# Patient Record
Sex: FEMALE | Race: WHITE | NOT HISPANIC OR LATINO | Employment: OTHER | URBAN - METROPOLITAN AREA
[De-identification: names, ages, dates, MRNs, and addresses within clinical notes are randomized per-mention and may not be internally consistent; named-entity substitution may affect disease eponyms.]

---

## 2017-02-13 ENCOUNTER — APPOINTMENT (EMERGENCY)
Dept: RADIOLOGY | Facility: HOSPITAL | Age: 57
End: 2017-02-13
Payer: COMMERCIAL

## 2017-02-13 ENCOUNTER — HOSPITAL ENCOUNTER (EMERGENCY)
Facility: HOSPITAL | Age: 57
Discharge: HOME | End: 2017-02-13
Payer: COMMERCIAL

## 2017-02-13 VITALS
OXYGEN SATURATION: 98 % | RESPIRATION RATE: 17 BRPM | SYSTOLIC BLOOD PRESSURE: 133 MMHG | DIASTOLIC BLOOD PRESSURE: 95 MMHG | HEART RATE: 65 BPM | WEIGHT: 146 LBS | TEMPERATURE: 97.7 F

## 2017-02-13 DIAGNOSIS — R53.1 WEAKNESS: Primary | ICD-10-CM

## 2017-02-13 DIAGNOSIS — E87.6 HYPOKALEMIA: ICD-10-CM

## 2017-02-13 LAB
ALBUMIN SERPL BCP-MCNC: 3.4 G/DL (ref 3.5–5)
ALP SERPL-CCNC: 83 U/L (ref 46–116)
ALT SERPL W P-5'-P-CCNC: 40 U/L (ref 12–78)
ANION GAP SERPL CALCULATED.3IONS-SCNC: 9 MMOL/L (ref 4–13)
AST SERPL W P-5'-P-CCNC: 15 U/L (ref 5–45)
BASOPHILS # BLD AUTO: 0.03 THOUSANDS/ΜL (ref 0–0.1)
BASOPHILS NFR BLD AUTO: 1 % (ref 0–1)
BILIRUB SERPL-MCNC: 0.3 MG/DL (ref 0.2–1)
BUN SERPL-MCNC: 20 MG/DL (ref 5–25)
CALCIUM SERPL-MCNC: 9 MG/DL (ref 8.3–10.1)
CHLORIDE SERPL-SCNC: 101 MMOL/L (ref 100–108)
CO2 SERPL-SCNC: 32 MMOL/L (ref 21–32)
CREAT SERPL-MCNC: 0.83 MG/DL (ref 0.6–1.3)
EOSINOPHIL # BLD AUTO: 0.2 THOUSAND/ΜL (ref 0–0.61)
EOSINOPHIL NFR BLD AUTO: 4 % (ref 0–6)
ERYTHROCYTE [DISTWIDTH] IN BLOOD BY AUTOMATED COUNT: 12.6 % (ref 11.6–15.1)
GFR SERPL CREATININE-BSD FRML MDRD: >60 ML/MIN/1.73SQ M
GLUCOSE SERPL-MCNC: 95 MG/DL (ref 65–140)
HCT VFR BLD AUTO: 44.6 % (ref 34.8–46.1)
HGB BLD-MCNC: 15.6 G/DL (ref 11.5–15.4)
LYMPHOCYTES # BLD AUTO: 2.15 THOUSANDS/ΜL (ref 0.6–4.47)
LYMPHOCYTES NFR BLD AUTO: 41 % (ref 14–44)
MCH RBC QN AUTO: 31.1 PG (ref 26.8–34.3)
MCHC RBC AUTO-ENTMCNC: 35 G/DL (ref 31.4–37.4)
MCV RBC AUTO: 89 FL (ref 82–98)
MONOCYTES # BLD AUTO: 0.51 THOUSAND/ΜL (ref 0.17–1.22)
MONOCYTES NFR BLD AUTO: 10 % (ref 4–12)
NEUTROPHILS # BLD AUTO: 2.33 THOUSANDS/ΜL (ref 1.85–7.62)
NEUTS SEG NFR BLD AUTO: 44 % (ref 43–75)
PLATELET # BLD AUTO: 254 THOUSANDS/UL (ref 149–390)
PMV BLD AUTO: 10.7 FL (ref 8.9–12.7)
POTASSIUM SERPL-SCNC: 2.9 MMOL/L (ref 3.5–5.3)
PROT SERPL-MCNC: 6.8 G/DL (ref 6.4–8.2)
RBC # BLD AUTO: 5.01 MILLION/UL (ref 3.81–5.12)
SODIUM SERPL-SCNC: 142 MMOL/L (ref 136–145)
TROPONIN I SERPL-MCNC: <0.02 NG/ML
WBC # BLD AUTO: 5.22 THOUSAND/UL (ref 4.31–10.16)

## 2017-02-13 PROCEDURE — 85025 COMPLETE CBC W/AUTO DIFF WBC: CPT | Performed by: PHYSICIAN ASSISTANT

## 2017-02-13 PROCEDURE — 80053 COMPREHEN METABOLIC PANEL: CPT | Performed by: PHYSICIAN ASSISTANT

## 2017-02-13 PROCEDURE — 99285 EMERGENCY DEPT VISIT HI MDM: CPT

## 2017-02-13 PROCEDURE — 84484 ASSAY OF TROPONIN QUANT: CPT | Performed by: PHYSICIAN ASSISTANT

## 2017-02-13 PROCEDURE — 36415 COLL VENOUS BLD VENIPUNCTURE: CPT | Performed by: PHYSICIAN ASSISTANT

## 2017-02-13 PROCEDURE — 71020 HB CHEST X-RAY 2VW FRONTAL&LATL: CPT

## 2017-02-13 PROCEDURE — 93005 ELECTROCARDIOGRAM TRACING: CPT | Performed by: PHYSICIAN ASSISTANT

## 2017-02-13 RX ORDER — POTASSIUM CHLORIDE 20 MEQ/1
40 TABLET, EXTENDED RELEASE ORAL ONCE
Status: COMPLETED | OUTPATIENT
Start: 2017-02-13 | End: 2017-02-13

## 2017-02-13 RX ADMIN — POTASSIUM CHLORIDE 40 MEQ: 1500 TABLET, EXTENDED RELEASE ORAL at 22:17

## 2017-02-14 LAB
ATRIAL RATE: 69 BPM
P AXIS: 54 DEGREES
PR INTERVAL: 154 MS
QRS AXIS: -17 DEGREES
QRSD INTERVAL: 98 MS
QT INTERVAL: 406 MS
QTC INTERVAL: 435 MS
T WAVE AXIS: 56 DEGREES
VENTRICULAR RATE: 69 BPM

## 2017-03-02 ENCOUNTER — HOSPITAL ENCOUNTER (EMERGENCY)
Facility: HOSPITAL | Age: 57
Discharge: HOME | End: 2017-03-02
Admitting: EMERGENCY MEDICINE
Payer: COMMERCIAL

## 2017-03-02 VITALS
WEIGHT: 147 LBS | RESPIRATION RATE: 18 BRPM | DIASTOLIC BLOOD PRESSURE: 92 MMHG | TEMPERATURE: 97.6 F | HEART RATE: 64 BPM | HEIGHT: 66 IN | BODY MASS INDEX: 23.63 KG/M2 | SYSTOLIC BLOOD PRESSURE: 132 MMHG | OXYGEN SATURATION: 97 %

## 2017-03-02 DIAGNOSIS — T50.905A DRUG REACTION, INITIAL ENCOUNTER: Primary | ICD-10-CM

## 2017-03-02 LAB
ANION GAP SERPL CALCULATED.3IONS-SCNC: 9 MMOL/L (ref 4–13)
BASOPHILS # BLD MANUAL: 0.11 THOUSAND/UL (ref 0–0.1)
BASOPHILS NFR MAR MANUAL: 2 % (ref 0–1)
BUN SERPL-MCNC: 22 MG/DL (ref 5–25)
CALCIUM SERPL-MCNC: 9 MG/DL (ref 8.3–10.1)
CHLORIDE SERPL-SCNC: 102 MMOL/L (ref 100–108)
CLARITY, POC: CLEAR
CO2 SERPL-SCNC: 32 MMOL/L (ref 21–32)
COLOR, POC: YELLOW
CREAT SERPL-MCNC: 0.86 MG/DL (ref 0.6–1.3)
EOSINOPHIL # BLD MANUAL: 0.28 THOUSAND/UL (ref 0–0.4)
EOSINOPHIL NFR BLD MANUAL: 5 % (ref 0–6)
ERYTHROCYTE [DISTWIDTH] IN BLOOD BY AUTOMATED COUNT: 13 % (ref 11.6–15.1)
EXT BILIRUBIN, UA: NORMAL
EXT BLOOD URINE: NORMAL
EXT GLUCOSE, UA: NORMAL
EXT KETONES: NORMAL
EXT NITRITE, UA: NORMAL
EXT PH, UA: 7
EXT PROTEIN, UA: NORMAL
EXT SPECIFIC GRAVITY, UA: 1.01
EXT UROBILINOGEN: 0.2
GFR SERPL CREATININE-BSD FRML MDRD: >60 ML/MIN/1.73SQ M
GLUCOSE SERPL-MCNC: 103 MG/DL (ref 65–140)
HCT VFR BLD AUTO: 46.1 % (ref 34.8–46.1)
HGB BLD-MCNC: 15.6 G/DL (ref 11.5–15.4)
LYMPHOCYTES # BLD AUTO: 2.19 THOUSAND/UL (ref 0.6–4.47)
LYMPHOCYTES # BLD AUTO: 39 % (ref 14–44)
MCH RBC QN AUTO: 31.4 PG (ref 26.8–34.3)
MCHC RBC AUTO-ENTMCNC: 33.8 G/DL (ref 31.4–37.4)
MCV RBC AUTO: 93 FL (ref 82–98)
MONOCYTES # BLD AUTO: 0.39 THOUSAND/UL (ref 0–1.22)
MONOCYTES NFR BLD: 7 % (ref 4–12)
NEUTROPHILS # BLD MANUAL: 2.3 THOUSAND/UL (ref 1.85–7.62)
NEUTS SEG NFR BLD AUTO: 41 % (ref 43–75)
PLATELET # BLD AUTO: 277 THOUSANDS/UL (ref 149–390)
PLATELET BLD QL SMEAR: ADEQUATE
PMV BLD AUTO: 10.1 FL (ref 8.9–12.7)
POTASSIUM SERPL-SCNC: 3.8 MMOL/L (ref 3.5–5.3)
RBC # BLD AUTO: 4.97 MILLION/UL (ref 3.81–5.12)
RBC MORPH BLD: NORMAL
SODIUM SERPL-SCNC: 143 MMOL/L (ref 136–145)
TOTAL CELLS COUNTED SPEC: 100
VARIANT LYMPHS # BLD AUTO: 6 %
WBC # BLD AUTO: 5.62 THOUSAND/UL (ref 4.31–10.16)
WBC # BLD EST: NORMAL 10*3/UL

## 2017-03-02 PROCEDURE — 80048 BASIC METABOLIC PNL TOTAL CA: CPT | Performed by: PHYSICIAN ASSISTANT

## 2017-03-02 PROCEDURE — 85027 COMPLETE CBC AUTOMATED: CPT | Performed by: PHYSICIAN ASSISTANT

## 2017-03-02 PROCEDURE — 85007 BL SMEAR W/DIFF WBC COUNT: CPT | Performed by: PHYSICIAN ASSISTANT

## 2017-03-02 PROCEDURE — 36415 COLL VENOUS BLD VENIPUNCTURE: CPT | Performed by: PHYSICIAN ASSISTANT

## 2017-03-02 PROCEDURE — 99284 EMERGENCY DEPT VISIT MOD MDM: CPT

## 2017-03-02 PROCEDURE — 81002 URINALYSIS NONAUTO W/O SCOPE: CPT | Performed by: PHYSICIAN ASSISTANT

## 2017-03-02 RX ORDER — MULTIVITAMIN WITH IRON
TABLET ORAL
COMMUNITY
End: 2018-02-27 | Stop reason: SDUPTHER

## 2017-03-02 RX ORDER — POTASSIUM CHLORIDE 1500 MG/1
10 TABLET, FILM COATED, EXTENDED RELEASE ORAL
COMMUNITY
End: 2018-02-27 | Stop reason: SDUPTHER

## 2017-03-02 RX ORDER — CHOLECALCIFEROL (VITAMIN D3) 125 MCG
500 CAPSULE ORAL DAILY
COMMUNITY
End: 2018-02-27 | Stop reason: SDUPTHER

## 2017-04-17 ENCOUNTER — HOSPITAL ENCOUNTER (OUTPATIENT)
Dept: BONE DENSITY | Facility: IMAGING CENTER | Age: 57
Discharge: HOME | End: 2017-04-17
Payer: COMMERCIAL

## 2017-04-17 DIAGNOSIS — Z00.00 ENCOUNTER FOR GENERAL ADULT MEDICAL EXAMINATION WITHOUT ABNORMAL FINDINGS: ICD-10-CM

## 2017-04-17 PROCEDURE — G0202 SCR MAMMO BI INCL CAD: HCPCS

## 2017-05-11 ENCOUNTER — HOSPITAL ENCOUNTER (EMERGENCY)
Facility: HOSPITAL | Age: 57
Discharge: HOME | End: 2017-05-11
Admitting: EMERGENCY MEDICINE
Payer: COMMERCIAL

## 2017-05-11 ENCOUNTER — APPOINTMENT (EMERGENCY)
Dept: RADIOLOGY | Facility: HOSPITAL | Age: 57
End: 2017-05-11
Payer: COMMERCIAL

## 2017-05-11 VITALS
OXYGEN SATURATION: 98 % | HEART RATE: 79 BPM | SYSTOLIC BLOOD PRESSURE: 129 MMHG | BODY MASS INDEX: 23.73 KG/M2 | RESPIRATION RATE: 20 BRPM | TEMPERATURE: 97.2 F | WEIGHT: 147 LBS | DIASTOLIC BLOOD PRESSURE: 67 MMHG

## 2017-05-11 DIAGNOSIS — S66.911A STRAIN OF RIGHT WRIST, INITIAL ENCOUNTER: Primary | ICD-10-CM

## 2017-05-11 PROCEDURE — 73110 X-RAY EXAM OF WRIST: CPT

## 2017-05-11 PROCEDURE — 99283 EMERGENCY DEPT VISIT LOW MDM: CPT

## 2017-05-11 RX ORDER — UBIDECARENONE/VIT E ACET 100MG-5
1 CAPSULE ORAL DAILY
COMMUNITY
End: 2018-02-27 | Stop reason: SDUPTHER

## 2017-05-11 RX ORDER — IBUPROFEN 600 MG/1
600 TABLET ORAL ONCE
Status: COMPLETED | OUTPATIENT
Start: 2017-05-11 | End: 2017-05-11

## 2017-05-11 RX ADMIN — IBUPROFEN 600 MG: 600 TABLET, FILM COATED ORAL at 19:18

## 2017-06-22 ENCOUNTER — ALLSCRIPTS OFFICE VISIT (OUTPATIENT)
Dept: OTHER | Facility: OTHER | Age: 57
End: 2017-06-22

## 2017-06-22 DIAGNOSIS — Z01.419 ENCOUNTER FOR GYNECOLOGICAL EXAMINATION WITHOUT ABNORMAL FINDING: ICD-10-CM

## 2017-06-27 LAB
ADEQUACY: (HISTORICAL): NORMAL
CLINICIAN PROVIDIED ICD 9 OR 10 (HISTORICAL): NORMAL
COMMENT (HISTORICAL): NORMAL
DIAGNOSIS (HISTORICAL): NORMAL
HPV HIGH RISK (HISTORICAL): NEGATIVE
NOTE: (HISTORICAL): NORMAL
PERFORMED BY (HISTORICAL): NORMAL
TEST INFORMATION (HISTORICAL): NORMAL

## 2018-01-12 VITALS
WEIGHT: 150 LBS | HEIGHT: 66 IN | DIASTOLIC BLOOD PRESSURE: 80 MMHG | SYSTOLIC BLOOD PRESSURE: 126 MMHG | BODY MASS INDEX: 24.11 KG/M2

## 2018-02-27 PROBLEM — Z00.00 HEALTH CARE MAINTENANCE: Status: ACTIVE | Noted: 2018-02-27

## 2018-02-27 RX ORDER — LOSARTAN POTASSIUM 50 MG/1
TABLET ORAL
COMMUNITY
End: 2018-03-19 | Stop reason: SDUPTHER

## 2018-02-27 RX ORDER — HYDROCHLOROTHIAZIDE 12.5 MG/1
CAPSULE, GELATIN COATED ORAL
COMMUNITY
Start: 2017-02-16 | End: 2018-03-19 | Stop reason: SDUPTHER

## 2018-02-27 NOTE — PROGRESS NOTES
Assessment/Plan:    Health care maintenance  Here to establish care and preventive health    Patient Active Problem List   Diagnosis    Health care maintenance     No orders of the defined types were placed in this encounter  Diagnoses and all orders for this visit:    Health care maintenance    Other orders  -     aspirin 81 MG tablet; Take by mouth  -     losartan (COZAAR) 50 mg tablet; Take by mouth  -     hydrochlorothiazide (MICROZIDE) 12 5 mg capsule; Take by mouth          Subjective:      Patient ID: Naomi Chow is a 65 y o  female  Here to establish care  The following portions of the patient's history were reviewed and updated as appropriate: allergies, current medications, past family history, past medical history, past social history, past surgical history and problem list     Review of Systems   Constitutional: Negative for fatigue and fever  HENT: Negative for hearing loss  Eyes: Negative for visual disturbance  Respiratory: Negative for cough, chest tightness, shortness of breath and wheezing  Cardiovascular: Negative for chest pain, palpitations and leg swelling  Gastrointestinal: Negative for abdominal pain, diarrhea and nausea  Genitourinary: Negative for dysuria and hematuria  Musculoskeletal: Negative for arthralgias  Neurological: Negative for dizziness, numbness and headaches  Psychiatric/Behavioral: Negative for confusion and dysphoric mood  All other systems reviewed and are negative  Objective:      Current Outpatient Prescriptions:     hydrochlorothiazide (MICROZIDE) 12 5 mg capsule, Take by mouth, Disp: , Rfl:     aspirin 81 MG tablet, Take by mouth, Disp: , Rfl:     losartan (COZAAR) 50 mg tablet, Take by mouth, Disp: , Rfl:      Physical Exam   Constitutional: She is oriented to person, place, and time  She appears well-developed and well-nourished  Eyes: Pupils are equal, round, and reactive to light     Neck: Normal range of motion  Neck supple  No thyromegaly present  Cardiovascular: Normal rate, regular rhythm, normal heart sounds and intact distal pulses  No murmur heard  Pulmonary/Chest: Effort normal and breath sounds normal  She has no wheezes  She has no rales  Abdominal: Soft  Bowel sounds are normal  There is no tenderness  Musculoskeletal: Normal range of motion  She exhibits no edema, tenderness or deformity  Lymphadenopathy:     She has no cervical adenopathy  Neurological: She is alert and oriented to person, place, and time  She has normal reflexes  Skin: Skin is warm and dry  Psychiatric: She has a normal mood and affect  Vitals reviewed

## 2018-02-28 ENCOUNTER — OFFICE VISIT (OUTPATIENT)
Dept: FAMILY MEDICINE CLINIC | Facility: HOSPITAL | Age: 58
End: 2018-02-28
Payer: COMMERCIAL

## 2018-02-28 VITALS
OXYGEN SATURATION: 96 % | WEIGHT: 148 LBS | HEIGHT: 66 IN | SYSTOLIC BLOOD PRESSURE: 144 MMHG | DIASTOLIC BLOOD PRESSURE: 70 MMHG | HEART RATE: 85 BPM | BODY MASS INDEX: 23.78 KG/M2 | RESPIRATION RATE: 16 BRPM

## 2018-02-28 DIAGNOSIS — I10 ESSENTIAL HYPERTENSION: ICD-10-CM

## 2018-02-28 DIAGNOSIS — G47.00 INSOMNIA, UNSPECIFIED TYPE: Primary | ICD-10-CM

## 2018-02-28 DIAGNOSIS — Z00.00 HEALTH CARE MAINTENANCE: ICD-10-CM

## 2018-02-28 DIAGNOSIS — R73.9 HYPERGLYCEMIA: ICD-10-CM

## 2018-02-28 PROBLEM — F84.0 AUTISM DISORDER: Chronic | Status: ACTIVE | Noted: 2018-02-28

## 2018-02-28 PROCEDURE — 99203 OFFICE O/P NEW LOW 30 MIN: CPT | Performed by: INTERNAL MEDICINE

## 2018-02-28 RX ORDER — TEMAZEPAM 7.5 MG/1
7.5 CAPSULE ORAL
COMMUNITY
End: 2018-02-28 | Stop reason: SDUPTHER

## 2018-02-28 RX ORDER — LORAZEPAM 0.5 MG/1
0.5 TABLET ORAL DAILY PRN
COMMUNITY
End: 2018-04-04 | Stop reason: SDUPTHER

## 2018-02-28 RX ORDER — TEMAZEPAM 7.5 MG/1
7.5 CAPSULE ORAL
Qty: 30 CAPSULE | Refills: 0 | Status: SHIPPED | OUTPATIENT
Start: 2018-02-28 | End: 2018-04-04 | Stop reason: SDUPTHER

## 2018-02-28 NOTE — PATIENT INSTRUCTIONS
First office visit today  Immediate concerns were about sleep  Current medications are effective and will be refilled as needed    Might need referral to sleep specialist      Blood pressure elevated a bit but likely due to stress    Screening labs ordered, no need to fast

## 2018-02-28 NOTE — ASSESSMENT & PLAN NOTE
Not a new problem, long standing, has used ativan in past  Patient is seeing a counselor and feels this is impacting her sleep  Can use prn ativan up to 1 5mg at night if desired

## 2018-03-19 DIAGNOSIS — I10 HYPERTENSION, ESSENTIAL: Primary | ICD-10-CM

## 2018-03-19 RX ORDER — LOSARTAN POTASSIUM 50 MG/1
50 TABLET ORAL DAILY
Qty: 90 TABLET | Refills: 3 | Status: SHIPPED | OUTPATIENT
Start: 2018-03-19 | End: 2019-03-27 | Stop reason: SDUPTHER

## 2018-03-19 RX ORDER — HYDROCHLOROTHIAZIDE 12.5 MG/1
12.5 CAPSULE, GELATIN COATED ORAL DAILY
Qty: 90 CAPSULE | Refills: 3 | Status: SHIPPED | OUTPATIENT
Start: 2018-03-19 | End: 2019-03-27 | Stop reason: SDUPTHER

## 2018-03-23 ENCOUNTER — HOSPITAL ENCOUNTER (EMERGENCY)
Facility: HOSPITAL | Age: 58
Discharge: HOME | End: 2018-03-23
Payer: COMMERCIAL

## 2018-03-23 VITALS
WEIGHT: 145 LBS | HEIGHT: 66 IN | TEMPERATURE: 98.5 F | SYSTOLIC BLOOD PRESSURE: 128 MMHG | OXYGEN SATURATION: 99 % | RESPIRATION RATE: 18 BRPM | BODY MASS INDEX: 23.3 KG/M2 | HEART RATE: 60 BPM | DIASTOLIC BLOOD PRESSURE: 80 MMHG

## 2018-03-23 DIAGNOSIS — E87.6 HYPOKALEMIA: ICD-10-CM

## 2018-03-23 DIAGNOSIS — R42 DIZZINESS: Primary | ICD-10-CM

## 2018-03-23 LAB
ANION GAP SERPL CALCULATED.3IONS-SCNC: 6 MMOL/L (ref 4–13)
BASOPHILS # BLD AUTO: 0.03 THOUSANDS/ΜL (ref 0–0.1)
BASOPHILS NFR BLD AUTO: 1 % (ref 0–1)
BILIRUB UR QL STRIP: NEGATIVE
BUN SERPL-MCNC: 23 MG/DL (ref 5–25)
CALCIUM SERPL-MCNC: 8.6 MG/DL (ref 8.3–10.1)
CHLORIDE SERPL-SCNC: 102 MMOL/L (ref 100–108)
CLARITY UR: CLEAR
CO2 SERPL-SCNC: 33 MMOL/L (ref 21–32)
COLOR UR: YELLOW
CREAT SERPL-MCNC: 0.86 MG/DL (ref 0.6–1.3)
EOSINOPHIL # BLD AUTO: 0.35 THOUSAND/ΜL (ref 0–0.61)
EOSINOPHIL NFR BLD AUTO: 7 % (ref 0–6)
ERYTHROCYTE [DISTWIDTH] IN BLOOD BY AUTOMATED COUNT: 13.2 % (ref 11.6–15.1)
GFR SERPL CREATININE-BSD FRML MDRD: 76 ML/MIN/1.73SQ M
GLUCOSE SERPL-MCNC: 102 MG/DL (ref 65–140)
GLUCOSE UR STRIP-MCNC: NEGATIVE MG/DL
HCT VFR BLD AUTO: 45.3 % (ref 34.8–46.1)
HGB BLD-MCNC: 15.5 G/DL (ref 11.5–15.4)
HGB UR QL STRIP.AUTO: NEGATIVE
KETONES UR STRIP-MCNC: NEGATIVE MG/DL
LEUKOCYTE ESTERASE UR QL STRIP: NEGATIVE
LYMPHOCYTES # BLD AUTO: 1.6 THOUSANDS/ΜL (ref 0.6–4.47)
LYMPHOCYTES NFR BLD AUTO: 30 % (ref 14–44)
MCH RBC QN AUTO: 31.9 PG (ref 26.8–34.3)
MCHC RBC AUTO-ENTMCNC: 34.2 G/DL (ref 31.4–37.4)
MCV RBC AUTO: 93 FL (ref 82–98)
MONOCYTES # BLD AUTO: 0.46 THOUSAND/ΜL (ref 0.17–1.22)
MONOCYTES NFR BLD AUTO: 9 % (ref 4–12)
NEUTROPHILS # BLD AUTO: 2.84 THOUSANDS/ΜL (ref 1.85–7.62)
NEUTS SEG NFR BLD AUTO: 53 % (ref 43–75)
NITRITE UR QL STRIP: NEGATIVE
PH UR STRIP.AUTO: 6.5 [PH] (ref 4.5–8)
PLATELET # BLD AUTO: 247 THOUSANDS/UL (ref 149–390)
PMV BLD AUTO: 10.1 FL (ref 8.9–12.7)
POTASSIUM SERPL-SCNC: 3.2 MMOL/L (ref 3.5–5.3)
PROT UR STRIP-MCNC: NEGATIVE MG/DL
RBC # BLD AUTO: 4.86 MILLION/UL (ref 3.81–5.12)
SODIUM SERPL-SCNC: 141 MMOL/L (ref 136–145)
SP GR UR STRIP.AUTO: 1.01 (ref 1–1.03)
UROBILINOGEN UR QL STRIP.AUTO: 0.2 E.U./DL
WBC # BLD AUTO: 5.28 THOUSAND/UL (ref 4.31–10.16)

## 2018-03-23 PROCEDURE — 80048 BASIC METABOLIC PNL TOTAL CA: CPT | Performed by: PHYSICIAN ASSISTANT

## 2018-03-23 PROCEDURE — 81003 URINALYSIS AUTO W/O SCOPE: CPT | Performed by: PHYSICIAN ASSISTANT

## 2018-03-23 PROCEDURE — 36415 COLL VENOUS BLD VENIPUNCTURE: CPT | Performed by: PHYSICIAN ASSISTANT

## 2018-03-23 PROCEDURE — 96360 HYDRATION IV INFUSION INIT: CPT

## 2018-03-23 PROCEDURE — 99284 EMERGENCY DEPT VISIT MOD MDM: CPT

## 2018-03-23 PROCEDURE — 85025 COMPLETE CBC W/AUTO DIFF WBC: CPT | Performed by: PHYSICIAN ASSISTANT

## 2018-03-23 RX ADMIN — SODIUM CHLORIDE 1000 ML: 0.9 INJECTION, SOLUTION INTRAVENOUS at 19:22

## 2018-03-23 NOTE — ED NOTES
Pt reports "I do not feel dizzy now"  Pt reports around 1630 today pt felt "dizzy" and "off balance"  Pt reports drinking "lots of fluid and still feeling thirsty"  Pt states "I came in because I felt off  I just didn't feel like myself   But after sitting around here, I do feel better"     Samson Monge RN  03/23/18 1950

## 2018-03-23 NOTE — ED NOTES
IV fluids running  Pt states "I feel fine  I don't feel dizzy  I am just hungry"  Pt is awake, alert, oriented and speaking in full sentences       Chastity Marcial RN  03/23/18 5767

## 2018-03-23 NOTE — ED PROVIDER NOTES
History  Chief Complaint   Patient presents with    Dizziness     Patient states she went to lunch today and developed dizziness  Patient states her BS and her BP was lower  Pt eating a protein bar, during triage  Patient denies any CP or SOB  Pt states she feels weak     64 yo female presents to the ER with complaint of dizziness that started at lunch today and then she developed dizziness  Patient states her BS and her BP was lower  Pt states that she went home and laid down for a while and ate a protein bar while waiting to be brought back and she feels better now  Patient denies any CP or SOB  Pt states she felt weak and doesn't know if she was trying to get sick  Prior to Admission Medications   Prescriptions Last Dose Informant Patient Reported? Taking? LORazepam (ATIVAN) 0 5 mg tablet   Yes Yes   Sig: Take 0 5 mg by mouth daily as needed for anxiety   aspirin 81 MG tablet   Yes Yes   Sig: Take by mouth   hydrochlorothiazide (MICROZIDE) 12 5 mg capsule   No Yes   Sig: Take 1 capsule (12 5 mg total) by mouth daily   losartan (COZAAR) 50 mg tablet   No Yes   Sig: Take 1 tablet (50 mg total) by mouth daily   temazepam (RESTORIL) 7 5 mg capsule   No Yes   Sig: Take 1 capsule (7 5 mg total) by mouth daily at bedtime as needed for sleep      Facility-Administered Medications: None       Past Medical History:   Diagnosis Date    Hypertension     Pre-diabetes        Past Surgical History:   Procedure Laterality Date    APPENDECTOMY         Family History   Problem Relation Age of Onset    Hypertension Mother     Substance Abuse Neg Hx     Mental illness Neg Hx      I have reviewed and agree with the history as documented  Social History   Substance Use Topics    Smoking status: Never Smoker    Smokeless tobacco: Never Used    Alcohol use No        Review of Systems   Constitutional: Positive for chills  Negative for fever     HENT: Negative for congestion, ear pain, rhinorrhea, sinus pain and sinus pressure  Respiratory: Negative for cough, chest tightness, shortness of breath and wheezing  Gastrointestinal: Negative for abdominal pain, constipation, diarrhea, nausea and vomiting  Neurological: Positive for dizziness and weakness  Negative for light-headedness, numbness and headaches  All other systems reviewed and are negative  Physical Exam  ED Triage Vitals   Temperature Pulse Respirations Blood Pressure SpO2   03/23/18 1807 03/23/18 1807 03/23/18 1807 03/23/18 1807 03/23/18 1807   98 5 °F (36 9 °C) 84 20 110/66 97 %      Temp src Heart Rate Source Patient Position - Orthostatic VS BP Location FiO2 (%)   -- 03/23/18 1945 -- -- --    Monitor         Pain Score       03/23/18 1807       No Pain           Orthostatic Vital Signs  Vitals:    03/23/18 1945 03/23/18 2000 03/23/18 2015 03/23/18 2054   BP: 132/83 128/82 132/84 128/80   Pulse: 66 62 61 60       Physical Exam   Constitutional: She is oriented to person, place, and time  Vital signs are normal  She appears well-developed and well-nourished  HENT:   Head: Normocephalic and atraumatic  Eyes: Conjunctivae and EOM are normal  Pupils are equal, round, and reactive to light  Neck: Normal range of motion  Neck supple  Cardiovascular: Normal rate, regular rhythm and normal heart sounds  Pulmonary/Chest: Effort normal and breath sounds normal    Abdominal: Soft  Bowel sounds are normal    Musculoskeletal: Normal range of motion  Neurological: She is alert and oriented to person, place, and time  Skin: Skin is warm and dry  Psychiatric: She has a normal mood and affect  Her speech is normal and behavior is normal  Judgment and thought content normal    Nursing note and vitals reviewed        ED Medications  Medications   sodium chloride 0 9 % bolus 1,000 mL (0 mL Intravenous Stopped 3/23/18 2022)       Diagnostic Studies  Results Reviewed     Procedure Component Value Units Date/Time    Basic metabolic panel [[id number]]  (Abnormal) Collected:  03/23/18 1921    Lab Status:  Final result Specimen:  Blood from Arm, Right Updated:  03/23/18 1947     Sodium 141 mmol/L      Potassium 3 2 (L) mmol/L      Chloride 102 mmol/L      CO2 33 (H) mmol/L      Anion Gap 6 mmol/L      BUN 23 mg/dL      Creatinine 0 86 mg/dL      Glucose 102 mg/dL      Calcium 8 6 mg/dL      eGFR 76 ml/min/1 73sq m     Narrative:         National Kidney Disease Education Program recommendations are as follows:  GFR calculation is accurate only with a steady state creatinine  Chronic Kidney disease less than 60 ml/min/1 73 sq  meters  Kidney failure less than 15 ml/min/1 73 sq  meters      UA w Reflex to Microscopic w Reflex to Culture [[id number]]  (Normal) Collected:  03/23/18 1927    Lab Status:  Final result Specimen:  Urine from Urine, Clean Catch Updated:  03/23/18 1943     Color, UA Yellow     Clarity, UA Clear     Specific Gravity, UA 1 010     pH, UA 6 5     Leukocytes, UA Negative     Nitrite, UA Negative     Protein, UA Negative mg/dl      Glucose, UA Negative mg/dl      Ketones, UA Negative mg/dl      Urobilinogen, UA 0 2 E U /dl      Bilirubin, UA Negative     Blood, UA Negative    CBC and differential [[id number]]  (Abnormal) Collected:  03/23/18 1921    Lab Status:  Final result Specimen:  Blood from Arm, Right Updated:  03/23/18 1937     WBC 5 28 Thousand/uL      RBC 4 86 Million/uL      Hemoglobin 15 5 (H) g/dL      Hematocrit 45 3 %      MCV 93 fL      MCH 31 9 pg      MCHC 34 2 g/dL      RDW 13 2 %      MPV 10 1 fL      Platelets 449 Thousands/uL      Neutrophils Relative 53 %      Lymphocytes Relative 30 %      Monocytes Relative 9 %      Eosinophils Relative 7 (H) %      Basophils Relative 1 %      Neutrophils Absolute 2 84 Thousands/µL      Lymphocytes Absolute 1 60 Thousands/µL      Monocytes Absolute 0 46 Thousand/µL      Eosinophils Absolute 0 35 Thousand/µL      Basophils Absolute 0 03 Thousands/µL                  No orders to display Procedures  Procedures       Phone Contacts  ED Phone Contact    ED Course  ED Course as of Mar 28 1232   Fri Mar 23, 2018   2056 Pt states that she has potassium pills at home that she can take to increase her potassium levels                                MDM  Number of Diagnoses or Management Options  Dizziness: new and requires workup  Hypokalemia: new and does not require workup     Amount and/or Complexity of Data Reviewed  Clinical lab tests: ordered and reviewed    Patient Progress  Patient progress: stable    CritCare Time    Disposition  Final diagnoses:   Dizziness   Hypokalemia     Time reflects when diagnosis was documented in both MDM as applicable and the Disposition within this note     Time User Action Codes Description Comment    3/23/2018  8:53 PM Ivette Sang Add [R42] Dizziness     3/23/2018  8:54 PM Ivette Sang Add [E87 6] Hypokalemia       ED Disposition     ED Disposition Condition Comment    Discharge  Luis A Ferrell discharge to home/self care      Condition at discharge: Stable        Follow-up Information     Follow up With Specialties Details Why Contact Info    Arelis Phillip MD Internal Medicine Call For Recheck, If symptoms worsen [facility]  [address] [facility]          Discharge Medication List as of 3/23/2018  8:55 PM      CONTINUE these medications which have NOT CHANGED    Details   aspirin 81 MG tablet Take by mouth, Historical Med      hydrochlorothiazide (MICROZIDE) 12 5 mg capsule Take 1 capsule (12 5 mg total) by mouth daily, Starting Mon 3/19/2018, Normal      LORazepam (ATIVAN) 0 5 mg tablet Take 0 5 mg by mouth daily as needed for anxiety, Historical Med      losartan (COZAAR) 50 mg tablet Take 1 tablet (50 mg total) by mouth daily, Starting Mon 3/19/2018, Normal      temazepam (RESTORIL) 7 5 mg capsule Take 1 capsule (7 5 mg total) by mouth daily at bedtime as needed for sleep, Starting Wed 2/28/2018, Print No discharge procedures on file      ED Provider  Electronically Signed by           Aldo García PA-C  03/28/18 8330

## 2018-03-24 NOTE — ED NOTES
Pt ambulated back to bed  No dizziness reported  Pt eating crackers and sitting upright       Jaime Maria RN  03/23/18 2046

## 2018-03-24 NOTE — DISCHARGE INSTRUCTIONS
Can take over the counter potassium  Drink plenty of fluids  Follow up with your family doctor if symptoms worsen or new symptoms develop  Dizziness   WHAT YOU NEED TO KNOW:   Dizziness is a feeling of being off balance or unsteady  Common causes of dizziness are an inner ear fluid imbalance or a lack of oxygen in your blood  Dizziness may be acute (lasts 3 days or less) or chronic (lasts longer than 3 days)  You may have dizzy spells that last from seconds to a few hours  DISCHARGE INSTRUCTIONS:   Return to the emergency department if:   · You have a headache and a stiff neck  · You have shaking chills and a fever  · You vomit over and over with no relief  · Your vomit or bowel movements are red or black  · You have pain in your chest, back, or abdomen  · You have numbness, especially in your face, arms, or legs  · You have trouble moving your arms or legs  · You are confused  Contact your healthcare provider if:   · You have a fever  · Your symptoms do not get better with treatment  · You have questions or concerns about your condition or care  Manage your symptoms:   · Do not drive  or operate heavy machinery when you are dizzy  · Get up slowly  from sitting or lying down  · Drink plenty of liquids  Liquids help prevent dehydration  Ask how much liquid to drink each day and which liquids are best for you  Follow up with your healthcare provider as directed:  Write down your questions so you remember to ask them during your visits  © 2017 [employer] Information is for End User's use only and may not be sold, redistributed or otherwise used for commercial purposes  All illustrations and images included in CareNotes® are the copyrighted property of [employer]  or [employer]  The above information is an  only  It is not intended as medical advice for individual conditions or treatments   Talk to your doctor, nurse or pharmacist before following any medical regimen to see if it is safe and effective for you  Hypokalemia   WHAT YOU NEED TO KNOW:   Hypokalemia is a low level of potassium in your blood  Potassium helps control how your muscles, heart, and digestive system work  Hypokalemia occurs when your body loses too much potassium or does not absorb enough from food  DISCHARGE INSTRUCTIONS:   Return to the emergency department if:   · You cannot move your arm or leg  · You have a fast or irregular heartbeat  · You are too tired or weak to stand up  Contact your healthcare provider if:   · You are vomiting, or you have diarrhea  · You have numbness or tingling in your arms or legs  · Your symptoms do not go away or they get worse  · You have questions or concerns about your condition or care  Medicines:   · Potassium  will be given to bring your potassium levels back to normal     · Take your medicine as directed  Contact your healthcare provider if you think your medicine is not helping or if you have side effects  Tell him of her if you are allergic to any medicine  Keep a list of the medicines, vitamins, and herbs you take  Include the amounts, and when and why you take them  Bring the list or the pill bottles to follow-up visits  Carry your medicine list with you in case of an emergency  Eat foods that are high in potassium:  Foods that are high in potassium include bananas, oranges, tomatoes, potatoes, and avocado  Mortensen beans, turkey, salmon, lean beef, yogurt, and milk are also high in potassium  Ask your healthcare provider or dietitian for more information about foods that are high in potassium  Follow up with your healthcare provider as directed:  Write down your questions so you remember to ask them during your visits  © 2017 [employer] Information is for End User's use only and may not be sold, redistributed or otherwise used for commercial purposes   All illustrations and images included in CareNotes® are the copyrighted property of [employer]  or [employer]  The above information is an  only  It is not intended as medical advice for individual conditions or treatments  Talk to your doctor, nurse or pharmacist before following any medical regimen to see if it is safe and effective for you

## 2018-03-24 NOTE — ED NOTES
No complaints of dizziness or N/V  Pt maintains balance and moves without any difficulties  VS stable       Sony Ward RN  03/23/18 3464

## 2018-03-24 NOTE — ED NOTES
Pt resting in bed  Pt sitting upright  No dizziness or slurred speech  Mother at the bedside  IV fluids remain infusing       Marshall Guallpa RN  03/23/18 2009

## 2018-03-26 ENCOUNTER — TELEPHONE (OUTPATIENT)
Dept: FAMILY MEDICINE CLINIC | Facility: HOSPITAL | Age: 58
End: 2018-03-26

## 2018-03-27 ENCOUNTER — VBI (OUTPATIENT)
Dept: ADMINISTRATIVE | Facility: OTHER | Age: 58
End: 2018-03-27

## 2018-03-27 ENCOUNTER — TELEPHONE (OUTPATIENT)
Dept: FAMILY MEDICINE CLINIC | Facility: HOSPITAL | Age: 58
End: 2018-03-27

## 2018-03-27 DIAGNOSIS — E87.6 HYPOKALEMIA: Primary | ICD-10-CM

## 2018-03-27 NOTE — TELEPHONE ENCOUNTER
Wang Zavala    ED Visit Information     Ed visit date: 03/23/2018  Diagnosis Description: Dizziness; Hypokalemia  In Network? Yes [address]  Discharge status: Home  Discharged with meds ? No  Number of ED visits to date: 1  ED Severity:3     Outreach Information    Outreach successful: Yes 2  Date letter mailed:N/a   Date Finalized:03/28/2018    Care Coordination    Follow up appointment with pcp: yes 04/04/2018  Transportation issues ? No    [employer] type:  7 Day Outreach  Emergent necessity warranted by diagnosis:  No  [facility] PCP:  Yes  Transportation:  Friend/Family Transport  Called PCP first?:  No  Feels able to call PCP for urgent problems ?:  Yes  Understands what emergencies can be handled by PCP ?:  Yes  Ever any problems getting appointment with PCP for minor emergency/urgency problems?:  No  Practice Contacted Patient ?:  No  Pt had ED follow up with pcp/staff ?:  No    Reason Patient went to ED instead of Urgent Care or PCP?:  Perceived Severity of Illness    03/27/2018 10:39 AM Phone (Katya Spangler) Mine Sellers (Self)   Left Message - Requesting a call back    03/28/2018 8:24 AM Phone (Katya Spangler) Mine Sellers (Self)   Call Complete - Personal communication with patient:    Patient stated that she is doing okay "under the circumstances " She stated that she was having a late lunch on 03/23/2008 and started to not feel well  She grew concerned when symptoms had not passed a few hours later  She was told at ED that she was dehydrated and had low potassium  She has been trying to stay hydrated, has made dietary changes to try and is taking MVI and supplements to  improve her levels  She does have an ED follow up appt scheduled with her PCP

## 2018-03-29 LAB
EST. AVERAGE GLUCOSE BLD GHB EST-MCNC: 117 MG/DL
HBA1C MFR BLD: 5.7 % (ref 4.8–5.6)
TSH SERPL DL<=0.005 MIU/L-ACNC: 0.52 UIU/ML (ref 0.45–4.5)

## 2018-03-30 LAB
BUN SERPL-MCNC: 17 MG/DL (ref 6–24)
BUN/CREAT SERPL: 22 (ref 9–23)
CALCIUM SERPL-MCNC: 9.5 MG/DL (ref 8.7–10.2)
CHLORIDE SERPL-SCNC: 103 MMOL/L (ref 96–106)
CO2 SERPL-SCNC: 27 MMOL/L (ref 18–29)
CREAT SERPL-MCNC: 0.79 MG/DL (ref 0.57–1)
GLUCOSE SERPL-MCNC: 80 MG/DL (ref 65–99)
POTASSIUM SERPL-SCNC: 4.3 MMOL/L (ref 3.5–5.2)
SL AMB EGFR AFRICAN AMERICAN: 97 ML/MIN/1.73
SL AMB EGFR NON AFRICAN AMERICAN: 84 ML/MIN/1.73
SODIUM SERPL-SCNC: 141 MMOL/L (ref 134–144)

## 2018-04-04 ENCOUNTER — OFFICE VISIT (OUTPATIENT)
Dept: FAMILY MEDICINE CLINIC | Facility: HOSPITAL | Age: 58
End: 2018-04-04
Payer: COMMERCIAL

## 2018-04-04 VITALS
WEIGHT: 149 LBS | HEIGHT: 66 IN | RESPIRATION RATE: 16 BRPM | BODY MASS INDEX: 23.95 KG/M2 | HEART RATE: 90 BPM | DIASTOLIC BLOOD PRESSURE: 82 MMHG | SYSTOLIC BLOOD PRESSURE: 138 MMHG

## 2018-04-04 DIAGNOSIS — G47.00 INSOMNIA, UNSPECIFIED TYPE: ICD-10-CM

## 2018-04-04 DIAGNOSIS — E87.6 HYPOKALEMIA: ICD-10-CM

## 2018-04-04 DIAGNOSIS — I10 ESSENTIAL HYPERTENSION: ICD-10-CM

## 2018-04-04 DIAGNOSIS — G47.09 OTHER INSOMNIA: Primary | ICD-10-CM

## 2018-04-04 PROCEDURE — 99213 OFFICE O/P EST LOW 20 MIN: CPT | Performed by: INTERNAL MEDICINE

## 2018-04-04 RX ORDER — ASCORBIC ACID
500 CRYSTALS ORAL DAILY
COMMUNITY
End: 2021-03-31 | Stop reason: HOSPADM

## 2018-04-04 RX ORDER — UBIDECARENONE/VIT E ACET 100MG-5
1 CAPSULE ORAL DAILY
COMMUNITY
End: 2021-03-31 | Stop reason: HOSPADM

## 2018-04-04 RX ORDER — YOHIMBE BARK 500 MG
1 CAPSULE ORAL DAILY
COMMUNITY
End: 2021-03-31 | Stop reason: HOSPADM

## 2018-04-04 RX ORDER — CALCIUM CARBONATE/VITAMIN D3 600 MG-10
1 TABLET ORAL DAILY
COMMUNITY
End: 2019-10-15 | Stop reason: ALTCHOICE

## 2018-04-04 RX ORDER — MULTIVIT-MIN/IRON FUM/FOLIC AC 7.5 MG-4
1 TABLET ORAL DAILY
COMMUNITY
End: 2019-03-18

## 2018-04-04 RX ORDER — ASCORBIC ACID 500 MG
500 TABLET ORAL 2 TIMES DAILY
COMMUNITY
End: 2021-03-31 | Stop reason: HOSPADM

## 2018-04-04 RX ORDER — LORAZEPAM 0.5 MG/1
0.5 TABLET ORAL DAILY PRN
Qty: 30 TABLET | Refills: 0 | Status: SHIPPED | OUTPATIENT
Start: 2018-04-04 | End: 2018-07-26 | Stop reason: SDUPTHER

## 2018-04-04 RX ORDER — MULTIVITAMIN WITH IRON
TABLET ORAL
COMMUNITY
End: 2021-03-31 | Stop reason: HOSPADM

## 2018-04-04 RX ORDER — TEMAZEPAM 7.5 MG/1
7.5 CAPSULE ORAL
Qty: 30 CAPSULE | Refills: 2 | Status: SHIPPED | OUTPATIENT
Start: 2018-04-04 | End: 2018-06-20 | Stop reason: SDUPTHER

## 2018-04-04 NOTE — PROGRESS NOTES
Assessment/Plan:    Insomnia  Re evaluation,  Using ativan    Essential hypertension  Stable  Hypokalemia  Found on labs, incidental   Replete and recheck  Patient Active Problem List   Diagnosis    Health care maintenance    Insomnia    Hyperglycemia    Essential hypertension    Autism disorder    Hypokalemia     No orders of the defined types were placed in this encounter  Diagnoses and all orders for this visit:    Other insomnia    Essential hypertension    Hypokalemia    Insomnia, unspecified type  -     LORazepam (ATIVAN) 0 5 mg tablet; Take 1 tablet (0 5 mg total) by mouth daily as needed for anxiety  -     temazepam (RESTORIL) 7 5 mg capsule; Take 1 capsule (7 5 mg total) by mouth daily at bedtime as needed for sleep    Other orders  -     Multiple Vitamins-Minerals (MULTIVITAMIN WITH MINERALS) tablet; Take 1 tablet by mouth daily  -     Calcium Carbonate-Vitamin D3 (CALCIUM 600-D) 600-400 MG-UNIT TABS; Take 1 capsule by mouth daily  -     Magnesium 250 MG TABS; Take 1 tablet by mouth daily  -     Lactobacillus (ACIDOPHILUS) 100 MG CAPS; Take 3 capsules by mouth daily  -     CHROMIUM POLYNICOTINATE PO; Take 1-2 capsules by mouth daily This is 200 mcg daily  -     Resveratrol 100 MG CAPS; Take 1 capsule by mouth daily  -     ascorbic acid (VITAMIN C) 500 mg tablet; Take 500 mg by mouth 3 (three) times a week  -     Selenium 200 MCG CAPS; Take by mouth 2-3 times a week takes 1 tab  -     Cyanocobalamin (VITAMIN B 12) 250 MCG LOZG; Take 500 mcg by mouth daily  -     VANADYL SULFATE PO; Take by mouth 10 mg tabs  Takes 1-2 daily          Subjective:      Patient ID: Nikolas Maravilla is a 65 y o  female  Recheck and review of labs     Sleep is an issue and using multiple sleep meds  Has long standing issues and sees psych as needed           The following portions of the patient's history were reviewed and updated as appropriate: allergies, current medications, past family history, past medical history, past social history, past surgical history and problem list     Review of Systems   Constitutional: Negative for fatigue and fever  HENT: Negative for hearing loss  Eyes: Negative for visual disturbance  Respiratory: Negative for cough, chest tightness, shortness of breath and wheezing  Cardiovascular: Negative for chest pain, palpitations and leg swelling  Gastrointestinal: Negative for abdominal pain, diarrhea and nausea  Genitourinary: Negative for dysuria and hematuria  Musculoskeletal: Negative for arthralgias  Neurological: Negative for dizziness, numbness and headaches  Psychiatric/Behavioral: Negative for confusion and dysphoric mood  All other systems reviewed and are negative  Objective:      Current Outpatient Prescriptions:     aspirin 81 MG tablet, Take by mouth, Disp: , Rfl:     hydrochlorothiazide (MICROZIDE) 12 5 mg capsule, Take 1 capsule (12 5 mg total) by mouth daily, Disp: 90 capsule, Rfl: 3    LORazepam (ATIVAN) 0 5 mg tablet, Take 0 5 mg by mouth daily as needed for anxiety, Disp: , Rfl:     losartan (COZAAR) 50 mg tablet, Take 1 tablet (50 mg total) by mouth daily, Disp: 90 tablet, Rfl: 3    temazepam (RESTORIL) 7 5 mg capsule, Take 1 capsule (7 5 mg total) by mouth daily at bedtime as needed for sleep, Disp: 30 capsule, Rfl: 0     Physical Exam   Constitutional: She is oriented to person, place, and time  She appears well-developed and well-nourished  Eyes: Pupils are equal, round, and reactive to light  Neck: Normal range of motion  Neck supple  No thyromegaly present  Cardiovascular: Normal rate, regular rhythm, normal heart sounds and intact distal pulses  No murmur heard  Pulmonary/Chest: Effort normal and breath sounds normal  She has no wheezes  She has no rales  Abdominal: Soft  Bowel sounds are normal  There is no tenderness  Musculoskeletal: Normal range of motion  She exhibits no edema, tenderness or deformity  Lymphadenopathy:     She has no cervical adenopathy  Neurological: She is alert and oriented to person, place, and time  She has normal reflexes  Skin: Skin is warm and dry  Psychiatric: She has a normal mood and affect  Vitals reviewed

## 2018-04-04 NOTE — PATIENT INSTRUCTIONS
You were seen for a follow up of chronic medical problems today  Be sure to make any changes to your medication as discussed by your doctor  If blood tests or other testing was ordered, be sure to obtain this at the time requested by the doctor  Our office will call you with the results of your tests once they arrive in our office

## 2018-04-05 ENCOUNTER — TELEPHONE (OUTPATIENT)
Dept: FAMILY MEDICINE CLINIC | Facility: HOSPITAL | Age: 58
End: 2018-04-05

## 2018-04-05 DIAGNOSIS — L68.9 EXCESS BODY AND FACIAL HAIR: Primary | ICD-10-CM

## 2018-04-05 NOTE — TELEPHONE ENCOUNTER
SHE SAID SHE HAS A PATCH OF DARK HAIR ON HER BACK - HER PUBIC HAIR GOES UP FARTHER THAN USUAL, SHE HAS CHIN HAIR - SHE ALSO FEELS HER HEAD MIGHT BE OFF BALANCE - BECAUSE OF HORMONES - EVEN HER MOTHER NOTICED

## 2018-04-09 DIAGNOSIS — L68.9 EXCESSIVE HAIR GROWTH: Primary | ICD-10-CM

## 2018-04-25 ENCOUNTER — TRANSCRIBE ORDERS (OUTPATIENT)
Dept: ADMINISTRATIVE | Facility: HOSPITAL | Age: 58
End: 2018-04-25

## 2018-04-25 DIAGNOSIS — Z12.39 SCREENING BREAST EXAMINATION: Primary | ICD-10-CM

## 2018-05-09 ENCOUNTER — HOSPITAL ENCOUNTER (OUTPATIENT)
Dept: BONE DENSITY | Facility: IMAGING CENTER | Age: 58
Discharge: HOME | End: 2018-05-09
Payer: COMMERCIAL

## 2018-05-09 DIAGNOSIS — Z12.39 SCREENING BREAST EXAMINATION: ICD-10-CM

## 2018-05-09 PROCEDURE — 77067 SCR MAMMO BI INCL CAD: CPT

## 2018-06-14 LAB
ALBUMIN SERPL-MCNC: 4.4 G/DL (ref 3.5–5.5)
ALBUMIN/GLOB SERPL: 1.8 {RATIO} (ref 1.2–2.2)
ALP SERPL-CCNC: 70 IU/L (ref 39–117)
ALT SERPL-CCNC: 23 IU/L (ref 0–32)
AST SERPL-CCNC: 18 IU/L (ref 0–40)
BILIRUB SERPL-MCNC: 0.5 MG/DL (ref 0–1.2)
BUN SERPL-MCNC: 14 MG/DL (ref 6–24)
BUN/CREAT SERPL: 17 (ref 9–23)
CALCIUM SERPL-MCNC: 9.8 MG/DL (ref 8.7–10.2)
CHLORIDE SERPL-SCNC: 100 MMOL/L (ref 96–106)
CO2 SERPL-SCNC: 27 MMOL/L (ref 20–29)
CREAT SERPL-MCNC: 0.83 MG/DL (ref 0.57–1)
GLOBULIN SER-MCNC: 2.4 G/DL (ref 1.5–4.5)
GLUCOSE SERPL-MCNC: 94 MG/DL (ref 65–99)
POTASSIUM SERPL-SCNC: 3.8 MMOL/L (ref 3.5–5.2)
PROT SERPL-MCNC: 6.8 G/DL (ref 6–8.5)
SL AMB EGFR AFRICAN AMERICAN: 91 ML/MIN/1.73
SL AMB EGFR NON AFRICAN AMERICAN: 79 ML/MIN/1.73
SODIUM SERPL-SCNC: 144 MMOL/L (ref 134–144)

## 2018-06-20 ENCOUNTER — OFFICE VISIT (OUTPATIENT)
Dept: FAMILY MEDICINE CLINIC | Facility: HOSPITAL | Age: 58
End: 2018-06-20
Payer: COMMERCIAL

## 2018-06-20 VITALS
HEART RATE: 63 BPM | BODY MASS INDEX: 25.16 KG/M2 | HEIGHT: 65 IN | TEMPERATURE: 98.6 F | SYSTOLIC BLOOD PRESSURE: 110 MMHG | WEIGHT: 151 LBS | OXYGEN SATURATION: 99 % | DIASTOLIC BLOOD PRESSURE: 80 MMHG

## 2018-06-20 DIAGNOSIS — G47.09 OTHER INSOMNIA: Primary | ICD-10-CM

## 2018-06-20 DIAGNOSIS — E87.6 HYPOKALEMIA: ICD-10-CM

## 2018-06-20 DIAGNOSIS — G47.00 INSOMNIA, UNSPECIFIED TYPE: ICD-10-CM

## 2018-06-20 DIAGNOSIS — I10 ESSENTIAL HYPERTENSION: ICD-10-CM

## 2018-06-20 PROBLEM — R73.9 HYPERGLYCEMIA: Status: RESOLVED | Noted: 2018-02-28 | Resolved: 2018-06-20

## 2018-06-20 PROCEDURE — 99214 OFFICE O/P EST MOD 30 MIN: CPT | Performed by: INTERNAL MEDICINE

## 2018-06-20 PROCEDURE — 3074F SYST BP LT 130 MM HG: CPT | Performed by: INTERNAL MEDICINE

## 2018-06-20 PROCEDURE — 3079F DIAST BP 80-89 MM HG: CPT | Performed by: INTERNAL MEDICINE

## 2018-06-20 RX ORDER — TEMAZEPAM 7.5 MG/1
7.5 CAPSULE ORAL
Qty: 30 CAPSULE | Refills: 3 | Status: SHIPPED | OUTPATIENT
Start: 2018-06-20 | End: 2018-10-19 | Stop reason: SDUPTHER

## 2018-06-20 NOTE — PROGRESS NOTES
Assessment/Plan:    Insomnia  Chronic insomnia issues, have been discussed at length and at many visits  Current regimen works     Essential hypertension  Stable current regimen    Hypokalemia  Now resolved    Patient Active Problem List   Diagnosis    Health care maintenance    Insomnia    Hyperglycemia    Essential hypertension    Autism disorder    Hypokalemia     No orders of the defined types were placed in this encounter  Diagnoses and all orders for this visit:    Other insomnia    Essential hypertension    Hypokalemia    Insomnia, unspecified type  -     temazepam (RESTORIL) 7 5 mg capsule; Take 1 capsule (7 5 mg total) by mouth daily at bedtime as needed for sleep          Subjective:      Patient ID: Mary Davis is a 63 y o  female  Here to review potassium level  Labs look improved  Using dietary  The following portions of the patient's history were reviewed and updated as appropriate: allergies, current medications, past family history, past medical history, past social history, past surgical history and problem list     Review of Systems   Constitutional: Negative for fatigue and fever  HENT: Negative for hearing loss  Eyes: Negative for visual disturbance  Respiratory: Negative for cough, chest tightness, shortness of breath and wheezing  Cardiovascular: Negative for chest pain, palpitations and leg swelling  Gastrointestinal: Negative for abdominal pain, diarrhea and nausea  Genitourinary: Negative for dysuria and hematuria  Musculoskeletal: Negative for arthralgias  Neurological: Negative for dizziness, numbness and headaches  Psychiatric/Behavioral: Negative for confusion and dysphoric mood  All other systems reviewed and are negative          Objective:      Current Outpatient Prescriptions:     ascorbic acid (VITAMIN C) 500 mg tablet, Take 500 mg by mouth 3 (three) times a week, Disp: , Rfl:     aspirin 81 MG tablet, Take by mouth, Disp: , Rfl:   Calcium Carbonate-Vitamin D3 (CALCIUM 600-D) 600-400 MG-UNIT TABS, Take 1 capsule by mouth daily, Disp: , Rfl:     CHROMIUM POLYNICOTINATE PO, Take 1-2 capsules by mouth daily This is 200 mcg daily, Disp: , Rfl:     Cyanocobalamin (VITAMIN B 12) 250 MCG LOZG, Take 500 mcg by mouth daily, Disp: , Rfl:     hydrochlorothiazide (MICROZIDE) 12 5 mg capsule, Take 1 capsule (12 5 mg total) by mouth daily, Disp: 90 capsule, Rfl: 3    Lactobacillus (ACIDOPHILUS) 100 MG CAPS, Take 3 capsules by mouth daily, Disp: , Rfl:     LORazepam (ATIVAN) 0 5 mg tablet, Take 1 tablet (0 5 mg total) by mouth daily as needed for anxiety, Disp: 30 tablet, Rfl: 0    losartan (COZAAR) 50 mg tablet, Take 1 tablet (50 mg total) by mouth daily, Disp: 90 tablet, Rfl: 3    Magnesium 250 MG TABS, Take 1 tablet by mouth daily, Disp: , Rfl:     Multiple Vitamins-Minerals (MULTIVITAMIN WITH MINERALS) tablet, Take 1 tablet by mouth daily, Disp: , Rfl:     Resveratrol 100 MG CAPS, Take 1 capsule by mouth daily, Disp: , Rfl:     Selenium 200 MCG CAPS, Take by mouth 2-3 times a week takes 1 tab, Disp: , Rfl:     temazepam (RESTORIL) 7 5 mg capsule, Take 1 capsule (7 5 mg total) by mouth daily at bedtime as needed for sleep, Disp: 30 capsule, Rfl: 3    VANADYL SULFATE PO, Take by mouth 10 mg tabs  Takes 1-2 daily, Disp: , Rfl:      Physical Exam   Constitutional: She is oriented to person, place, and time  She appears well-developed and well-nourished  Eyes: Pupils are equal, round, and reactive to light  Neck: Normal range of motion  Neck supple  No thyromegaly present  Cardiovascular: Normal rate, regular rhythm, normal heart sounds and intact distal pulses  No murmur heard  Pulmonary/Chest: Effort normal and breath sounds normal  She has no wheezes  She has no rales  Abdominal: Soft  Bowel sounds are normal  There is no tenderness  Musculoskeletal: Normal range of motion  She exhibits no edema, tenderness or deformity  Lymphadenopathy:     She has no cervical adenopathy  Neurological: She is alert and oriented to person, place, and time  She has normal reflexes  Skin: Skin is warm and dry  Psychiatric: She has a normal mood and affect  Nursing note and vitals reviewed

## 2018-06-22 ENCOUNTER — ANNUAL EXAM (OUTPATIENT)
Dept: OBGYN CLINIC | Facility: CLINIC | Age: 58
End: 2018-06-22
Payer: COMMERCIAL

## 2018-06-22 VITALS
BODY MASS INDEX: 24.78 KG/M2 | HEIGHT: 66 IN | DIASTOLIC BLOOD PRESSURE: 76 MMHG | SYSTOLIC BLOOD PRESSURE: 122 MMHG | WEIGHT: 154.2 LBS

## 2018-06-22 DIAGNOSIS — Z12.31 ENCOUNTER FOR SCREENING MAMMOGRAM FOR MALIGNANT NEOPLASM OF BREAST: ICD-10-CM

## 2018-06-22 DIAGNOSIS — Z01.419 ENCOUNTER FOR GYNECOLOGICAL EXAMINATION WITHOUT ABNORMAL FINDING: Primary | ICD-10-CM

## 2018-06-22 PROCEDURE — 99396 PREV VISIT EST AGE 40-64: CPT | Performed by: OBSTETRICS & GYNECOLOGY

## 2018-06-22 NOTE — PROGRESS NOTES
CC:  Annual exam    HPI: Daniel Cueto presents for  Routine annual visit  She expresses no complaints or concerns at this time  Past Medical History:  Past Medical History:   Diagnosis Date    Hypertension     Pre-diabetes        Past Surgical History:  Past Surgical History:   Procedure Laterality Date    APPENDECTOMY         Past OB/Gyn History:    Patient is menopausal   Denies any history of sexually transmitted infection  No history of abnormal pap smears  Her last pap smear was  2017  ALLERGIES:   Allergies   Allergen Reactions    Sulfa Antibiotics Rash and Itching       MEDS:   Current Outpatient Prescriptions:     ascorbic acid (VITAMIN C) 500 mg tablet    aspirin 81 MG tablet    Calcium Carbonate-Vitamin D3 (CALCIUM 600-D) 600-400 MG-UNIT TABS    CHROMIUM POLYNICOTINATE PO    Cyanocobalamin (VITAMIN B 12) 250 MCG LOZG    hydrochlorothiazide (MICROZIDE) 12 5 mg capsule    Lactobacillus (ACIDOPHILUS) 100 MG CAPS    LORazepam (ATIVAN) 0 5 mg tablet    losartan (COZAAR) 50 mg tablet    Magnesium 250 MG TABS    Multiple Vitamins-Minerals (MULTIVITAMIN WITH MINERALS) tablet    Resveratrol 100 MG CAPS    Selenium 200 MCG CAPS    temazepam (RESTORIL) 7 5 mg capsule    VANADYL SULFATE PO    Family History:  Family History   Problem Relation Age of Onset    Hypertension Mother     Substance Abuse Neg Hx     Mental illness Neg Hx        Social History:  Social History     Social History    Marital status: Single     Spouse name: N/A    Number of children: N/A    Years of education: N/A     Occupational History    Not on file  Social History Main Topics    Smoking status: Never Smoker    Smokeless tobacco: Never Used    Alcohol use No    Drug use: No    Sexual activity: Not on file     Other Topics Concern    Not on file     Social History Narrative    Always uses seat belt    Caffeine use    Lives with mother  Feels safe  No living will  Sees dentist reg  Review of Systems:  Skin: No rashes or discolorations of any concern  RESP: Denies SOB, no cough  CV: Denies chest pain or palpitations  Breasts: Denies masses, pain, skin changes and nipple discharge  GI: Denies abdominal pain, heartburn, nausea, vomiting, changes in bowel habits  : Denies dysuria, frequency, CVA tenderness, incontinence and hematuria  Genitalia: Denies abnormal vaginal discharge, external lesions, rashes, pelvic pain, pressure, abnormal bleeding  Rectal:  Denies pain, bleeding, hemorrhoids,    Physical Exam:  /76 (BP Location: Right arm, Patient Position: Sitting, Cuff Size: Adult)   Ht 5' 6" (1 676 m)   Wt 69 9 kg (154 lb 3 2 oz)   LMP  (LMP Unknown)   BMI 24 89 kg/m²    Gen: The patient was alert and oriented x3, pleasant well-appearing female in no acute distress  Neck:  Unremarkable, no anterior or posterior lymphadenopathy, no thyromegaly  Breasts: Symmetric  No dominant, discrete, fixed  or suspicious masses are noted  No skin or nipple changes  No palpable axillary nodes  Abd:  Soft, nontender, nondistended, no masses or organomegaly  Back:  No CVA tenderness, no tenderness to palpation along spine  Pelvic  Normal appearing external female genitalia, no visible lesions, no rashes  Vagina is free of discharge, normal vaginal epithelium, no abnormal  lesions, no evidence of prolapse anteriorly or posteriorly  Caliber is somewhat narrow  Normal appearing cervix, mobile and nontender  A thin prep pap smear was  Not obtained  Uterus is atrophic in size, mobile and, nontender  No palpable adnexal masses or tenderness  No anoperineal lesions  Rectal:  No masses, tenderness, hemorrhoids, or obvious blood  Skin:  No concerning lesions  Extremeties: No edema      Assessment & Plan:   1  Routine annual exam      RTO one year orPRN  2  Encounter for screening mammogram, referral for mammogram given to patient

## 2018-07-05 ENCOUNTER — TELEPHONE (OUTPATIENT)
Dept: FAMILY MEDICINE CLINIC | Facility: HOSPITAL | Age: 58
End: 2018-07-05

## 2018-07-05 NOTE — TELEPHONE ENCOUNTER
----- Message from Felipe Tinajero MD sent at 7/5/2018  2:28 PM EDT -----  Regarding: appt next week  I saw Leopold Mura in office last week  I told her that I will see her every 6 months for routine visits  She is on schedule next week  Please call her and cancel this visit unless it is for some new acute issue  Assure her that when she needs refills of her sleep meds, she just needs to call us and give 48 hr notice and we will refill for her

## 2018-07-26 DIAGNOSIS — G47.00 INSOMNIA, UNSPECIFIED TYPE: ICD-10-CM

## 2018-07-26 RX ORDER — LORAZEPAM 0.5 MG/1
TABLET ORAL
Qty: 30 TABLET | Refills: 0 | Status: SHIPPED | OUTPATIENT
Start: 2018-07-26 | End: 2019-03-04 | Stop reason: SDUPTHER

## 2018-08-01 ENCOUNTER — OFFICE VISIT (OUTPATIENT)
Dept: FAMILY MEDICINE CLINIC | Facility: HOSPITAL | Age: 58
End: 2018-08-01
Payer: COMMERCIAL

## 2018-08-01 VITALS
BODY MASS INDEX: 25.33 KG/M2 | WEIGHT: 152 LBS | HEART RATE: 77 BPM | OXYGEN SATURATION: 98 % | HEIGHT: 65 IN | SYSTOLIC BLOOD PRESSURE: 122 MMHG | DIASTOLIC BLOOD PRESSURE: 82 MMHG

## 2018-08-01 DIAGNOSIS — F32.89 OTHER DEPRESSION: Primary | ICD-10-CM

## 2018-08-01 PROBLEM — F32.A DEPRESSION: Status: ACTIVE | Noted: 2018-08-01

## 2018-08-01 PROBLEM — E87.6 HYPOKALEMIA: Status: RESOLVED | Noted: 2018-04-04 | Resolved: 2018-08-01

## 2018-08-01 PROCEDURE — 3008F BODY MASS INDEX DOCD: CPT | Performed by: INTERNAL MEDICINE

## 2018-08-01 PROCEDURE — 3725F SCREEN DEPRESSION PERFORMED: CPT | Performed by: INTERNAL MEDICINE

## 2018-08-01 PROCEDURE — 99213 OFFICE O/P EST LOW 20 MIN: CPT | Performed by: INTERNAL MEDICINE

## 2018-08-01 NOTE — ASSESSMENT & PLAN NOTE
Sees a counsellor,  Working through [facility] she is having some PTSD and some emotional upheaval

## 2018-08-01 NOTE — PROGRESS NOTES
Assessment/Plan:    Depression  Sees a counsellor,  Working through [facility] she is having some PTSD and some emotional upheaval      Patient Active Problem List   Diagnosis    Health care maintenance    Insomnia    Essential hypertension    Autism disorder    Depression     No orders of the defined types were placed in this encounter  Diagnoses and all orders for this visit:    Other depression          Subjective:      Patient ID: Hallie Finch is a 63 y o  female  Wants to consider medication for depression  Not a new issue and does see a counsellor  Having trouble getting out of the house  Trying to make some efforts and did join [employer] for [employer]  Had a good time  The following portions of the patient's history were reviewed and updated as appropriate: allergies, current medications, past family history, past medical history, past social history, past surgical history and problem list     Review of Systems   Constitutional: Negative for fatigue and fever  HENT: Negative for hearing loss  Eyes: Negative for visual disturbance  Respiratory: Negative for cough, chest tightness, shortness of breath and wheezing  Cardiovascular: Negative for chest pain, palpitations and leg swelling  Gastrointestinal: Negative for abdominal pain, diarrhea and nausea  Genitourinary: Negative for dysuria and hematuria  Musculoskeletal: Negative for arthralgias  Neurological: Negative for dizziness, numbness and headaches  Psychiatric/Behavioral: Positive for dysphoric mood  Negative for confusion  All other systems reviewed and are negative          Objective:      Current Outpatient Prescriptions:     CHROMIUM POLYNICOTINATE PO, Take 1-2 capsules by mouth daily This is 200 mcg daily, Disp: , Rfl:     hydrochlorothiazide (MICROZIDE) 12 5 mg capsule, Take 1 capsule (12 5 mg total) by mouth daily, Disp: 90 capsule, Rfl: 3    LORazepam (ATIVAN) 0 5 mg tablet, take 1 tablet by mouth daily if needed for anxiety, Disp: 30 tablet, Rfl: 0    losartan (COZAAR) 50 mg tablet, Take 1 tablet (50 mg total) by mouth daily, Disp: 90 tablet, Rfl: 3    Magnesium 250 MG TABS, Take 1 tablet by mouth daily, Disp: , Rfl:     Multiple Vitamins-Minerals (MULTIVITAMIN WITH MINERALS) tablet, Take 1 tablet by mouth daily, Disp: , Rfl:     temazepam (RESTORIL) 7 5 mg capsule, Take 1 capsule (7 5 mg total) by mouth daily at bedtime as needed for sleep, Disp: 30 capsule, Rfl: 3    VANADYL SULFATE PO, Take by mouth 10 mg tabs  Takes 1-2 daily, Disp: , Rfl:     ascorbic acid (VITAMIN C) 500 mg tablet, Take 500 mg by mouth 3 (three) times a week, Disp: , Rfl:     aspirin 81 MG tablet, Take by mouth, Disp: , Rfl:     Calcium Carbonate-Vitamin D3 (CALCIUM 600-D) 600-400 MG-UNIT TABS, Take 1 capsule by mouth daily, Disp: , Rfl:     Cyanocobalamin (VITAMIN B 12) 250 MCG LOZG, Take 500 mcg by mouth daily, Disp: , Rfl:     Lactobacillus (ACIDOPHILUS) 100 MG CAPS, Take 3 capsules by mouth daily, Disp: , Rfl:     Resveratrol 100 MG CAPS, Take 1 capsule by mouth daily, Disp: , Rfl:     Selenium 200 MCG CAPS, Take by mouth 2-3 times a week takes 1 tab, Disp: , Rfl:      Physical Exam   Constitutional: She is oriented to person, place, and time  She appears well-developed and well-nourished  Eyes: Pupils are equal, round, and reactive to light  Neck: Normal range of motion  Neck supple  No thyromegaly present  Cardiovascular: Normal rate, regular rhythm, normal heart sounds and intact distal pulses  No murmur heard  Pulmonary/Chest: Effort normal and breath sounds normal  She has no wheezes  She has no rales  Abdominal: Soft  Bowel sounds are normal  There is no tenderness  Musculoskeletal: Normal range of motion  She exhibits no edema, tenderness or deformity  Lymphadenopathy:     She has no cervical adenopathy  Neurological: She is alert and oriented to person, place, and time   She has normal reflexes  Skin: Skin is warm and dry  Psychiatric: She has a normal mood and affect  Nursing note and vitals reviewed

## 2018-08-01 NOTE — PATIENT INSTRUCTIONS
You were seen today for an acute episode of illness and feeling unwell  Be sure to rest, drink fluids and take any medications ordered  If the doctor ordered testing, please get this done promptly and be sure to follow up with the doctor's office  You will get a call if any of the tests show abnormal results  Try holistic methods first, then consider referral to [facility]

## 2018-10-19 DIAGNOSIS — G47.00 INSOMNIA, UNSPECIFIED TYPE: ICD-10-CM

## 2018-10-22 RX ORDER — TEMAZEPAM 7.5 MG/1
CAPSULE ORAL
Qty: 30 CAPSULE | Refills: 3 | Status: SHIPPED | OUTPATIENT
Start: 2018-10-22 | End: 2019-02-02 | Stop reason: SDUPTHER

## 2018-11-29 ENCOUNTER — OFFICE VISIT (OUTPATIENT)
Dept: FAMILY MEDICINE CLINIC | Facility: HOSPITAL | Age: 58
End: 2018-11-29
Payer: COMMERCIAL

## 2018-11-29 VITALS
HEIGHT: 65 IN | HEART RATE: 69 BPM | SYSTOLIC BLOOD PRESSURE: 124 MMHG | WEIGHT: 154 LBS | DIASTOLIC BLOOD PRESSURE: 82 MMHG | BODY MASS INDEX: 25.66 KG/M2

## 2018-11-29 DIAGNOSIS — G47.09 OTHER INSOMNIA: Primary | ICD-10-CM

## 2018-11-29 DIAGNOSIS — K59.09 OTHER CONSTIPATION: ICD-10-CM

## 2018-11-29 DIAGNOSIS — I10 ESSENTIAL HYPERTENSION: ICD-10-CM

## 2018-11-29 PROCEDURE — 3074F SYST BP LT 130 MM HG: CPT | Performed by: INTERNAL MEDICINE

## 2018-11-29 PROCEDURE — 99214 OFFICE O/P EST MOD 30 MIN: CPT | Performed by: INTERNAL MEDICINE

## 2018-11-29 PROCEDURE — 1036F TOBACCO NON-USER: CPT | Performed by: INTERNAL MEDICINE

## 2018-11-29 PROCEDURE — 3079F DIAST BP 80-89 MM HG: CPT | Performed by: INTERNAL MEDICINE

## 2018-11-29 PROCEDURE — 3008F BODY MASS INDEX DOCD: CPT | Performed by: INTERNAL MEDICINE

## 2018-11-29 NOTE — PATIENT INSTRUCTIONS
You were seen for a follow up of chronic medical problems today  Be sure to make any changes to your medication as discussed by your doctor  If blood tests or other testing was ordered, be sure to obtain this at the time requested by the doctor  Our office will call you with the results of your tests once they arrive in our office  One blood test to check potassium when able  No changes to regimen

## 2018-11-29 NOTE — PROGRESS NOTES
Subjective:   Chief Complaint   Patient presents with    Follow-up     pt declines flu vaccine        Patient ID: Jose Luis Alcantara is a 63 y o  female  Follow up of chronic conditions    Lots of issues with constipation        The following portions of the patient's history were reviewed and updated as appropriate: allergies, current medications, past family history, past medical history, past social history, past surgical history and problem list     Review of Systems   Constitutional: Negative for fatigue and fever  HENT: Negative for hearing loss  Eyes: Negative for visual disturbance  Respiratory: Negative for cough, chest tightness, shortness of breath and wheezing  Cardiovascular: Negative for chest pain, palpitations and leg swelling  Gastrointestinal: Negative for abdominal pain, diarrhea and nausea  Genitourinary: Negative for dysuria and hematuria  Musculoskeletal: Negative for arthralgias  Neurological: Negative for dizziness, numbness and headaches  Psychiatric/Behavioral: Negative for confusion and dysphoric mood  All other systems reviewed and are negative          Current Outpatient Prescriptions on File Prior to Visit   Medication Sig Dispense Refill    ascorbic acid (VITAMIN C) 500 mg tablet Take 500 mg by mouth 3 (three) times a week      aspirin 81 MG tablet Take by mouth      Calcium Carbonate-Vitamin D3 (CALCIUM 600-D) 600-400 MG-UNIT TABS Take 1 capsule by mouth daily      CHROMIUM POLYNICOTINATE PO Take 1-2 capsules by mouth daily This is 200 mcg daily      Cyanocobalamin (VITAMIN B 12) 250 MCG LOZG Take 500 mcg by mouth daily      hydrochlorothiazide (MICROZIDE) 12 5 mg capsule Take 1 capsule (12 5 mg total) by mouth daily 90 capsule 3    Lactobacillus (ACIDOPHILUS) 100 MG CAPS Take 3 capsules by mouth daily      LORazepam (ATIVAN) 0 5 mg tablet take 1 tablet by mouth daily if needed for anxiety 30 tablet 0    losartan (COZAAR) 50 mg tablet Take 1 tablet (50 mg total) by mouth daily 90 tablet 3    Magnesium 250 MG TABS Take 1 tablet by mouth daily      Multiple Vitamins-Minerals (MULTIVITAMIN WITH MINERALS) tablet Take 1 tablet by mouth daily      Resveratrol 100 MG CAPS Take 1 capsule by mouth daily      Selenium 200 MCG CAPS Take by mouth 2-3 times a week takes 1 tab      temazepam (RESTORIL) 7 5 mg capsule take 1 capsule by mouth at bedtime if needed for sleep 30 capsule 3    VANADYL SULFATE PO Take by mouth 10 mg tabs  Takes 1-2 daily       No current facility-administered medications on file prior to visit  Objective:  Vitals:    11/29/18 1334   BP: 124/82   Pulse: 69   Weight: 69 9 kg (154 lb)   Height: 5' 5" (1 651 m)      Physical Exam   Constitutional: She is oriented to person, place, and time  She appears well-developed and well-nourished  Eyes: Pupils are equal, round, and reactive to light  Neck: Normal range of motion  Neck supple  No thyromegaly present  Cardiovascular: Normal rate, regular rhythm, normal heart sounds and intact distal pulses  No murmur heard  Pulmonary/Chest: Effort normal and breath sounds normal  She has no wheezes  She has no rales  Abdominal: Soft  Bowel sounds are normal  There is no tenderness  Musculoskeletal: Normal range of motion  She exhibits no edema, tenderness or deformity  Lymphadenopathy:     She has no cervical adenopathy  Neurological: She is alert and oriented to person, place, and time  She has normal reflexes  Skin: Skin is warm and dry  Psychiatric: She has a normal mood and affect  Nursing note and vitals reviewed  Assessment/Plan:    No problem-specific Assessment & Plan notes found for this encounter  Diagnoses and all orders for this visit:    Other insomnia    Other constipation  -     Cologuard    Essential hypertension  -     Basic metabolic panel;  Future  -     Basic metabolic panel

## 2018-11-30 ENCOUNTER — TELEPHONE (OUTPATIENT)
Dept: FAMILY MEDICINE CLINIC | Facility: HOSPITAL | Age: 58
End: 2018-11-30

## 2018-11-30 LAB
BUN SERPL-MCNC: 19 MG/DL (ref 6–24)
BUN/CREAT SERPL: 22 (ref 9–23)
CALCIUM SERPL-MCNC: 10.1 MG/DL (ref 8.7–10.2)
CHLORIDE SERPL-SCNC: 101 MMOL/L (ref 96–106)
CO2 SERPL-SCNC: 25 MMOL/L (ref 20–29)
CREAT SERPL-MCNC: 0.88 MG/DL (ref 0.57–1)
GLUCOSE SERPL-MCNC: 91 MG/DL (ref 65–99)
POTASSIUM SERPL-SCNC: 4.1 MMOL/L (ref 3.5–5.2)
SL AMB EGFR AFRICAN AMERICAN: 84 ML/MIN/1.73
SL AMB EGFR NON AFRICAN AMERICAN: 73 ML/MIN/1.73
SODIUM SERPL-SCNC: 142 MMOL/L (ref 134–144)

## 2019-02-02 DIAGNOSIS — G47.00 INSOMNIA, UNSPECIFIED TYPE: ICD-10-CM

## 2019-02-04 RX ORDER — TEMAZEPAM 7.5 MG/1
CAPSULE ORAL
Qty: 30 CAPSULE | Refills: 3 | Status: SHIPPED | OUTPATIENT
Start: 2019-02-04 | End: 2019-05-29 | Stop reason: SDUPTHER

## 2019-02-06 ENCOUNTER — APPOINTMENT (OUTPATIENT)
Dept: LAB | Facility: HOSPITAL | Age: 59
End: 2019-02-06
Attending: INTERNAL MEDICINE
Payer: COMMERCIAL

## 2019-03-04 DIAGNOSIS — G47.00 INSOMNIA, UNSPECIFIED TYPE: ICD-10-CM

## 2019-03-04 RX ORDER — LORAZEPAM 0.5 MG/1
0.5 TABLET ORAL DAILY PRN
Qty: 30 TABLET | Refills: 0 | Status: SHIPPED | OUTPATIENT
Start: 2019-03-04 | End: 2019-05-03 | Stop reason: SDUPTHER

## 2019-03-05 ENCOUNTER — OFFICE VISIT (OUTPATIENT)
Dept: FAMILY MEDICINE CLINIC | Facility: HOSPITAL | Age: 59
End: 2019-03-05
Payer: COMMERCIAL

## 2019-03-05 VITALS
SYSTOLIC BLOOD PRESSURE: 126 MMHG | HEART RATE: 96 BPM | WEIGHT: 154 LBS | BODY MASS INDEX: 25.66 KG/M2 | TEMPERATURE: 99.4 F | DIASTOLIC BLOOD PRESSURE: 76 MMHG | HEIGHT: 65 IN

## 2019-03-05 DIAGNOSIS — J06.9 VIRAL UPPER RESPIRATORY TRACT INFECTION: Primary | ICD-10-CM

## 2019-03-05 DIAGNOSIS — I10 ESSENTIAL HYPERTENSION: ICD-10-CM

## 2019-03-05 DIAGNOSIS — L20.89 OTHER ATOPIC DERMATITIS: ICD-10-CM

## 2019-03-05 PROBLEM — Z00.00 HEALTH CARE MAINTENANCE: Status: RESOLVED | Noted: 2018-02-27 | Resolved: 2019-03-05

## 2019-03-05 PROCEDURE — 99214 OFFICE O/P EST MOD 30 MIN: CPT | Performed by: INTERNAL MEDICINE

## 2019-03-05 PROCEDURE — 3008F BODY MASS INDEX DOCD: CPT | Performed by: INTERNAL MEDICINE

## 2019-03-05 PROCEDURE — 3078F DIAST BP <80 MM HG: CPT | Performed by: INTERNAL MEDICINE

## 2019-03-05 PROCEDURE — 3074F SYST BP LT 130 MM HG: CPT | Performed by: INTERNAL MEDICINE

## 2019-03-05 RX ORDER — FLUTICASONE PROPIONATE 50 MCG
1 SPRAY, SUSPENSION (ML) NASAL DAILY
Qty: 16 G | Refills: 0 | Status: SHIPPED | OUTPATIENT
Start: 2019-03-05 | End: 2019-10-15 | Stop reason: ALTCHOICE

## 2019-03-05 RX ORDER — AMOXICILLIN AND CLAVULANATE POTASSIUM 875; 125 MG/1; MG/1
1 TABLET, FILM COATED ORAL EVERY 12 HOURS SCHEDULED
Qty: 20 TABLET | Refills: 0 | Status: SHIPPED | OUTPATIENT
Start: 2019-03-05 | End: 2019-03-15

## 2019-03-05 NOTE — PROGRESS NOTES
Subjective:   No chief complaint on file  Patient ID: Kendra Luong is a 63 y o  female  Presents c/o symptoms of upper respiratory inflammation  Nasal and sinus pressure, some PND,  Some ear pressure,  Occ  Scratchy throat  +/- fever  Sx for few days, not improving with OTC treatments  The following portions of the patient's history were reviewed and updated as appropriate: allergies, current medications, past family history, past medical history, past social history, past surgical history and problem list     Review of Systems   Constitutional: Positive for fatigue  Negative for fever  HENT: Positive for congestion, ear pain, postnasal drip and sinus pressure  Eyes: Negative for discharge  Respiratory: Positive for cough  Negative for shortness of breath and wheezing  Cardiovascular: Negative for chest pain  Neurological: Positive for headaches           Current Outpatient Medications on File Prior to Visit   Medication Sig Dispense Refill    ascorbic acid (VITAMIN C) 500 mg tablet Take 500 mg by mouth 3 (three) times a week      aspirin 81 MG tablet Take by mouth      Calcium Carbonate-Vitamin D3 (CALCIUM 600-D) 600-400 MG-UNIT TABS Take 1 capsule by mouth daily      CHROMIUM POLYNICOTINATE PO Take 1-2 capsules by mouth daily This is 200 mcg daily      Cyanocobalamin (VITAMIN B 12) 250 MCG LOZG Take 500 mcg by mouth daily      hydrochlorothiazide (MICROZIDE) 12 5 mg capsule Take 1 capsule (12 5 mg total) by mouth daily 90 capsule 3    Lactobacillus (ACIDOPHILUS) 100 MG CAPS Take 3 capsules by mouth daily      LORazepam (ATIVAN) 0 5 mg tablet Take 1 tablet (0 5 mg total) by mouth daily as needed for anxiety 30 tablet 0    losartan (COZAAR) 50 mg tablet Take 1 tablet (50 mg total) by mouth daily 90 tablet 3    Magnesium 250 MG TABS Take 1 tablet by mouth daily      Multiple Vitamins-Minerals (MULTIVITAMIN WITH MINERALS) tablet Take 1 tablet by mouth daily  Resveratrol 100 MG CAPS Take 1 capsule by mouth daily      Selenium 200 MCG CAPS Take by mouth 2-3 times a week takes 1 tab      temazepam (RESTORIL) 7 5 mg capsule take 1 capsule by mouth at bedtime if needed for sleep 30 capsule 3    VANADYL SULFATE PO Take by mouth 10 mg tabs  Takes 1-2 daily      VENTOLIN  (90 Base) MCG/ACT inhaler        No current facility-administered medications on file prior to visit  Objective: There were no vitals filed for this visit  Physical Exam   Constitutional: She is oriented to person, place, and time  She appears well-developed and well-nourished  She appears distressed  HENT:   Right Ear: Tympanic membrane is injected and retracted  Decreased hearing is noted  Left Ear: Tympanic membrane is injected and retracted  Decreased hearing is noted  Nose: Mucosal edema and rhinorrhea present  Mouth/Throat: Mucous membranes are normal  Posterior oropharyngeal erythema present  No tonsillar abscesses  Eyes: Pupils are equal, round, and reactive to light  Neck: Normal range of motion  Neck supple  No thyromegaly present  Cardiovascular: Normal rate, regular rhythm, normal heart sounds and intact distal pulses  No murmur heard  Pulmonary/Chest: Effort normal and breath sounds normal  She has no wheezes  She has no rales  Abdominal: Soft  Bowel sounds are normal  There is no tenderness  Musculoskeletal: Normal range of motion  She exhibits no edema, tenderness or deformity  Lymphadenopathy:     She has no cervical adenopathy  Neurological: She is alert and oriented to person, place, and time  She has normal reflexes  Skin: Skin is warm and dry  Psychiatric: She has a normal mood and affect  Assessment/Plan:    No problem-specific Assessment & Plan notes found for this encounter         Diagnoses and all orders for this visit:    Viral upper respiratory tract infection    Essential hypertension    Other orders  -     VENTOLIN HFA 108 (90 Base) MCG/ACT inhaler

## 2019-03-18 ENCOUNTER — HOSPITAL ENCOUNTER (EMERGENCY)
Facility: HOSPITAL | Age: 59
Discharge: HOME | End: 2019-03-18
Attending: EMERGENCY MEDICINE
Payer: COMMERCIAL

## 2019-03-18 ENCOUNTER — TELEPHONE (OUTPATIENT)
Dept: FAMILY MEDICINE CLINIC | Facility: HOSPITAL | Age: 59
End: 2019-03-18

## 2019-03-18 VITALS
HEIGHT: 65 IN | BODY MASS INDEX: 24.99 KG/M2 | OXYGEN SATURATION: 96 % | SYSTOLIC BLOOD PRESSURE: 124 MMHG | TEMPERATURE: 97.4 F | RESPIRATION RATE: 20 BRPM | DIASTOLIC BLOOD PRESSURE: 88 MMHG | HEART RATE: 95 BPM | WEIGHT: 150 LBS

## 2019-03-18 DIAGNOSIS — E87.6 HYPOKALEMIA: Primary | ICD-10-CM

## 2019-03-18 DIAGNOSIS — R09.81 NASAL CONGESTION: ICD-10-CM

## 2019-03-18 DIAGNOSIS — R53.83 FATIGUE: ICD-10-CM

## 2019-03-18 DIAGNOSIS — R05.9 COUGH: ICD-10-CM

## 2019-03-18 DIAGNOSIS — L20.89 OTHER ATOPIC DERMATITIS: Primary | ICD-10-CM

## 2019-03-18 LAB
ALBUMIN SERPL BCP-MCNC: 3.6 G/DL (ref 3.5–5)
ALP SERPL-CCNC: 106 U/L (ref 46–116)
ALT SERPL W P-5'-P-CCNC: 104 U/L (ref 12–78)
ANION GAP SERPL CALCULATED.3IONS-SCNC: 7 MMOL/L (ref 4–13)
AST SERPL W P-5'-P-CCNC: 19 U/L (ref 5–45)
BASOPHILS # BLD AUTO: 0.05 THOUSANDS/ΜL (ref 0–0.1)
BASOPHILS NFR BLD AUTO: 1 % (ref 0–1)
BILIRUB SERPL-MCNC: 0.3 MG/DL (ref 0.2–1)
BUN SERPL-MCNC: 21 MG/DL (ref 5–25)
CALCIUM SERPL-MCNC: 9.5 MG/DL (ref 8.3–10.1)
CHLORIDE SERPL-SCNC: 101 MMOL/L (ref 100–108)
CO2 SERPL-SCNC: 29 MMOL/L (ref 21–32)
CREAT SERPL-MCNC: 0.92 MG/DL (ref 0.6–1.3)
EOSINOPHIL # BLD AUTO: 0.17 THOUSAND/ΜL (ref 0–0.61)
EOSINOPHIL NFR BLD AUTO: 3 % (ref 0–6)
ERYTHROCYTE [DISTWIDTH] IN BLOOD BY AUTOMATED COUNT: 13.2 % (ref 11.6–15.1)
GFR SERPL CREATININE-BSD FRML MDRD: 69 ML/MIN/1.73SQ M
GLUCOSE SERPL-MCNC: 101 MG/DL (ref 65–140)
HCT VFR BLD AUTO: 46.4 % (ref 34.8–46.1)
HGB BLD-MCNC: 15.8 G/DL (ref 11.5–15.4)
IMM GRANULOCYTES # BLD AUTO: 0.01 THOUSAND/UL (ref 0–0.2)
IMM GRANULOCYTES NFR BLD AUTO: 0 % (ref 0–2)
LYMPHOCYTES # BLD AUTO: 2.39 THOUSANDS/ΜL (ref 0.6–4.47)
LYMPHOCYTES NFR BLD AUTO: 43 % (ref 14–44)
MCH RBC QN AUTO: 32 PG (ref 26.8–34.3)
MCHC RBC AUTO-ENTMCNC: 34.1 G/DL (ref 31.4–37.4)
MCV RBC AUTO: 94 FL (ref 82–98)
MONOCYTES # BLD AUTO: 0.38 THOUSAND/ΜL (ref 0.17–1.22)
MONOCYTES NFR BLD AUTO: 7 % (ref 4–12)
NEUTROPHILS # BLD AUTO: 2.57 THOUSANDS/ΜL (ref 1.85–7.62)
NEUTS SEG NFR BLD AUTO: 46 % (ref 43–75)
NRBC BLD AUTO-RTO: 0 /100 WBCS
PLATELET # BLD AUTO: 290 THOUSANDS/UL (ref 149–390)
PMV BLD AUTO: 10.3 FL (ref 8.9–12.7)
POTASSIUM SERPL-SCNC: 3.2 MMOL/L (ref 3.5–5.3)
PROT SERPL-MCNC: 7.3 G/DL (ref 6.4–8.2)
RBC # BLD AUTO: 4.93 MILLION/UL (ref 3.81–5.12)
SODIUM SERPL-SCNC: 137 MMOL/L (ref 136–145)
WBC # BLD AUTO: 5.57 THOUSAND/UL (ref 4.31–10.16)

## 2019-03-18 PROCEDURE — 80053 COMPREHEN METABOLIC PANEL: CPT | Performed by: PHYSICIAN ASSISTANT

## 2019-03-18 PROCEDURE — 99283 EMERGENCY DEPT VISIT LOW MDM: CPT

## 2019-03-18 PROCEDURE — 85025 COMPLETE CBC W/AUTO DIFF WBC: CPT | Performed by: PHYSICIAN ASSISTANT

## 2019-03-18 PROCEDURE — 36415 COLL VENOUS BLD VENIPUNCTURE: CPT | Performed by: PHYSICIAN ASSISTANT

## 2019-03-18 RX ORDER — POTASSIUM CHLORIDE 20 MEQ/1
40 TABLET, EXTENDED RELEASE ORAL ONCE
Status: COMPLETED | OUTPATIENT
Start: 2019-03-18 | End: 2019-03-18

## 2019-03-18 RX ADMIN — POTASSIUM CHLORIDE 40 MEQ: 1500 TABLET, EXTENDED RELEASE ORAL at 22:12

## 2019-03-19 NOTE — DISCHARGE INSTRUCTIONS
Rest, increase fluids  Follow up with family doctor for recheck of potassium  Maintain a high potassium diet     Keep appointment with allergist

## 2019-03-19 NOTE — ED PROVIDER NOTES
History  Chief Complaint   Patient presents with   Sofy Biju Like Symptoms     Nevan states she has had cold symptoms for about a year, she thinks she has black mold in her house  Patient is a 64 y/o F with a h/o HTN that presents to the ED with multiple complaints that she feels is due to black mold toxicity  She states she has had nasal congestion for 3 months  She was seen by urgent care and diagnosed with URI and told to treat her symptoms with OTC medications  SHe states she then saw her PCP who started her on antibiotics for sinus infection  Patient states she was taking allergy medicine, but it wasn't helping her symptoms  She has had a dry cough for a couple months  She denies fevers/chills  She states she has had "severe fatigue" for 3 months  She was "brainstorming" with her friend and they both feel she has black mold toxicity  Her mother lives in the same house she does, but does not have any symptoms  She states the mold is in her room on the ceiling  She has an appointment with an allergist in 3 days for allergy testing  Patient did have labs done by her PCP 1 month ago which included BMP and TSH which were all normal        History provided by:  Patient  Fatigue   Severity:  Moderate  Onset quality:  Gradual  Duration: 3 months  Timing:  Constant  Progression:  Worsening  Chronicity:  New  Context: recent infection    Relieved by:  Nothing  Worsened by:  Nothing  Ineffective treatments:  None tried  Associated symptoms: cough    Associated symptoms: no abdominal pain, no anorexia, no chest pain, no diarrhea, no dizziness, no dysuria, no falls, no fever, no foul-smelling urine, no headaches, no nausea, no seizures, no shortness of breath, no urgency and no vomiting        Prior to Admission Medications   Prescriptions Last Dose Informant Patient Reported? Taking?    CHROMIUM POLYNICOTINATE PO  Self Yes Yes   Sig: Take 1-2 capsules by mouth daily This is 200 mcg daily   Calcium Carbonate-Vitamin D3 (CALCIUM 600-D) 600-400 MG-UNIT TABS   Yes Yes   Sig: Take 1 capsule by mouth daily   Cyanocobalamin (VITAMIN B 12) 250 MCG LOZG   Yes Yes   Sig: Take 500 mcg by mouth daily   LORazepam (ATIVAN) 0 5 mg tablet   No Yes   Sig: Take 1 tablet (0 5 mg total) by mouth daily as needed for anxiety   Lactobacillus (ACIDOPHILUS) 100 MG CAPS   Yes Yes   Sig: Take 3 capsules by mouth daily   Magnesium 250 MG TABS   Yes Yes   Sig: Take 1 tablet by mouth daily   Resveratrol 100 MG CAPS   Yes Yes   Sig: Take 1 capsule by mouth daily   Selenium 200 MCG CAPS  Self Yes Yes   Sig: Take by mouth 2-3 times a week takes 1 tab   VANADYL SULFATE PO  Self Yes Yes   Sig: Take by mouth 10 mg tabs  Takes 1-2 daily   ascorbic acid (VITAMIN C) 500 mg tablet   Yes Yes   Sig: Take 500 mg by mouth 3 (three) times a week   aspirin 81 MG tablet   Yes Yes   Sig: Take by mouth   fluocinonide (LIDEX) 0 05 % cream Not Taking at Unknown time  No No   Sig: Apply topically 2 (two) times a day   Patient not taking: Reported on 3/18/2019   fluticasone (FLONASE) 50 mcg/act nasal spray   No Yes   Si spray into each nostril daily   hydrochlorothiazide (MICROZIDE) 12 5 mg capsule   No Yes   Sig: Take 1 capsule (12 5 mg total) by mouth daily   losartan (COZAAR) 50 mg tablet   No Yes   Sig: Take 1 tablet (50 mg total) by mouth daily   methylPREDNISolone 4 MG tablet therapy pack   Yes Yes   Sig: use as directed for 6 days   temazepam (RESTORIL) 7 5 mg capsule   No Yes   Sig: take 1 capsule by mouth at bedtime if needed for sleep      Facility-Administered Medications: None       Past Medical History:   Diagnosis Date    Hypertension     Pre-diabetes        Past Surgical History:   Procedure Laterality Date    APPENDECTOMY         Family History   Problem Relation Age of Onset    Hypertension Mother     Substance Abuse Neg Hx     Mental illness Neg Hx      I have reviewed and agree with the history as documented      Social History Tobacco Use    Smoking status: Never Smoker    Smokeless tobacco: Never Used   Substance Use Topics    Alcohol use: No    Drug use: No        Review of Systems   Constitutional: Positive for fatigue  Negative for chills and fever  HENT: Negative  Respiratory: Positive for cough  Negative for shortness of breath  Cardiovascular: Negative for chest pain, palpitations and leg swelling  Gastrointestinal: Negative for abdominal pain, anorexia, diarrhea, nausea and vomiting  Genitourinary: Negative for dysuria and urgency  Musculoskeletal: Negative for back pain, falls and neck pain  Skin: Negative for color change, pallor and rash  Neurological: Negative for dizziness, seizures, weakness, light-headedness, numbness and headaches  Psychiatric/Behavioral: Positive for decreased concentration  Negative for confusion  All other systems reviewed and are negative  Physical Exam  Physical Exam   Constitutional: She is oriented to person, place, and time  She appears well-developed and well-nourished  She is cooperative  She does not appear ill  No distress  HENT:   Head: Normocephalic and atraumatic  Right Ear: Hearing normal    Left Ear: Hearing normal    Nose: Nose normal    Mouth/Throat: Oropharynx is clear and moist and mucous membranes are normal    Eyes: Conjunctivae are normal    Neck: Normal range of motion  Cardiovascular: Normal rate, regular rhythm and normal heart sounds  No murmur heard  Pulmonary/Chest: Effort normal and breath sounds normal  She has no wheezes  She has no rhonchi  She has no rales  Musculoskeletal: Normal range of motion  She exhibits no edema or deformity  Neurological: She is alert and oriented to person, place, and time  She has normal strength  No sensory deficit  Gait normal    Skin: Skin is warm and dry  No rash noted  She is not diaphoretic  No pallor  Psychiatric: Her mood appears anxious  Her speech is rapid and/or pressured   Cognition and memory are normal    Nursing note and vitals reviewed  Vital Signs  ED Triage Vitals [03/18/19 1829]   Temperature Pulse Respirations Blood Pressure SpO2   (!) 97 4 °F (36 3 °C) 95 20 124/88 96 %      Temp src Heart Rate Source Patient Position - Orthostatic VS BP Location FiO2 (%)   -- -- -- -- --      Pain Score       No Pain           Vitals:    03/18/19 1829   BP: 124/88   Pulse: 95       qSOFA     Row Name 03/18/19 1829                Altered mental status GCS < 15  --        Respiratory Rate > / =96  0        Systolic BP < / =884  0        Q Sofa Score  0              Visual Acuity      ED Medications  Medications   potassium chloride (K-DUR,KLOR-CON) CR tablet 40 mEq (40 mEq Oral Given 3/18/19 2212)       Diagnostic Studies  Results Reviewed     Procedure Component Value Units Date/Time    Comprehensive metabolic panel [[id number]]  (Abnormal) Collected:  03/18/19 2025    Lab Status:  Final result Specimen:  Blood from Arm, Right Updated:  03/18/19 2136     Sodium 137 mmol/L      Potassium 3 2 mmol/L      Chloride 101 mmol/L      CO2 29 mmol/L      ANION GAP 7 mmol/L      BUN 21 mg/dL      Creatinine 0 92 mg/dL      Glucose 101 mg/dL      Calcium 9 5 mg/dL      AST 19 U/L       U/L      Alkaline Phosphatase 106 U/L      Total Protein 7 3 g/dL      Albumin 3 6 g/dL      Total Bilirubin 0 30 mg/dL      eGFR 69 ml/min/1 73sq m     Narrative:       National Kidney Disease Education Program recommendations are as follows:  GFR calculation is accurate only with a steady state creatinine  Chronic Kidney disease less than 60 ml/min/1 73 sq  meters  Kidney failure less than 15 ml/min/1 73 sq  meters      CBC and differential [[id number]]  (Abnormal) Collected:  03/18/19 2025    Lab Status:  Final result Specimen:  Blood from Arm, Right Updated:  03/18/19 2116     WBC 5 57 Thousand/uL      RBC 4 93 Million/uL      Hemoglobin 15 8 g/dL      Hematocrit 46 4 %      MCV 94 fL      MCH 32 0 pg      MCHC 34 1 g/dL      RDW 13 2 %      MPV 10 3 fL      Platelets 355 Thousands/uL      nRBC 0 /100 WBCs      Neutrophils Relative 46 %      Immat GRANS % 0 %      Lymphocytes Relative 43 %      Monocytes Relative 7 %      Eosinophils Relative 3 %      Basophils Relative 1 %      Neutrophils Absolute 2 57 Thousands/µL      Immature Grans Absolute 0 01 Thousand/uL      Lymphocytes Absolute 2 39 Thousands/µL      Monocytes Absolute 0 38 Thousand/µL      Eosinophils Absolute 0 17 Thousand/µL      Basophils Absolute 0 05 Thousands/µL                  No orders to display              Procedures  Procedures       Phone Contacts  ED Phone Contact    ED Course                               MDM  Number of Diagnoses or Management Options  Fatigue: established and worsening  Hypokalemia: new and does not require workup  Nasal congestion: established and worsening  Diagnosis management comments: Patient with multiple complaints, will check labs to r/o anemia or electrolyte abnormality  Will hold off on cxr since lungs cta  Patient with mild hypokalemia in the ER will give a supplement and discharge  Patient advised to f/u with PCP for recheck of potassium and LFTs and to keep the appointment with her allergist         Amount and/or Complexity of Data Reviewed  Clinical lab tests: ordered and reviewed    Patient Progress  Patient progress: stable      Disposition  Final diagnoses:   Hypokalemia   Fatigue   Nasal congestion     Time reflects when diagnosis was documented in both MDM as applicable and the Disposition within this note     Time User Action Codes Description Comment    3/18/2019  9:59 PM Dominic Michael Add [E87 6] Hypokalemia     3/18/2019 10:00 PM Dominic Michael Add [R53 83] Fatigue     3/18/2019 10:00 PM Dominic Michael Add [R09 81] Nasal congestion       ED Disposition     ED Disposition Condition Date/Time Comment    Discharge Stable Mon Mar 18, 2019  9:59 PM Mariluz Parra discharge to home/self care  Follow-up Information     Follow up With Specialties Details Why Contact Info    Lio Melgar MD Internal Medicine In 3 days for recheck of potassium and liver function tests  Tian  [address] [facility]            Discharge Medication List as of 3/18/2019 10:01 PM      CONTINUE these medications which have NOT CHANGED    Details   ascorbic acid (VITAMIN C) 500 mg tablet Take 500 mg by mouth 3 (three) times a week, Historical Med      aspirin 81 MG tablet Take by mouth, Historical Med      Calcium Carbonate-Vitamin D3 (CALCIUM 600-D) 600-400 MG-UNIT TABS Take 1 capsule by mouth daily, Historical Med      CHROMIUM POLYNICOTINATE PO Take 1-2 capsules by mouth daily This is 200 mcg daily, Historical Med      Cyanocobalamin (VITAMIN B 12) 250 MCG LOZG Take 500 mcg by mouth daily, Historical Med      fluticasone (FLONASE) 50 mcg/act nasal spray 1 spray into each nostril daily, Starting Tue 3/5/2019, Normal      hydrochlorothiazide (MICROZIDE) 12 5 mg capsule Take 1 capsule (12 5 mg total) by mouth daily, Starting Mon 3/19/2018, Normal      Lactobacillus (ACIDOPHILUS) 100 MG CAPS Take 3 capsules by mouth daily, Historical Med      LORazepam (ATIVAN) 0 5 mg tablet Take 1 tablet (0 5 mg total) by mouth daily as needed for anxiety, Starting Mon 3/4/2019, Normal      losartan (COZAAR) 50 mg tablet Take 1 tablet (50 mg total) by mouth daily, Starting Mon 3/19/2018, Normal      Magnesium 250 MG TABS Take 1 tablet by mouth daily, Historical Med      methylPREDNISolone 4 MG tablet therapy pack use as directed for 6 days, Historical Med      Resveratrol 100 MG CAPS Take 1 capsule by mouth daily, Historical Med      Selenium 200 MCG CAPS Take by mouth 2-3 times a week takes 1 tab, Historical Med      temazepam (RESTORIL) 7 5 mg capsule take 1 capsule by mouth at bedtime if needed for sleep, Normal      VANADYL SULFATE PO Take by mouth 10 mg tabs    Takes 1-2 daily, Historical Med fluocinonide (LIDEX) 0 05 % cream Apply topically 2 (two) times a day, Starting Tue 3/5/2019, Normal           No discharge procedures on file      ED Provider  Electronically Signed by           Daniel Sevilla PA-C  03/18/19 7556

## 2019-03-21 ENCOUNTER — TELEPHONE (OUTPATIENT)
Dept: FAMILY MEDICINE CLINIC | Facility: HOSPITAL | Age: 59
End: 2019-03-21

## 2019-03-22 ENCOUNTER — OFFICE VISIT (OUTPATIENT)
Dept: FAMILY MEDICINE CLINIC | Facility: HOSPITAL | Age: 59
End: 2019-03-22
Payer: COMMERCIAL

## 2019-03-22 VITALS
RESPIRATION RATE: 16 BRPM | DIASTOLIC BLOOD PRESSURE: 82 MMHG | HEART RATE: 90 BPM | SYSTOLIC BLOOD PRESSURE: 120 MMHG | HEIGHT: 65 IN | BODY MASS INDEX: 25.83 KG/M2 | WEIGHT: 155 LBS

## 2019-03-22 DIAGNOSIS — I10 ESSENTIAL HYPERTENSION: ICD-10-CM

## 2019-03-22 DIAGNOSIS — E87.6 HYPOKALEMIA: ICD-10-CM

## 2019-03-22 DIAGNOSIS — G47.09 OTHER INSOMNIA: ICD-10-CM

## 2019-03-22 DIAGNOSIS — R53.83 FATIGUE, UNSPECIFIED TYPE: Primary | ICD-10-CM

## 2019-03-22 PROBLEM — L20.89 OTHER ATOPIC DERMATITIS: Status: RESOLVED | Noted: 2019-03-05 | Resolved: 2019-03-22

## 2019-03-22 PROBLEM — J06.9 VIRAL UPPER RESPIRATORY TRACT INFECTION: Status: RESOLVED | Noted: 2019-03-05 | Resolved: 2019-03-22

## 2019-03-22 PROCEDURE — 1036F TOBACCO NON-USER: CPT | Performed by: INTERNAL MEDICINE

## 2019-03-22 PROCEDURE — 99214 OFFICE O/P EST MOD 30 MIN: CPT | Performed by: INTERNAL MEDICINE

## 2019-03-22 PROCEDURE — 3008F BODY MASS INDEX DOCD: CPT | Performed by: INTERNAL MEDICINE

## 2019-03-22 PROCEDURE — 3079F DIAST BP 80-89 MM HG: CPT | Performed by: INTERNAL MEDICINE

## 2019-03-22 PROCEDURE — 3074F SYST BP LT 130 MM HG: CPT | Performed by: INTERNAL MEDICINE

## 2019-03-22 RX ORDER — POTASSIUM CHLORIDE 750 MG/1
10 TABLET, EXTENDED RELEASE ORAL DAILY
Qty: 30 TABLET | Refills: 0 | Status: SHIPPED | OUTPATIENT
Start: 2019-03-22 | End: 2019-05-29 | Stop reason: ALTCHOICE

## 2019-03-22 NOTE — PROGRESS NOTES
Subjective:   Chief Complaint   Patient presents with    Follow-up     ER f/u        Patient ID: Naomi Chow is a 63 y o  female  Has been to ENT and allergist   Was seen in Er with essentially negative work up  Here with multiple complaints and issues she feels are related to mold in her home and lots of fatigue and etc   Some perseveration about all of these and not seeming to be convinced that all tests done so far are WNL  The following portions of the patient's history were reviewed and updated as appropriate: allergies, current medications, past family history, past medical history, past social history, past surgical history and problem list     Review of Systems   Constitutional: Positive for fatigue  Negative for fever  HENT: Negative for hearing loss  Eyes: Negative for visual disturbance  Respiratory: Positive for shortness of breath  Negative for cough, chest tightness and wheezing  Cardiovascular: Negative for chest pain, palpitations and leg swelling  Gastrointestinal: Negative for abdominal pain, diarrhea and nausea  Genitourinary: Negative for dysuria and hematuria  Musculoskeletal: Positive for arthralgias  Neurological: Positive for dizziness and headaches  Negative for numbness  Psychiatric/Behavioral: Negative for confusion and dysphoric mood  All other systems reviewed and are negative          Current Outpatient Medications on File Prior to Visit   Medication Sig Dispense Refill    ascorbic acid (VITAMIN C) 500 mg tablet Take 500 mg by mouth 3 (three) times a week      aspirin 81 MG tablet Take by mouth      Calcium Carbonate-Vitamin D3 (CALCIUM 600-D) 600-400 MG-UNIT TABS Take 1 capsule by mouth daily      CHROMIUM POLYNICOTINATE PO Take 1-2 capsules by mouth daily This is 200 mcg daily      Cyanocobalamin (VITAMIN B 12) 250 MCG LOZG Take 500 mcg by mouth daily      fluocinonide (LIDEX) 0 05 % cream Apply topically 2 (two) times a day 30 g 1    fluticasone (FLONASE) 50 mcg/act nasal spray 1 spray into each nostril daily 16 g 0    hydrochlorothiazide (MICROZIDE) 12 5 mg capsule Take 1 capsule (12 5 mg total) by mouth daily 90 capsule 3    Lactobacillus (ACIDOPHILUS) 100 MG CAPS Take 3 capsules by mouth daily      LORazepam (ATIVAN) 0 5 mg tablet Take 1 tablet (0 5 mg total) by mouth daily as needed for anxiety 30 tablet 0    losartan (COZAAR) 50 mg tablet Take 1 tablet (50 mg total) by mouth daily 90 tablet 3    Magnesium 250 MG TABS Take 1 tablet by mouth daily      Resveratrol 100 MG CAPS Take 1 capsule by mouth daily      Selenium 200 MCG CAPS Take by mouth 2-3 times a week takes 1 tab      temazepam (RESTORIL) 7 5 mg capsule take 1 capsule by mouth at bedtime if needed for sleep 30 capsule 3    VANADYL SULFATE PO Take by mouth 10 mg tabs  Takes 1-2 daily      [DISCONTINUED] methylPREDNISolone 4 MG tablet therapy pack use as directed for 6 days  0     No current facility-administered medications on file prior to visit  Objective:  Vitals:    03/22/19 1029   BP: 120/82   Pulse: 90   Resp: 16   Weight: 70 3 kg (155 lb)   Height: 5' 5" (1 651 m)      Physical Exam   Constitutional: She is oriented to person, place, and time  She appears well-developed and well-nourished  Eyes: Pupils are equal, round, and reactive to light  Neck: Normal range of motion  Neck supple  No thyromegaly present  Cardiovascular: Normal rate, regular rhythm, normal heart sounds and intact distal pulses  No murmur heard  Pulmonary/Chest: Effort normal and breath sounds normal  She has no wheezes  She has no rales  Abdominal: Soft  Bowel sounds are normal  There is no tenderness  Musculoskeletal: Normal range of motion  She exhibits no edema, tenderness or deformity  Lymphadenopathy:     She has no cervical adenopathy  Neurological: She is alert and oriented to person, place, and time  She has normal reflexes  Skin: Skin is warm and dry  Psychiatric: She has a normal mood and affect  Nursing note and vitals reviewed  Assessment/Plan:    No problem-specific Assessment & Plan notes found for this encounter         Diagnoses and all orders for this visit:    Fatigue, unspecified type    Other insomnia    Essential hypertension

## 2019-03-27 ENCOUNTER — TELEPHONE (OUTPATIENT)
Dept: FAMILY MEDICINE CLINIC | Facility: HOSPITAL | Age: 59
End: 2019-03-27

## 2019-03-27 DIAGNOSIS — I10 HYPERTENSION, ESSENTIAL: ICD-10-CM

## 2019-03-27 RX ORDER — HYDROCHLOROTHIAZIDE 12.5 MG/1
CAPSULE, GELATIN COATED ORAL
Qty: 90 CAPSULE | Refills: 3 | Status: SHIPPED | OUTPATIENT
Start: 2019-03-27 | End: 2019-11-29 | Stop reason: SDUPTHER

## 2019-03-27 RX ORDER — LOSARTAN POTASSIUM 50 MG/1
TABLET ORAL
Qty: 90 TABLET | Refills: 3 | Status: SHIPPED | OUTPATIENT
Start: 2019-03-27 | End: 2020-04-14 | Stop reason: SDUPTHER

## 2019-03-28 ENCOUNTER — TELEPHONE (OUTPATIENT)
Dept: FAMILY MEDICINE CLINIC | Facility: HOSPITAL | Age: 59
End: 2019-03-28

## 2019-03-28 DIAGNOSIS — Z77.120 CONTACT WITH AND (SUSPECTED) EXPOSURE TO MOLD (TOXIC): Primary | ICD-10-CM

## 2019-03-28 NOTE — TELEPHONE ENCOUNTER
Pt called back this morning I have a lengthy called with her  She said the  came back to her house this morning and she is bringing in a copy of the mold report in to show you  Sheldon Carrizales stated she called [facility] and needs codes for lab work to test for mold allergy  Pt will be bringing in a copy of the mold inspection report for you to review and to be put in her chart  She also asked me to fax her last labs and ov note from tanya to [facility] rheumatology because tanya most recently referred her to rheumatology and pt is awaiting appt from them      Sheldon Carrizales asking if you can please look at the mold report when it comes in and order a lab judith lab slip for mold testing and she will need the codes to call [facility] to see if its covered  [phone]

## 2019-03-28 NOTE — TELEPHONE ENCOUNTER
Spoke with edwin I called [facility] got the cpt code for the allergan mold panel that suni ordered [place] 80054 times 12  Spoke with Nicole Choi at [facility] she advised no prior auth needed that its a covered test for CPT code 80258 times 12  Also per Eddy request she is requesting the mold testing done on her house be put in her chart  I send up to scanning and it is now in her chart   Also gave a copy of the mold testing to suni and per eddy request she wanted a copy of the mold testing on her house send to her allergy dr Nia Macias testing to [facility] to Dr Joe Brito fax [phone]    Dx codes suni gave me for mold panel   Are chronic headadche R51 and allergies and fatigue R53 83

## 2019-03-28 NOTE — TELEPHONE ENCOUNTER
Pt is aware I gave a copy of lab order to Elvis Olvera to give to pt mother as per pt request Elvis Olvera is aware DD

## 2019-04-02 ENCOUNTER — TELEPHONE (OUTPATIENT)
Dept: FAMILY MEDICINE CLINIC | Facility: HOSPITAL | Age: 59
End: 2019-04-02

## 2019-04-03 ENCOUNTER — TELEPHONE (OUTPATIENT)
Dept: FAMILY MEDICINE CLINIC | Facility: HOSPITAL | Age: 59
End: 2019-04-03

## 2019-04-03 DIAGNOSIS — R06.03 RESPIRATORY DISTRESS: ICD-10-CM

## 2019-04-03 DIAGNOSIS — Z77.120 MOLD EXPOSURE: Primary | ICD-10-CM

## 2019-04-04 ENCOUNTER — HOSPITAL ENCOUNTER (OUTPATIENT)
Dept: RADIOLOGY | Facility: HOSPITAL | Age: 59
Discharge: HOME | End: 2019-04-04
Attending: INTERNAL MEDICINE
Payer: COMMERCIAL

## 2019-04-04 DIAGNOSIS — R06.03 RESPIRATORY DISTRESS: ICD-10-CM

## 2019-04-04 DIAGNOSIS — Z77.120 MOLD EXPOSURE: ICD-10-CM

## 2019-04-04 PROCEDURE — 71046 X-RAY EXAM CHEST 2 VIEWS: CPT

## 2019-04-05 ENCOUNTER — TELEPHONE (OUTPATIENT)
Dept: FAMILY MEDICINE CLINIC | Facility: HOSPITAL | Age: 59
End: 2019-04-05

## 2019-04-07 LAB
A ALTERNATA IGE QN: <0.1 KU/L
A FUMIGATUS IGE QN: <0.1 KU/L
A PULLULANS IGE QN: <0.1 KU/L
C ALBICANS IGE QN: <0.1 KU/L
C HERBARUM IGE QN: <0.1 KU/L
E PURPURASCENS IGE QN: <0.1 KU/L
FUSARIUM PROLIFERATUM: <0.1 KU/L
Lab: NORMAL
M RACEMOSUS IGE QN: <0.1 KU/L
PENICILLIUM CHRYSOGENUM: <0.1 KU/L
PHOMA BETAE: <0.1 KU/L
SETOMELANOMMA ROSTRATA: <0.1 KU/L
STEMPHYLIUM HERBARUM: <0.1 KU/L

## 2019-04-22 LAB
ALBUMIN SERPL-MCNC: 4.4 G/DL (ref 3.5–5.5)
ALBUMIN/GLOB SERPL: 1.8 {RATIO} (ref 1.2–2.2)
ALP SERPL-CCNC: 64 IU/L (ref 39–117)
ALT SERPL-CCNC: 26 IU/L (ref 0–32)
AST SERPL-CCNC: 25 IU/L (ref 0–40)
BILIRUB SERPL-MCNC: 0.4 MG/DL (ref 0–1.2)
BUN SERPL-MCNC: 18 MG/DL (ref 6–24)
BUN/CREAT SERPL: 22 (ref 9–23)
CALCIUM SERPL-MCNC: 10.1 MG/DL (ref 8.7–10.2)
CHLORIDE SERPL-SCNC: 99 MMOL/L (ref 96–106)
CO2 SERPL-SCNC: 28 MMOL/L (ref 20–29)
CREAT SERPL-MCNC: 0.82 MG/DL (ref 0.57–1)
GLOBULIN SER-MCNC: 2.4 G/DL (ref 1.5–4.5)
GLUCOSE SERPL-MCNC: 89 MG/DL (ref 65–99)
POTASSIUM SERPL-SCNC: 4.2 MMOL/L (ref 3.5–5.2)
PROT SERPL-MCNC: 6.8 G/DL (ref 6–8.5)
SL AMB EGFR AFRICAN AMERICAN: 92 ML/MIN/1.73
SL AMB EGFR NON AFRICAN AMERICAN: 80 ML/MIN/1.73
SODIUM SERPL-SCNC: 140 MMOL/L (ref 134–144)

## 2019-05-03 DIAGNOSIS — G47.00 INSOMNIA, UNSPECIFIED TYPE: ICD-10-CM

## 2019-05-06 RX ORDER — LORAZEPAM 0.5 MG/1
TABLET ORAL
Qty: 30 TABLET | Refills: 0 | Status: SHIPPED | OUTPATIENT
Start: 2019-05-06 | End: 2020-01-20

## 2019-05-29 ENCOUNTER — OFFICE VISIT (OUTPATIENT)
Dept: FAMILY MEDICINE CLINIC | Facility: HOSPITAL | Age: 59
End: 2019-05-29
Payer: COMMERCIAL

## 2019-05-29 VITALS
SYSTOLIC BLOOD PRESSURE: 122 MMHG | BODY MASS INDEX: 25.66 KG/M2 | RESPIRATION RATE: 16 BRPM | HEART RATE: 84 BPM | WEIGHT: 154 LBS | HEIGHT: 65 IN | DIASTOLIC BLOOD PRESSURE: 68 MMHG

## 2019-05-29 DIAGNOSIS — I10 ESSENTIAL HYPERTENSION: Primary | ICD-10-CM

## 2019-05-29 DIAGNOSIS — F32.89 OTHER DEPRESSION: ICD-10-CM

## 2019-05-29 DIAGNOSIS — G47.00 INSOMNIA, UNSPECIFIED TYPE: ICD-10-CM

## 2019-05-29 PROCEDURE — 1036F TOBACCO NON-USER: CPT | Performed by: INTERNAL MEDICINE

## 2019-05-29 PROCEDURE — 99214 OFFICE O/P EST MOD 30 MIN: CPT | Performed by: INTERNAL MEDICINE

## 2019-05-29 PROCEDURE — 3078F DIAST BP <80 MM HG: CPT | Performed by: INTERNAL MEDICINE

## 2019-05-29 PROCEDURE — 3074F SYST BP LT 130 MM HG: CPT | Performed by: INTERNAL MEDICINE

## 2019-05-29 PROCEDURE — 3008F BODY MASS INDEX DOCD: CPT | Performed by: INTERNAL MEDICINE

## 2019-05-29 RX ORDER — TEMAZEPAM 7.5 MG/1
7.5 CAPSULE ORAL
Qty: 30 CAPSULE | Refills: 3 | Status: SHIPPED | OUTPATIENT
Start: 2019-05-29 | End: 2019-12-17 | Stop reason: SDUPTHER

## 2019-07-25 ENCOUNTER — TELEPHONE (OUTPATIENT)
Dept: FAMILY MEDICINE CLINIC | Facility: HOSPITAL | Age: 59
End: 2019-07-25

## 2019-07-29 DIAGNOSIS — R53.83 FATIGUE, UNSPECIFIED TYPE: Primary | ICD-10-CM

## 2019-07-29 DIAGNOSIS — G47.00 INSOMNIA, UNSPECIFIED TYPE: ICD-10-CM

## 2019-07-30 RX ORDER — TEMAZEPAM 7.5 MG/1
CAPSULE ORAL
Qty: 30 CAPSULE | Refills: 3 | Status: ON HOLD | OUTPATIENT
Start: 2019-07-30 | End: 2019-09-25 | Stop reason: CLARIF

## 2019-09-20 ENCOUNTER — TELEPHONE (OUTPATIENT)
Dept: FAMILY MEDICINE CLINIC | Facility: HOSPITAL | Age: 59
End: 2019-09-20

## 2019-09-20 NOTE — TELEPHONE ENCOUNTER
Pt called wants something to be run by Dr Patricia Phillip  Pt said she saw something on facebook if you have a black nelly or line on her finger nail that it could be melanoma  Pt said on her Left Big Toe she has a black nelly  Pt wants to know if Dr Patricia Phillip thinks it could be melanoma or she supposed to do a specalist for this? PT said she did not slam her toe    [phone]

## 2019-09-22 ENCOUNTER — APPOINTMENT (EMERGENCY)
Dept: RADIOLOGY | Facility: HOSPITAL | Age: 59
DRG: 301 | End: 2019-09-22
Payer: COMMERCIAL

## 2019-09-22 ENCOUNTER — APPOINTMENT (EMERGENCY)
Dept: CT IMAGING | Facility: HOSPITAL | Age: 59
DRG: 301 | End: 2019-09-22
Payer: COMMERCIAL

## 2019-09-22 ENCOUNTER — HOSPITAL ENCOUNTER (INPATIENT)
Facility: HOSPITAL | Age: 59
LOS: 3 days | Discharge: NURSING, REHAB OR LONG-TERM CARE | DRG: 301 | End: 2019-09-25
Attending: EMERGENCY MEDICINE | Admitting: INTERNAL MEDICINE
Payer: COMMERCIAL

## 2019-09-22 DIAGNOSIS — S72.001A DISPLACED FRACTURE OF RIGHT FEMORAL NECK (HCC): ICD-10-CM
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
## 2019-09-22 DIAGNOSIS — S72.001A FRACTURE, HIP, RIGHT, CLOSED, INITIAL ENCOUNTER (HCC): Primary | ICD-10-CM

## 2019-09-22 LAB
ALBUMIN SERPL BCP-MCNC: 3.6 G/DL (ref 3.5–5)
ALP SERPL-CCNC: 63 U/L (ref 46–116)
ALT SERPL W P-5'-P-CCNC: 28 U/L (ref 12–78)
ANION GAP SERPL CALCULATED.3IONS-SCNC: 9 MMOL/L (ref 4–13)
APTT PPP: 28 SECONDS (ref 23–37)
AST SERPL W P-5'-P-CCNC: 21 U/L (ref 5–45)
BASOPHILS # BLD AUTO: 0.04 THOUSANDS/ΜL (ref 0–0.1)
BASOPHILS NFR BLD AUTO: 1 % (ref 0–1)
BILIRUB SERPL-MCNC: 0.4 MG/DL (ref 0.2–1)
BUN SERPL-MCNC: 16 MG/DL (ref 5–25)
CALCIUM SERPL-MCNC: 8.8 MG/DL (ref 8.3–10.1)
CHLORIDE SERPL-SCNC: 104 MMOL/L (ref 100–108)
CO2 SERPL-SCNC: 27 MMOL/L (ref 21–32)
CREAT SERPL-MCNC: 0.75 MG/DL (ref 0.6–1.3)
EOSINOPHIL # BLD AUTO: 0.05 THOUSAND/ΜL (ref 0–0.61)
EOSINOPHIL NFR BLD AUTO: 1 % (ref 0–6)
ERYTHROCYTE [DISTWIDTH] IN BLOOD BY AUTOMATED COUNT: 13 % (ref 11.6–15.1)
GFR SERPL CREATININE-BSD FRML MDRD: 88 ML/MIN/1.73SQ M
GLUCOSE SERPL-MCNC: 107 MG/DL (ref 65–140)
GLUCOSE SERPL-MCNC: 107 MG/DL (ref 65–140)
GLUCOSE SERPL-MCNC: 148 MG/DL (ref 65–140)
HCT VFR BLD AUTO: 45.9 % (ref 34.8–46.1)
HGB BLD-MCNC: 15.4 G/DL (ref 11.5–15.4)
IMM GRANULOCYTES # BLD AUTO: 0.03 THOUSAND/UL (ref 0–0.2)
IMM GRANULOCYTES NFR BLD AUTO: 0 % (ref 0–2)
INR PPP: 1.02 (ref 0.84–1.19)
LYMPHOCYTES # BLD AUTO: 1.01 THOUSANDS/ΜL (ref 0.6–4.47)
LYMPHOCYTES NFR BLD AUTO: 12 % (ref 14–44)
MCH RBC QN AUTO: 31.4 PG (ref 26.8–34.3)
MCHC RBC AUTO-ENTMCNC: 33.6 G/DL (ref 31.4–37.4)
MCV RBC AUTO: 94 FL (ref 82–98)
MONOCYTES # BLD AUTO: 0.5 THOUSAND/ΜL (ref 0.17–1.22)
MONOCYTES NFR BLD AUTO: 6 % (ref 4–12)
NEUTROPHILS # BLD AUTO: 6.77 THOUSANDS/ΜL (ref 1.85–7.62)
NEUTS SEG NFR BLD AUTO: 80 % (ref 43–75)
NRBC BLD AUTO-RTO: 0 /100 WBCS
PLATELET # BLD AUTO: 219 THOUSANDS/UL (ref 149–390)
PMV BLD AUTO: 10.7 FL (ref 8.9–12.7)
POTASSIUM SERPL-SCNC: 3.5 MMOL/L (ref 3.5–5.3)
PROT SERPL-MCNC: 6.8 G/DL (ref 6.4–8.2)
PROTHROMBIN TIME: 13.1 SECONDS (ref 11.6–14.5)
RBC # BLD AUTO: 4.9 MILLION/UL (ref 3.81–5.12)
SODIUM SERPL-SCNC: 140 MMOL/L (ref 136–145)
TSH SERPL DL<=0.05 MIU/L-ACNC: 0.53 UIU/ML (ref 0.36–3.74)
WBC # BLD AUTO: 8.4 THOUSAND/UL (ref 4.31–10.16)

## 2019-09-22 PROCEDURE — 93005 ELECTROCARDIOGRAM TRACING: CPT

## 2019-09-22 PROCEDURE — 96374 THER/PROPH/DIAG INJ IV PUSH: CPT

## 2019-09-22 PROCEDURE — 73700 CT LOWER EXTREMITY W/O DYE: CPT

## 2019-09-22 PROCEDURE — 99285 EMERGENCY DEPT VISIT HI MDM: CPT | Performed by: PHYSICIAN ASSISTANT

## 2019-09-22 PROCEDURE — 96361 HYDRATE IV INFUSION ADD-ON: CPT

## 2019-09-22 PROCEDURE — 82948 REAGENT STRIP/BLOOD GLUCOSE: CPT

## 2019-09-22 PROCEDURE — 99285 EMERGENCY DEPT VISIT HI MDM: CPT

## 2019-09-22 PROCEDURE — 36415 COLL VENOUS BLD VENIPUNCTURE: CPT | Performed by: PHYSICIAN ASSISTANT

## 2019-09-22 PROCEDURE — 80053 COMPREHEN METABOLIC PANEL: CPT | Performed by: PHYSICIAN ASSISTANT

## 2019-09-22 PROCEDURE — 94664 DEMO&/EVAL PT USE INHALER: CPT

## 2019-09-22 PROCEDURE — 71045 X-RAY EXAM CHEST 1 VIEW: CPT

## 2019-09-22 PROCEDURE — 85610 PROTHROMBIN TIME: CPT | Performed by: PHYSICIAN ASSISTANT

## 2019-09-22 PROCEDURE — 85025 COMPLETE CBC W/AUTO DIFF WBC: CPT | Performed by: PHYSICIAN ASSISTANT

## 2019-09-22 PROCEDURE — 85730 THROMBOPLASTIN TIME PARTIAL: CPT | Performed by: PHYSICIAN ASSISTANT

## 2019-09-22 PROCEDURE — 94760 N-INVAS EAR/PLS OXIMETRY 1: CPT

## 2019-09-22 PROCEDURE — 73502 X-RAY EXAM HIP UNI 2-3 VIEWS: CPT

## 2019-09-22 PROCEDURE — 84443 ASSAY THYROID STIM HORMONE: CPT | Performed by: INTERNAL MEDICINE

## 2019-09-22 PROCEDURE — 99223 1ST HOSP IP/OBS HIGH 75: CPT | Performed by: INTERNAL MEDICINE

## 2019-09-22 RX ORDER — ONDANSETRON 2 MG/ML
4 INJECTION INTRAMUSCULAR; INTRAVENOUS EVERY 6 HOURS PRN
Status: DISCONTINUED | OUTPATIENT
Start: 2019-09-22 | End: 2019-09-25 | Stop reason: HOSPADM

## 2019-09-22 RX ORDER — HYDROMORPHONE HCL/PF 1 MG/ML
0.5 SYRINGE (ML) INJECTION ONCE
Status: COMPLETED | OUTPATIENT
Start: 2019-09-22 | End: 2019-09-22

## 2019-09-22 RX ORDER — POTASSIUM CHLORIDE 20 MEQ/1
40 TABLET, EXTENDED RELEASE ORAL ONCE
Status: COMPLETED | OUTPATIENT
Start: 2019-09-22 | End: 2019-09-22

## 2019-09-22 RX ORDER — ACETAMINOPHEN 325 MG/1
650 TABLET ORAL EVERY 6 HOURS PRN
Status: DISCONTINUED | OUTPATIENT
Start: 2019-09-22 | End: 2019-09-25 | Stop reason: HOSPADM

## 2019-09-22 RX ORDER — OXYCODONE HYDROCHLORIDE AND ACETAMINOPHEN 5; 325 MG/1; MG/1
1 TABLET ORAL EVERY 4 HOURS PRN
Status: DISCONTINUED | OUTPATIENT
Start: 2019-09-22 | End: 2019-09-25 | Stop reason: HOSPADM

## 2019-09-22 RX ORDER — FLUTICASONE PROPIONATE 50 MCG
1 SPRAY, SUSPENSION (ML) NASAL DAILY
Status: DISCONTINUED | OUTPATIENT
Start: 2019-09-22 | End: 2019-09-22

## 2019-09-22 RX ORDER — HYDROCHLOROTHIAZIDE 12.5 MG/1
12.5 TABLET ORAL DAILY
Status: DISCONTINUED | OUTPATIENT
Start: 2019-09-23 | End: 2019-09-25 | Stop reason: HOSPADM

## 2019-09-22 RX ORDER — TEMAZEPAM 7.5 MG/1
7.5 CAPSULE ORAL
Status: DISCONTINUED | OUTPATIENT
Start: 2019-09-22 | End: 2019-09-25 | Stop reason: HOSPADM

## 2019-09-22 RX ORDER — MORPHINE SULFATE 10 MG/ML
6 INJECTION, SOLUTION INTRAMUSCULAR; INTRAVENOUS ONCE
Status: COMPLETED | OUTPATIENT
Start: 2019-09-22 | End: 2019-09-22

## 2019-09-22 RX ORDER — SODIUM CHLORIDE 9 MG/ML
125 INJECTION, SOLUTION INTRAVENOUS CONTINUOUS
Status: DISCONTINUED | OUTPATIENT
Start: 2019-09-22 | End: 2019-09-25 | Stop reason: HOSPADM

## 2019-09-22 RX ORDER — FENTANYL CITRATE 50 UG/ML
1 INJECTION, SOLUTION INTRAMUSCULAR; INTRAVENOUS ONCE
Status: COMPLETED | OUTPATIENT
Start: 2019-09-22 | End: 2019-09-22

## 2019-09-22 RX ORDER — LORAZEPAM 0.5 MG/1
0.5 TABLET ORAL DAILY PRN
Status: DISCONTINUED | OUTPATIENT
Start: 2019-09-22 | End: 2019-09-25 | Stop reason: HOSPADM

## 2019-09-22 RX ORDER — LOSARTAN POTASSIUM 50 MG/1
50 TABLET ORAL DAILY
Status: DISCONTINUED | OUTPATIENT
Start: 2019-09-22 | End: 2019-09-25 | Stop reason: HOSPADM

## 2019-09-22 RX ADMIN — SODIUM CHLORIDE 125 ML/HR: 0.9 INJECTION, SOLUTION INTRAVENOUS at 23:36

## 2019-09-22 RX ADMIN — SODIUM CHLORIDE 125 ML/HR: 0.9 INJECTION, SOLUTION INTRAVENOUS at 12:30

## 2019-09-22 RX ADMIN — POTASSIUM CHLORIDE 40 MEQ: 20 TABLET, EXTENDED RELEASE ORAL at 14:46

## 2019-09-22 RX ADMIN — LOSARTAN POTASSIUM 50 MG: 50 TABLET, FILM COATED ORAL at 22:39

## 2019-09-22 RX ADMIN — MORPHINE SULFATE 6 MG: 10 INJECTION INTRAVENOUS at 11:36

## 2019-09-22 RX ADMIN — MORPHINE SULFATE 2 MG: 2 INJECTION, SOLUTION INTRAMUSCULAR; INTRAVENOUS at 20:42

## 2019-09-22 RX ADMIN — MORPHINE SULFATE 2 MG: 2 INJECTION, SOLUTION INTRAMUSCULAR; INTRAVENOUS at 15:42

## 2019-09-22 RX ADMIN — HYDROMORPHONE HYDROCHLORIDE 0.5 MG: 1 INJECTION, SOLUTION INTRAMUSCULAR; INTRAVENOUS; SUBCUTANEOUS at 21:25

## 2019-09-22 RX ADMIN — TEMAZEPAM 7.5 MG: 7.5 CAPSULE ORAL at 22:39

## 2019-09-22 RX ADMIN — ENOXAPARIN SODIUM 40 MG: 40 INJECTION, SOLUTION INTRAVENOUS; SUBCUTANEOUS at 14:46

## 2019-09-22 NOTE — H&P
H&P- Arjun Woodward 1960, 63 y o  female MRN: [id number]    Unit/Bed#: [address] [place] [address] Encounter: [id number]    Primary Care Provider: Israel Oreilly MD   Date and time admitted to hospital: 9/22/2019 10:52 AM        * Closed fracture of right hip [facility])  Assessment & Plan  Right hip fracture status post fall  Orthopedic consult  Pain management and stool softener  Encourage incentive spirometer use  Monitor H&H and transfuse if needed  Gentle IV fluids  Will order UA for completeness  Repeat labs including PT INR in a m  EKG in a m   Keep patient NPO after midnight for surgery in a m  Fall  Assessment & Plan  Mechanical fall  Denies any loss of consciousness  P T /OT    Depression  Assessment & Plan  Denies any suicidal or homicidal ideation    Essential hypertension  Assessment & Plan  On hydrochlorothiazide and Cozaar  Monitor vitals as per protocol   Anticipated length of stay greater than 2 midnights  Chief Complaint   Patient presents with    Hip Pain     Pt presents to ER after a fall this morning, landing on her right hip        HPI:  Arjun Woodward is a 63 y o  female with history of hypertension, depression presented to the emergency department with right hip pain after a fall  Patient states that she was walking from the kitchen to the living room when her right leg gave out on her and she fell  She landed on her right hip and complained of severe right hip pain which was 10/10 intensity, sharp, constant and worse with movement  She denies hitting her head or any other injuries or loss of consciousness  Denies any tingling or numbness  Patient was found to have right hip fracture and Orthopedics recommended patient to be admitted to medical service  Patient denies any fever, chills, chest pain, shortness of breath, nausea, vomiting, abdominal pain, headache, weakness of extremities      Historical Information   Past Medical History:   Diagnosis Date    Anxiety     Hypertension     Pre-diabetes      Past Surgical History:   Procedure Laterality Date    APPENDECTOMY       Social History   Social History     Substance and Sexual Activity   Alcohol Use Never    Frequency: Never     Social History     Substance and Sexual Activity   Drug Use Never     Social History     Tobacco Use   Smoking Status Never Smoker   Smokeless Tobacco Never Used     Family History   Problem Relation Age of Onset    Hypertension Mother     Substance Abuse Neg Hx     Mental illness Neg Hx        Meds/Allergies   Allergies   Allergen Reactions    Sulfa Antibiotics Rash and Itching       Meds:    Current Facility-Administered Medications:     acetaminophen (TYLENOL) tablet 650 mg, 650 mg, Oral, Q6H PRN, Derrick Og MD    enoxaparin (LOVENOX) subcutaneous injection 40 mg, 40 mg, Subcutaneous, Daily, Derrick Og MD    fluticasone (FLONASE) 50 mcg/act nasal spray 1 spray, 1 spray, Nasal, Daily, Derrick Og MD    [START ON 9/23/2019] hydrochlorothiazide (HYDRODIURIL) tablet 12 5 mg, 12 5 mg, Oral, Daily, Derrick Og MD    LORazepam (ATIVAN) tablet 0 5 mg, 0 5 mg, Oral, Daily PRN, Derrick Og MD    losartan (COZAAR) tablet 50 mg, 50 mg, Oral, Daily, Derrick Og MD    morphine injection 2 mg, 2 mg, Intravenous, Q4H PRN, Derrick Og MD    ondansetron (ZOFRAN) injection 4 mg, 4 mg, Intravenous, Q6H PRN, Derrick Og MD    oxyCODONE-acetaminophen (PERCOCET) 5-325 mg per tablet 1 tablet, 1 tablet, Oral, Q4H PRN, Derrick Og MD    potassium chloride (K-DUR,KLOR-CON) CR tablet 40 mEq, 40 mEq, Oral, Once, Derrick Og MD    sodium chloride 0 9 % infusion, 125 mL/hr, Intravenous, Continuous, Derrick Og MD, Last Rate: 125 mL/hr at 09/22/19 1230, 125 mL/hr at 09/22/19 1230    temazepam (RESTORIL) capsule 7 5 mg, 7 5 mg, Oral, HS PRN, Derrick Og MD    Medications Prior to Admission   Medication    ascorbic acid (VITAMIN C) 500 mg tablet    aspirin 81 MG tablet    Calcium Carbonate-Vitamin D3 (CALCIUM 600-D) 600-400 MG-UNIT TABS    CHROMIUM POLYNICOTINATE PO    Cyanocobalamin (VITAMIN B 12) 250 MCG LOZG    fluocinonide (LIDEX) 0 05 % cream    fluticasone (FLONASE) 50 mcg/act nasal spray    hydrochlorothiazide (MICROZIDE) 12 5 mg capsule    Lactobacillus (ACIDOPHILUS) 100 MG CAPS    LORazepam (ATIVAN) 0 5 mg tablet    losartan (COZAAR) 50 mg tablet    Magnesium 250 MG TABS    Resveratrol 100 MG CAPS    Selenium 200 MCG CAPS    temazepam (RESTORIL) 7 5 mg capsule    temazepam (RESTORIL) 7 5 mg capsule    VANADYL SULFATE PO         Review of Systems   Constitutional: Positive for activity change and fatigue  HENT: Negative  Eyes: Negative  Respiratory: Negative  Cardiovascular: Negative  Gastrointestinal: Negative  Endocrine: Negative  Genitourinary: Negative  Musculoskeletal: Positive for arthralgias  Skin: Negative  Allergic/Immunologic: Negative  Neurological: Negative  Hematological: Negative  Psychiatric/Behavioral: Negative  Current Vitals:   Blood Pressure: 135/79 (09/22/19 1417)  Pulse: 77 (09/22/19 1417)  Temperature: 98 °F (36 7 °C) (09/22/19 1417)  Temp Source: Oral (09/22/19 1417)  Respirations: 18 (09/22/19 1417)  Height: 5' 7 2" (170 7 cm) (09/22/19 1417)  Weight - Scale: 70 kg (154 lb 5 2 oz) (09/22/19 1417)  SpO2: 97 % (09/22/19 1417)  SPO2 RA Rest      ED to Hosp-Admission (Current) from 9/22/2019 in [facility]   SpO2  97 %   SpO2 Activity  At Rest   O2 Device  None (Room air)   O2 Flow Rate          No intake or output data in the 24 hours ending 09/22/19 1420  Body mass index is 24 03 kg/m²  Physical Exam   Constitutional: She is oriented to person, place, and time  She appears well-developed and well-nourished  No distress  HENT:   Head: Normocephalic and atraumatic     Nose: Nose normal    Mouth/Throat: Oropharynx is clear and moist    Eyes: Pupils are equal, round, and reactive to light  Conjunctivae and EOM are normal  No scleral icterus  Neck: Normal range of motion  Neck supple  No JVD present  No tracheal deviation present  No thyromegaly present  Cardiovascular: Normal rate, regular rhythm, normal heart sounds and intact distal pulses  No murmur heard  Pulmonary/Chest: Effort normal and breath sounds normal  No respiratory distress  She has no wheezes  She has no rales  She exhibits no tenderness  Abdominal: Soft  Bowel sounds are normal  She exhibits no mass  There is no tenderness  There is no rebound and no guarding  Musculoskeletal: Normal range of motion  She exhibits no edema, tenderness or deformity  Right hip-shortened, decreased range of motion, tenderness to palpation   Lymphadenopathy:     She has no cervical adenopathy  Neurological: She is alert and oriented to person, place, and time  No cranial nerve deficit  No focal deficits   Skin: Skin is warm  No rash noted  No erythema  No pallor  Psychiatric: She has a normal mood and affect  Her behavior is normal  Judgment normal    Nursing note and vitals reviewed        Lab Results:   CBC:   Lab Results   Component Value Date    WBC 8 40 09/22/2019    HGB 15 4 09/22/2019    HCT 45 9 09/22/2019    MCV 94 09/22/2019     09/22/2019    MCH 31 4 09/22/2019    MCHC 33 6 09/22/2019    RDW 13 0 09/22/2019    MPV 10 7 09/22/2019    NRBC 0 09/22/2019     CMP:  Lab Results   Component Value Date     12/22/2014     09/22/2019    CL 99 04/22/2019    CO2 27 09/22/2019    CO2 28 04/22/2019    ANIONGAP 5 12/22/2014    BUN 16 09/22/2019    BUN 18 04/22/2019    CREATININE 0 75 09/22/2019    CREATININE 0 65 12/22/2014    GLUCOSE 107 12/22/2014    CALCIUM 8 8 09/22/2019    CALCIUM 8 9 12/22/2014    AST 21 09/22/2019    AST 25 04/22/2019    ALT 28 09/22/2019    ALT 26 04/22/2019    ALKPHOS 63 09/22/2019    EGFR 88 09/22/2019     Lab Results   Component Value Date TROPONINI <0 02 02/13/2017     Coagulation:   Lab Results   Component Value Date    INR 1 02 09/22/2019    Urinalysis:  Lab Results   Component Value Date    COLORU Yellow 03/23/2018    CLARITYU Clear 03/23/2018    SPECGRAV 1 010 03/23/2018    PHUR 6 5 03/23/2018    LEUKOCYTESUR Negative 03/23/2018    NITRITE Negative 03/23/2018    GLUCOSEU Negative 03/23/2018    KETONESU Negative 03/23/2018    BILIRUBINUR Negative 03/23/2018    BLOODU Negative 03/23/2018      Amylase: No results found for: AMYLASE  Lipase: No results found for: LIPASE     Imaging: Xr Hip/pelv 2-3 Vws Right    Result Date: 9/22/2019  Narrative: RIGHT HIP INDICATION:   fall  COMPARISON:  None VIEWS:  XR HIP/PELV 2-3 VWS RIGHT W PELVIS IF PERFORMED FINDINGS: There is an acute impacted and minimally displaced right femoral neck fracture  Mild bilateral hip osteoarthrosis  No lytic or blastic osseous lesions  Soft tissues are unremarkable  Mild degenerative changes in the lower lumbar spine  Impression: Acute right femoral neck fracture  Workstation performed: [id number]     Ct Hip Right Without Contrast    Result Date: 9/22/2019  Narrative: CT right hip without IV contrast INDICATION: Right hip fracture seen on the prior radiograph  Follow-up    COMPARISON: X-ray pelvis/right hip dated September 22, 2019  TECHNIQUE: CT examination of the right hip was performed  This examination, like all CT scans performed in the [facility], was performed utilizing techniques to minimize radiation dose exposure, including the use of iterative reconstruction and automated exposure control software  Sagittal and coronal two dimensional reconstructed images were also submitted for interpretation  Rad dose  339 03 mGy-cm FINDINGS: OSSEOUS STRUCTURES:  There is an impacted and mildly displaced right basicervical femoral neck fracture, similar to the prior radiograph  Mild degenerative arthropathy of the right hip    Mild degenerative changes in the lower lumbar spine  VISUALIZED MUSCULATURE:  Unremarkable  SOFT TISSUES:  Unremarkable  Impression: RIGHT side femoral neck fracture, similar to the prior radiograph  Workstation performed: [id number]     EKG, Pathology, and Other Studies: I have personally reviewed the results    VTE Pharmacologic Prophylaxis: Enoxaparin (Lovenox)  VTE Mechanical Prophylaxis: sequential compression device    Code Status: Level 1 - Full Code    "This note has been constructed using a voice recognition system"      Kia Nath MD  9/22/2019, 2:20 PM

## 2019-09-22 NOTE — ASSESSMENT & PLAN NOTE
Right hip fracture status post fall  Orthopedic consult  Pain management and stool softener  Encourage incentive spirometer use  Monitor H&H and transfuse if needed  Gentle IV fluids  Will order UA for completeness  Repeat labs including PT INR in a m  EKG in a m   Keep patient NPO after midnight for surgery in a m

## 2019-09-22 NOTE — RESPIRATORY THERAPY NOTE
RT Protocol Note  Kayla Ford 63 y o  female MRN: [id number]  Unit/Bed#: [address] [place] [address] 201-01 Encounter: [id number]    Assessment    Principal Problem:    Closed fracture of right hip [facility])  Active Problems:    Essential hypertension    Depression    Fall      Home Pulmonary Medications:  no       Past Medical History:   Diagnosis Date    Anxiety     Hypertension     Pre-diabetes      Social History     Socioeconomic History    Marital status: Single     Spouse name: None    Number of children: None    Years of education: None    Highest education level: None   Occupational History    None   Social Needs    Financial resource strain: None    Food insecurity:     Worry: None     Inability: None    Transportation needs:     Medical: No     Non-medical: No   Tobacco Use    Smoking status: Never Smoker    Smokeless tobacco: Never Used   Substance and Sexual Activity    Alcohol use: Never     Frequency: Never    Drug use: Never    Sexual activity: Never   Lifestyle    Physical activity:     Days per week: None     Minutes per session: None    Stress: None   Relationships    Social connections:     Talks on phone: None     Gets together: None     Attends Shinto service: None     Active member of club or organization: None     Attends meetings of clubs or organizations: None     Relationship status: None    Intimate partner violence:     Fear of current or ex partner: None     Emotionally abused: None     Physically abused: None     Forced sexual activity: None   Other Topics Concern    None   Social History Narrative    Always uses seat belt    Caffeine use    Lives with mother  Feels safe  No living will  Sees dentist reg         Subjective         Objective    Physical Exam:   Assessment Type: Assess only  General Appearance: Alert, Awake  Respiratory Pattern: Normal  Chest Assessment: Chest expansion symmetrical  Bilateral Breath Sounds: Clear, Diminished    Vitals:  Blood pressure 135/79, pulse 77, temperature 98 °F (36 7 °C), temperature source Oral, resp  rate 18, height 5' 7 2" (1 707 m), weight 70 kg (154 lb 5 2 oz), SpO2 97 %, not currently breastfeeding  Imaging and other studies: I have personally reviewed pertinent reports              Plan    Respiratory Plan: No distress/Pulmonary history

## 2019-09-22 NOTE — ED PROVIDER NOTES
History  Chief Complaint   Patient presents with    Hip Pain     Pt presents to ER after a fall this morning, landing on her right hip     Patient is a 64 y/o F with h/o HTN and depression that presents to the ED with severe right hip pain that started this morning after a fall  She states she was walking from the kitchen to the living room with a bowl of cereal and her right leg gave out on her and she fell onto her right hip  She denies hitting head or LOC  No other injuries  No numbness or tingling  Patient keeps requesting to us to touch pressure points on her feet to take the pain away  She was given 100mcg of Fentanyl in the ambulance on the way to the ER, but states it didn't help  History provided by:  Patient  Hip Pain   Location:  Right hip pain  Severity:  Moderate  Onset quality:  Sudden  Duration:  3 hours  Timing:  Constant  Progression:  Unchanged  Chronicity:  New  Context:  Patient's right leg gave out and she fell onto right hip this morning  Relieved by:  Nothing  Worsened by: Movement  Ineffective treatments:  Fentanyl  Associated symptoms: no abdominal pain, no chest pain, no cough, no fever, no nausea, no rash, no shortness of breath and no vomiting        Prior to Admission Medications   Prescriptions Last Dose Informant Patient Reported? Taking?    CHROMIUM POLYNICOTINATE PO  Self Yes No   Sig: Take 1-2 capsules by mouth daily This is 200 mcg daily   Calcium Carbonate-Vitamin D3 (CALCIUM 600-D) 600-400 MG-UNIT TABS Not Taking at Unknown time  Yes No   Sig: Take 1 capsule by mouth daily    Cyanocobalamin (VITAMIN B 12) 250 MCG LOZG   Yes No   Sig: Take 500 mcg by mouth daily   LORazepam (ATIVAN) 0 5 mg tablet   No No   Sig: take 1 tablet by mouth once daily if needed for anxiety   Lactobacillus (ACIDOPHILUS) 100 MG CAPS   Yes No   Sig: Take 3 capsules by mouth daily   Magnesium 250 MG TABS   Yes No   Sig: Take 1 tablet by mouth daily   Resveratrol 100 MG CAPS Not Taking at Unknown time  Yes No   Sig: Take 1 capsule by mouth daily   Selenium 200 MCG CAPS  Self Yes No   Sig: Take by mouth 2-3 times a week takes 1 tab   VANADYL SULFATE PO  Self Yes No   Sig: Take by mouth 10 mg tabs  Takes 1-2 daily   ascorbic acid (VITAMIN C) 500 mg tablet   Yes No   Sig: Take 500 mg by mouth daily    aspirin 81 MG tablet   Yes No   Sig: Take by mouth   fluocinonide (LIDEX) 0 05 % cream Not Taking at Unknown time  No No   Sig: Apply topically 2 (two) times a day   Patient not taking: Reported on 2019   fluticasone (FLONASE) 50 mcg/act nasal spray Not Taking at Unknown time  No No   Si spray into each nostril daily   Patient not taking: Reported on 2019   hydrochlorothiazide (MICROZIDE) 12 5 mg capsule   No No   Sig: take 1 capsule by mouth once daily   losartan (COZAAR) 50 mg tablet   No No   Sig: take 1 tablet by mouth once daily   temazepam (RESTORIL) 7 5 mg capsule   No No   Sig: Take 1 capsule (7 5 mg total) by mouth daily at bedtime as needed for sleep   temazepam (RESTORIL) 7 5 mg capsule   No No   Sig: take 1 capsule by mouth at bedtime if needed sleep      Facility-Administered Medications: None       Past Medical History:   Diagnosis Date    Anxiety     Hypertension     Pre-diabetes        Past Surgical History:   Procedure Laterality Date    APPENDECTOMY         Family History   Problem Relation Age of Onset    Hypertension Mother     Substance Abuse Neg Hx     Mental illness Neg Hx      I have reviewed and agree with the history as documented  Social History     Tobacco Use    Smoking status: Never Smoker    Smokeless tobacco: Never Used   Substance Use Topics    Alcohol use: Never     Frequency: Never    Drug use: Never        Review of Systems   Constitutional: Negative for chills and fever  HENT: Negative  Respiratory: Negative for cough and shortness of breath  Cardiovascular: Negative for chest pain     Gastrointestinal: Negative for abdominal pain, nausea and vomiting  Musculoskeletal:        Right hip pain   Skin: Negative for color change and rash  Neurological: Negative for dizziness, weakness and numbness  Psychiatric/Behavioral: Negative for confusion  All other systems reviewed and are negative  Physical Exam  Physical Exam   Constitutional: She is oriented to person, place, and time  She appears well-developed and well-nourished  She is uncooperative  No distress  HENT:   Head: Normocephalic and atraumatic  Nose: Nose normal    Mouth/Throat: Oropharynx is clear and moist    Eyes: Conjunctivae are normal    Neck: Normal range of motion  No spinous process tenderness present  Cardiovascular: Normal rate, regular rhythm and normal heart sounds  Pulses:       Dorsalis pedis pulses are 2+ on the right side  Pulmonary/Chest: Effort normal and breath sounds normal    Abdominal: Soft  Normal appearance and bowel sounds are normal  There is no tenderness  Musculoskeletal:        Right hip: She exhibits decreased range of motion, tenderness and bony tenderness  She exhibits no swelling  Neurological: She is alert and oriented to person, place, and time  She has normal strength  No sensory deficit  Skin: Skin is warm and dry  No bruising and no rash noted  She is not diaphoretic  No pallor  Psychiatric: Her speech is rapid and/or pressured  She is agitated  Nursing note and vitals reviewed        Vital Signs  ED Triage Vitals   Temperature Pulse Respirations Blood Pressure SpO2   09/22/19 1050 09/22/19 1049 09/22/19 1049 09/22/19 1049 09/22/19 1049   (!) 97 2 °F (36 2 °C) 97 19 139/96 97 %      Temp Source Heart Rate Source Patient Position - Orthostatic VS BP Location FiO2 (%)   09/22/19 1050 09/22/19 1049 09/22/19 1049 09/22/19 1049 --   Tympanic Monitor Lying Right arm       Pain Score       09/22/19 1049       Worst Possible Pain           Vitals:    09/22/19 1049 09/22/19 1230 09/22/19 1245 09/22/19 1417   BP: 139/96 149/65 166/81 135/79   Pulse: 97 81 76 77   Patient Position - Orthostatic VS: Lying   Lying         Visual Acuity      ED Medications  Medications   sodium chloride 0 9 % infusion (125 mL/hr Intravenous New Bag 9/22/19 1230)   fluticasone (FLONASE) 50 mcg/act nasal spray 1 spray (has no administration in time range)   hydrochlorothiazide (HYDRODIURIL) tablet 12 5 mg (has no administration in time range)   LORazepam (ATIVAN) tablet 0 5 mg (has no administration in time range)   losartan (COZAAR) tablet 50 mg (has no administration in time range)   temazepam (RESTORIL) capsule 7 5 mg (has no administration in time range)   ondansetron (ZOFRAN) injection 4 mg (has no administration in time range)   enoxaparin (LOVENOX) subcutaneous injection 40 mg (has no administration in time range)   acetaminophen (TYLENOL) tablet 650 mg (has no administration in time range)   morphine injection 2 mg (has no administration in time range)   oxyCODONE-acetaminophen (PERCOCET) 5-325 mg per tablet 1 tablet (has no administration in time range)   potassium chloride (K-DUR,KLOR-CON) CR tablet 40 mEq (has no administration in time range)   fentanyl citrate (PF) (FOR EMS ONLY) 100 mcg/2 mL injection 100 mcg (0 mcg Does not apply Given to EMS 9/22/19 1100)   morphine (PF) 10 mg/mL injection 6 mg (6 mg Intravenous Given 9/22/19 1136)       Diagnostic Studies  Results Reviewed     Procedure Component Value Units Date/Time    Comprehensive metabolic panel [[id number]] Collected:  09/22/19 1225    Lab Status:  Final result Specimen:  Blood from Arm, Right Updated:  09/22/19 1321     Sodium 140 mmol/L      Potassium 3 5 mmol/L      Chloride 104 mmol/L      CO2 27 mmol/L      ANION GAP 9 mmol/L      BUN 16 mg/dL      Creatinine 0 75 mg/dL      Glucose 107 mg/dL      Calcium 8 8 mg/dL      AST 21 U/L      ALT 28 U/L      Alkaline Phosphatase 63 U/L      Total Protein 6 8 g/dL      Albumin 3 6 g/dL      Total Bilirubin 0 40 mg/dL      eGFR 88 ml/min/1 73sq m     Narrative:       National Kidney Disease Foundation guidelines for Chronic Kidney Disease (CKD):     Stage 1 with normal or high GFR (GFR > 90 mL/min/1 73 square meters)    Stage 2 Mild CKD (GFR = 60-89 mL/min/1 73 square meters)    Stage 3A Moderate CKD (GFR = 45-59 mL/min/1 73 square meters)    Stage 3B Moderate CKD (GFR = 30-44 mL/min/1 73 square meters)    Stage 4 Severe CKD (GFR = 15-29 mL/min/1 73 square meters)    Stage 5 End Stage CKD (GFR <15 mL/min/1 73 square meters)  Note: GFR calculation is accurate only with a steady state creatinine    Protime-INR [[id number]]  (Normal) Collected:  09/22/19 1225    Lab Status:  Final result Specimen:  Blood from Arm, Right Updated:  09/22/19 1312     Protime 13 1 seconds      INR 1 02    APTT [[id number]]  (Normal) Collected:  09/22/19 1225    Lab Status:  Final result Specimen:  Blood from Arm, Right Updated:  09/22/19 1312     PTT 28 seconds     CBC and differential [[id number]]  (Abnormal) Collected:  09/22/19 1225    Lab Status:  Final result Specimen:  Blood from Arm, Right Updated:  09/22/19 1301     WBC 8 40 Thousand/uL      RBC 4 90 Million/uL      Hemoglobin 15 4 g/dL      Hematocrit 45 9 %      MCV 94 fL      MCH 31 4 pg      MCHC 33 6 g/dL      RDW 13 0 %      MPV 10 7 fL      Platelets 309 Thousands/uL      nRBC 0 /100 WBCs      Neutrophils Relative 80 %      Immat GRANS % 0 %      Lymphocytes Relative 12 %      Monocytes Relative 6 %      Eosinophils Relative 1 %      Basophils Relative 1 %      Neutrophils Absolute 6 77 Thousands/µL      Immature Grans Absolute 0 03 Thousand/uL      Lymphocytes Absolute 1 01 Thousands/µL      Monocytes Absolute 0 50 Thousand/µL      Eosinophils Absolute 0 05 Thousand/µL      Basophils Absolute 0 04 Thousands/µL     UA w Reflex to Microscopic w Reflex to Culture [[id number]]     Lab Status:  No result Specimen:  Urine, Clean Catch                  CT hip right without contrast   Final Result by Tiffany Gudino MD (09/22 1415)      RIGHT side femoral neck fracture, similar to the prior radiograph  Workstation performed: [id number]         XR hip/pelv 2-3 vws right   ED Interpretation by Sarah Valerio PA-C (09/22 1219)   Intertrochanteric fracture right hip      Final Result by Romario Zarco MD (09/22 1415)      Acute right femoral neck fracture  Workstation performed: [id number]         XR chest 1 view portable    (Results Pending)              Procedures  ECG 12 Lead Documentation Only  Date/Time: 9/22/2019 1:03 PM  Performed by: Sarah Valerio PA-C  Authorized by: Sarah Valerio PA-C     Indications / Diagnosis:  Pre op  Patient location:  ED  Previous ECG:     Previous ECG:  Compared to current    Similarity:  No change  Rate:     ECG rate:  72  Rhythm:     Rhythm: sinus rhythm    Conduction:     Conduction: abnormal      Abnormal conduction: incomplete RBBB    ST segments:     ST segments:  Normal           ED Course  ED Course as of Sep 22 1422   Sun Sep 22, 2019   1220 Ortho paged  125 SPoke with Dr Skip Rojas, he suggests admitting to medicine, surgery tomorrow  [facility] patient, but would prefer to wait for labs                                     MDM  Number of Diagnoses or Management Options  Fall: new and requires workup  Fracture, hip, right, closed, initial encounter [facility]): new and requires workup     Amount and/or Complexity of Data Reviewed  Clinical lab tests: ordered and reviewed  Tests in the radiology section of CPT®: ordered and reviewed  Discuss the patient with other providers: yes (Dr Skip Stout)  Independent visualization of images, tracings, or specimens: yes    Patient Progress  Patient progress: stable      Disposition  Final diagnoses:   Fracture, hip, right, closed, initial encounter [facility])   Fall     Time reflects when diagnosis was documented in both MDM as applicable and the Disposition within this note     Time User Action Codes Description Comment    9/22/2019  1:23 PM Dana Antony Add [S72 001A] Fracture, hip, right, closed, initial encounter ([facility] [place] [address] )     9/22/2019  1:24 PM Dana Antony Add [K94  XXXA] Fall     9/22/2019  2:01 PM Suleiman Krishnamurthy Modify [S63  XXXA] Fall     9/22/2019  2:18 PM Lachman, Ona Pringle Add [S72 001A] Displaced fracture of right femoral neck [facility])       ED Disposition     ED Disposition Condition Date/Time Comment    Admit Stable Sun Sep 22, 2019 12:49 PM Case was discussed with Dr Umesh Doyle and the patient's admission status was agreed to be Admission Status: inpatient status to the service of Dr Umesh Doyle           Follow-up Information    None         Current Discharge Medication List      CONTINUE these medications which have NOT CHANGED    Details   ascorbic acid (VITAMIN C) 500 mg tablet Take 500 mg by mouth daily       aspirin 81 MG tablet Take by mouth      Calcium Carbonate-Vitamin D3 (CALCIUM 600-D) 600-400 MG-UNIT TABS Take 1 capsule by mouth daily       CHROMIUM POLYNICOTINATE PO Take 1-2 capsules by mouth daily This is 200 mcg daily      Cyanocobalamin (VITAMIN B 12) 250 MCG LOZG Take 500 mcg by mouth daily      fluocinonide (LIDEX) 0 05 % cream Apply topically 2 (two) times a day  Qty: 30 g, Refills: 1    Associated Diagnoses: Other atopic dermatitis      fluticasone (FLONASE) 50 mcg/act nasal spray 1 spray into each nostril daily  Qty: 16 g, Refills: 0    Associated Diagnoses: Viral upper respiratory tract infection      hydrochlorothiazide (MICROZIDE) 12 5 mg capsule take 1 capsule by mouth once daily  Qty: 90 capsule, Refills: 3    Associated Diagnoses: Hypertension, essential      Lactobacillus (ACIDOPHILUS) 100 MG CAPS Take 3 capsules by mouth daily      LORazepam (ATIVAN) 0 5 mg tablet take 1 tablet by mouth once daily if needed for anxiety  Qty: 30 tablet, Refills: 0    Associated Diagnoses: Insomnia, unspecified type      losartan (COZAAR) 50 mg tablet take 1 tablet by mouth once daily  Qty: 90 tablet, Refills: 3    Associated Diagnoses: Hypertension, essential      Magnesium 250 MG TABS Take 1 tablet by mouth daily      Resveratrol 100 MG CAPS Take 1 capsule by mouth daily      Selenium 200 MCG CAPS Take by mouth 2-3 times a week takes 1 tab      !! temazepam (RESTORIL) 7 5 mg capsule Take 1 capsule (7 5 mg total) by mouth daily at bedtime as needed for sleep  Qty: 30 capsule, Refills: 3    Associated Diagnoses: Insomnia, unspecified type      !! temazepam (RESTORIL) 7 5 mg capsule take 1 capsule by mouth at bedtime if needed sleep  Qty: 30 capsule, Refills: 3    Associated Diagnoses: Insomnia, unspecified type      VANADYL SULFATE PO Take by mouth 10 mg tabs  Takes 1-2 daily       ! ! - Potential duplicate medications found  Please discuss with provider  No discharge procedures on file      ED Provider  Electronically Signed by           Maya Ribeiro PA-C  09/22/19 0044

## 2019-09-23 ENCOUNTER — ANESTHESIA EVENT (INPATIENT)
Dept: PERIOP | Facility: HOSPITAL | Age: 59
DRG: 301 | End: 2019-09-23
Payer: COMMERCIAL

## 2019-09-23 ENCOUNTER — APPOINTMENT (INPATIENT)
Dept: RADIOLOGY | Facility: HOSPITAL | Age: 59
DRG: 301 | End: 2019-09-23
Payer: COMMERCIAL

## 2019-09-23 ENCOUNTER — ANESTHESIA (INPATIENT)
Dept: PERIOP | Facility: HOSPITAL | Age: 59
DRG: 301 | End: 2019-09-23
Payer: COMMERCIAL

## 2019-09-23 LAB
ABO GROUP BLD: NORMAL
ALBUMIN SERPL BCP-MCNC: 2.8 G/DL (ref 3.5–5)
ALP SERPL-CCNC: 62 U/L (ref 46–116)
ALT SERPL W P-5'-P-CCNC: 21 U/L (ref 12–78)
ANION GAP SERPL CALCULATED.3IONS-SCNC: 7 MMOL/L (ref 4–13)
APTT PPP: 29 SECONDS (ref 23–37)
AST SERPL W P-5'-P-CCNC: 18 U/L (ref 5–45)
ATRIAL RATE: 72 BPM
BASOPHILS # BLD AUTO: 0.04 THOUSANDS/ΜL (ref 0–0.1)
BASOPHILS NFR BLD AUTO: 1 % (ref 0–1)
BILIRUB SERPL-MCNC: 0.4 MG/DL (ref 0.2–1)
BLD GP AB SCN SERPL QL: NEGATIVE
BUN SERPL-MCNC: 12 MG/DL (ref 5–25)
CALCIUM SERPL-MCNC: 8.8 MG/DL (ref 8.3–10.1)
CHLORIDE SERPL-SCNC: 111 MMOL/L (ref 100–108)
CO2 SERPL-SCNC: 25 MMOL/L (ref 21–32)
CREAT SERPL-MCNC: 0.63 MG/DL (ref 0.6–1.3)
EOSINOPHIL # BLD AUTO: 0.33 THOUSAND/ΜL (ref 0–0.61)
EOSINOPHIL NFR BLD AUTO: 5 % (ref 0–6)
ERYTHROCYTE [DISTWIDTH] IN BLOOD BY AUTOMATED COUNT: 13.3 % (ref 11.6–15.1)
GFR SERPL CREATININE-BSD FRML MDRD: 99 ML/MIN/1.73SQ M
GLUCOSE SERPL-MCNC: 112 MG/DL (ref 65–140)
GLUCOSE SERPL-MCNC: 120 MG/DL (ref 65–140)
GLUCOSE SERPL-MCNC: 123 MG/DL (ref 65–140)
GLUCOSE SERPL-MCNC: 126 MG/DL (ref 65–140)
GLUCOSE SERPL-MCNC: 211 MG/DL (ref 65–140)
GLUCOSE SERPL-MCNC: 90 MG/DL (ref 65–140)
GLUCOSE SERPL-MCNC: 92 MG/DL (ref 65–140)
GLUCOSE SERPL-MCNC: 95 MG/DL (ref 65–140)
HCT VFR BLD AUTO: 42.3 % (ref 34.8–46.1)
HGB BLD-MCNC: 14 G/DL (ref 11.5–15.4)
IMM GRANULOCYTES # BLD AUTO: 0.02 THOUSAND/UL (ref 0–0.2)
IMM GRANULOCYTES NFR BLD AUTO: 0 % (ref 0–2)
INR PPP: 1.02 (ref 0.84–1.19)
LYMPHOCYTES # BLD AUTO: 1.53 THOUSANDS/ΜL (ref 0.6–4.47)
LYMPHOCYTES NFR BLD AUTO: 22 % (ref 14–44)
MAGNESIUM SERPL-MCNC: 2.1 MG/DL (ref 1.6–2.6)
MCH RBC QN AUTO: 31.5 PG (ref 26.8–34.3)
MCHC RBC AUTO-ENTMCNC: 33.1 G/DL (ref 31.4–37.4)
MCV RBC AUTO: 95 FL (ref 82–98)
MONOCYTES # BLD AUTO: 0.7 THOUSAND/ΜL (ref 0.17–1.22)
MONOCYTES NFR BLD AUTO: 10 % (ref 4–12)
NEUTROPHILS # BLD AUTO: 4.5 THOUSANDS/ΜL (ref 1.85–7.62)
NEUTS SEG NFR BLD AUTO: 62 % (ref 43–75)
NRBC BLD AUTO-RTO: 0 /100 WBCS
P AXIS: 72 DEGREES
PHOSPHATE SERPL-MCNC: 3 MG/DL (ref 2.7–4.5)
PLATELET # BLD AUTO: 204 THOUSANDS/UL (ref 149–390)
PMV BLD AUTO: 10.6 FL (ref 8.9–12.7)
POTASSIUM SERPL-SCNC: 3.6 MMOL/L (ref 3.5–5.3)
PR INTERVAL: 162 MS
PROT SERPL-MCNC: 6 G/DL (ref 6.4–8.2)
PROTHROMBIN TIME: 13.1 SECONDS (ref 11.6–14.5)
QRS AXIS: -7 DEGREES
QRSD INTERVAL: 94 MS
QT INTERVAL: 400 MS
QTC INTERVAL: 438 MS
RBC # BLD AUTO: 4.44 MILLION/UL (ref 3.81–5.12)
RH BLD: POSITIVE
SODIUM SERPL-SCNC: 143 MMOL/L (ref 136–145)
SPECIMEN EXPIRATION DATE: NORMAL
T WAVE AXIS: 70 DEGREES
VENTRICULAR RATE: 72 BPM
WBC # BLD AUTO: 7.12 THOUSAND/UL (ref 4.31–10.16)

## 2019-09-23 PROCEDURE — 93010 ELECTROCARDIOGRAM REPORT: CPT | Performed by: INTERNAL MEDICINE

## 2019-09-23 PROCEDURE — 86900 BLOOD TYPING SEROLOGIC ABO: CPT | Performed by: PHYSICIAN ASSISTANT

## 2019-09-23 PROCEDURE — C1776 JOINT DEVICE (IMPLANTABLE): HCPCS | Performed by: ORTHOPAEDIC SURGERY

## 2019-09-23 PROCEDURE — 84100 ASSAY OF PHOSPHORUS: CPT | Performed by: INTERNAL MEDICINE

## 2019-09-23 PROCEDURE — 85025 COMPLETE CBC W/AUTO DIFF WBC: CPT | Performed by: INTERNAL MEDICINE

## 2019-09-23 PROCEDURE — 72170 X-RAY EXAM OF PELVIS: CPT

## 2019-09-23 PROCEDURE — 0SRR0JA REPLACEMENT OF RIGHT HIP JOINT, FEMORAL SURFACE WITH SYNTHETIC SUBSTITUTE, UNCEMENTED, OPEN APPROACH: ICD-10-PCS | Performed by: ORTHOPAEDIC SURGERY

## 2019-09-23 PROCEDURE — 86850 RBC ANTIBODY SCREEN: CPT | Performed by: PHYSICIAN ASSISTANT

## 2019-09-23 PROCEDURE — 27236 TREAT THIGH FRACTURE: CPT | Performed by: ORTHOPAEDIC SURGERY

## 2019-09-23 PROCEDURE — 88305 TISSUE EXAM BY PATHOLOGIST: CPT | Performed by: PATHOLOGY

## 2019-09-23 PROCEDURE — 99223 1ST HOSP IP/OBS HIGH 75: CPT | Performed by: ORTHOPAEDIC SURGERY

## 2019-09-23 PROCEDURE — 27236 TREAT THIGH FRACTURE: CPT | Performed by: PHYSICIAN ASSISTANT

## 2019-09-23 PROCEDURE — 85730 THROMBOPLASTIN TIME PARTIAL: CPT | Performed by: INTERNAL MEDICINE

## 2019-09-23 PROCEDURE — 88311 DECALCIFY TISSUE: CPT | Performed by: PATHOLOGY

## 2019-09-23 PROCEDURE — 80053 COMPREHEN METABOLIC PANEL: CPT | Performed by: INTERNAL MEDICINE

## 2019-09-23 PROCEDURE — 86901 BLOOD TYPING SEROLOGIC RH(D): CPT | Performed by: PHYSICIAN ASSISTANT

## 2019-09-23 PROCEDURE — 85610 PROTHROMBIN TIME: CPT | Performed by: INTERNAL MEDICINE

## 2019-09-23 PROCEDURE — 83735 ASSAY OF MAGNESIUM: CPT | Performed by: INTERNAL MEDICINE

## 2019-09-23 PROCEDURE — 82948 REAGENT STRIP/BLOOD GLUCOSE: CPT

## 2019-09-23 PROCEDURE — 99232 SBSQ HOSP IP/OBS MODERATE 35: CPT | Performed by: INTERNAL MEDICINE

## 2019-09-23 DEVICE — SELF CENTERING BI-POLAR HEAD 28MM ID 45MM OD
Type: IMPLANTABLE DEVICE | Status: FUNCTIONAL
Brand: SELF CENTERING

## 2019-09-23 DEVICE — TRI-LOCK BPS FEMORAL STEM 12/14 TAPER TRI-LOCK BPS W/GRIPTION SIZE 3 HI 101MM
Type: IMPLANTABLE DEVICE | Status: FUNCTIONAL
Brand: TRI-LOCK GRIPTION

## 2019-09-23 DEVICE — ARTICUL/EZE FEMORAL HEAD DIAMETER 28MM +1.5 12/14 TAPER
Type: IMPLANTABLE DEVICE | Status: FUNCTIONAL
Brand: ARTICUL/EZE

## 2019-09-23 RX ORDER — MAGNESIUM HYDROXIDE/ALUMINUM HYDROXICE/SIMETHICONE 120; 1200; 1200 MG/30ML; MG/30ML; MG/30ML
30 SUSPENSION ORAL EVERY 6 HOURS PRN
Status: DISCONTINUED | OUTPATIENT
Start: 2019-09-23 | End: 2019-09-25 | Stop reason: HOSPADM

## 2019-09-23 RX ORDER — ASCORBIC ACID 500 MG
500 TABLET ORAL 2 TIMES DAILY
Status: DISCONTINUED | OUTPATIENT
Start: 2019-09-23 | End: 2019-09-25 | Stop reason: HOSPADM

## 2019-09-23 RX ORDER — DOCUSATE SODIUM 100 MG/1
100 CAPSULE, LIQUID FILLED ORAL 2 TIMES DAILY
Status: DISCONTINUED | OUTPATIENT
Start: 2019-09-23 | End: 2019-09-25 | Stop reason: HOSPADM

## 2019-09-23 RX ORDER — FERROUS SULFATE 325(65) MG
325 TABLET ORAL 2 TIMES DAILY WITH MEALS
Status: DISCONTINUED | OUTPATIENT
Start: 2019-09-23 | End: 2019-09-25 | Stop reason: HOSPADM

## 2019-09-23 RX ORDER — FENTANYL CITRATE 50 UG/ML
INJECTION, SOLUTION INTRAMUSCULAR; INTRAVENOUS AS NEEDED
Status: DISCONTINUED | OUTPATIENT
Start: 2019-09-23 | End: 2019-09-23 | Stop reason: SURG

## 2019-09-23 RX ORDER — CEFAZOLIN SODIUM 1 G/50ML
1000 SOLUTION INTRAVENOUS EVERY 8 HOURS
Status: COMPLETED | OUTPATIENT
Start: 2019-09-23 | End: 2019-09-24

## 2019-09-23 RX ORDER — PROPOFOL 10 MG/ML
INJECTION, EMULSION INTRAVENOUS AS NEEDED
Status: DISCONTINUED | OUTPATIENT
Start: 2019-09-23 | End: 2019-09-23 | Stop reason: SURG

## 2019-09-23 RX ORDER — CALCIUM CARBONATE 200(500)MG
1000 TABLET,CHEWABLE ORAL DAILY PRN
Status: DISCONTINUED | OUTPATIENT
Start: 2019-09-23 | End: 2019-09-25 | Stop reason: HOSPADM

## 2019-09-23 RX ORDER — DEXAMETHASONE SODIUM PHOSPHATE 4 MG/ML
INJECTION, SOLUTION INTRA-ARTICULAR; INTRALESIONAL; INTRAMUSCULAR; INTRAVENOUS; SOFT TISSUE AS NEEDED
Status: DISCONTINUED | OUTPATIENT
Start: 2019-09-23 | End: 2019-09-23 | Stop reason: SURG

## 2019-09-23 RX ORDER — FOLIC ACID 1 MG/1
1 TABLET ORAL DAILY
Status: DISCONTINUED | OUTPATIENT
Start: 2019-09-24 | End: 2019-09-25 | Stop reason: HOSPADM

## 2019-09-23 RX ORDER — SENNOSIDES 8.6 MG
1 TABLET ORAL DAILY
Status: DISCONTINUED | OUTPATIENT
Start: 2019-09-24 | End: 2019-09-25 | Stop reason: HOSPADM

## 2019-09-23 RX ORDER — MULTIVITAMIN/IRON/FOLIC ACID 18MG-0.4MG
1 TABLET ORAL DAILY
Status: DISCONTINUED | OUTPATIENT
Start: 2019-09-24 | End: 2019-09-25 | Stop reason: HOSPADM

## 2019-09-23 RX ORDER — HYDROMORPHONE HCL/PF 1 MG/ML
0.5 SYRINGE (ML) INJECTION
Status: DISCONTINUED | OUTPATIENT
Start: 2019-09-23 | End: 2019-09-23 | Stop reason: HOSPADM

## 2019-09-23 RX ORDER — SODIUM CHLORIDE, SODIUM LACTATE, POTASSIUM CHLORIDE, CALCIUM CHLORIDE 600; 310; 30; 20 MG/100ML; MG/100ML; MG/100ML; MG/100ML
75 INJECTION, SOLUTION INTRAVENOUS CONTINUOUS
Status: DISCONTINUED | OUTPATIENT
Start: 2019-09-23 | End: 2019-09-25 | Stop reason: HOSPADM

## 2019-09-23 RX ORDER — MIDAZOLAM HYDROCHLORIDE 1 MG/ML
INJECTION INTRAMUSCULAR; INTRAVENOUS AS NEEDED
Status: DISCONTINUED | OUTPATIENT
Start: 2019-09-23 | End: 2019-09-23 | Stop reason: SURG

## 2019-09-23 RX ORDER — SODIUM CHLORIDE, SODIUM LACTATE, POTASSIUM CHLORIDE, CALCIUM CHLORIDE 600; 310; 30; 20 MG/100ML; MG/100ML; MG/100ML; MG/100ML
INJECTION, SOLUTION INTRAVENOUS CONTINUOUS PRN
Status: DISCONTINUED | OUTPATIENT
Start: 2019-09-23 | End: 2019-09-23 | Stop reason: SURG

## 2019-09-23 RX ORDER — GLYCOPYRROLATE 0.2 MG/ML
INJECTION INTRAMUSCULAR; INTRAVENOUS AS NEEDED
Status: DISCONTINUED | OUTPATIENT
Start: 2019-09-23 | End: 2019-09-23 | Stop reason: SURG

## 2019-09-23 RX ORDER — EPHEDRINE SULFATE 50 MG/ML
INJECTION INTRAVENOUS AS NEEDED
Status: DISCONTINUED | OUTPATIENT
Start: 2019-09-23 | End: 2019-09-23 | Stop reason: SURG

## 2019-09-23 RX ORDER — SIMETHICONE 80 MG
80 TABLET,CHEWABLE ORAL 4 TIMES DAILY PRN
Status: DISCONTINUED | OUTPATIENT
Start: 2019-09-23 | End: 2019-09-25 | Stop reason: HOSPADM

## 2019-09-23 RX ORDER — KETOROLAC TROMETHAMINE 30 MG/ML
INJECTION, SOLUTION INTRAMUSCULAR; INTRAVENOUS AS NEEDED
Status: DISCONTINUED | OUTPATIENT
Start: 2019-09-23 | End: 2019-09-23 | Stop reason: SURG

## 2019-09-23 RX ORDER — BISACODYL 10 MG
10 SUPPOSITORY, RECTAL RECTAL DAILY PRN
Status: DISCONTINUED | OUTPATIENT
Start: 2019-09-23 | End: 2019-09-25 | Stop reason: HOSPADM

## 2019-09-23 RX ADMIN — SODIUM CHLORIDE, SODIUM LACTATE, POTASSIUM CHLORIDE, AND CALCIUM CHLORIDE 75 ML/HR: .6; .31; .03; .02 INJECTION, SOLUTION INTRAVENOUS at 17:56

## 2019-09-23 RX ADMIN — FENTANYL CITRATE 50 MCG: 50 INJECTION, SOLUTION INTRAMUSCULAR; INTRAVENOUS at 16:23

## 2019-09-23 RX ADMIN — EPHEDRINE SULFATE 10 MG: 50 INJECTION, SOLUTION INTRAVENOUS at 15:35

## 2019-09-23 RX ADMIN — KETOROLAC TROMETHAMINE 30 MG: 30 INJECTION, SOLUTION INTRAMUSCULAR; INTRAVENOUS at 16:55

## 2019-09-23 RX ADMIN — MORPHINE SULFATE 2 MG: 2 INJECTION, SOLUTION INTRAMUSCULAR; INTRAVENOUS at 03:28

## 2019-09-23 RX ADMIN — OXYCODONE HYDROCHLORIDE AND ACETAMINOPHEN 1 TABLET: 5; 325 TABLET ORAL at 00:12

## 2019-09-23 RX ADMIN — OXYCODONE HYDROCHLORIDE AND ACETAMINOPHEN 1 TABLET: 5; 325 TABLET ORAL at 12:10

## 2019-09-23 RX ADMIN — PROPOFOL 100 MG: 10 INJECTION, EMULSION INTRAVENOUS at 15:23

## 2019-09-23 RX ADMIN — FERROUS SULFATE TAB 325 MG (65 MG ELEMENTAL FE) 325 MG: 325 (65 FE) TAB at 18:36

## 2019-09-23 RX ADMIN — HYDROCHLOROTHIAZIDE 12.5 MG: 12.5 TABLET ORAL at 08:03

## 2019-09-23 RX ADMIN — SODIUM CHLORIDE 125 ML/HR: 0.9 INJECTION, SOLUTION INTRAVENOUS at 07:00

## 2019-09-23 RX ADMIN — TEMAZEPAM 7.5 MG: 7.5 CAPSULE ORAL at 22:27

## 2019-09-23 RX ADMIN — FENTANYL CITRATE 50 MCG: 50 INJECTION, SOLUTION INTRAMUSCULAR; INTRAVENOUS at 15:50

## 2019-09-23 RX ADMIN — OXYCODONE HYDROCHLORIDE AND ACETAMINOPHEN 1 TABLET: 5; 325 TABLET ORAL at 22:27

## 2019-09-23 RX ADMIN — ONDANSETRON 4 MG: 2 INJECTION INTRAMUSCULAR; INTRAVENOUS at 16:52

## 2019-09-23 RX ADMIN — EPHEDRINE SULFATE 5 MG: 50 INJECTION, SOLUTION INTRAVENOUS at 16:57

## 2019-09-23 RX ADMIN — CEFAZOLIN SODIUM 1000 MG: 1 SOLUTION INTRAVENOUS at 17:28

## 2019-09-23 RX ADMIN — SODIUM CHLORIDE, SODIUM LACTATE, POTASSIUM CHLORIDE, AND CALCIUM CHLORIDE: .6; .31; .03; .02 INJECTION, SOLUTION INTRAVENOUS at 16:12

## 2019-09-23 RX ADMIN — SODIUM CHLORIDE, SODIUM LACTATE, POTASSIUM CHLORIDE, AND CALCIUM CHLORIDE: .6; .31; .03; .02 INJECTION, SOLUTION INTRAVENOUS at 15:20

## 2019-09-23 RX ADMIN — OXYCODONE HYDROCHLORIDE AND ACETAMINOPHEN 500 MG: 500 TABLET ORAL at 18:36

## 2019-09-23 RX ADMIN — FENTANYL CITRATE 50 MCG: 50 INJECTION, SOLUTION INTRAMUSCULAR; INTRAVENOUS at 15:22

## 2019-09-23 RX ADMIN — EPHEDRINE SULFATE 5 MG: 50 INJECTION, SOLUTION INTRAVENOUS at 16:31

## 2019-09-23 RX ADMIN — GLYCOPYRROLATE 0.2 MG: 0.2 INJECTION, SOLUTION INTRAMUSCULAR; INTRAVENOUS at 15:20

## 2019-09-23 RX ADMIN — DEXAMETHASONE SODIUM PHOSPHATE 4 MG: 4 INJECTION, SOLUTION INTRAMUSCULAR; INTRAVENOUS at 15:43

## 2019-09-23 RX ADMIN — DOCUSATE SODIUM 100 MG: 100 CAPSULE, LIQUID FILLED ORAL at 18:36

## 2019-09-23 RX ADMIN — OXYCODONE HYDROCHLORIDE AND ACETAMINOPHEN 1 TABLET: 5; 325 TABLET ORAL at 08:03

## 2019-09-23 RX ADMIN — FENTANYL CITRATE 50 MCG: 50 INJECTION, SOLUTION INTRAMUSCULAR; INTRAVENOUS at 17:01

## 2019-09-23 RX ADMIN — MIDAZOLAM 2 MG: 1 INJECTION INTRAMUSCULAR; INTRAVENOUS at 15:19

## 2019-09-23 RX ADMIN — LORAZEPAM 0.5 MG: 0.5 TABLET ORAL at 00:06

## 2019-09-23 NOTE — CONSULTS
Consultation - Orthopedics   Nathan Guajardo 63 y o  female MRN: [id number]  Unit/Bed#: [address] [place] [address] 201-02 Encounter: [id number]      Assessment/Plan     Assessment:  Closed displaced femoral neck fracture    Plan:  Right hip hemiarthroplasty to be done by Dr Duane Dumont this afternoon  Risks, benefits and complications of surgery were discussed in detail by Dr Duane uDmont and informed consent was obtained  NPO  Pain control  Hold Lovenox  Mechanical DVT prophylaxis only  Bed rest  NWB to RLE     History of Present Illness   Physician Requesting Consult: Nico Escobar MD  Reason for Consult / Principal Problem: right hip injury  HPI: Nathan Guajardo is a 63y o  year old female who is a community ambulator presents with right hip pain after suffering a mechanical fall in her home  Patient states she was walking into her kitchen in her right leg gave out on her causing her to fall directly to the ground  She had immediate pain in her right hip and was unable to get up  She was brought to the emergency room and x-rays revealed a displaced femoral neck fracture  She was admitted to Internal Medicine and Orthopedics was consulted  Patient normally uses a cane when out of the house, but no assistance when in her home  She lives with her mother in a residential home  She denies any previous injury to that hip  Inpatient consult to Orthopedic Surgery  Consult performed by: Sarah Jean PA-C  Consult ordered by: Blanca Barba MD          Review of Systems   Constitutional: Negative  HENT: Negative  Eyes: Negative  Respiratory: Negative  Cardiovascular: Negative  Gastrointestinal: Negative  Endocrine: Negative  Genitourinary: Negative  Musculoskeletal: Positive for arthralgias  Skin: Negative  Allergic/Immunologic: Negative  Neurological: Negative  Hematological: Negative  Psychiatric/Behavioral: Negative          Historical Information   Past Medical History:   Diagnosis Date    Anxiety  Hypertension     Pre-diabetes      Past Surgical History:   Procedure Laterality Date    APPENDECTOMY       Social History   Social History     Substance and Sexual Activity   Alcohol Use Never    Frequency: Never     Social History     Substance and Sexual Activity   Drug Use Never     Social History     Tobacco Use   Smoking Status Never Smoker   Smokeless Tobacco Never Used     Family History: non-contributory    Meds/Allergies   all current active meds have been reviewed  Allergies   Allergen Reactions    Sulfa Antibiotics Rash and Itching       Objective   Vitals: Blood pressure 135/75, pulse 77, temperature 98 °F (36 7 °C), temperature source Tympanic, resp  rate 18, height 5' 7 2" (1 707 m), weight 70 3 kg (154 lb 15 7 oz), SpO2 96 %, not currently breastfeeding  ,Body mass index is 24 13 kg/m²  Intake/Output Summary (Last 24 hours) at 9/23/2019 0906  Last data filed at 9/23/2019 0236  Gross per 24 hour   Intake    Output 2000 ml   Net -2000 ml     I/O last 24 hours: In: -   Out: 2000 [Urine:2000]    Invasive Devices     Peripheral Intravenous Line            Peripheral IV 09/22/19 Right Arm less than 1 day          Drain            Urethral Catheter Latex 16 Fr  less than 1 day                Physical Exam   Constitutional: She is oriented to person, place, and time  She appears well-developed and well-nourished  No distress  HENT:   Head: Normocephalic and atraumatic  Mouth/Throat: Oropharynx is clear and moist    Eyes: Pupils are equal, round, and reactive to light  Conjunctivae and EOM are normal    Neck: Normal range of motion  Cardiovascular: Normal rate, regular rhythm and normal heart sounds  Pulmonary/Chest: Effort normal and breath sounds normal  No respiratory distress  Abdominal: Soft  Bowel sounds are normal  There is no tenderness  Neurological: She is alert and oriented to person, place, and time  Skin: Skin is warm and dry     Psychiatric: She has a normal mood and affect  Her behavior is normal      Right Hip Exam     Tenderness   The patient is experiencing tenderness in the anterior and lateral     Other   Erythema: absent  Scars: absent  Sensation: normal  Pulse: present    Comments:  RLE shortened and internally rotated  Thigh and calf compartments soft, compressible  NV intact      Left Hip Exam   Left hip exam is normal             Lab Results: I have personally reviewed pertinent lab results    Imaging Studies: I have personally reviewed pertinent films in PACS  X-rays right hip: Displaced femoral neck fracture

## 2019-09-23 NOTE — ANESTHESIA PREPROCEDURE EVALUATION
Review of Systems/Medical History  Patient summary reviewed  Chart reviewed      Cardiovascular  EKG reviewed, Hypertension ,    Pulmonary       GI/Hepatic            Endo/Other     GYN       Hematology   Musculoskeletal       Neurology   Psychology   Anxiety, Depression ,              Physical Exam    Airway    Mallampati score: I  TM Distance: >3 FB  Neck ROM: full     Dental       Cardiovascular  Cardiovascular exam normal    Pulmonary  Pulmonary exam normal     Other Findings        Anesthesia Plan  ASA Score- 2     Anesthesia Type- general with ASA Monitors  Additional Monitors:   Airway Plan: LMA  Plan Factors-    Induction- intravenous  Postoperative Plan-     Informed Consent- Anesthetic plan and risks discussed with patient  I personally reviewed this patient with the CRNA  Discussed and agreed on the Anesthesia Plan with the CRNA  Norma Leach

## 2019-09-23 NOTE — OP NOTE
OPERATIVE REPORT  PATIENT NAME: Juan Carlos Pickett    :  1960  MRN: [id number]  Pt Location:  OR ROOM 01    SURGERY DATE: 2019    Surgeon(s) and Role:     * Bill Love MD - Primary    Preop Diagnosis:  Displaced fracture of right femoral neck ([address] ) [S72 001A]    Post-Op Diagnosis Codes:     * Displaced fracture of right femoral neck ([address] ) [S72 001A]    Procedure(s) (LRB):  HEMIARTHROPLASTY HIP (BIPOLAR) (Right)    Specimen(s):  ID Type Source Tests Collected by Time Destination   1 :  Tissue Femoral Head TISSUE EXAM Bill Love MD 2019 1611      Estimated Blood Loss:   Minimal    Drains:  Urethral Catheter Latex 16 Fr  (Active)   Output (mL) 2100 mL 2019 11:16 AM   Number of days: 1     Anesthesia Type:   LMA    Operative Indications:  Displaced fracture of right femoral neck (HCC) [S72 001A]    Prosthesis: Depuy Trilock femur size 3 with hi-offset, Femoral head size 45 bipolar +1 5 neck length  Juan Carlos Pickett is a 63y o  years old female diagnosed with right hip femoral neck displaced  Surgical intervention was recommended  The risks and complications were discussed with the patient  The patient consented to the procedure  Procedure: Patient was brought into the OR and anethetized with spinal anesthesia without any complications  Patient was placed in lateral decubitus position with right up  Patient's right hip and leg were prepped and draped in sterile fashion  A time out was called and identified the right hip was the operating site  A posterior-lateral incision was made over the right hip  Dissection was then carried down to the Gluteus graciela and fascia filomena  The short external rotators was detached from the posterior aspect of the greater trochanter  Bleeders were controlled with electro-Bovie  Care was make sure the Siatic nerve was protected and retracted out of way  An arthrotomy was then done in the posterior capsule in a T-fashion    The femoral neck fracture was identified within the joint  Femoral neck osteotomy was done about 2 cm proximal to the lesser trochanter  The femoral head was then removed from the acetabulum  The femoral head was measured to be size 45  A trial bipolar head was then placed in the acetabulum, which appeared to have to good fit  The attention was then turned to the proximal femur  The leg was then internally rotated to expose the proximal femur  A sequential broach system is used to enlarge the femoral canal   Size 3 broach appeared to be fitting the femoral canal well  Care was made sure while broaching the femoral canal about 15 degree of anteversion was created  A size 45 bipolar femoral head trial with hi-offset +1 5 neck length was used for trial   The trial hip was reduced and range of motion was carried out  Patient appeared to have a good stability with the hip in full flexion, internal rotation to more than 50 degree, and adduction of the hip  The femoral stem trial was then removed  The femoral prosthesis was inserted with press-fit mechanism  Again, about 15 of anteversion was placed with the femoral component  The bipolar femoral head component was placed onto the femoral stem with taper lock mechanism  The prosthesis was reduced and range of motion was carried out  The hip appeared to be stable just like the trial  Wound was thoroughly irrigated with Irricept solution for 1 min and then irrigated with NS   30 cc of Duramorph was injected around the joint capsule  Aquamantys was used to treat the soft tissue around the joint  The posterior capsule was repaired with #1 Vicryl in an interrupted fashion  The short external rotator was reattached to the greater trochanter posteriorly  The gluteus graciela and fascia filomena were repair with a #1 Vicryl   0 and 2-0 Vicryl were used to close the subcutaneous tissue  Incision was painted with betadine and Stratafix was used to close the skin  Steri-Strips was then applied  Sterile dressing was applied over the incision  The patient tolerated the procedure well without any complications  An abduction pillow was placed between patient's legs  Patient was transferred to recovery room for post-op care  The family was contacted  There was no qualified resident available to assist       Debrarolanda Marisol was required in the OR is helping manipulate the hip, exposure of the joint, placement of the prosthesis, and reduction of the hip joint      Complications:   None    Patient Disposition:  PACU     SIGNATURE: Florence Bowles MD  DATE: September 23, 2019  TIME: 5:12 PM

## 2019-09-23 NOTE — PROGRESS NOTES
09/23/19 1200   Clinical Encounter Type   Visited With Patient   Routine Visit Introduction   Patient Spiritual Encounters   Fear Level 4

## 2019-09-23 NOTE — PROGRESS NOTES
Progress Note - Sundar Calhoun 1960, 63 y o  female MRN: [id number]    Unit/Bed#: [address] [place] [address] Encounter: [id number]    Primary Care Provider: Hector More MD   Date and time admitted to hospital: 9/22/2019 10:52 AM        * Closed fracture of right hip [facility])  Assessment & Plan  Closed displaced right femoral neck fracture status post fall    Plan  · For right hip hemiarthroplasty today  · Currently NPO  · Orthopedics on board, for procedure  · Pain management and stool softener  · Encourage incentive spirometer use  · Monitor H&H and transfuse if needed  · Gentle IV fluids while NPO      Fall  Assessment & Plan  Status post Mechanical fall, complicated by right hip fracture  For surgery today   P T /OT and acute rehab placement afterwards    Depression  Assessment & Plan  Denies any suicidal or homicidal ideation    Essential hypertension  Assessment & Plan  Blood pressure stable On hydrochlorothiazide and Cozaar  Monitor vitals as per protocol        VTE Pharmacologic Prophylaxis:   Pharmacologic: Pharmacologic VTE Prophylaxis contraindicated due to Right hip surgery today  Mechanical VTE Prophylaxis in Place: Yes    Patient Centered Rounds: I have performed bedside rounds with nursing staff today  Discussions with Specialists or Other Care Team Provider:  Orthopedics    Education and Discussions with Family / Patient:  Discussed with the patient  Time Spent for Care: 30 minutes  More than 50% of total time spent on counseling and coordination of care as described above  Current Length of Stay: 1 day(s)    Current Patient Status: Inpatient   Certification Statement: The patient will continue to require additional inpatient hospital stay due to Right hip fracture    Discharge Plan:  2 days    Code Status: Level 1 - Full Code      Subjective:   No overnight events reported by nursing  Patient feels fine this morning, patiently waiting for surgery to take place       Objective:     Vitals: Temp (24hrs), Av 7 °F (36 5 °C), Min:97 2 °F (36 2 °C), Max:98 °F (36 7 °C)    Temp:  [97 2 °F (36 2 °C)-98 °F (36 7 °C)] 98 °F (36 7 °C)  HR:  [76-97] 77  Resp:  [18-19] 18  BP: (135-166)/(65-96) 135/75  SpO2:  [95 %-98 %] 96 %  Body mass index is 24 13 kg/m²  Input and Output Summary (last 24 hours): Intake/Output Summary (Last 24 hours) at 2019 1023  Last data filed at 2019 0236  Gross per 24 hour   Intake    Output 2000 ml   Net -2000 ml       Physical Exam:     Physical Exam   Constitutional: She is oriented to person, place, and time  She appears well-developed and well-nourished  No distress  Cardiovascular: Normal rate, regular rhythm and normal heart sounds  Exam reveals no friction rub  No murmur heard  Pulmonary/Chest: Effort normal and breath sounds normal  No stridor  No respiratory distress  She has no wheezes  Abdominal: Soft  Bowel sounds are normal  She exhibits no distension and no mass  There is no tenderness  There is no guarding  Musculoskeletal: She exhibits tenderness  Neurological: She is alert and oriented to person, place, and time  Skin: Skin is warm  She is not diaphoretic  Vitals reviewed          Additional Data:     Labs:    Results from last 7 days   Lab Units 19  0555   WBC Thousand/uL 7 12   HEMOGLOBIN g/dL 14 0   HEMATOCRIT % 42 3   PLATELETS Thousands/uL 204   NEUTROS PCT % 62   LYMPHS PCT % 22   MONOS PCT % 10   EOS PCT % 5     Results from last 7 days   Lab Units 19  0555   SODIUM mmol/L 143   POTASSIUM mmol/L 3 6   CHLORIDE mmol/L 111*   CO2 mmol/L 25   BUN mg/dL 12   CREATININE mg/dL 0 63   ANION GAP mmol/L 7   CALCIUM mg/dL 8 8   ALBUMIN g/dL 2 8*   TOTAL BILIRUBIN mg/dL 0 40   ALK PHOS U/L 62   ALT U/L 21   AST U/L 18   GLUCOSE RANDOM mg/dL 112     Results from last 7 days   Lab Units 19  0555   INR  1 02     Results from last 7 days   Lab Units 19  0800 19  2128 19  1816 19  1614   POC GLUCOSE mg/dl 95 120 148* 107                   * I Have Reviewed All Lab Data Listed Above  * Additional Pertinent Lab Tests Reviewed: [address] Admission Reviewed    Imaging:    Imaging Reports Reviewed Today Include:  CT right hip- showed a right femoral neck fracture   Imaging Personally Reviewed by Myself Includes:      Recent Cultures (last 7 days):           Last 24 Hours Medication List:     Current Facility-Administered Medications:  acetaminophen 650 mg Oral Q6H PRN Zoe Ko MD    hydrochlorothiazide 12 5 mg Oral Daily Zoe Ko MD    insulin lispro 1-6 Units Subcutaneous TID AC Zoe Ko MD    LORazepam 0 5 mg Oral Daily PRN Zoe Ko MD    losartan 50 mg Oral Daily Zoe Ko MD    morphine injection 2 mg Intravenous Q4H PRN Zoe Ko MD    ondansetron 4 mg Intravenous Q6H PRN Zoe Ko MD    oxyCODONE-acetaminophen 1 tablet Oral Q4H PRN Zoe Ko MD    sodium chloride 125 mL/hr Intravenous Continuous Zoe Ko MD Last Rate: 125 mL/hr (09/23/19 0700)   temazepam 7 5 mg Oral HS PRN Zoe Ko MD         Today, Patient Was Seen By: Wanda Mcdonald MD    ** Please Note: Dictation voice to text software may have been used in the creation of this document   **

## 2019-09-23 NOTE — DISCHARGE INSTRUCTIONS
Orthopedics:  1  WBAT to RLE with assistance  2  Continue PT/OT  3  Pain medication as needed  4  Continue DVT prophylaxis as prescribed  Lovenox 40 mg daily for total of 2 weeks then  mg BID x 30 days  5  Keep dressing clean and dry  May remove in one week  Then daily dressing change with gauze and tape  Do not shower until seen by surgeon in office  6  Follow up in office in 10-14 days

## 2019-09-23 NOTE — ASSESSMENT & PLAN NOTE
Closed displaced right femoral neck fracture status post fall    Plan  · For right hip hemiarthroplasty today  · Currently NPO  · Orthopedics on board, for procedure  · Pain management and stool softener  · Encourage incentive spirometer use  · Monitor H&H and transfuse if needed  · Gentle IV fluids while NPO

## 2019-09-23 NOTE — UTILIZATION REVIEW
Initial Clinical Review    Admission: Date/Time/Statement: Inpatient Admission Orders (From admission, onward)     Ordered        09/22/19 1324  Inpatient Admission (expected length of stay for this patient Order details is greater than two midnights)  Once                   Orders Placed This Encounter   Procedures    Inpatient Admission (expected length of stay for this patient Order details is greater than two midnights)     Standing Status:   Standing     Number of Occurrences:   1     Order Specific Question:   Admitting Physician     Answer:   Luz Maria Tirado [[id number]]     Order Specific Question:   Level of Care     Answer:   Med Surg [[id number]]     Order Specific Question:   Estimated length of stay     Answer:   More than 2 Midnights     Order Specific Question:   Certification     Answer:   I certify that inpatient services are medically necessary for this patient for a duration of greater than two midnights  See H&P and MD Progress Notes for additional information about the patient's course of treatment  ED Arrival Information     Expected Arrival Acuity Means of Arrival Escorted By Service Admission Type    - 9/22/2019 10:48 Urgent Ambulance [facility] General Medicine Urgent    Arrival Complaint    Fall, Right Hip Pain        Chief Complaint   Patient presents with    Hip Pain     Pt presents to ER after a fall this morning, landing on her right hip     Assessment/Plan: 63 yr old female presents to the ed via ems from home as she was walking to the living room from the kitchen and her right leg gave out and she fell landing on her right hip and c/o pain 10/10, worse with movement   Ct of right hip-RIGHT side femoral neck fracture, similar to the prior radiograph   chest- no acute she is admitted inpatient with right closed fx of hip and the plan is an orthopedic consult, pain management , monitor h&h , gentle iv fluids, ekg, npo after mn for possible surgery tomorrow-- will order pt/ot post op   Ortho consult shows displaced femoral neck fracture will surgically intervene with right hip hemiarthroplasty    ED Triage Vitals   Temperature Pulse Respirations Blood Pressure SpO2   09/22/19 1050 09/22/19 1049 09/22/19 1049 09/22/19 1049 09/22/19 1049   (!) 97 2 °F (36 2 °C) 97 19 139/96 97 %      Temp Source Heart Rate Source Patient Position - Orthostatic VS BP Location FiO2 (%)   09/22/19 1050 09/22/19 1049 09/22/19 1049 09/22/19 1049 --   Tympanic Monitor Lying Right arm       Pain Score       09/22/19 1049       Worst Possible Pain        Wt Readings from Last 1 Encounters:   09/23/19 70 3 kg (154 lb 15 7 oz)     Additional Vital Signs:   09/23/19 0800            None (Room air)     09/23/19 0743  98 °F (36 7 °C)  77  18  135/75  96 %  None (Room air)  Lying   09/22/19 2010    77      98 %  None (Room air)     09/22/19 1417  98 °F (36 7 °C)  77  18  135/79  97 %  None (Room air)  Lying   09/22/19 1403          95 %  None (Room air)     09/22/19 1245    76  19  166/81  96 %       09/22/19 1230    81  19  149/65  96 %       09/22/19 1050  97 2 °F (36 2 °C)Abnormal                    Pertinent Labs/Diagnostic Test Results:   Results from last 7 days   Lab Units 09/23/19  0555 09/22/19  1225   WBC Thousand/uL 7 12 8 40   HEMOGLOBIN g/dL 14 0 15 4   HEMATOCRIT % 42 3 45 9   PLATELETS Thousands/uL 204 219   NEUTROS ABS Thousands/µL 4 50 6 77         Results from last 7 days   Lab Units 09/23/19  0555 09/22/19  1225   SODIUM mmol/L 143 140   POTASSIUM mmol/L 3 6 3 5   CHLORIDE mmol/L 111* 104   CO2 mmol/L 25 27   ANION GAP mmol/L 7 9   BUN mg/dL 12 16   CREATININE mg/dL 0 63 0 75   EGFR ml/min/1 73sq m 99 88   CALCIUM mg/dL 8 8 8 8   MAGNESIUM mg/dL 2 1  --    PHOSPHORUS mg/dL 3 0  --      Results from last 7 days   Lab Units 09/23/19  0555 09/22/19  1225   AST U/L 18 21   ALT U/L 21 28   ALK PHOS U/L 62 63   TOTAL PROTEIN g/dL 6 0* 6 8   ALBUMIN g/dL 2 8* 3 6   TOTAL BILIRUBIN mg/dL 0  40 0 40     Results from last 7 days   Lab Units 09/23/19  1512 09/23/19  1112 09/23/19  0800 09/22/19  2128 09/22/19  1816 09/22/19  1614   POC GLUCOSE mg/dl 90 92 95 120 148* 107     Results from last 7 days   Lab Units 09/23/19  0555 09/22/19  1225   GLUCOSE RANDOM mg/dL 112 107     Results from last 7 days   Lab Units 09/23/19  0555 09/22/19  1225   PROTIME seconds 13 1 13 1   INR  1 02 1 02   PTT seconds 29 28     Results from last 7 days   Lab Units 09/22/19  1225   TSH 3RD GENERATON uIU/mL 0 531         ED Treatment:   Medication Administration from 09/22/2019 1048 to 09/22/2019 1354       Date/Time Order Dose Route Action Comments     09/22/2019 1100 fentanyl citrate (PF) (FOR EMS ONLY) 100 mcg/2 mL injection 100 mcg 0 mcg Does not apply Given to EMS      09/22/2019 1136 morphine (PF) 10 mg/mL injection 6 mg 6 mg Intravenous Given      09/22/2019 1230 sodium chloride 0 9 % infusion 125 mL/hr Intravenous New Bag         Past Medical History:   Diagnosis Date    Anxiety     Hypertension     Pre-diabetes      Present on Admission:   Closed fracture of right hip [facility])   Fall   Essential hypertension      Admitting Diagnosis: Fracture, hip, right, closed, initial encounter ([facility] [address] ) [S72 001A]  Age/Sex: 63 y o  female  Admission Orders:    Current Facility-Administered Medications:  [MAR Hold] acetaminophen 650 mg Oral Q6H PRN    hydrochlorothiazide 12 5 mg Oral Daily    [MAR Hold] insulin lispro 1-6 Units Subcutaneous TID AC    LORazepam 0 5 mg Oral Daily PRN    losartan 50 mg Oral Daily    morphine injection 2 mg Intravenous Q4H PRN    [MAR Hold] ondansetron 4 mg Intravenous Q6H PRN    oxyCODONE-acetaminophen 1 tablet Oral Q4H PRN    sodium chloride 125 mL/hr Intravenous Continuous Last Rate: 125 mL/hr (09/23/19 0700)   temazepam 7 5 mg Oral HS PRN    fs glucose 4 x daily  Daily wt  Nasal 02  scd  ua with microscopic  Pt/ot    IP CONSULT TO ORTHOPEDIC SURGERY  IP CONSULT TO CASE MANAGEMENT    Network Utilization Review Department  Phone: [phone]; Fax [phone]  [email or web address]  ATTENTION: Please call with any questions or concerns to [phone]  and carefully listen to the prompts so that you are directed to the right person  Send all requests for admission clinical reviews, approved or denied determinations and any other requests to fax [phone]   All voicemails are confidential

## 2019-09-23 NOTE — PROGRESS NOTES
09/23/19 [address] 41 Involvement Patient active with Amish;Evangelical active in support   Spiritual Leader [id number] Leader Aware/Contacted Leader/Amish aware of patient's status   Spiritual Beliefs/Perceptions   Concept of God Accepting   God's Role in Disease God's will   Relationship with God Close   Spiritual Strengths Prayer life  Evangelical community  Bible reading  Stress Factors   Patient Stress Factors Financial concerns; Health changes   Coping Responses   Patient Coping Accepting; Anxiety;Open/discussion   Plan of Care   Assessment Completed by: Unit visit

## 2019-09-23 NOTE — PLAN OF CARE
Problem: Potential for Falls  Goal: Patient will remain free of falls  Description  INTERVENTIONS:  - Assess patient frequently for physical needs  -  Identify cognitive and physical deficits and behaviors that affect risk of falls    -  [place] fall precautions as indicated by assessment   - Educate patient/family on patient safety including physical limitations  - Instruct patient to call for assistance with activity based on assessment  - Modify environment to reduce risk of injury  - Consider OT/PT consult to assist with strengthening/mobility  Outcome: Progressing     Problem: Prexisting or High Potential for Compromised Skin Integrity  Goal: Skin integrity is maintained or improved  Description  INTERVENTIONS:  - Identify patients at risk for skin breakdown  - Assess and monitor skin integrity  - Assess and monitor nutrition and hydration status  - Monitor labs   - Assess for incontinence   - Turn and reposition patient  - Assist with mobility/ambulation  - Relieve pressure over bony prominences  - Avoid friction and shearing  - Provide appropriate hygiene as needed including keeping skin clean and dry  - Evaluate need for skin moisturizer/barrier cream  - Collaborate with interdisciplinary team   - Patient/family teaching  - Consider wound care consult   Outcome: Progressing     Problem: PAIN - ADULT  Goal: Verbalizes/displays adequate comfort level or baseline comfort level  Description  Interventions:  - Encourage patient to monitor pain and request assistance  - Assess pain using appropriate pain scale  - Administer analgesics based on type and severity of pain and evaluate response  - Implement non-pharmacological measures as appropriate and evaluate response  - Consider cultural and social influences on pain and pain management  - Notify physician/advanced practitioner if interventions unsuccessful or patient reports new pain  Outcome: Progressing     Problem: SAFETY ADULT  Goal: Maintain or return to baseline ADL function  Description  INTERVENTIONS:  -  Assess patient's ability to carry out ADLs; assess patient's baseline for ADL function and identify physical deficits which impact ability to perform ADLs (bathing, care of mouth/teeth, toileting, grooming, dressing, etc )  - Assess/evaluate cause of self-care deficits   - Assess range of motion  - Assess patient's mobility; develop plan if impaired  - Assess patient's need for assistive devices and provide as appropriate  - Encourage maximum independence but intervene and supervise when necessary  - Involve family in performance of ADLs  - Assess for home care needs following discharge   - Consider OT consult to assist with ADL evaluation and planning for discharge  - Provide patient education as appropriate  Outcome: Progressing  Goal: Maintain or return mobility status to optimal level  Description  INTERVENTIONS:  - Assess patient's baseline mobility status (ambulation, transfers, stairs, etc )    - Identify cognitive and physical deficits and behaviors that affect mobility  - Identify mobility aids required to assist with transfers and/or ambulation (gait belt, sit-to-stand, lift, walker, cane, etc )  - [place] fall precautions as indicated by assessment  - Record patient progress and toleration of activity level on Mobility SBAR; progress patient to next Phase/Stage  - Instruct patient to call for assistance with activity based on assessment  - Consider rehabilitation consult to assist with strengthening/weightbearing, etc   Outcome: Progressing     Problem: DISCHARGE PLANNING  Goal: Discharge to home or other facility with appropriate resources  Description  INTERVENTIONS:  - Identify barriers to discharge w/patient and caregiver  - Arrange for needed discharge resources and transportation as appropriate  - Identify discharge learning needs (meds, wound care, etc )  - Arrange for interpretive services to assist at discharge as needed  - Refer to Case Management Department for coordinating discharge planning if the patient needs post-hospital services based on physician/advanced practitioner order or complex needs related to functional status, cognitive ability, or social support system  Outcome: Progressing     Problem: Knowledge Deficit  Goal: Patient/family/caregiver demonstrates understanding of disease process, treatment plan, medications, and discharge instructions  Description  Complete learning assessment and assess knowledge base    Interventions:  - Provide teaching at level of understanding  - Provide teaching via preferred learning methods  Outcome: Progressing

## 2019-09-23 NOTE — SOCIAL WORK
LOS: 1  PATIENT IS NOT A MEDICARE BUNDLE OR A 30 DAY READMISSION  Met with patient  Explained role of care management  Patient lives in a 2 story home with her 80year old mother  [address]  Bedroom is on the second floor  Has powder room on the first floor  She is independent adl's, uses a cane to ambulate, drives is unemployed  DME - cane, JAYDE, DELLA   Past services - denies past history of MH, D&A or SNF admission  Pharmacy of choice is [facility]  She has a prescription plan and is able to afford her medications  She does not have a POA/AD  Given information on [facility] as per patient request   States that her mother will not be much help at home if she returns home at discharge  Discussed discharge plan - home with VNA and home PT vs  SNF  Patient is unsure at this time what she plans on doing  Will follow  Await PT recommendation  CM reviewed the discharge planning process including identifying help at home and patient preference for discharge planning

## 2019-09-23 NOTE — ASSESSMENT & PLAN NOTE
Status post Mechanical fall, complicated by right hip fracture  For surgery today   P T /OT and acute rehab placement afterwards

## 2019-09-24 LAB
ANION GAP SERPL CALCULATED.3IONS-SCNC: 1 MMOL/L (ref 4–13)
BUN SERPL-MCNC: 13 MG/DL (ref 5–25)
CALCIUM SERPL-MCNC: 8.8 MG/DL (ref 8.3–10.1)
CHLORIDE SERPL-SCNC: 108 MMOL/L (ref 100–108)
CO2 SERPL-SCNC: 32 MMOL/L (ref 21–32)
CREAT SERPL-MCNC: 0.74 MG/DL (ref 0.6–1.3)
ERYTHROCYTE [DISTWIDTH] IN BLOOD BY AUTOMATED COUNT: 13.4 % (ref 11.6–15.1)
GFR SERPL CREATININE-BSD FRML MDRD: 90 ML/MIN/1.73SQ M
GLUCOSE SERPL-MCNC: 100 MG/DL (ref 65–140)
GLUCOSE SERPL-MCNC: 103 MG/DL (ref 65–140)
GLUCOSE SERPL-MCNC: 87 MG/DL (ref 65–140)
GLUCOSE SERPL-MCNC: 97 MG/DL (ref 65–140)
GLUCOSE SERPL-MCNC: 98 MG/DL (ref 65–140)
HCT VFR BLD AUTO: 40.7 % (ref 34.8–46.1)
HGB BLD-MCNC: 13.2 G/DL (ref 11.5–15.4)
MCH RBC QN AUTO: 31.1 PG (ref 26.8–34.3)
MCHC RBC AUTO-ENTMCNC: 32.4 G/DL (ref 31.4–37.4)
MCV RBC AUTO: 96 FL (ref 82–98)
PLATELET # BLD AUTO: 184 THOUSANDS/UL (ref 149–390)
PMV BLD AUTO: 10.6 FL (ref 8.9–12.7)
POTASSIUM SERPL-SCNC: 4.1 MMOL/L (ref 3.5–5.3)
RBC # BLD AUTO: 4.24 MILLION/UL (ref 3.81–5.12)
SODIUM SERPL-SCNC: 141 MMOL/L (ref 136–145)
WBC # BLD AUTO: 8.25 THOUSAND/UL (ref 4.31–10.16)

## 2019-09-24 PROCEDURE — G8987 SELF CARE CURRENT STATUS: HCPCS

## 2019-09-24 PROCEDURE — 99024 POSTOP FOLLOW-UP VISIT: CPT | Performed by: PHYSICIAN ASSISTANT

## 2019-09-24 PROCEDURE — 85027 COMPLETE CBC AUTOMATED: CPT | Performed by: PHYSICIAN ASSISTANT

## 2019-09-24 PROCEDURE — 99232 SBSQ HOSP IP/OBS MODERATE 35: CPT | Performed by: INTERNAL MEDICINE

## 2019-09-24 PROCEDURE — 97167 OT EVAL HIGH COMPLEX 60 MIN: CPT

## 2019-09-24 PROCEDURE — 82948 REAGENT STRIP/BLOOD GLUCOSE: CPT

## 2019-09-24 PROCEDURE — 80048 BASIC METABOLIC PNL TOTAL CA: CPT | Performed by: PHYSICIAN ASSISTANT

## 2019-09-24 PROCEDURE — 97163 PT EVAL HIGH COMPLEX 45 MIN: CPT

## 2019-09-24 PROCEDURE — G8979 MOBILITY GOAL STATUS: HCPCS

## 2019-09-24 PROCEDURE — G8988 SELF CARE GOAL STATUS: HCPCS

## 2019-09-24 PROCEDURE — 97530 THERAPEUTIC ACTIVITIES: CPT

## 2019-09-24 PROCEDURE — G8978 MOBILITY CURRENT STATUS: HCPCS

## 2019-09-24 RX ORDER — DM/P-EPHED/ACETAMINOPH/DOXYLAM
1 LIQUID (ML) ORAL 2 TIMES DAILY
Status: DISCONTINUED | OUTPATIENT
Start: 2019-09-24 | End: 2019-09-24

## 2019-09-24 RX ORDER — DM/P-EPHED/ACETAMINOPH/DOXYLAM
1 LIQUID (ML) ORAL 2 TIMES DAILY PRN
COMMUNITY
End: 2020-02-06 | Stop reason: ALTCHOICE

## 2019-09-24 RX ADMIN — HYDROCHLOROTHIAZIDE 12.5 MG: 12.5 TABLET ORAL at 08:47

## 2019-09-24 RX ADMIN — CEFAZOLIN SODIUM 1000 MG: 1 SOLUTION INTRAVENOUS at 02:26

## 2019-09-24 RX ADMIN — SENNOSIDES 8.6 MG: 8.6 TABLET, FILM COATED ORAL at 08:50

## 2019-09-24 RX ADMIN — LOSARTAN POTASSIUM 50 MG: 50 TABLET, FILM COATED ORAL at 21:15

## 2019-09-24 RX ADMIN — OXYCODONE HYDROCHLORIDE AND ACETAMINOPHEN 1 TABLET: 5; 325 TABLET ORAL at 21:15

## 2019-09-24 RX ADMIN — OXYCODONE HYDROCHLORIDE AND ACETAMINOPHEN 1 TABLET: 5; 325 TABLET ORAL at 15:29

## 2019-09-24 RX ADMIN — FOLIC ACID 1 MG: 1 TABLET ORAL at 08:50

## 2019-09-24 RX ADMIN — OXYCODONE HYDROCHLORIDE AND ACETAMINOPHEN 500 MG: 500 TABLET ORAL at 08:47

## 2019-09-24 RX ADMIN — TEMAZEPAM 7.5 MG: 7.5 CAPSULE ORAL at 22:15

## 2019-09-24 RX ADMIN — ENOXAPARIN SODIUM 40 MG: 40 INJECTION, SOLUTION INTRAVENOUS; SUBCUTANEOUS at 08:50

## 2019-09-24 RX ADMIN — DOCUSATE SODIUM 100 MG: 100 CAPSULE, LIQUID FILLED ORAL at 08:47

## 2019-09-24 RX ADMIN — OXYCODONE HYDROCHLORIDE AND ACETAMINOPHEN 1 TABLET: 5; 325 TABLET ORAL at 08:47

## 2019-09-24 RX ADMIN — CYANOCOBALAMIN TAB 500 MCG 500 MCG: 500 TAB at 09:12

## 2019-09-24 RX ADMIN — FERROUS SULFATE TAB 325 MG (65 MG ELEMENTAL FE) 325 MG: 325 (65 FE) TAB at 08:47

## 2019-09-24 RX ADMIN — LORAZEPAM 0.5 MG: 0.5 TABLET ORAL at 06:15

## 2019-09-24 RX ADMIN — FERROUS SULFATE TAB 325 MG (65 MG ELEMENTAL FE) 325 MG: 325 (65 FE) TAB at 17:13

## 2019-09-24 RX ADMIN — DOCUSATE SODIUM 100 MG: 100 CAPSULE, LIQUID FILLED ORAL at 17:13

## 2019-09-24 RX ADMIN — OXYCODONE HYDROCHLORIDE AND ACETAMINOPHEN 500 MG: 500 TABLET ORAL at 17:13

## 2019-09-24 RX ADMIN — Medication 1 TABLET: at 08:50

## 2019-09-24 NOTE — OCCUPATIONAL THERAPY NOTE
Occupational Therapy Evaluation      Juan Carlos Going    9/24/2019    Principal Problem:    Closed displaced fracture of right femoral neck ([address] )  Active Problems:    Essential hypertension    Fall      Past Medical History:   Diagnosis Date    Anxiety     Hypertension     Pre-diabetes        Past Surgical History:   Procedure Laterality Date    APPENDECTOMY      IA PARTIAL HIP REPLACEMENT Right 9/23/2019    Procedure: HEMIARTHROPLASTY HIP (BIPOLAR); Surgeon: Bill Love MD;  Location:  MAIN OR;  Service: Orthopedics        09/24/19 0910   Note Type   Note type Eval only   Restrictions/Precautions   Weight Bearing Precautions Per Order Yes   RLE Weight Bearing Per Order WBAT   Other Precautions THR (posterior); Cognitive; Fall Risk;Pain;Multiple lines   Pain Assessment   Pain Assessment 0-10   Pain Score 7   Pain Type Acute pain   Pain Location Hip   Pain Orientation Right   Hospital Pain Intervention(s) Medication (See MAR); Repositioned; Ambulation/increased activity  (RN present to provide medication)   Home Living   Type of [address] [place] [address] Two level;Bed/bath upstairs;1/2 bath on main level  (2STE)   [address];Walker   Prior Function   Level of [place] Independent with ADLs and functional mobility   Lives With Family  (Mother)   ADL Assistance Independent   IADLs Independent   Falls in the last 6 months 1 to 4   Vocational Unemployed   Comments +   Lifestyle   Autonomy Pt states she stopped working less than a year ago due to illness from mold in her house  Since the repairs have been made, 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, but hopes to return to working for pay soon  Pt resides with her elderly mother  Pt has a diagnosis of autism     Psychosocial   Psychosocial (WDL) X   Patient Behaviors/Mood Anxious   ADL   Eating Assistance 7  Independent   Grooming Assistance 5  Supervision/Setup   UB Bathing Assistance 5  Supervision/Setup   LB Bathing Assistance 2  Maximal Assistance   UB Dressing Assistance 5  Supervision/Setup   LB Dressing Assistance 2  Maximal Assistance   Toileting Assistance  2  Maximal Assistance   Bed Mobility   Supine to Sit 4  Minimal assistance   Additional items Assist x 1;LE management;Verbal cues   Transfers   Sit to Stand 4  Minimal assistance   Additional items Assist x 2   Stand to Sit 4  Minimal assistance   Additional items Assist x 1;Verbal cues   Stand pivot 4  Minimal assistance   Additional items Assist x 2;Verbal cues  (RW)   Activity Tolerance   Activity Tolerance Patient tolerated treatment well   Nurse Made Aware CARLOS Momin present for portions of eval   RUE Assessment   RUE Assessment WFL   LUE Assessment   LUE Assessment WFL   Hand Function   Gross Motor Coordination Functional   Fine Motor Coordination Functional   Cognition   Overall Cognitive Status Impaired   Arousal/Participation Alert; Cooperative   Attention Attends with cues to redirect   Orientation Level Oriented X4   Memory Within functional limits   Following Commands Follows one step commands without difficulty   Comments Pt with dx of Autism; requires redirection to task and some step-by-step directions and encouragement but otherwise appears to be cognitively intact   Assessment   Limitation Decreased ADL status; Decreased Safe judgement during ADL;Decreased endurance;Decreased self-care trans;Decreased high-level ADLs   Prognosis Fair   Assessment Pt is a 63 y o  female seen for OT evaluation at [facility], admitted 9/22/2019 w/ Closed displaced fracture of right femoral neck ([facility] [address] )  OT completed extensive review of pt's medical and social history  Comorbidities affecting pt's functional performance at time of assessment include: Autism disorder, HTN, falls, insomnia, depression, fatigue   Personal factors affecting pt at time of IE include:steps to enter environment, limited home support, behavioral pattern, difficulty performing ADLS, difficulty performing IADLS  and limited insight into deficits  Prior to admission, pt was living in [place], [place] with her elderly mother and was independent with ADL/IADL, driving, and unemployed  Upon evaluation, pt presents to OT below baseline due to the following performance deficits: weakness, decreased strength, decreased balance, decreased tolerance, impaired problem solving, decreased safety awareness, increased pain, orthopedic restrictions, impaired interpersonal skills and decreased coping skills  Pt to benefit from continued skilled OT tx while in the hospital to address deficits as defined above and maximize level of functional independence w ADL's and functional mobility  Occupational Performance areas to address include: grooming, bathing/shower, toilet hygiene, dressing, functional mobility, meal prep and household maintenance  Based on findings, pt is of high complexity  At this time, OT recommendations at time of discharge are short term rehab  Plan   Treatment Interventions ADL retraining;Functional transfer training; Endurance training;Patient/family training; Compensatory technique education;Continued evaluation;Equipment evaluation/education   Goal Expiration Date 10/04/19   OT Frequency 3-5x/wk   Recommendation   OT Discharge Recommendation Short Term Rehab   OT - OK to Discharge No   Barthel Index   Feeding 10   Bathing 0   Grooming Score 0   Dressing Score 0   Bladder Score 10   Bowels Score 10   Toilet Use Score 5   Transfers (Bed/Chair) Score 5   Mobility (Level Surface) Score 0   Stairs Score 0   Barthel Index Score 40     Pt will achieve the following goals within 10 days  *Pt will complete grooming with Mod I     *Pt will complete UB bathing and dressing with Mod I     *Pt will complete LB bathing and dressing with Min A, using adaptive techniques   *Pt will complete toileting (hygiene and clothing management) with supervision      *Pt will complete bed mobility with supervision, with bed flat and no side rail to prep for purposeful tasks, while maintaining THPs  *Pt will perform functional transfers with supervision in order to complete ADL routine  *Pt will increase standing tolerance to 3+ minutes in order to complete sinkside ADL  *Pt will complete item retrieval and light home management with Min A while demonstrating good safety  *Pt will demonstrate increased activity tolerance in order to complete ADL routine  *Pt will participate in cognitive assessment to determine level of safety for returning home    *Pt will participate in UE therapeutic exercise in order to maximize strength for ADL transfers  *Pt will identify 3/3 total hip precautions to ensure safety upon discharge      [facility], OT

## 2019-09-24 NOTE — ASSESSMENT & PLAN NOTE
Closed displaced right femoral neck fracture status post fall  Status post right hip hemiarthroplasty #POD 1    Plan  · Stable, tolerating a diet  · For PT evaluation today  · Possible rehab placement tomorrow  · Pain control with oxycodone p r n    · Continue with stool softener  · Orthopedics on board, appreciate recommendations  · Encourage incentive spirometer use  · Monitor H&H and transfuse if needed  · Discontinue IV fluids, patient now tolerating a diet

## 2019-09-24 NOTE — PHYSICAL THERAPY NOTE
PT tx     09/24/19 0905   Pain Assessment   Pain Assessment 0-10   Pain Score 7   Pain Type Acute pain   Pain Location Hip   Pain Orientation Right   Restrictions/Precautions   Weight Bearing Precautions Per Order Yes   RLE Weight Bearing Per Order WBAT   Other Precautions Fall Risk;THR;WBS;Cognitive   General   Chart Reviewed Yes   Cognition   Overall Cognitive Status Impaired   Arousal/Participation Alert; Cooperative   Attention Attends with cues to redirect   Orientation Level Oriented X4   Subjective   Subjective but my leg turns in automatically   Bed Mobility   Supine to Sit 4  Minimal assistance   Additional items Assist x 1; Increased time required;LE management;Verbal cues; Impulsive   Transfers   Sit to Stand 4  Minimal assistance   Additional items Assist x 2   Stand to Sit 4  Minimal assistance   Additional items Assist x 2   Stand pivot 4  Minimal assistance   Additional items Assist x 2;Verbal cues; Increased time required;Armrests  (rw)   Ambulation/Elevation   Gait pattern Improper Weight shift; Inconsistent david; Short stride; Step to; Forward Flexion   Gait Assistance 4  Minimal assist   Additional items Assist x 2   Assistive Device Rolling walker   Distance 6'   Balance   Static Sitting Good   Dynamic Sitting Good   Static Standing Fair -   Dynamic Standing Fair -   Ambulatory Fair -   Endurance Deficit   Endurance Deficit Yes   Activity Tolerance   Activity Tolerance Patient tolerated treatment well   Nurse Made Aware RN Yamini   Exercises   Ankle Pumps 10 reps;AROM; Right;Left   Assessment   Prognosis Good   Problem List Decreased strength;Decreased endurance; Impaired balance;Decreased mobility; Decreased coordination;Decreased safety awareness;Orthopedic restrictions;Decreased skin integrity   Assessment Instr in THR precautions several time adn reinforced during mobilty training   Continue to recommend short term in-pt rehab atd/c   Barriers to Discharge Inaccessible home environment   Goals Patient Goals get better   Plan   Treatment/Interventions ADL retraining;Functional transfer training;LE strengthening/ROM; Therapeutic exercise; Endurance training;Cognitive reorientation;Patient/family training;Equipment eval/education; Bed mobility;Gait training;Spoke to nursing;Spoke to case management   Progress Slow progress, medical status limitations   PT Frequency 5x/wk   Recommendation   Recommendation Short-term skilled PT   Equipment Recommended Walker   PT - OK to Discharge Jackie Hines, PT

## 2019-09-24 NOTE — PLAN OF CARE
Problem: PHYSICAL THERAPY ADULT  Goal: Performs mobility at highest level of function for planned discharge setting  See evaluation for individualized goals  Description  Treatment/Interventions: ADL retraining, Functional transfer training, LE strengthening/ROM, Therapeutic exercise, Endurance training, Cognitive reorientation, Patient/family training, Equipment eval/education, Bed mobility, Gait training, Spoke to nursing, Spoke to case management, OT  Equipment Recommended: Jong Johnson       See flowsheet documentation for full assessment, interventions and recommendations  9/24/2019 1242 by Skyler Palmer PT  Outcome: Progressing  Note:   Prognosis: Good  Problem List: Decreased strength, Decreased endurance, Impaired balance, Decreased mobility, Decreased coordination, Decreased safety awareness, Orthopedic restrictions, Decreased skin integrity  Assessment: Instr in THR precautions several time adn reinforced during mobilty training  Continue to recommend short term in-pt rehab atd/c  Barriers to Discharge: Inaccessible home environment     Recommendation: Short-term skilled PT     PT - OK to Discharge: No    See flowsheet documentation for full assessment  9/24/2019 1214 by Skyler Palmer, PT  Outcome: Progressing  Note:   Prognosis: Good  Problem List: Decreased strength, Decreased endurance, Impaired balance, Decreased mobility, Decreased coordination, Decreased safety awareness, Impaired judgement, Orthopedic restrictions  Assessment: Pt presents s/p fal with R hip fx and tHR  Able ot mobilize with 2 assist and rw  Needs frequent redirection to focus on tasks  Rahul benefit form skilled TP serivc estore gain safe idn funtional mobility  Recommend shor tterm in-pt rehab at d/c  Will follow see goals  Barriers to Discharge: Inaccessible home environment, Decreased caregiver support     Recommendation: Short-term skilled PT     PT - OK to Discharge: Yes    See flowsheet documentation for full assessment

## 2019-09-24 NOTE — PLAN OF CARE
Problem: OCCUPATIONAL THERAPY ADULT  Goal: Performs self-care activities at highest level of function for planned discharge setting  See evaluation for individualized goals  Outcome: Progressing  Note:   Limitation: Decreased ADL status, Decreased Safe judgement during ADL, Decreased endurance, Decreased self-care trans, Decreased high-level ADLs  Prognosis: Fair  Assessment: Pt is a 63 y o  female seen for OT evaluation at [facility], admitted 9/22/2019 w/ Closed displaced fracture of right femoral neck ([address] )  OT completed extensive review of pt's medical and social history  Comorbidities affecting pt's functional performance at time of assessment include: Autism disorder, HTN, falls, insomnia, depression, fatigue  Personal factors affecting pt at time of IE include:steps to enter environment, limited home support, behavioral pattern, difficulty performing ADLS, difficulty performing IADLS  and limited insight into deficits  Prior to admission, pt was living in [address] [place] [address] with her elderly mother and was independent with ADL/IADL, driving, and unemployed  Upon evaluation, pt presents to OT below baseline due to the following performance deficits: weakness, decreased strength, decreased balance, decreased tolerance, impaired problem solving, decreased safety awareness, increased pain, orthopedic restrictions, impaired interpersonal skills and decreased coping skills  Pt to benefit from continued skilled OT tx while in the hospital to address deficits as defined above and maximize level of functional independence w ADL's and functional mobility  Occupational Performance areas to address include: grooming, bathing/shower, toilet hygiene, dressing, functional mobility, meal prep and household maintenance  Based on findings, pt is of high complexity  At this time, OT recommendations at time of discharge are short term rehab       OT Discharge Recommendation: Short Term Rehab  OT - OK to Discharge: No

## 2019-09-24 NOTE — PROGRESS NOTES
Mary Dress  63 y o   female  MR#: [id number]  9/24/2019    Post-op days: 1  Extremity: right hip    Subjective: Patient seen and examined  Lying comfortably in bed  Denies pain at this time  Denies chest pain, SOB, nausea, vomiting, fever or chills  Vitals:   Vitals:    09/23/19 2300   BP: 96/52   Pulse: 76   Resp: 18   Temp: 97 5 °F (36 4 °C)   SpO2: 95%       Exam:   A&O x 3 NAD  Right hip:  Dressing c/d/i  Thigh and calf soft, compressible  Abduction pillow in place   NV intact    X-rays:  Right hip prosthesis in good position with no evidence of loosening  Labs:   WBC   Recent Labs     09/22/19  1225 09/23/19  0555 09/24/19  0443   WBC 8 40 7 12 8 25     H/H   Recent Labs     09/22/19  1225 09/23/19  0555 09/24/19  0443   HGB 15 4 14 0 13 2   /  Recent Labs     09/22/19  1225 09/23/19  0555 09/24/19  0443   HCT 45 9 42 3 40 7     Sed Rate No results for input(s): SEDRATE in the last 72 hours  CRP No results for input(s): CRP in the last 72 hours  Assessment:   S/p right hip hemiarthroplasty    Plan:   WBAT to RLE with assistance  PT/OT  Posterior hip precautions  Pain control  DVT prophylaxis- Lovenox and foot pumps  Encourage incentive spirometry  DC planning to rehab  ABLA- stable   Continue to monitor vitals and H/H

## 2019-09-24 NOTE — SOCIAL WORK
Bed is available at [facility] and [facility]  Patient is aware and chooses [facility]  Will obtain auth

## 2019-09-24 NOTE — PROGRESS NOTES
Progress Note - Amy Heart 1960, 63 y o  female MRN: [id number]    Unit/Bed#: [address] [place] [address] Encounter: [id number]    Primary Care Provider: Adriane Orellana MD   Date and time admitted to hospital: 9/22/2019 10:52 AM        * Closed displaced fracture of right femoral neck [facility])  Assessment & Plan  Closed displaced right femoral neck fracture status post fall  Status post right hip hemiarthroplasty #POD 1    Plan  · Stable, tolerating a diet  · For PT evaluation today  · Possible rehab placement tomorrow  · Pain control with oxycodone p r n  · Continue with stool softener  · Orthopedics on board, appreciate recommendations  · Encourage incentive spirometer use  · Monitor H&H and transfuse if needed  · Discontinue IV fluids, patient now tolerating a diet     Fall  Assessment & Plan  Status post Mechanical fall, complicated by right hip fracture  Status post right hip hemiarthroplasty   P T /OT on board, for rehab tomorrow    Essential hypertension  Assessment & Plan  Blood pressure stable on hydrochlorothiazide and Cozaar  Continue with home BP meds        VTE Pharmacologic Prophylaxis:   Pharmacologic: Enoxaparin (Lovenox)  Mechanical VTE Prophylaxis in Place: Yes    Patient Centered Rounds: I have performed bedside rounds with nursing staff today  Discussions with Specialists or Other Care Team Provider:  Discussed with Orthopedics, patient is stable for rehab tomorrow    Education and Discussions with Family / Patient:  Discussed with patient    Time Spent for Care: 20 minutes  More than 50% of total time spent on counseling and coordination of care as described above  Current Length of Stay: 2 day(s)    Current Patient Status: Inpatient   Certification Statement: The patient will continue to require additional inpatient hospital stay due to Right femoral neck fracture status post surgery    Discharge Plan:  Tomorrow    Code Status: Level 1 - Full Code      Subjective:   No overnight events  Patient had an uneventful surgery and has no complaints this morning    Objective:     Vitals:   Temp (24hrs), Av 4 °F (36 9 °C), Min:97 5 °F (36 4 °C), Max:99 7 °F (37 6 °C)    Temp:  [97 5 °F (36 4 °C)-99 7 °F (37 6 °C)] 98 °F (36 7 °C)  HR:  [64-90] 90  Resp:  [15-18] 18  BP: ()/(52-78) 120/66  SpO2:  [91 %-99 %] 97 %  Body mass index is 25 33 kg/m²  Input and Output Summary (last 24 hours): Intake/Output Summary (Last 24 hours) at 2019 1345  Last data filed at 2019 5555  Gross per 24 hour   Intake 1900 ml   Output 3500 ml   Net -1600 ml       Physical Exam:     Physical Exam   Constitutional: She is oriented to person, place, and time  She appears well-developed and well-nourished  No distress  Cardiovascular: Normal rate, regular rhythm, normal heart sounds and intact distal pulses  Exam reveals no friction rub  No murmur heard  Pulmonary/Chest: Effort normal and breath sounds normal  No stridor  No respiratory distress  She has no wheezes  She has no rales  Abdominal: Soft  Bowel sounds are normal  She exhibits no distension and no mass  There is no tenderness  There is no guarding  Musculoskeletal: She exhibits tenderness (post-surgical tenderness in the right lower extremity)  Neurological: She is alert and oriented to person, place, and time  Skin: She is not diaphoretic  Vitals reviewed          Additional Data:     Labs:    Results from last 7 days   Lab Units 19  0443 19  0555   WBC Thousand/uL 8 25 7 12   HEMOGLOBIN g/dL 13 2 14 0   HEMATOCRIT % 40 7 42 3   PLATELETS Thousands/uL 184 204   NEUTROS PCT %  --  62   LYMPHS PCT %  --  22   MONOS PCT %  --  10   EOS PCT %  --  5     Results from last 7 days   Lab Units 19  0443 19  0555   SODIUM mmol/L 141 143   POTASSIUM mmol/L 4 1 3 6   CHLORIDE mmol/L 108 111*   CO2 mmol/L 32 25   BUN mg/dL 13 12   CREATININE mg/dL 0 74 0 63   ANION GAP mmol/L 1* 7   CALCIUM mg/dL 8 8 8 8   ALBUMIN g/dL --  2 8*   TOTAL BILIRUBIN mg/dL  --  0 40   ALK PHOS U/L  --  62   ALT U/L  --  21   AST U/L  --  18   GLUCOSE RANDOM mg/dL 97 112     Results from last 7 days   Lab Units 09/23/19  0555   INR  1 02     Results from last 7 days   Lab Units 09/24/19  1134 09/24/19  0742 09/23/19  2139 09/23/19  1844 09/23/19  1807 09/23/19  1512 09/23/19  1112 09/23/19  0800 09/22/19  2128 09/22/19  1816 09/22/19  1614   POC GLUCOSE mg/dl 103 87 211* 126 123 90 92 95 120 148* 107                   * I Have Reviewed All Lab Data Listed Above  * Additional Pertinent Lab Tests Reviewed:  All Labs Within Last 24 Hours Reviewed    Imaging:    Imaging Reports Reviewed Today Include:   Imaging Personally Reviewed by Myself Includes:      Recent Cultures (last 7 days):           Last 24 Hours Medication List:     Current Facility-Administered Medications:  acetaminophen 650 mg Oral Q6H PRN Humberto Childers PA-C    aluminum-magnesium hydroxide-simethicone 30 mL Oral Q6H PRN Humberto Childers PA-C    ascorbic acid 500 mg Oral BID Humberto Childers PA-C    bisacodyl 10 mg Rectal Daily PRN Humberto Childers PA-C    calcium carbonate 1,000 mg Oral Daily PRN Humberto Childers PA-C    cyanocobalamin 500 mcg Oral Daily Mitzy Selby MD    docusate sodium 100 mg Oral BID Humberto Childers PA-C    enoxaparin 40 mg Subcutaneous Daily Humberto Childers PA-C    ferrous sulfate 325 mg Oral BID With Meals Humberto Childers PA-C    folic acid 1 mg Oral Daily Humberto Childers PA-C    hydrochlorothiazide 12 5 mg Oral Daily Humberto Childers PA-C    insulin lispro 1-6 Units Subcutaneous TID AC Humberto Childers PA-C    lactated ringers 75 mL/hr Intravenous Continuous Humberto Childers PA-C Last Rate: 75 mL/hr (09/23/19 1756)   LORazepam 0 5 mg Oral Daily PRN Humberto Childers PA-C    losartan 50 mg Oral Daily Humberto Childers PA-C    morphine injection 2 mg Intravenous Q4H PRN Humberto Childers PA-C    multivitamin-minerals 1 tablet Oral Daily Mitzy Selby MD    ondansetron 4 mg Intravenous Q6H PRN Humberto Childers PA-C oxyCODONE-acetaminophen 1 tablet Oral Q4H PRN Radames Rangel PA-C    senna 1 tablet Oral Daily Radames Rangel PA-C    simethicone 80 mg Oral 4x Daily PRN Radames Rangel PA-C    sodium chloride 125 mL/hr Intravenous Continuous Radames Rangel PA-C Last Rate: 125 mL/hr (09/23/19 0700)   temazepam 7 5 mg Oral HS PRN Radames Rangel PA-C         Today, Patient Was Seen By: Arely Ernst MD    ** Please Note: Dictation voice to text software may have been used in the creation of this document   **

## 2019-09-24 NOTE — PLAN OF CARE
Problem: Potential for Falls  Goal: Patient will remain free of falls  Description  INTERVENTIONS:  - Assess patient frequently for physical needs  -  Identify cognitive and physical deficits and behaviors that affect risk of falls    -  [place] fall precautions as indicated by assessment   - Educate patient/family on patient safety including physical limitations  - Instruct patient to call for assistance with activity based on assessment  - Modify environment to reduce risk of injury  - Consider OT/PT consult to assist with strengthening/mobility  Outcome: Progressing     Problem: Prexisting or High Potential for Compromised Skin Integrity  Goal: Skin integrity is maintained or improved  Description  INTERVENTIONS:  - Identify patients at risk for skin breakdown  - Assess and monitor skin integrity  - Assess and monitor nutrition and hydration status  - Monitor labs   - Assess for incontinence   - Turn and reposition patient  - Assist with mobility/ambulation  - Relieve pressure over bony prominences  - Avoid friction and shearing  - Provide appropriate hygiene as needed including keeping skin clean and dry  - Evaluate need for skin moisturizer/barrier cream  - Collaborate with interdisciplinary team   - Patient/family teaching  - Consider wound care consult   Outcome: Progressing     Problem: PAIN - ADULT  Goal: Verbalizes/displays adequate comfort level or baseline comfort level  Description  Interventions:  - Encourage patient to monitor pain and request assistance  - Assess pain using appropriate pain scale  - Administer analgesics based on type and severity of pain and evaluate response  - Implement non-pharmacological measures as appropriate and evaluate response  - Consider cultural and social influences on pain and pain management  - Notify physician/advanced practitioner if interventions unsuccessful or patient reports new pain  Outcome: Progressing     Problem: SAFETY ADULT  Goal: Maintain or return to baseline ADL function  Description  INTERVENTIONS:  -  Assess patient's ability to carry out ADLs; assess patient's baseline for ADL function and identify physical deficits which impact ability to perform ADLs (bathing, care of mouth/teeth, toileting, grooming, dressing, etc )  - Assess/evaluate cause of self-care deficits   - Assess range of motion  - Assess patient's mobility; develop plan if impaired  - Assess patient's need for assistive devices and provide as appropriate  - Encourage maximum independence but intervene and supervise when necessary  - Involve family in performance of ADLs  - Assess for home care needs following discharge   - Consider OT consult to assist with ADL evaluation and planning for discharge  - Provide patient education as appropriate  Outcome: Progressing  Goal: Maintain or return mobility status to optimal level  Description  INTERVENTIONS:  - Assess patient's baseline mobility status (ambulation, transfers, stairs, etc )    - Identify cognitive and physical deficits and behaviors that affect mobility  - Identify mobility aids required to assist with transfers and/or ambulation (gait belt, sit-to-stand, lift, walker, cane, etc )  - [place] fall precautions as indicated by assessment  - Record patient progress and toleration of activity level on Mobility SBAR; progress patient to next Phase/Stage  - Instruct patient to call for assistance with activity based on assessment  - Consider rehabilitation consult to assist with strengthening/weightbearing, etc   Outcome: Progressing     Problem: DISCHARGE PLANNING  Goal: Discharge to home or other facility with appropriate resources  Description  INTERVENTIONS:  - Identify barriers to discharge w/patient and caregiver  - Arrange for needed discharge resources and transportation as appropriate  - Identify discharge learning needs (meds, wound care, etc )  - Arrange for interpretive services to assist at discharge as needed  - Refer to Case Management Department for coordinating discharge planning if the patient needs post-hospital services based on physician/advanced practitioner order or complex needs related to functional status, cognitive ability, or social support system  Outcome: Progressing     Problem: Knowledge Deficit  Goal: Patient/family/caregiver demonstrates understanding of disease process, treatment plan, medications, and discharge instructions  Description  Complete learning assessment and assess knowledge base    Interventions:  - Provide teaching at level of understanding  - Provide teaching via preferred learning methods  Outcome: Progressing

## 2019-09-24 NOTE — PLAN OF CARE
Problem: PHYSICAL THERAPY ADULT  Goal: Performs mobility at highest level of function for planned discharge setting  See evaluation for individualized goals  Description  Treatment/Interventions: ADL retraining, Functional transfer training, LE strengthening/ROM, Therapeutic exercise, Endurance training, Cognitive reorientation, Patient/family training, Equipment eval/education, Bed mobility, Gait training, Spoke to nursing, Spoke to case management, OT  Equipment Recommended: Amy Ren       See flowsheet documentation for full assessment, interventions and recommendations  Outcome: Progressing  Note:   Prognosis: Good  Problem List: Decreased strength, Decreased endurance, Impaired balance, Decreased mobility, Decreased coordination, Decreased safety awareness, Impaired judgement, Orthopedic restrictions  Assessment: Pt presents s/p fal with R hip fx and tHR  Able ot mobilize with 2 assist and rw  Needs frequent redirection to focus on tasks  Rahul benefit form skilled TP serivc estore gain safe idn funtional mobility  Recommend shor tterm in-pt rehab at d/c  Will follow see goals  Barriers to Discharge: Inaccessible home environment, Decreased caregiver support     Recommendation: Short-term skilled PT     PT - OK to Discharge: Yes    See flowsheet documentation for full assessment

## 2019-09-24 NOTE — SOCIAL WORK
Received auth for SNF from Daria Pinzon at [facility]  Skilled level 1   7days  #[id number]  NRD 10/1  Called and notified Kellie at [address] [place] [address] Street

## 2019-09-24 NOTE — PHYSICAL THERAPY NOTE
PT eval   09/24/19 0850   Note Type   Note type Eval only   Pain Assessment   Pain Assessment 0-10   Pain Score 2   Pain Location Hip   Pain Orientation Right   Home Living   Type of [address] Two level;Bed/bath upstairs   Bathroom Equipment Commode   Home Equipment Cane;Walker   Prior Function   Level of [place] Independent with ADLs and functional mobility   Lives With [employer] Help From Family   ADL Assistance Independent   IADLs Independent   Falls in the last 6 months 1 to 4   Vocational Unemployed   Comments drives   Restrictions/Precautions   Weight Bearing Precautions Per Order Yes   RLE Weight Bearing Per Order WBAT   General   Additional Pertinent History fall and R hip fx then THR  referred to PT OT   Family/Caregiver Present Yes   Cognition   Overall Cognitive Status Impaired   Attention Attends with cues to redirect   Orientation Level Oriented X4   Memory Within functional limits   Following Commands Follows one step commands without difficulty   Comments dx of autism   RUE Assessment   RUE Assessment WFL   LUE Assessment   LUE Assessment WFL   RLE Assessment   RLE Assessment   (knee ankle wfl ,increased adductor tone hip 2-/5)   LLE Assessment   LLE Assessment WFL   Coordination   Movements are Fluid and Coordinated 1   Sensation WFL   Bed Mobility   Supine to Sit 4  Minimal assistance   Additional items Assist x 1;LE management;Verbal cues; Increased time required   Transfers   Sit to Stand 4  Minimal assistance   Additional items Assist x 2   Stand to Sit 4  Minimal assistance   Additional items Assist x 2   Stand pivot 4  Minimal assistance   Additional items Assist x 1;Verbal cues; Increased time required   Ambulation/Elevation   Gait pattern Decreased foot clearance; Inconsistent david; Foward flexed; Short stride; Step to;Decreased R stance   Gait Assistance 4  Minimal assist   Additional items Assist x 2   Assistive Device Rolling walker   Distance 5'   Balance Static Sitting Fair   Dynamic Sitting Fair -   Static Standing Fair -   Dynamic Standing Fair -   Ambulatory Fair -   Endurance Deficit   Endurance Deficit No   Activity Tolerance   Activity Tolerance Patient tolerated treatment well   Medical Staff Made Aware OT Yessy   Nurse Made Aware CARLOS Kc   Assessment   Prognosis Good   Problem List Decreased strength;Decreased endurance; Impaired balance;Decreased mobility; Decreased coordination;Decreased safety awareness; Impaired judgement;Orthopedic restrictions   Assessment Pt presents s/p fal with R hip fx and tHR  Able ot mobilize with 2 assist and rw  Needs frequent redirection to focus on tasks  Rahul benefit form skilled TP serivc estore gain safe idn funtional mobility  Recommend shor tterm in-pt rehab at d/c  Will follow see goals   Barriers to Discharge Inaccessible home environment;Decreased caregiver support   Goals   Patient Goals get better   STG Expiration Date 10/02/19   Short Term Goal #1 1) safe ind tnrasfers wbat Rle 2) safe idn abm with rw ' level wbat Rle 3) 3/3 thr precautions 4) improve strength 1/2 grade Rle    Plan   Treatment/Interventions ADL retraining;Functional transfer training;LE strengthening/ROM; Therapeutic exercise; Endurance training;Cognitive reorientation;Patient/family training;Equipment eval/education; Bed mobility;Gait training;Spoke to nursing;Spoke to case management;OT   PT Frequency 5x/wk   Recommendation   Recommendation Short-term skilled PT   Equipment Recommended Walker   PT - OK to Discharge Yes   Additional Comments   (to STR)   Barthel Index   Feeding 10   Bathing 0   Grooming Score 0   Dressing Score 0   Bladder Score 10   Bowels Score 10   Toilet Use Score 5   Transfers (Bed/Chair) Score 5   Mobility (Level Surface) Score 0   Stairs Score 0   Barthel Index Score 40   Madalyn Aguilera, PT

## 2019-09-24 NOTE — ASSESSMENT & PLAN NOTE
Status post Mechanical fall, complicated by right hip fracture  Status post right hip hemiarthroplasty   P T /OT on board, for rehab tomorrow

## 2019-09-25 VITALS
DIASTOLIC BLOOD PRESSURE: 67 MMHG | SYSTOLIC BLOOD PRESSURE: 121 MMHG | TEMPERATURE: 98 F | BODY MASS INDEX: 24.19 KG/M2 | OXYGEN SATURATION: 94 % | WEIGHT: 154.1 LBS | RESPIRATION RATE: 18 BRPM | HEIGHT: 67 IN | HEART RATE: 100 BPM

## 2019-09-25 LAB
ANION GAP SERPL CALCULATED.3IONS-SCNC: 6 MMOL/L (ref 4–13)
BUN SERPL-MCNC: 18 MG/DL (ref 5–25)
CALCIUM SERPL-MCNC: 8.8 MG/DL (ref 8.3–10.1)
CHLORIDE SERPL-SCNC: 106 MMOL/L (ref 100–108)
CO2 SERPL-SCNC: 28 MMOL/L (ref 21–32)
CREAT SERPL-MCNC: 0.75 MG/DL (ref 0.6–1.3)
ERYTHROCYTE [DISTWIDTH] IN BLOOD BY AUTOMATED COUNT: 13.6 % (ref 11.6–15.1)
GFR SERPL CREATININE-BSD FRML MDRD: 88 ML/MIN/1.73SQ M
GLUCOSE SERPL-MCNC: 106 MG/DL (ref 65–140)
GLUCOSE SERPL-MCNC: 128 MG/DL (ref 65–140)
GLUCOSE SERPL-MCNC: 96 MG/DL (ref 65–140)
HCT VFR BLD AUTO: 39 % (ref 34.8–46.1)
HGB BLD-MCNC: 12.7 G/DL (ref 11.5–15.4)
MCH RBC QN AUTO: 31.4 PG (ref 26.8–34.3)
MCHC RBC AUTO-ENTMCNC: 32.6 G/DL (ref 31.4–37.4)
MCV RBC AUTO: 97 FL (ref 82–98)
PLATELET # BLD AUTO: 178 THOUSANDS/UL (ref 149–390)
PMV BLD AUTO: 10.6 FL (ref 8.9–12.7)
POTASSIUM SERPL-SCNC: 4 MMOL/L (ref 3.5–5.3)
RBC # BLD AUTO: 4.04 MILLION/UL (ref 3.81–5.12)
SODIUM SERPL-SCNC: 140 MMOL/L (ref 136–145)
WBC # BLD AUTO: 6.93 THOUSAND/UL (ref 4.31–10.16)

## 2019-09-25 PROCEDURE — 82948 REAGENT STRIP/BLOOD GLUCOSE: CPT

## 2019-09-25 PROCEDURE — 99239 HOSP IP/OBS DSCHRG MGMT >30: CPT | Performed by: INTERNAL MEDICINE

## 2019-09-25 PROCEDURE — 97535 SELF CARE MNGMENT TRAINING: CPT

## 2019-09-25 PROCEDURE — 80048 BASIC METABOLIC PNL TOTAL CA: CPT | Performed by: PHYSICIAN ASSISTANT

## 2019-09-25 PROCEDURE — 97530 THERAPEUTIC ACTIVITIES: CPT

## 2019-09-25 PROCEDURE — 99024 POSTOP FOLLOW-UP VISIT: CPT | Performed by: PHYSICIAN ASSISTANT

## 2019-09-25 PROCEDURE — 85027 COMPLETE CBC AUTOMATED: CPT | Performed by: PHYSICIAN ASSISTANT

## 2019-09-25 PROCEDURE — 97110 THERAPEUTIC EXERCISES: CPT

## 2019-09-25 PROCEDURE — 97116 GAIT TRAINING THERAPY: CPT

## 2019-09-25 RX ORDER — DOCUSATE SODIUM 100 MG/1
100 CAPSULE, LIQUID FILLED ORAL 2 TIMES DAILY
Qty: 10 CAPSULE | Refills: 0 | Status: SHIPPED | OUTPATIENT
Start: 2019-09-25 | End: 2020-07-03 | Stop reason: ALTCHOICE

## 2019-09-25 RX ORDER — OXYCODONE HYDROCHLORIDE AND ACETAMINOPHEN 5; 325 MG/1; MG/1
1 TABLET ORAL EVERY 6 HOURS PRN
Qty: 12 TABLET | Refills: 0 | Status: SHIPPED | OUTPATIENT
Start: 2019-09-25 | End: 2019-09-28

## 2019-09-25 RX ORDER — ACETAMINOPHEN 325 MG/1
650 TABLET ORAL EVERY 6 HOURS PRN
Qty: 30 TABLET | Refills: 0 | Status: ON HOLD | OUTPATIENT
Start: 2019-09-25 | End: 2021-03-09 | Stop reason: SDUPTHER

## 2019-09-25 RX ORDER — FERROUS SULFATE 325(65) MG
325 TABLET ORAL 2 TIMES DAILY WITH MEALS
Qty: 28 TABLET | Refills: 0 | Status: SHIPPED | OUTPATIENT
Start: 2019-09-25 | End: 2019-10-15 | Stop reason: ALTCHOICE

## 2019-09-25 RX ADMIN — HYDROCHLOROTHIAZIDE 12.5 MG: 12.5 TABLET ORAL at 09:17

## 2019-09-25 RX ADMIN — FOLIC ACID 1 MG: 1 TABLET ORAL at 09:17

## 2019-09-25 RX ADMIN — ENOXAPARIN SODIUM 40 MG: 40 INJECTION, SOLUTION INTRAVENOUS; SUBCUTANEOUS at 09:17

## 2019-09-25 RX ADMIN — Medication 1 TABLET: at 09:17

## 2019-09-25 RX ADMIN — OXYCODONE HYDROCHLORIDE AND ACETAMINOPHEN 1 TABLET: 5; 325 TABLET ORAL at 09:17

## 2019-09-25 RX ADMIN — LORAZEPAM 0.5 MG: 0.5 TABLET ORAL at 01:55

## 2019-09-25 RX ADMIN — DOCUSATE SODIUM 100 MG: 100 CAPSULE, LIQUID FILLED ORAL at 09:17

## 2019-09-25 RX ADMIN — OXYCODONE HYDROCHLORIDE AND ACETAMINOPHEN 1 TABLET: 5; 325 TABLET ORAL at 01:16

## 2019-09-25 RX ADMIN — OXYCODONE HYDROCHLORIDE AND ACETAMINOPHEN 500 MG: 500 TABLET ORAL at 09:17

## 2019-09-25 RX ADMIN — SENNOSIDES 8.6 MG: 8.6 TABLET, FILM COATED ORAL at 09:17

## 2019-09-25 RX ADMIN — BISACODYL 10 MG: 10 SUPPOSITORY RECTAL at 04:51

## 2019-09-25 RX ADMIN — FERROUS SULFATE TAB 325 MG (65 MG ELEMENTAL FE) 325 MG: 325 (65 FE) TAB at 09:17

## 2019-09-25 RX ADMIN — CYANOCOBALAMIN TAB 500 MCG 500 MCG: 500 TAB at 09:17

## 2019-09-25 NOTE — PLAN OF CARE
Problem: OCCUPATIONAL THERAPY ADULT  Goal: Performs self-care activities at highest level of function for planned discharge setting  See evaluation for individualized goals  Outcome: Progressing  Note:   Limitation: Decreased ADL status, Decreased Safe judgement during ADL, Decreased endurance, Decreased self-care trans, Decreased high-level ADLs  Prognosis: Fair  Assessment: Patient participated in Skilled OT session this date with interventions consisting of ADL re training with the use of correct body mechnaics, Energy Conservation techniques, safety awareness and fall prevention techniques,  long handle equipment, maintaining Total Hip precautions,  therapeutic activities to: increase activity tolerance, increase standing tolerance time with unilateral UE support to complete sink level ADLs, increase dynamic sit/ stand balance during functional activity  and increase OOB/ sitting tolerance   Patient agreeable to OT treatment session, upon arrival patient was found supine in bed  Treatment session as follows: Pt setup at table in bed grooming upon start of session  Pt able to move to EOB with Min A, increased time and verbal cues/encouragment  Pt agreeable to perform LB dressing with AE at EOB  Edu on AE use  Pt utilized dressing stick to doff slipper socks  Pt utilized reacher to don pants  Pt utilized sock aide to don B/L socks with Min A for R sock 2* pain  Pt required Max A and shoe horn to don B/L shoes 2* increased pain  Pt stood with Min A and transferred to commode w RW and VCs  Pt utilized toilet and was able to perform toilet hygiene with VCs and CGA w RW  Pt then transferred from commode to recliner chair with Min A x2, RW and VCs  Pt able to perform UB dressing seated in recliner, and stood with RW to tuck shirt in (Mod A)  Patient continues to be functioning below baseline level, occupational performance remains limited secondary to factors listed above and increased risk for falls and injury  From OT standpoint, recommendation at time of d/c would be Short Term Rehab  Patient to benefit from continued Occupational Therapy treatment while in the hospital to address deficits as defined above and maximize level of functional independence with ADLs and functional mobility  Pt left with call bell in reach, tray table in reach, needs met, chair alarm activated        OT Discharge Recommendation: Short Term Rehab  OT - OK to Discharge: Yes(to STR)

## 2019-09-25 NOTE — PLAN OF CARE
Problem: Potential for Falls  Goal: Patient will remain free of falls  Description  INTERVENTIONS:  - Assess patient frequently for physical needs  -  Identify cognitive and physical deficits and behaviors that affect risk of falls    -  [place] fall precautions as indicated by assessment   - Educate patient/family on patient safety including physical limitations  - Instruct patient to call for assistance with activity based on assessment  - Modify environment to reduce risk of injury  - Consider OT/PT consult to assist with strengthening/mobility  9/25/2019 1158 by Debra Pacheco RN  Outcome: Adequate for Discharge  9/25/2019 1117 by Debra Pacheco RN  Outcome: Progressing     Problem: Prexisting or High Potential for Compromised Skin Integrity  Goal: Skin integrity is maintained or improved  Description  INTERVENTIONS:  - Identify patients at risk for skin breakdown  - Assess and monitor skin integrity  - Assess and monitor nutrition and hydration status  - Monitor labs   - Assess for incontinence   - Turn and reposition patient  - Assist with mobility/ambulation  - Relieve pressure over bony prominences  - Avoid friction and shearing  - Provide appropriate hygiene as needed including keeping skin clean and dry  - Evaluate need for skin moisturizer/barrier cream  - Collaborate with interdisciplinary team   - Patient/family teaching  - Consider wound care consult   9/25/2019 1158 by Debra Pacheco RN  Outcome: Adequate for Discharge  9/25/2019 1117 by Debra Pacheco RN  Outcome: Progressing     Problem: PAIN - ADULT  Goal: Verbalizes/displays adequate comfort level or baseline comfort level  Description  Interventions:  - Encourage patient to monitor pain and request assistance  - Assess pain using appropriate pain scale  - Administer analgesics based on type and severity of pain and evaluate response  - Implement non-pharmacological measures as appropriate and evaluate response  - Consider cultural and social influences on pain and pain management  - Notify physician/advanced practitioner if interventions unsuccessful or patient reports new pain  9/25/2019 1158 by Yocasta Guallpa RN  Outcome: Adequate for Discharge  9/25/2019 1117 by Yocasta Guallpa RN  Outcome: Progressing     Problem: SAFETY ADULT  Goal: Maintain or return to baseline ADL function  Description  INTERVENTIONS:  -  Assess patient's ability to carry out ADLs; assess patient's baseline for ADL function and identify physical deficits which impact ability to perform ADLs (bathing, care of mouth/teeth, toileting, grooming, dressing, etc )  - Assess/evaluate cause of self-care deficits   - Assess range of motion  - Assess patient's mobility; develop plan if impaired  - Assess patient's need for assistive devices and provide as appropriate  - Encourage maximum independence but intervene and supervise when necessary  - Involve family in performance of ADLs  - Assess for home care needs following discharge   - Consider OT consult to assist with ADL evaluation and planning for discharge  - Provide patient education as appropriate  9/25/2019 1158 by Yocasta Guallpa RN  Outcome: Adequate for Discharge  9/25/2019 1117 by Yocasta Guallpa RN  Outcome: Progressing  Goal: Maintain or return mobility status to optimal level  Description  INTERVENTIONS:  - Assess patient's baseline mobility status (ambulation, transfers, stairs, etc )    - Identify cognitive and physical deficits and behaviors that affect mobility  - Identify mobility aids required to assist with transfers and/or ambulation (gait belt, sit-to-stand, lift, walker, cane, etc )  - [place] fall precautions as indicated by assessment  - Record patient progress and toleration of activity level on Mobility SBAR; progress patient to next Phase/Stage  - Instruct patient to call for assistance with activity based on assessment  - Consider rehabilitation consult to assist with strengthening/weightbearing, etc   9/25/2019 1158 by Kateryna Arnold RN  Outcome: Adequate for Discharge  9/25/2019 1117 by Kateryna Arnold RN  Outcome: Progressing     Problem: DISCHARGE PLANNING  Goal: Discharge to home or other facility with appropriate resources  Description  INTERVENTIONS:  - Identify barriers to discharge w/patient and caregiver  - Arrange for needed discharge resources and transportation as appropriate  - Identify discharge learning needs (meds, wound care, etc )  - Arrange for interpretive services to assist at discharge as needed  - Refer to Case Management Department for coordinating discharge planning if the patient needs post-hospital services based on physician/advanced practitioner order or complex needs related to functional status, cognitive ability, or social support system  9/25/2019 1158 by Kateryna Arnold RN  Outcome: Adequate for Discharge  9/25/2019 1117 by Kateryna Arnold RN  Outcome: Progressing     Problem: Knowledge Deficit  Goal: Patient/family/caregiver demonstrates understanding of disease process, treatment plan, medications, and discharge instructions  Description  Complete learning assessment and assess knowledge base    Interventions:  - Provide teaching at level of understanding  - Provide teaching via preferred learning methods  9/25/2019 1158 by Kateryna Arnold RN  Outcome: Adequate for Discharge  9/25/2019 1117 by Kateryna Arnold RN  Outcome: Progressing

## 2019-09-25 NOTE — ASSESSMENT & PLAN NOTE
Closed displaced right femoral neck fracture status post fall  Status post right hip hemiarthroplasty #POD 2    Plan  · Stable and medically stable for discharge to rehab today  · Pain control with oxycodone p r n    · Continue with stool softener  · Orthopedics on board, appreciate recommendations  · Encourage incentive spirometer use  · Monitor H&H and transfuse if needed

## 2019-09-25 NOTE — PHYSICAL THERAPY NOTE
PT tx     09/25/19 1000   Pain Assessment   Pain Assessment 0-10   Pain Score 2   Pain Location Hip   Pain Orientation Right   Restrictions/Precautions   Weight Bearing Precautions Per Order Yes   RLE Weight Bearing Per Order WBAT   Other Precautions THR; Combative; Fall Risk;Pain   General   Chart Reviewed Yes   Cognition   Overall Cognitive Status Impaired   Arousal/Participation Alert; Cooperative   Attention Attends with cues to redirect   Orientation Level Oriented X4   Memory Within functional limits   Following Commands Follows one step commands without difficulty   Comments frequent cues reassure and redirection to stay on task   Subjective   Subjective Pt for d/c to rehab later today agrees to tx  Bed Mobility   Supine to Sit 4  Minimal assistance   Additional items Assist x 1; Increased time required;LE management;Verbal cues   Transfers   Sit to Stand 4  Minimal assistance   Additional items Assist x 1; Armrests; Increased time required;Verbal cues   Stand to Sit 4  Minimal assistance   Additional items Assist x 1;Verbal cues; Bed elevated; Increased time required;Armrests   Stand pivot 4  Minimal assistance   Additional items Assist x 1;Verbal cues; Increased time required;Armrests  (rw)   Ambulation/Elevation   Gait pattern Improper Weight shift; Forward Flexion;Narrow SHERRI; Inconsistent david; Short stride; Shuffling; Step to   Gait Assistance 4  Minimal assist   Additional items Assist x 1   Assistive Device Rolling walker   Distance 5'x2   Balance   Static Sitting Fair +   Dynamic Sitting Fair   Static Standing Fair   Dynamic Standing Fair -   Ambulatory Fair -   Endurance Deficit   Endurance Deficit Yes   Endurance Deficit Description tires quickly   Activity Tolerance   Activity Tolerance Patient tolerated treatment well   Medical Staff Made Aware yes   Nurse Made Aware yes   Exercises   THR Supine;10 reps;AAROM; Right   Assessment   Prognosis Good   Problem List Decreased strength;Decreased endurance; Impaired balance;Decreased mobility; Decreased coordination;Decreased safety awareness; Impaired judgement;Orthopedic restrictions   Assessment Able to mobilize to edge of bed transfer to amd from [id number] and to chair amb with rw adn Ax1 with 100% v cues  Frequent rest breaks for redirection  Standingbalance with rw adn 1 assist for clothing management after commode  Shira ther ex well   For trnasfer to rehab later today  THR precautions reinforced   Barriers to Discharge Inaccessible home environment;Decreased caregiver support   Goals   Patient Goals get better   Plan   Treatment/Interventions ADL retraining;Functional transfer training;LE strengthening/ROM; Therapeutic exercise; Endurance training;Cognitive reorientation;Patient/family training;Equipment eval/education; Bed mobility;Gait training;Spoke to nursing;Spoke to case management;OT   Progress Progressing toward goals   PT Frequency 5x/wk   Recommendation   Recommendation Short-term skilled PT   Equipment Recommended Walker   PT - OK to Discharge Yes   Lauren [place], PT

## 2019-09-25 NOTE — PROGRESS NOTES
charles Ford  63 y o   female  MR#: [id number]  9/25/2019    Post-op days: 2  Extremity: right hip    Subjective: Patient seen and examined  Lying comfortably in bed  Pain is well controlled  No issues overnight  Vitals:   Vitals:    09/25/19 0828   BP: 121/67   Pulse: 100   Resp: 18   Temp: 98 °F (36 7 °C)   SpO2: 94%       Exam:   A&O x 3 NAD  Right hip:  Dressing c/d/i  Thigh and calf soft, compressible  NV intact      Labs:   WBC   Recent Labs     09/22/19  1225 09/23/19  0555 09/24/19  0443 09/25/19  0432   WBC 8 40 7 12 8 25 6 93     H/H   Recent Labs     09/22/19  1225 09/23/19  0555 09/24/19  0443 09/25/19  0432   HGB 15 4 14 0 13 2 12 7   /  Recent Labs     09/22/19  1225 09/23/19  0555 09/24/19  0443 09/25/19  0432   HCT 45 9 42 3 40 7 39 0     Sed Rate No results for input(s): SEDRATE in the last 72 hours  CRP No results for input(s): CRP in the last 72 hours  Assessment:   S/p right hip hemiarthroplasty    Plan:   WBAT to RLE with assistance  PT/OT  Posterior hip precautions  Pain control  DVT prophylaxis- Lovenox and foot pumps  Discharge with Lovenox 40 mg daily for total of 2 weeks then  mg BID x 30 days  Encourage incentive spirometry  Ortho stable for discharge to rehab  ABLA- stable   Continue to monitor vitals and H/H  Follow up in office with Dr Luz Sparks in 10-14 days

## 2019-09-25 NOTE — PLAN OF CARE
Problem: PHYSICAL THERAPY ADULT  Goal: Performs mobility at highest level of function for planned discharge setting  See evaluation for individualized goals  Description  Treatment/Interventions: ADL retraining, Functional transfer training, LE strengthening/ROM, Therapeutic exercise, Endurance training, Cognitive reorientation, Patient/family training, Equipment eval/education, Bed mobility, Gait training, Spoke to nursing, Spoke to case management, OT  Equipment Recommended: Jerrica Wagner       See flowsheet documentation for full assessment, interventions and recommendations  Outcome: Progressing  Note:   Prognosis: Good  Problem List: Decreased strength, Decreased endurance, Impaired balance, Decreased mobility, Decreased coordination, Decreased safety awareness, Impaired judgement, Orthopedic restrictions  Assessment: Able to mobilize to edge of bed transfer to amd from [id number] and to chair amb with rw adn Ax1 with 100% v cues  Frequent rest breaks for redirection  Standingbalance with rw adn 1 assist for clothing management after commode  Shira ther ex well   For trnasfer to rehab later today  THR precautions reinforced  Barriers to Discharge: Inaccessible home environment, Decreased caregiver support     Recommendation: Short-term skilled PT     PT - OK to Discharge: Yes    See flowsheet documentation for full assessment

## 2019-09-25 NOTE — SOCIAL WORK
Patient is for discharge today to [facility]  Obtained auth for BLS from [address] [place] [address] Per Laguerre  at [phone] and obtained auth # of [id number]  Arranged for  [facility] to transport at 12:30 today  Notified patient, Sejal Kyle of [facility] and [phone], patient's nurse of transport time

## 2019-09-25 NOTE — OCCUPATIONAL THERAPY NOTE
Occupational Therapy Treatment Note    Patient Name: Torres Guerrero  DNDTG'G Date: 9/25/2019 09/25/19 1011   Restrictions/Precautions   Weight Bearing Precautions Per Order Yes   RLE Weight Bearing Per Order WBAT   Other Precautions THR; Cognitive; Fall Risk;Pain   General   Response to Previous Treatment Patient reporting fatigue but able to participate   Pain Assessment   Pain Assessment 0-10   Pain Score 8   Pain Type Acute pain   Pain Location Hip   Pain Orientation Right   Hospital Pain Intervention(s) Repositioned; Ambulation/increased activity; Emotional support  (RN provided pain medication)   ADL   Eating Assistance 7  Independent   Grooming Assistance 5  Supervision/Setup   Grooming Deficit Setup   UB Dressing Assistance 5  Supervision/Setup   UB Dressing Deficit Setup;Steadying;Supervision/safety   UB Dressing Comments Assist to tuck shirt in while standing w RW   LB Dressing Assistance 4  Minimal Assistance   LB Dressing Deficit Requires assistive device for steadying;Verbal cueing;Supervision/safety; Increased time to complete;Tie shoes; Don/doff R sock; Don/doff L sock; Thread RLE into pants; Thread LLE into pants; Thread RLE into underwear; Thread LLE into underwear;Pull up over hips; Fasteners; Don/doff R shoe;Don/doff L shoe;Use of adaptive equipment   LB Dressing Comments Pt required VCs, Min A and AE for LB dressing; most assist required for shoes as pt fatigued by that point   [address]  5  Supervision/Setup   Toileting Deficit Steadying;Verbal cueing;Supervison/safety; Increased time to complete; Bedside commode;Clothing management up;Clothing management down;Perineal hygiene   Functional Standing Tolerance   Time 2-3 min   Activity ADL tasks   Comments w RW   Bed Mobility   Supine to Sit 4  Minimal assistance   Additional items Assist x 1; Increased time required;LE management;Verbal cues   Transfers   Sit to Stand 4  Minimal assistance   Additional items Assist x 1;Verbal cues   Stand to Sit 4  Minimal assistance   Additional items Assist x 1;Verbal cues   Stand pivot 4  Minimal assistance   Additional items Assist x 2;Verbal cues; Increased time required   Cognition   Overall Cognitive Status Impaired   Arousal/Participation Alert; Cooperative   Attention Attends with cues to redirect   Orientation Level Oriented X4   Memory Within functional limits   Following Commands Follows one step commands without difficulty   Comments Pt requires frequent reassurance, redirection and verbal cueing   Activity Tolerance   Activity Tolerance Patient limited by pain   Medical Staff Made Aware CARLOS Rasheed   Assessment   Assessment Patient participated in Skilled OT session this date with interventions consisting of ADL re training with the use of correct body mechnaics, Energy Conservation techniques, safety awareness and fall prevention techniques,  long handle equipment, maintaining Total Hip precautions,  therapeutic activities to: increase activity tolerance, increase standing tolerance time with unilateral UE support to complete sink level ADLs, increase dynamic sit/ stand balance during functional activity  and increase OOB/ sitting tolerance   Patient agreeable to OT treatment session, upon arrival patient was found supine in bed  Treatment session as follows: Pt setup at table in bed grooming upon start of session  Pt able to move to EOB with Min A, increased time and verbal cues/encouragment  Pt agreeable to perform LB dressing with AE at EOB  Edu on AE use  Pt utilized dressing stick to doff slipper socks  Pt utilized reacher to don pants  Pt utilized sock aide to don B/L socks with Min A for R sock 2* pain  Pt required Max A and shoe horn to don B/L shoes 2* increased pain  Pt stood with Min A and transferred to commode w RW and VCs  Pt utilized toilet and was able to perform toilet hygiene with VCs and CGA w RW  Pt then transferred from commode to recliner chair with Min A x2, RW and VCs   Pt able to perform UB dressing seated in recliner, and stood with RW to tuck shirt in (Mod A)  Patient continues to be functioning below baseline level, occupational performance remains limited secondary to factors listed above and increased risk for falls and injury  From OT standpoint, recommendation at time of d/c would be Short Term Rehab  Patient to benefit from continued Occupational Therapy treatment while in the hospital to address deficits as defined above and maximize level of functional independence with ADLs and functional mobility  Pt left with call bell in reach, tray table in reach, needs met, chair alarm activated      Plan   OT Treatment Day 1   Recommendation   OT Discharge Recommendation Short Term Rehab   OT - OK to Discharge Yes  (to STR)     Lonnie Jacinto, OT

## 2019-09-25 NOTE — TRANSPORTATION MEDICAL NECESSITY
Section I - General Information    Name of Patient: Clarence Blunt                 : 1960    Medicare #: [id number]  Transport Date: 19 (PCS is valid for round trips on this date and for all repetitive trips in the 60-day range as noted below )  Origin: [place]:  [facility]  Is the pt's stay covered under Medicare Part A (PPS/DRG)   []     Closest appropriate facility? If no, why is transport to more distant facility required? Yes  If hospice pt, is this transport related to pt's terminal illness? NA       Section II - Medical Necessity Questionnaire  Ambulance transportation is medically necessary only if other means of transport are contraindicated or would be potentially harmful to the patient  To meet this requirement, the patient must either be "bed confined" or suffer from a condition such that transport by means other than ambulance is contraindicated by the patient's condition  The following questions must be answered by the medical professional signing below for this form to be valid:    1)  Describe the MEDICAL CONDITION (physical and/or mental) of this patient AT [address] [place] [address] that requires the patient to be transported in an ambulance and why transport by other means is contraindicated by the patient's condition: fx right hip, autism  falls    2) Is the patient "bed confined" as defined below? NO  To be "be confined" the patient must satisfy all three of the following conditions: (1) unable to get up from bed without Assistance; AND (2) unable to ambulate; AND (3) unable to sit in a chair or wheelchair  3) Can this patient safely be transported by car or wheelchair van (i e , seated during transport without a medical attendant or monitoring)?    NO    4) In addition to completing questions 1-3 above, please check any of the following conditions that apply *Note: supporting documentation for any boxes checked must be maintained in the patient's medical records  If hosp-hosp transfer, describe services needed at 2nd facility not available at 1st facility? Unable to tolerate seated position for time needed to transport       Section III - Signature of Physician or Healthcare Professional  I certify that the above information is true and correct based on my evaluation of this patient, and represent that the patient requires transport by ambulance and that other forms of transport are contraindicated  I understand that this information will be used by the Centers for Medicare and Medicaid Services (CMS) to support the determination of medical necessity for ambulance services, and I represent that I have personal knowledge of the patient's condition at time of transport  []  If this box is checked, I also certify that the patient is physically or mentally incapable of signing the ambulance service's claim and that the institution with which I am affiliated has furnished care, services, or assistance to the patient  My signature below is made on behalf of the patient pursuant to 42 CFR §424 36(b)(4)  In accordance with 42 CFR §424 37, the specific reason(s) that the patient is physically or mentally incapable of signing the claim form is as follows:       Signature of Physician* or Healthcare Professional______________________________________________________________  Signature Date 09/25/19 (For scheduled repetitive transports, this form is not valid for transports performed more than 60 days after this date)    Printed Name & Credentials of Physician or Healthcare Professional (MD, DO, RN, etc )________________________________  *Form must be signed by patient's attending physician for scheduled, repetitive transports   For non-repetitive, unscheduled ambulance transports, if unable to obtain the signature of the attending physician, any of the following may sign (choose appropriate option below)  [] Physician Assistant []  Clinical Nurse Specialist []  Registered Nurse  []  Nurse Practitioner  [] Discharge Planner

## 2019-09-25 NOTE — ASSESSMENT & PLAN NOTE
Status post Mechanical fall, complicated by right hip fracture  Status post right hip hemiarthroplasty  P T /OT on board,  Medically stable for discharge to rehab today

## 2019-09-25 NOTE — PLAN OF CARE
Problem: Potential for Falls  Goal: Patient will remain free of falls  Description  INTERVENTIONS:  - Assess patient frequently for physical needs  -  Identify cognitive and physical deficits and behaviors that affect risk of falls    -  [place] fall precautions as indicated by assessment   - Educate patient/family on patient safety including physical limitations  - Instruct patient to call for assistance with activity based on assessment  - Modify environment to reduce risk of injury  - Consider OT/PT consult to assist with strengthening/mobility  Outcome: Progressing     Problem: Prexisting or High Potential for Compromised Skin Integrity  Goal: Skin integrity is maintained or improved  Description  INTERVENTIONS:  - Identify patients at risk for skin breakdown  - Assess and monitor skin integrity  - Assess and monitor nutrition and hydration status  - Monitor labs   - Assess for incontinence   - Turn and reposition patient  - Assist with mobility/ambulation  - Relieve pressure over bony prominences  - Avoid friction and shearing  - Provide appropriate hygiene as needed including keeping skin clean and dry  - Evaluate need for skin moisturizer/barrier cream  - Collaborate with interdisciplinary team   - Patient/family teaching  - Consider wound care consult   Outcome: Progressing     Problem: PAIN - ADULT  Goal: Verbalizes/displays adequate comfort level or baseline comfort level  Description  Interventions:  - Encourage patient to monitor pain and request assistance  - Assess pain using appropriate pain scale  - Administer analgesics based on type and severity of pain and evaluate response  - Implement non-pharmacological measures as appropriate and evaluate response  - Consider cultural and social influences on pain and pain management  - Notify physician/advanced practitioner if interventions unsuccessful or patient reports new pain  Outcome: Progressing     Problem: SAFETY ADULT  Goal: Maintain or return to baseline ADL function  Description  INTERVENTIONS:  -  Assess patient's ability to carry out ADLs; assess patient's baseline for ADL function and identify physical deficits which impact ability to perform ADLs (bathing, care of mouth/teeth, toileting, grooming, dressing, etc )  - Assess/evaluate cause of self-care deficits   - Assess range of motion  - Assess patient's mobility; develop plan if impaired  - Assess patient's need for assistive devices and provide as appropriate  - Encourage maximum independence but intervene and supervise when necessary  - Involve family in performance of ADLs  - Assess for home care needs following discharge   - Consider OT consult to assist with ADL evaluation and planning for discharge  - Provide patient education as appropriate  Outcome: Progressing  Goal: Maintain or return mobility status to optimal level  Description  INTERVENTIONS:  - Assess patient's baseline mobility status (ambulation, transfers, stairs, etc )    - Identify cognitive and physical deficits and behaviors that affect mobility  - Identify mobility aids required to assist with transfers and/or ambulation (gait belt, sit-to-stand, lift, walker, cane, etc )  - [place] fall precautions as indicated by assessment  - Record patient progress and toleration of activity level on Mobility SBAR; progress patient to next Phase/Stage  - Instruct patient to call for assistance with activity based on assessment  - Consider rehabilitation consult to assist with strengthening/weightbearing, etc   Outcome: Progressing     Problem: DISCHARGE PLANNING  Goal: Discharge to home or other facility with appropriate resources  Description  INTERVENTIONS:  - Identify barriers to discharge w/patient and caregiver  - Arrange for needed discharge resources and transportation as appropriate  - Identify discharge learning needs (meds, wound care, etc )  - Arrange for interpretive services to assist at discharge as needed  - Refer to Case Management Department for coordinating discharge planning if the patient needs post-hospital services based on physician/advanced practitioner order or complex needs related to functional status, cognitive ability, or social support system  Outcome: Progressing     Problem: Knowledge Deficit  Goal: Patient/family/caregiver demonstrates understanding of disease process, treatment plan, medications, and discharge instructions  Description  Complete learning assessment and assess knowledge base    Interventions:  - Provide teaching at level of understanding  - Provide teaching via preferred learning methods  Outcome: Progressing

## 2019-09-25 NOTE — DISCHARGE SUMMARY
Discharge- Amy Heart 1960, 63 y o  female MRN: [id number]    Unit/Bed#: [address] [place], [place] [id number] Encounter: [id number]    Primary Care Provider: Adriane Orellana MD   Date and time admitted to hospital: 9/22/2019 10:52 AM        * Closed displaced fracture of right femoral neck [facility])  Assessment & Plan  Closed displaced right femoral neck fracture status post fall  Status post right hip hemiarthroplasty #POD 2    Plan  · Stable and medically stable for discharge to rehab today  · Pain control with oxycodone p r n  · Continue with stool softener  · Orthopedics on board, appreciate recommendations  · Encourage incentive spirometer use  · Monitor H&H and transfuse if needed    Fall  Assessment & Plan  Status post Mechanical fall, complicated by right hip fracture  Status post right hip hemiarthroplasty  P T /OT on board,  Medically stable for discharge to rehab today    Essential hypertension  Assessment & Plan  Blood pressure stable on hydrochlorothiazide and Cozaar  Continue with home BP meds            Discharging Physician / Practitioner: Flakita Palmer MD  PCP: Adriane Orellana MD  Admission Date:   Admission Orders (From admission, onward)     Ordered        09/22/19 1324  Inpatient Admission (expected length of stay for this patient Order details is greater than two midnights)  Once                   Discharge Date: 09/25/19    Resolved Problems  Date Reviewed: 9/25/2019    None          Consultations During Hospital Stay:  · Orthopedics    Procedures Performed:   · Right hip hemiarthroplasty (9/23/19)    Significant Findings / Test Results:     · XR right hip (9/22/19)    FINDINGS:    There is an acute impacted and minimally displaced right femoral neck fracture  Mild bilateral hip osteoarthrosis  No lytic or blastic osseous lesions  Soft tissues are unremarkable  Mild degenerative changes in the lower lumbar spine     Impression:       Acute right femoral neck fracture         CT Right hip (9/22/19)    FINDINGS:    OSSEOUS STRUCTURES: Tristan Hdz is an impacted and mildly displaced right basicervical femoral neck fracture, similar to the prior radiograph   Mild degenerative arthropathy of the right hip   Mild degenerative changes in the lower lumbar spine  VISUALIZED MUSCULATURE:  Unremarkable  SOFT TISSUES:  Unremarkable  Impression:       RIGHT side femoral neck fracture, similar to the prior radiograph            Incidental Findings:   · None     Test Results Pending at Discharge (will require follow up): · None     Outpatient Tests Requested:  · None    Complications:  None    Reason for Admission:  Right hip pain after a fall    Hospital Course:     Lisandra Fowler is a 63 y o  female patient with past medical history of hypertension who originally presented to the hospital on 9/22/2019 due to right hip pain which started after a mechanical fall  On presentation she was found to have a right sided femoral neck fracture  Patient underwent right hip hemiarthroplasty 09/23/2019  Patient's postoperative course has been uneventful and she is now stable for discharge    Please see above list of diagnoses and related plan for additional information  Condition at Discharge: stable     Discharge Day Visit / Exam:     Subjective:  No overnight events  She has no complaints this morning    Vitals: Blood Pressure: 121/67 (09/25/19 0828)  Pulse: 100 (09/25/19 0828)  Temperature: 98 °F (36 7 °C) (09/25/19 0828)  Temp Source: Oral (09/25/19 0828)  Respirations: 18 (09/25/19 0828)  Height: 5' 7 2" (170 7 cm) (09/22/19 1417)  Weight - Scale: 69 9 kg (154 lb 1 6 oz) (09/25/19 0600)  SpO2: 94 % (09/25/19 0828)  Exam:   Physical Exam   Constitutional: She is oriented to person, place, and time  She appears well-developed and well-nourished  No distress  Cardiovascular: Normal rate, regular rhythm, normal heart sounds and intact distal pulses  Exam reveals no friction rub     No murmur heard   Pulmonary/Chest: Effort normal and breath sounds normal  No stridor  No respiratory distress  She has no wheezes  She has no rales  She exhibits no tenderness  Abdominal: Soft  Bowel sounds are normal  She exhibits no distension  There is no tenderness  There is no guarding  Musculoskeletal: She exhibits tenderness (Post op right hip tenderness,)  Neurological: She is alert and oriented to person, place, and time  No cranial nerve deficit  Skin: Skin is warm  She is not diaphoretic  Discussion with Family: Patient opted to update her aged mother  Discharge instructions/Information to patient and family:   See after visit summary for information provided to patient and family  Provisions for Follow-Up Care:  See after visit summary for information related to follow-up care and any pertinent home health orders  Disposition:     Acute Rehab at [facility]    For Discharges to [address]:   · Not Applicable to this Patient - Not Applicable to this Patient    Planned Readmission: No     Discharge Statement: This time was spent on the day of discharge  I had direct contact with the patient on the day of discharge  Greater than 50% of the total time was spent examining patient, answering all patient questions, arranging and discussing plan of care with patient as well as directly providing post-discharge instructions  Additional time then spent on discharge activities  Discharge Medications:  See after visit summary for reconciled discharge medications provided to patient and family        ** Please Note: This note has been constructed using a voice recognition system **

## 2019-09-30 ENCOUNTER — TELEPHONE (OUTPATIENT)
Dept: FAMILY MEDICINE CLINIC | Facility: HOSPITAL | Age: 59
End: 2019-09-30

## 2019-09-30 NOTE — TELEPHONE ENCOUNTER
PT CALLED SHE STATED SHE IS IN [facility] SHE BROKE HER HIP  She said she is in horrible horrible pain  She said she is not getting any meds for pain  She wants something for pain and muscle relaxer and lorazepam to help her sleep   She is barely getting rest at all due to having to use rest room and not being able to sleep due to pain    She would also like crebs magnesium/potassium one in am and 1 in the pm ordered and  Also wants Vitamin K2-100mcg   And D3-500 [id number] soft gel daily    [phone] at [facility]  [id number]

## 2019-09-30 NOTE — TELEPHONE ENCOUNTER
Pt would also like an order for ice and something topically to numb pain where the incision is she said its red and hot and hurts

## 2019-10-02 NOTE — UTILIZATION REVIEW
Notification of Discharge  This is a Notification of Discharge from our facility [address]  Please be advised that this patient has been discharge from our facility  Below you will find the admission and discharge date and time including the patients disposition  PRESENTATION DATE: 9/22/2019 10:52 AM  OBS ADMISSION DATE:   IP ADMISSION DATE: 9/22/19 1324   DISCHARGE DATE: 9/25/2019 12:52 PM  DISPOSITION: Non [facility] SN[facility]/TCU/SNU Non [facility] SNF/TCU/SNU   Admission Orders listed below:  Admission Orders (From admission, onward)     Ordered        09/22/19 1324  Inpatient Admission (expected length of stay for this patient Order details is greater than two midnights)  Once                   Please contact the UR Department if additional information is required to close this patient's authorization/case  [address]  Utilization Review Department  Phone: [phone]; Fax [phone]  [email or web address]  ATTENTION: Please call with any questions or concerns to [phone]  and carefully listen to the prompts so that you are directed to the right person  Send all requests for admission clinical reviews, approved or denied determinations and any other requests to fax [phone]   All voicemails are confidential

## 2019-10-04 ENCOUNTER — OFFICE VISIT (OUTPATIENT)
Dept: OBGYN CLINIC | Facility: CLINIC | Age: 59
End: 2019-10-04

## 2019-10-04 ENCOUNTER — APPOINTMENT (OUTPATIENT)
Dept: RADIOLOGY | Facility: CLINIC | Age: 59
End: 2019-10-04
Payer: COMMERCIAL

## 2019-10-04 VITALS — DIASTOLIC BLOOD PRESSURE: 82 MMHG | HEART RATE: 72 BPM | SYSTOLIC BLOOD PRESSURE: 122 MMHG

## 2019-10-04 DIAGNOSIS — Z47.89 AFTERCARE FOLLOWING SURGERY OF THE MUSCULOSKELETAL SYSTEM: ICD-10-CM

## 2019-10-04 DIAGNOSIS — Z47.89 AFTERCARE FOLLOWING SURGERY OF THE MUSCULOSKELETAL SYSTEM: Primary | ICD-10-CM

## 2019-10-04 PROCEDURE — 99024 POSTOP FOLLOW-UP VISIT: CPT | Performed by: ORTHOPAEDIC SURGERY

## 2019-10-04 PROCEDURE — 73502 X-RAY EXAM HIP UNI 2-3 VIEWS: CPT

## 2019-10-04 RX ORDER — OXYCODONE HYDROCHLORIDE AND ACETAMINOPHEN 5; 325 MG/1; MG/1
1 TABLET ORAL EVERY 4 HOURS PRN
Qty: 20 TABLET | Refills: 0 | Status: SHIPPED | OUTPATIENT
Start: 2019-10-04 | End: 2020-04-14 | Stop reason: ALTCHOICE

## 2019-10-04 RX ORDER — CEPHALEXIN 500 MG/1
500 CAPSULE ORAL EVERY 6 HOURS SCHEDULED
Qty: 40 CAPSULE | Refills: 0 | Status: SHIPPED | OUTPATIENT
Start: 2019-10-04 | End: 2019-10-14

## 2019-10-04 NOTE — ASSESSMENT & PLAN NOTE
Patient is status post right hip hemiarthroplasty  X-rays were reviewed with her  She has developed a cellulitis and contact dermatitis to the area surrounding the right hip incision  It appears the rehab facility has been putting an appointment on her skin in that area  Advised them to discontinue the appointment immediately  She was prescribed Keflex to take for 10 days  She was also prescribed Percocet to take for severe pain as needed  Follow-up in 2 weeks for wound check

## 2019-10-06 ENCOUNTER — TELEPHONE (OUTPATIENT)
Dept: OTHER | Facility: OTHER | Age: 59
End: 2019-10-06

## 2019-10-14 ENCOUNTER — TELEPHONE (OUTPATIENT)
Dept: FAMILY MEDICINE CLINIC | Facility: HOSPITAL | Age: 59
End: 2019-10-14

## 2019-10-14 NOTE — TELEPHONE ENCOUNTER
I talked to pt  Wants clarification on her EC asa 325 mg alt with tyl 325 mg  Her d/c says 1 asa q 8 hrs alt with 2--325 ty q 4-6 hrs--she wants to know if this is ok? She also takes an 81 mg asa daily  Also wants to use [employer] instead of [employer]aps[employer]in due to [address] leaving her skin fire engine red

## 2019-10-14 NOTE — TELEPHONE ENCOUNTER
Mess to pt  She said the gv vn suni will probably be calling with the same questions as to how pt can take her asa/ty  Also about the cream    Pt was blair their reg # [phone]  I tried calling vn and they left at 430  They do not have a machine  I will call them in the am   Pt currently getting PT/OT and nurse duties    dk

## 2019-10-14 NOTE — TELEPHONE ENCOUNTER
Ok to use icy hot or any other lotion that helps  She should take 1 aspirn  mg, 4 hrs later take 2 reg   Tylenol,  4 hts later take 1 EC aspirin, 4 hours later take 2 tylenol , etc, etc

## 2019-10-15 ENCOUNTER — TELEPHONE (OUTPATIENT)
Dept: FAMILY MEDICINE CLINIC | Facility: HOSPITAL | Age: 59
End: 2019-10-15

## 2019-10-15 ENCOUNTER — DOCUMENTATION (OUTPATIENT)
Dept: FAMILY MEDICINE CLINIC | Facility: HOSPITAL | Age: 59
End: 2019-10-15

## 2019-10-15 RX ORDER — MAG HYDROX/ALUMINUM HYD/SIMETH 400-400-40
SUSPENSION, ORAL (FINAL DOSE FORM) ORAL
COMMUNITY
End: 2021-03-31 | Stop reason: HOSPADM

## 2019-10-15 NOTE — TELEPHONE ENCOUNTER
This was already handled --dk spoke to angel gates--office nurse this morning earlier about tylenol/asa 325 mg orders as per gb and also using icy hot instead of capsicin cream    The pt said that suni might be calling us about these orders    dk

## 2019-10-15 NOTE — PROGRESS NOTES
LM for Darien castro who sees pt to call me to go over meds  suni direct--[phone]  Updating med list which I went over with pt this morning  I got angel castro print out from media this morning in chart  I will call suni the angel castro to update her  Kody angel castro office nurse called me concerned that pt is taking oxycodone/acet 5/325 mg in addition to her EC  mg and 2 reg strength tylenol alt daily  Kody wants me to call Darien castro direct at # above  I told kody that pt didn't tell me she is taking the percocet  Pt told me and the list does say that she only got #4 tabs from snf for the percocet and she is not taking it  She will only take it if in severe pain, but most likely wont take it         dk

## 2019-10-15 NOTE — TELEPHONE ENCOUNTER
Called angel BP--[phone], spoke to [facility] office nurse,  who will tell suni the vn who saw pt  Gave icy hot and asa 325/ty 325 directions     dk

## 2019-10-16 ENCOUNTER — DOCUMENTATION (OUTPATIENT)
Dept: FAMILY MEDICINE CLINIC | Facility: HOSPITAL | Age: 59
End: 2019-10-16

## 2019-10-16 RX ORDER — MULTIVIT-MIN/IRON FUM/FOLIC AC 7.5 MG-4
1 TABLET ORAL DAILY
Status: ON HOLD | COMMUNITY
End: 2021-03-09 | Stop reason: SDUPTHER

## 2019-10-16 NOTE — PROGRESS NOTES
Spoke to suni ortiz    [phone]  She gave me verbal on pts med list   I will add in to chart  suni will fax over update tomorrow    dk

## 2019-10-18 ENCOUNTER — OFFICE VISIT (OUTPATIENT)
Dept: OBGYN CLINIC | Facility: CLINIC | Age: 59
End: 2019-10-18

## 2019-10-18 VITALS
SYSTOLIC BLOOD PRESSURE: 130 MMHG | BODY MASS INDEX: 24.91 KG/M2 | DIASTOLIC BLOOD PRESSURE: 70 MMHG | HEIGHT: 66 IN | WEIGHT: 155 LBS

## 2019-10-18 DIAGNOSIS — Z47.89 AFTERCARE FOLLOWING SURGERY OF THE MUSCULOSKELETAL SYSTEM: Primary | ICD-10-CM

## 2019-10-18 PROCEDURE — 99024 POSTOP FOLLOW-UP VISIT: CPT | Performed by: ORTHOPAEDIC SURGERY

## 2019-10-18 NOTE — PROGRESS NOTES
Assessment:     1  Aftercare following surgery of the musculoskeletal system        Plan:  The patient was seen and examined by Dr Philip Tomlinson and myself  Problem List Items Addressed This Visit        Other    Aftercare following surgery of the musculoskeletal system - Primary     Patient is status post right hip hemiarthroplasty  She is doing very well  Erythema around incision has resolved completely  Continue physical therapy and posterior hip precautions  Follow-up in 8 weeks with repeat x-rays  All questions were answered to patient's satisfaction  Subjective:     Patient ID: Mason Krishnan is a 63 y o  female  Chief Complaint: This is a 50-year-old white female who is status post right hip hemiarthroplasty on September 23, 2019  She returned home from the rehab center about a week ago  She took the Keflex antibiotics as prescribed  She denies any fevers or chills  Pain is minimal     Allergy:  Allergies   Allergen Reactions    Sulfa Antibiotics Rash and Itching     Medications:  all current active meds have been reviewed  Past Medical History:  Past Medical History:   Diagnosis Date    Anxiety     Hypertension     Pre-diabetes      Past Surgical History:  Past Surgical History:   Procedure Laterality Date    APPENDECTOMY      MA PARTIAL HIP REPLACEMENT Right 9/23/2019    Procedure: HEMIARTHROPLASTY HIP (BIPOLAR); Surgeon: Phyllis Alamo MD;  Location: [facility] OR;  Service: Orthopedics     Family History:  Family History   Problem Relation Age of Onset    Hypertension Mother     Substance Abuse Neg Hx     Mental illness Neg Hx      Social History:  Social History     Substance and Sexual Activity   Alcohol Use Never    Frequency: Never     Social History     Substance and Sexual Activity   Drug Use Never     Social History     Tobacco Use   Smoking Status Never Smoker   Smokeless Tobacco Never Used     Review of Systems   Constitutional: Negative  HENT: Negative      Eyes: Negative  Respiratory: Negative  Cardiovascular: Negative  Gastrointestinal: Negative  Endocrine: Negative  Genitourinary: Negative  Musculoskeletal: Positive for arthralgias and gait problem (Using a walker)  Skin: Negative  Allergic/Immunologic: Negative  Hematological: Negative  Psychiatric/Behavioral: Negative  Objective:  BP Readings from Last 1 Encounters:   10/18/19 130/70      Wt Readings from Last 1 Encounters:   10/18/19 70 3 kg (155 lb)      BMI:   Estimated body mass index is 25 4 kg/m² as calculated from the following:    Height as of this encounter: 5' 5 5" (1 664 m)  Weight as of this encounter: 70 3 kg (155 lb)  BSA:   Estimated body surface area is 1 78 meters squared as calculated from the following:    Height as of this encounter: 5' 5 5" (1 664 m)  Weight as of this encounter: 70 3 kg (155 lb)  Physical Exam   Constitutional: She is oriented to person, place, and time  She appears well-developed  HENT:   Head: Normocephalic and atraumatic  Eyes: Conjunctivae and EOM are normal    Neck: Neck supple  Neurological: She is alert and oriented to person, place, and time  Skin: Skin is warm  Psychiatric: She has a normal mood and affect  Nursing note and vitals reviewed  Right Hip Exam     Other   Erythema: absent  Scars: present (Healed incision with no active drainage)  Sensation: normal  Pulse: present    Comments:  Thigh and calf soft, nontender  Good passive range of motion with no pain      Left Hip Exam   Left hip exam is normal             No imaging today

## 2019-10-18 NOTE — ASSESSMENT & PLAN NOTE
Patient is status post right hip hemiarthroplasty  She is doing very well  Erythema around incision has resolved completely  Continue physical therapy and posterior hip precautions  Follow-up in 8 weeks with repeat x-rays  All questions were answered to patient's satisfaction

## 2019-10-23 ENCOUNTER — TELEPHONE (OUTPATIENT)
Dept: FAMILY MEDICINE CLINIC | Facility: HOSPITAL | Age: 59
End: 2019-10-23

## 2019-10-23 NOTE — TELEPHONE ENCOUNTER
Patient had a hip replacement  She had a follow up appointment with the surgeon and has another follow up with them in December  Her visiting nurses are telling her she needs a follow up appointment with you   Do you really need to see her? She is scheduled with you in December for a regular check up

## 2019-10-25 ENCOUNTER — TELEPHONE (OUTPATIENT)
Dept: OBGYN CLINIC | Facility: HOSPITAL | Age: 59
End: 2019-10-25

## 2019-10-25 NOTE — TELEPHONE ENCOUNTER
Melissa Engel from [facility] calling to report that there is a small portion of suture that seems like it has not dissolved at this time  The patient informed her that it was snipped at her last visit and now there is a little tiny spot sticking out of the skin  She is wondering if the tiny piece of stitch at this point is an issue or if it will resolve on its own  No infection symptoms  Please advise  Patient also dislikes abductor pillow  Has been using a couple of rolled towels instead of the abductor pillow at this point and it is more comfortable than the pillow  Please advise if this is acceptable

## 2019-10-27 NOTE — TELEPHONE ENCOUNTER
The wound will close on its own  It was a small vicryl stitch that did not resolve and it was cut out last visit  If there is increased drainage or erythema she is to let us know  Using the rolled towels is fine as well

## 2019-10-28 ENCOUNTER — TELEPHONE (OUTPATIENT)
Dept: OBGYN CLINIC | Facility: HOSPITAL | Age: 59
End: 2019-10-28

## 2019-10-28 NOTE — TELEPHONE ENCOUNTER
Patient is calling   [phone]    Patient would like us to call her because she would like to know how long she has precautions due to her hip surgery  Patient is wondering if it is 3 mos or 6 mos  Patient had sx on 9/23/19

## 2019-10-29 ENCOUNTER — TELEPHONE (OUTPATIENT)
Dept: OBGYN CLINIC | Facility: HOSPITAL | Age: 59
End: 2019-10-29

## 2019-10-29 ENCOUNTER — TELEPHONE (OUTPATIENT)
Dept: FAMILY MEDICINE CLINIC | Facility: HOSPITAL | Age: 59
End: 2019-10-29

## 2019-10-29 NOTE — TELEPHONE ENCOUNTER
[facility] called letting us know that patient was discharged from nursing, patient is still doing physical therapy at home,

## 2019-11-29 DIAGNOSIS — I10 HYPERTENSION, ESSENTIAL: ICD-10-CM

## 2019-11-29 RX ORDER — HYDROCHLOROTHIAZIDE 12.5 MG/1
12.5 CAPSULE, GELATIN COATED ORAL DAILY
Qty: 90 CAPSULE | Refills: 3 | Status: SHIPPED | OUTPATIENT
Start: 2019-11-29 | End: 2020-04-14 | Stop reason: ALTCHOICE

## 2019-12-02 ENCOUNTER — TELEPHONE (OUTPATIENT)
Dept: FAMILY MEDICINE CLINIC | Facility: HOSPITAL | Age: 59
End: 2019-12-02

## 2019-12-02 DIAGNOSIS — R53.83 FATIGUE, UNSPECIFIED TYPE: ICD-10-CM

## 2019-12-02 DIAGNOSIS — I10 ESSENTIAL HYPERTENSION: Primary | ICD-10-CM

## 2019-12-02 NOTE — TELEPHONE ENCOUNTER
THINKS - SHE HAS AN APPT  NEXT WEEK W/ DR Roxanna Dave TOLD HER TO ADDRESS IT AT THAT TIME BECAUSE DR ESCOBAR IS NOT IN - BUT   SHE SAID HER BROTHER IS HELPING HER GET TO APPTS  ETC SHE WOULD LIKE TO GET BLOOD WORK OR SOMETHING DONE PRIOR TO THAT APPT   - SHE SAID MAYBE ANOTHER DOCTOR COULD ADDRESS - HER BROTHER WON'T BE HAPPY IF HE HAS TO WAIT AND GET LAB WORK DONE AFTER HER APPOINTMENT W/ DR Roxanna Dave THAT SAME DAY - WOULD LIKE A CALL BACK

## 2019-12-06 LAB
ALBUMIN SERPL-MCNC: 4.6 G/DL (ref 3.5–5.5)
ALBUMIN/GLOB SERPL: 2 {RATIO} (ref 1.2–2.2)
ALP SERPL-CCNC: 74 IU/L (ref 39–117)
ALT SERPL-CCNC: 17 IU/L (ref 0–32)
AST SERPL-CCNC: 21 IU/L (ref 0–40)
BASOPHILS # BLD AUTO: 0 X10E3/UL (ref 0–0.2)
BASOPHILS NFR BLD AUTO: 1 %
BILIRUB SERPL-MCNC: 0.3 MG/DL (ref 0–1.2)
BUN SERPL-MCNC: 17 MG/DL (ref 6–24)
BUN/CREAT SERPL: 22 (ref 9–23)
CALCIUM SERPL-MCNC: 10.4 MG/DL (ref 8.7–10.2)
CHLORIDE SERPL-SCNC: 99 MMOL/L (ref 96–106)
CHOLEST SERPL-MCNC: 257 MG/DL (ref 100–199)
CHOLEST/HDLC SERPL: 3.6 RATIO (ref 0–4.4)
CO2 SERPL-SCNC: 27 MMOL/L (ref 20–29)
CREAT SERPL-MCNC: 0.78 MG/DL (ref 0.57–1)
EOSINOPHIL # BLD AUTO: 0.4 X10E3/UL (ref 0–0.4)
EOSINOPHIL NFR BLD AUTO: 10 %
ERYTHROCYTE [DISTWIDTH] IN BLOOD BY AUTOMATED COUNT: 13.7 % (ref 12.3–15.4)
GLOBULIN SER-MCNC: 2.3 G/DL (ref 1.5–4.5)
GLUCOSE SERPL-MCNC: 83 MG/DL (ref 65–99)
HCT VFR BLD AUTO: 49 % (ref 34–46.6)
HDLC SERPL-MCNC: 71 MG/DL
HGB BLD-MCNC: 16.6 G/DL (ref 11.1–15.9)
IMM GRANULOCYTES # BLD: 0 X10E3/UL (ref 0–0.1)
IMM GRANULOCYTES NFR BLD: 0 %
LDLC SERPL CALC-MCNC: 164 MG/DL (ref 0–99)
LYMPHOCYTES # BLD AUTO: 2 X10E3/UL (ref 0.7–3.1)
LYMPHOCYTES NFR BLD AUTO: 48 %
MCH RBC QN AUTO: 32 PG (ref 26.6–33)
MCHC RBC AUTO-ENTMCNC: 33.9 G/DL (ref 31.5–35.7)
MCV RBC AUTO: 95 FL (ref 79–97)
MONOCYTES # BLD AUTO: 0.3 X10E3/UL (ref 0.1–0.9)
MONOCYTES NFR BLD AUTO: 8 %
NEUTROPHILS # BLD AUTO: 1.3 X10E3/UL (ref 1.4–7)
NEUTROPHILS NFR BLD AUTO: 33 %
PLATELET # BLD AUTO: 292 X10E3/UL (ref 150–450)
POTASSIUM SERPL-SCNC: 3.9 MMOL/L (ref 3.5–5.2)
PROT SERPL-MCNC: 6.9 G/DL (ref 6–8.5)
RBC # BLD AUTO: 5.18 X10E6/UL (ref 3.77–5.28)
SL AMB EGFR AFRICAN AMERICAN: 97 ML/MIN/1.73
SL AMB EGFR NON AFRICAN AMERICAN: 84 ML/MIN/1.73
SL AMB VLDL CHOLESTEROL CALC: 22 MG/DL (ref 5–40)
SODIUM SERPL-SCNC: 140 MMOL/L (ref 134–144)
TRIGL SERPL-MCNC: 112 MG/DL (ref 0–149)
TSH SERPL DL<=0.005 MIU/L-ACNC: 1.17 UIU/ML (ref 0.45–4.5)
WBC # BLD AUTO: 4.1 X10E3/UL (ref 3.4–10.8)

## 2019-12-09 ENCOUNTER — OFFICE VISIT (OUTPATIENT)
Dept: FAMILY MEDICINE CLINIC | Facility: HOSPITAL | Age: 59
End: 2019-12-09
Payer: COMMERCIAL

## 2019-12-09 VITALS
HEART RATE: 100 BPM | HEIGHT: 66 IN | WEIGHT: 159 LBS | SYSTOLIC BLOOD PRESSURE: 128 MMHG | BODY MASS INDEX: 25.55 KG/M2 | DIASTOLIC BLOOD PRESSURE: 86 MMHG

## 2019-12-09 DIAGNOSIS — I10 ESSENTIAL HYPERTENSION: ICD-10-CM

## 2019-12-09 DIAGNOSIS — N39.498 OTHER URINARY INCONTINENCE: ICD-10-CM

## 2019-12-09 DIAGNOSIS — S72.001A CLOSED DISPLACED FRACTURE OF RIGHT FEMORAL NECK (HCC): Primary | ICD-10-CM

## 2019-12-09 DIAGNOSIS — F32.89 OTHER DEPRESSION: ICD-10-CM

## 2019-12-09 DIAGNOSIS — G47.09 OTHER INSOMNIA: ICD-10-CM

## 2019-12-09 DIAGNOSIS — R60.0 LEG EDEMA: ICD-10-CM

## 2019-12-09 PROBLEM — R32 ABSENCE OF BLADDER CONTINENCE: Status: ACTIVE | Noted: 2019-12-09

## 2019-12-09 PROBLEM — E87.6 HYPOKALEMIA: Status: RESOLVED | Noted: 2018-04-04 | Resolved: 2019-12-09

## 2019-12-09 PROCEDURE — 3079F DIAST BP 80-89 MM HG: CPT | Performed by: INTERNAL MEDICINE

## 2019-12-09 PROCEDURE — 3074F SYST BP LT 130 MM HG: CPT | Performed by: INTERNAL MEDICINE

## 2019-12-09 PROCEDURE — 3008F BODY MASS INDEX DOCD: CPT | Performed by: INTERNAL MEDICINE

## 2019-12-09 PROCEDURE — 99214 OFFICE O/P EST MOD 30 MIN: CPT | Performed by: INTERNAL MEDICINE

## 2019-12-09 NOTE — PATIENT INSTRUCTIONS
All labs are good except cholesterol  Can address this when aorthopedic issues are completely resolved  Follow up with orthopedics as scheduled    Toenail looks fine  Could see pociatry if worsens  If urinary issues persist, will need to get a urinalysis  Do leg pumping 15 reps 4 x day for leg edema and avoid all salt in diet

## 2019-12-09 NOTE — PROGRESS NOTES
Subjective:   Chief Complaint   Patient presents with    Follow-up        Patient ID: aFnnie Esquivel is a 63 y o  female  Follow up visit  Recent fall resulted in right hip hemiarthroplasty  Wearing brace and follow up with orhto arranged  Concerns for black toenail    Chronic issues with sleep and sleep meds, perseverates over this  Labs to review  Some complaints of urinary incontinence  Has been using temazepam for a long time for sleep and this works well for her  The following portions of the patient's history were reviewed and updated as appropriate: allergies, current medications, past family history, past medical history, past social history, past surgical history and problem list     Review of Systems   Constitutional: Negative for fatigue and fever  HENT: Negative for hearing loss  Eyes: Negative for visual disturbance  Respiratory: Negative for cough, chest tightness, shortness of breath and wheezing  Cardiovascular: Negative for chest pain, palpitations and leg swelling  Gastrointestinal: Negative for abdominal pain, diarrhea and nausea  Genitourinary: Negative for dysuria and hematuria  Musculoskeletal: Positive for arthralgias, back pain and myalgias  Neurological: Negative for dizziness, numbness and headaches  Psychiatric/Behavioral: Negative for confusion and dysphoric mood  All other systems reviewed and are negative  Current Outpatient Medications on File Prior to Visit   Medication Sig Dispense Refill    acetaminophen (TYLENOL) 325 mg tablet Take 2 tablets (650 mg total) by mouth every 6 (six) hours as needed for fever (Patient taking differently: 2 every 4 hours alt with 1  mg ASA  ) 30 tablet 0    ascorbic acid (VITAMIN C) 500 mg tablet Take 500 mg by mouth 2 (two) times a day       aspirin (ECOTRIN) 325 mg EC tablet 1 every 4 hrs alt with 2 reg strength tylenol every 4 hrs      aspirin 81 MG tablet Take by mouth Takes 1 daily  B Complex Vitamins (VITAMIN B COMPLEX PO) Take by mouth 1 daily  (100 mg) per vn gv      Cholecalciferol (VITAMIN D3) 5000 units CAPS Take by mouth 1 daily      CHROMIUM POLYNICOTINATE PO Take 1-2 capsules by mouth daily This is 200 mcg daily      Cyanocobalamin (VITAMIN B 12) 250 MCG LOZG Take 500 mcg by mouth daily      docusate sodium (COLACE) 100 mg capsule Take 1 capsule (100 mg total) by mouth 2 (two) times a day 10 capsule 0    fluocinonide (LIDEX) 0 05 % cream Apply topically 2 (two) times a day 30 g 1    hydrochlorothiazide (MICROZIDE) 12 5 mg capsule Take 1 capsule (12 5 mg total) by mouth daily 90 capsule 3    Lactobacillus (ACIDOPHILUS) 100 MG CAPS Take 3 capsules by mouth daily      LORazepam (ATIVAN) 0 5 mg tablet take 1 tablet by mouth once daily if needed for anxiety (Patient taking differently: 1/2 to 1 tab hs if temazepam is ineffective) 30 tablet 0    losartan (COZAAR) 50 mg tablet take 1 tablet by mouth once daily 90 tablet 3    Magnesium 250 MG TABS 1-2 tabs daily      MAGNESIUM-POTASSIUM PO Take by mouth 1 bid  (Crebs/Magnesium/Potassium)      Multiple Vitamins-Minerals (MULTIVITAMIN WITH MINERALS) tablet Take 1 tablet by mouth daily      Potassium 99 MG TABS Take by mouth 1 daily      Resveratrol 100 MG CAPS Take 1 capsule by mouth daily      Selenium 200 MCG CAPS Take by mouth 2-3 times a week takes 1 tab      temazepam (RESTORIL) 7 5 mg capsule Take 1 capsule (7 5 mg total) by mouth daily at bedtime as needed for sleep 30 capsule 3    VANADYL SULFATE PO Take by mouth 10 mg tabs  Takes 1-2 daily      Oil of Oregano 1500 MG CAPS Take 1 capsule by mouth 2 (two) times a day as needed      oxyCODONE-acetaminophen (PERCOCET) 5-325 mg per tablet Take 1 tablet by mouth every 4 (four) hours as needed for moderate painMax Daily Amount: 6 tablets (Patient not taking: Reported on 10/15/2019) 20 tablet 0     No current facility-administered medications on file prior to visit  Objective:  Vitals:    12/09/19 1845   BP: 128/86   Pulse: 100   Weight: 72 1 kg (159 lb)   Height: 5' 5 5" (1 664 m)      Physical Exam   Constitutional: She is oriented to person, place, and time  She appears well-developed and well-nourished  Eyes: Pupils are equal, round, and reactive to light  Neck: Normal range of motion  Neck supple  No thyromegaly present  Cardiovascular: Normal rate, regular rhythm, normal heart sounds and intact distal pulses  No murmur heard  Pulmonary/Chest: Effort normal and breath sounds normal  She has no wheezes  She has no rales  Abdominal: Soft  Bowel sounds are normal  There is no tenderness  Musculoskeletal: Normal range of motion  She exhibits no edema, tenderness or deformity  Lymphadenopathy:     She has no cervical adenopathy  Neurological: She is alert and oriented to person, place, and time  She has normal reflexes  Skin: Skin is warm and dry  Psychiatric: She has a normal mood and affect  Nursing note and vitals reviewed  Assessment/Plan:    No problem-specific Assessment & Plan notes found for this encounter         Diagnoses and all orders for this visit:    Closed displaced fracture of right femoral neck ([address] )    Essential hypertension    Other insomnia    Other depression

## 2019-12-11 ENCOUNTER — APPOINTMENT (OUTPATIENT)
Dept: RADIOLOGY | Facility: CLINIC | Age: 59
End: 2019-12-11
Payer: COMMERCIAL

## 2019-12-11 ENCOUNTER — OFFICE VISIT (OUTPATIENT)
Dept: OBGYN CLINIC | Facility: CLINIC | Age: 59
End: 2019-12-11

## 2019-12-11 VITALS
DIASTOLIC BLOOD PRESSURE: 80 MMHG | HEIGHT: 66 IN | BODY MASS INDEX: 24.91 KG/M2 | SYSTOLIC BLOOD PRESSURE: 140 MMHG | HEART RATE: 82 BPM | WEIGHT: 155 LBS

## 2019-12-11 DIAGNOSIS — Z96.649 STATUS POST HIP HEMIARTHROPLASTY: Primary | ICD-10-CM

## 2019-12-11 DIAGNOSIS — Z47.89 AFTERCARE FOLLOWING SURGERY OF THE MUSCULOSKELETAL SYSTEM: ICD-10-CM

## 2019-12-11 PROCEDURE — 99024 POSTOP FOLLOW-UP VISIT: CPT | Performed by: ORTHOPAEDIC SURGERY

## 2019-12-11 PROCEDURE — 73502 X-RAY EXAM HIP UNI 2-3 VIEWS: CPT

## 2019-12-11 NOTE — ASSESSMENT & PLAN NOTE
Findings consistent with status post right hip hemiarthroplasty  Discussed findings and treatment options with the patient  I reviewed patient's right hip x-ray with her I recommend patient to attend outpatient physical therapy which I provided her a prescription  I reassured patient that she is doing well postoperatively  Patient will just knee more rehabilitation of her hip  We have lifted her precaution of her hip  I will see patient back in 6 months with repeat x-ray  All patient's questions were answered to her satisfaction  This note is created using dictation transcription  It may contain typographical errors, grammatical errors, improperly dictated words, background noise and other errors

## 2019-12-11 NOTE — PROGRESS NOTES
Assessment:     1  Status post hip hemiarthroplasty        Plan:     Problem List Items Addressed This Visit        Other    Status post hip hemiarthroplasty - Primary     Findings consistent with status post right hip hemiarthroplasty  Discussed findings and treatment options with the patient  I reviewed patient's right hip x-ray with her I recommend patient to attend outpatient physical therapy which I provided her a prescription  I reassured patient that she is doing well postoperatively  Patient will just knee more rehabilitation of her hip  We have lifted her precaution of her hip  I will see patient back in 6 months with repeat x-ray  All patient's questions were answered to her satisfaction  This note is created using dictation transcription  It may contain typographical errors, grammatical errors, improperly dictated words, background noise and other errors  Relevant Orders    XR hip/pelv 2-3 vws right if performed    Ambulatory referral to Physical Therapy         Subjective:     Patient ID: Flor Stafford is a 63 y o  female  Chief Complaint:  58-year-old female status post right hip hemiarthroplasty on 9/22/2019  Patient is walking with a walker  She had completed home therapy  She has not started outpatient therapy  She has no pain in her hip  She does complaining of decrease endurance in walking  She has no sensation of instability  She denies fever, chills, or sweats  Allergy:  Allergies   Allergen Reactions    Sulfa Antibiotics Rash and Itching     Medications:  all current active meds have been reviewed  Past Medical History:  Past Medical History:   Diagnosis Date    Anxiety     Hypertension     Pre-diabetes      Past Surgical History:  Past Surgical History:   Procedure Laterality Date    APPENDECTOMY      OR PARTIAL HIP REPLACEMENT Right 9/23/2019    Procedure: HEMIARTHROPLASTY HIP (BIPOLAR);   Surgeon: Josh Morgan MD;  Location:  MAIN OR;  Service: Orthopedics Family History:  Family History   Problem Relation Age of Onset    Hypertension Mother     Substance Abuse Neg Hx     Mental illness Neg Hx      Social History:  Social History     Substance and Sexual Activity   Alcohol Use Never    Frequency: Never     Social History     Substance and Sexual Activity   Drug Use Never     Social History     Tobacco Use   Smoking Status Never Smoker   Smokeless Tobacco Never Used     Review of Systems   Constitutional: Negative  HENT: Negative  Eyes: Negative  Respiratory: Negative  Cardiovascular: Negative  Gastrointestinal: Negative  Endocrine: Negative  Genitourinary: Negative  Musculoskeletal: Positive for gait problem (Use a walker)  Negative for arthralgias  Skin: Negative  Allergic/Immunologic: Negative  Hematological: Negative  Psychiatric/Behavioral: Negative  Objective:  BP Readings from Last 1 Encounters:   12/11/19 140/80      Wt Readings from Last 1 Encounters:   12/11/19 70 3 kg (155 lb)      BMI:   Estimated body mass index is 25 4 kg/m² as calculated from the following:    Height as of this encounter: 5' 5 5" (1 664 m)  Weight as of this encounter: 70 3 kg (155 lb)  BSA:   Estimated body surface area is 1 78 meters squared as calculated from the following:    Height as of this encounter: 5' 5 5" (1 664 m)  Weight as of this encounter: 70 3 kg (155 lb)  Physical Exam   Constitutional: She is oriented to person, place, and time  She appears well-developed  HENT:   Head: Normocephalic and atraumatic  Eyes: Conjunctivae and EOM are normal    Neck: Neck supple  Pulmonary/Chest: Effort normal    Neurological: She is alert and oriented to person, place, and time  Skin: Skin is warm  Psychiatric: She has a normal mood and affect  Nursing note and vitals reviewed  Right Hip Exam     Tenderness   The patient is experiencing no tenderness       Range of Motion   Flexion: normal   External rotation: normal   Internal rotation: 20     Muscle Strength   The patient has normal right hip strength  Tests   SOUTH: negative  Parker: negative    Other   Erythema: absent  Scars: present (Incision well healed  No signs of infection )  Sensation: normal  Pulse: present            I have personally reviewed pertinent films in PACS and my interpretation is Right hip x-ray show prosthesis in good alignment  No signs of loosening

## 2019-12-12 ENCOUNTER — TELEPHONE (OUTPATIENT)
Dept: FAMILY MEDICINE CLINIC | Facility: HOSPITAL | Age: 59
End: 2019-12-12

## 2019-12-12 NOTE — TELEPHONE ENCOUNTER
----- Message from Gina Mckeon MD sent at 12/9/2019  7:15 PM EST -----  Regarding: med prior auth  Can you please investigate with [facility] first about edwin's sleeping medication  She got notice stating they will cover temazepam 15 mg or 30 mg  She only take 7 5 mg and it is a capsule  She has been on this a long time  It works well  She has tried many other remedies which don't help  Can we get authorization to continue her current dose? Let edwin know the result and I can send in order if able

## 2019-12-12 NOTE — TELEPHONE ENCOUNTER
Spoke to PT on Thursday 12/12 - she has about 1 week left of medications - told her I'm off on Friday 12/13 - but will call her insurance company on Monday 12/16 - told her if she doesn't hear from me by Tuesday - to give me another call

## 2019-12-13 ENCOUNTER — EVALUATION (OUTPATIENT)
Dept: PHYSICAL THERAPY | Facility: CLINIC | Age: 59
End: 2019-12-13
Payer: COMMERCIAL

## 2019-12-13 DIAGNOSIS — Z96.649 STATUS POST HIP HEMIARTHROPLASTY: ICD-10-CM

## 2019-12-13 PROCEDURE — 97162 PT EVAL MOD COMPLEX 30 MIN: CPT | Performed by: PHYSICAL THERAPIST

## 2019-12-13 NOTE — PROGRESS NOTES
PT Evaluation     Today's date: 2019  Patient name: Lino Eller  : 1960  MRN: [id number]  Referring provider: Naren Castaneda MD  Dx:   Encounter Diagnosis     ICD-10-CM    1  Status post hip hemiarthroplasty F07 840 Ambulatory referral to Physical Therapy                  Assessment  Assessment details: Lino Eller is a 63 y o  female presenting as an outpatient to [facility] s/p R hip hemiarthroplasty on 19 as a result of R femoral neck fracture on 19  Pt presents w/ pain, decreased ROM, decreased strength, and impaired gait significantly limiting pt's functional ability  Pt will benefit from skilled PT services to address the above deficits in order to max function to allow pt to achieve goals in PT  Thank you for the referral of this pt  Impairments: abnormal gait, abnormal or restricted ROM, activity intolerance, impaired balance, impaired physical strength, lacks appropriate home exercise program and pain with function  Understanding of Dx/Px/POC: good   Prognosis: good    Goals  ST  Pain decreased by 25% in 4-6 weeks  2  ROM increased by 25% in 4-6 weeks  3  Strength increased by 1/2 to 1 muscle grade in all deficient muscle groups in 4-6 weeks  LT  Decrease pain to 1-2/10 at worst by d/c   2  Increase ROM to [facility] for all deficient movements by d/c   3  Strength increased to 5 for all deficient muscle groups by d/c   4  IADL performance increased to max function by d/c   5  Recreational performance increased to max function by d/c   6  Ambulation/stair climbing improved to max level of function by d/c      Plan  Planned modality interventions: cryotherapy and TENS  Other planned modality interventions: other modalities PRN  Planned therapy interventions: abdominal trunk stabilization, ADL retraining, IADL retraining, balance, flexibility, functional ROM exercises, gait training, graded exercise, home exercise program, manual therapy, neuromuscular re-education, patient education, postural training, strengthening, therapeutic exercise, therapeutic activities and transfer training  Other planned therapy interventions: other interventions PRN  Frequency: 1-2x/week  Duration in weeks: 6  Plan of Care beginning date: 2019  Plan of Care expiration date: 2020  Treatment plan discussed with: patient        Subjective Evaluation    History of Present Illness  Date of onset: 2019  Date of surgery: 2019  Mechanism of injury: Pt reports to IE using RW for ambulation s/p R posterolateral hip hemiarthroplasty  She reports having a fall on 19 due to RLE that "gave out" resulting in R femoral neck fracture  She reports a hx of sciatic type pain/numbness in RLE prior to fall and would use cane if feeling numbness on a particular day  Otherwise, she did not use AD prior to surgery  Pt had home PT until the beginning of November  As per ortho note (), hip precautions have been lifted, but pt has been instructed to slowly wean into increasing ROM  Pt uses commode and is looking at getting a lift for toilet seat as she reports having very low toilet seats  Pt still sleeping in hospital bed  Pt has been cleared to drive, but not yet comfortable at this time  Pain  Current pain ratin  At worst pain ratin  Location: R hip  Relieving factors: ice, medications and rest (Tylenol PRN)  Exacerbated by: walking/standing for extended periods of time, stair climbing (STS fashion w/ rail and quad cane), excessive activity; intermittently sleeping if rolling at night  Social Support  Steps to enter house: yes (2 LIBORIO w/ RW)  Stairs in house: yes (1 FF steps up to 2nd floor; 1 FF steps to basement )   Lives in: multiple-level home  Lives with: Mother      Employment status: not working (Has not worked for approximately 1 year due to sickness f/b hip kaitlynn CARMELINA)  Patient Goals  Patient goals for therapy: decreased pain, independence with ADLs/IADLs and return to sport/leisure activities          Objective     Active Range of Motion     Right Hip   Flexion: 100 degrees   Extension: 5 degrees   Abduction: 18 degrees   External rotation (90/90): 5 degrees with pain    Passive Range of Motion     Right Hip   Flexion: 101 degrees with pain  Extension: 5 degrees with pain  Abduction: 20 degrees with pain  External rotation (90/90): 15 degrees with pain    Strength/Myotome Testing     Right Hip   Planes of Motion   Flexion: 4- (*pain)  Extension: 4+ (assessed in standing)  Abduction: 3 (*pain)  External rotation: 3    General Comments:      Hip Comments   Observation/gait: 7 5 cm posterolateral incision healed well w/o s/s of infection    Palpation: Adhesions palpated t/o incision    Gait: Pt ambulates w/ RW w/ increased WS to L and decreased R hip extension              Daily Treatment Diary    [facility]: 1/24/2020  Precautions: Hx of scoliosis/back pain; Hx of mild CP; pt apprehensive to sit on lower chairs; HTN- takes meds;  No hip precautions, but avoid excessive flexion, IR, adduction due to posterolateral approach  Co- Morbidities: Hx of b/l bunionectomy  Patient Active Problem List   Diagnosis    Insomnia    Essential hypertension    Autism disorder    Depression    Other constipation    Fatigue    Closed displaced fracture of right femoral neck (HCC)    Fall    Status post hip hemiarthroplasty    Absence of bladder continence    Leg edema        Manual  12/13                   Hip PROM (avoid excessive hip flexion, avoid IR/adduction)  NV                   TPR/ STM in surrounding musculature if needed  NV                                                                Total Time                        Exercise Diary  12/13                   HEP instruct/handout Future visits (has handout from home PT already)           Recumbent Bike                     DLS: hip abduction                     DLS: hip adduction                     DLS: marchdanny DLS:Bent knee fall outs                     Bridges                     [facility]                     SLR flex/SLR AA abd                     Standing hip abduction/extension                     Hip flexor stretch- gentle on step                     Minisquats                     Side stepping                      SLS                      Gait Training w/ RW                      step ups/downs w/ u/l rail and quad cane                                                                   Modalities 12/13                       CP Home

## 2019-12-17 ENCOUNTER — APPOINTMENT (OUTPATIENT)
Dept: PHYSICAL THERAPY | Facility: CLINIC | Age: 59
End: 2019-12-17
Payer: COMMERCIAL

## 2019-12-17 DIAGNOSIS — G47.00 INSOMNIA, UNSPECIFIED TYPE: ICD-10-CM

## 2019-12-17 RX ORDER — TEMAZEPAM 7.5 MG/1
7.5 CAPSULE ORAL
Qty: 30 CAPSULE | Refills: 0 | Status: SHIPPED | OUTPATIENT
Start: 2019-12-17 | End: 2020-01-20

## 2019-12-17 RX ORDER — TEMAZEPAM 7.5 MG/1
7.5 CAPSULE ORAL
Qty: 30 CAPSULE | Refills: 0 | OUTPATIENT
Start: 2019-12-17

## 2019-12-18 ENCOUNTER — OFFICE VISIT (OUTPATIENT)
Dept: PHYSICAL THERAPY | Facility: CLINIC | Age: 59
End: 2019-12-18
Payer: COMMERCIAL

## 2019-12-18 DIAGNOSIS — Z96.649 STATUS POST HIP HEMIARTHROPLASTY: Primary | ICD-10-CM

## 2019-12-18 PROCEDURE — 97140 MANUAL THERAPY 1/> REGIONS: CPT

## 2019-12-18 PROCEDURE — 97112 NEUROMUSCULAR REEDUCATION: CPT

## 2019-12-18 PROCEDURE — 97110 THERAPEUTIC EXERCISES: CPT

## 2019-12-18 NOTE — PROGRESS NOTES
Daily Note     Today's date: 2019  Patient name: Debra Townsend  : 1960  MRN: [id number]  Referring provider: Attila Freedman MD  Dx:   Encounter Diagnosis     ICD-10-CM    1  Status post hip hemiarthroplasty C11 986                   Subjective: Patient states she has mild pain into hip arriving to therapy  Objective: See treatment diary below      Assessment: Tolerated treatment poor  Patient was very anxious about all exercises and did not tolerate exercise well  Deferred recumbent bike stating she can not complete due to back pain  She requested to stop after minimal completion of reps with exercises  Poor tolerance to PROM needing frequent breaks to complete  PT educated patient on precautions due to over protecting at home  Patient demonstrated fatigue post treatment and would benefit from continued PT to increase strength and ROM  Plan: Continue per plan of care  Daily Treatment Diary    EPOC: 2020  Precautions: Hx of scoliosis/back pain; Hx of mild CP; pt apprehensive to sit on lower chairs; HTN- takes meds;  No hip precautions, but avoid excessive flexion, IR, adduction due to posterolateral approach  Co- Morbidities: Hx of b/l bunionectomy  Patient Active Problem List   Diagnosis    Insomnia    Essential hypertension    Autism disorder    Depression    Other constipation    Fatigue    Closed displaced fracture of right femoral neck (HCC)    Fall    Status post hip hemiarthroplasty    Absence of bladder continence    Leg edema        Manual                   Hip PROM (avoid excessive hip flexion, avoid IR/adduction)  NV RA                 TPR/ STM in surrounding musculature if needed  NV                                                                Total Time                        Exercise Diary                   HEP instruct/handout Future visits (has handout from home PT already)           Recumbent Bike    def                 DLS: hip abduction    10x                 DLS: hip adduction    5"x10                 DLS: marches                     DLS:Bent knee fall outs                     Bridges    x10                 Clamshells                     SLR flex/SLR AA abd    x5                 Standing hip abduction/extension    x15 ea                 Hip flexor stretch- gentle on step                     Minisquats    x10                 Side stepping   1 lap                   SLS                      Gait Training w/ RW                      step ups/downs w/ u/l rail and quad cane                                                                   Modalities 12/13 12/18                  CP Home    10'

## 2019-12-20 ENCOUNTER — OFFICE VISIT (OUTPATIENT)
Dept: PHYSICAL THERAPY | Facility: CLINIC | Age: 59
End: 2019-12-20
Payer: COMMERCIAL

## 2019-12-20 DIAGNOSIS — Z96.649 STATUS POST HIP HEMIARTHROPLASTY: Primary | ICD-10-CM

## 2019-12-20 PROCEDURE — 97112 NEUROMUSCULAR REEDUCATION: CPT

## 2019-12-20 PROCEDURE — 97110 THERAPEUTIC EXERCISES: CPT

## 2019-12-20 NOTE — PROGRESS NOTES
Daily Note     Today's date: 2019  Patient name: Zelalem Ernandez  : 1960  MRN: [id number]  Referring provider: Viky Salter MD  Dx:   Encounter Diagnosis     ICD-10-CM    1  Status post hip hemiarthroplasty I67 037                   Subjective: Patient offers no new complaints at this time  Objective: See treatment diary below      Assessment: Tolerated treatment well  Patient was 7 min late to therapy today therefore did not complete all exercises  Patient demonstrated better tolerance to exercises today  Was able to complete SLR's today without fatigue  Introduced march today with good tolerance  She also had better tolerance to PROM able to complete for longer time  Continue to progress as able next visit  Patient demonstrated fatigue post treatment and would benefit from continued PT      Plan: Continue per plan of care  Daily Treatment Diary    EPOC: 2020  Precautions: Hx of scoliosis/back pain; Hx of mild CP; pt apprehensive to sit on lower chairs; HTN- takes meds;  No hip precautions, but avoid excessive flexion, IR, adduction due to posterolateral approach  Co- Morbidities: Hx of b/l bunionectomy  Patient Active Problem List   Diagnosis    Insomnia    Essential hypertension    Autism disorder    Depression    Other constipation    Fatigue    Closed displaced fracture of right femoral neck (HCC)    Fall    Status post hip hemiarthroplasty    Absence of bladder continence    Leg edema        Manual                 Hip PROM (avoid excessive hip flexion, avoid IR/adduction)  NV RA  RA               TPR/ STM in surrounding musculature if needed  NV                                                                Total Time                        Exercise Diary                 HEP instruct/handout Future visits (has handout from home PT already)           Recumbent Bike    def                 DLS: hip abduction    10x  10x DLS: hip adduction    5"x10  5"x10               DLS: marches      52Q               DLS:Bent knee fall outs                     Bridges    x10  5x2               Clamshells                     SLR flex/SLR AA abd    x5  x10               Standing hip abduction/extension    x15 ea  nv               Hip flexor stretch- gentle on step                     Minisquats    x10  nv               Side stepping   1 lap   nv                SLS                      Gait Training w/ RW                      step ups/downs w/ u/l rail and quad cane                                                                   Modalities 12/13 12/18 12/20                CP Home    10'   10'

## 2019-12-24 ENCOUNTER — APPOINTMENT (OUTPATIENT)
Dept: PHYSICAL THERAPY | Facility: CLINIC | Age: 59
End: 2019-12-24
Payer: COMMERCIAL

## 2019-12-27 ENCOUNTER — OFFICE VISIT (OUTPATIENT)
Dept: PHYSICAL THERAPY | Facility: CLINIC | Age: 59
End: 2019-12-27
Payer: COMMERCIAL

## 2019-12-27 DIAGNOSIS — Z96.649 STATUS POST HIP HEMIARTHROPLASTY: Primary | ICD-10-CM

## 2019-12-27 PROCEDURE — 97140 MANUAL THERAPY 1/> REGIONS: CPT | Performed by: PHYSICAL THERAPIST

## 2019-12-27 PROCEDURE — 97110 THERAPEUTIC EXERCISES: CPT | Performed by: PHYSICAL THERAPIST

## 2019-12-27 PROCEDURE — 97112 NEUROMUSCULAR REEDUCATION: CPT | Performed by: PHYSICAL THERAPIST

## 2019-12-27 NOTE — PROGRESS NOTES
Daily Note     Today's date: 2019  Patient name: Arline Rosado  : 1960  MRN: [id number]  Referring provider: Lupie Lundborg, MD  Dx:   Encounter Diagnosis     ICD-10-CM    1  Status post hip hemiarthroplasty A39 384                   Subjective:  Patient reports that she would like to be able to drive, but can't get in the car due to needing to balance while getting her walker in the car  Would also like to be able to step into the bathtub  Objective:  See treatment diary below      Assessment:  Pt was able to complete S/L R hip abd without assist for the first time today, but needed a break after 5 reps  She limits herself too much at home and needs to become more active to gain strength at a faster rate  PROM is improved more than pt thinks  Plan: Add more reps as tolerated to gain strength  Add step ups  Add more R LE balance ex to be able to step into tub and into car independently  Daily Treatment Diary    EPOC: 2020  Precautions: Hx of scoliosis/back pain; Hx of mild CP; pt apprehensive to sit on lower chairs; HTN- takes meds;  No hip precautions, but avoid excessive flexion, IR, adduction due to posterolateral approach  Co- Morbidities: Hx of b/l bunionectomy  Patient Active Problem List   Diagnosis    Insomnia    Essential hypertension    Autism disorder    Depression    Other constipation    Fatigue    Closed displaced fracture of right femoral neck (HCC)    Fall    Status post hip hemiarthroplasty    Absence of bladder continence    Leg edema        Manual               Hip PROM (avoid excessive hip flexion, avoid IR/adduction)  NV RA  RA  10'             TPR/ STM in surrounding musculature if needed  NV                                                                Total Time                        Exercise Diary               HEP instruct/handout Future visits (has handout from home PT already) Recumbent Bike    def                 DLS: hip abduction    10x  10x  10             DLS: hip adduction    5"x10  5"x10  5" 10             DLS: marches      11D  10             DLS:Bent knee fall outs        10             Bridges    x10  5x2  10             Clamshells L S/L        10             SLR flex/SLR AA abd    x5  x10  2x5             Standing hip abduction/extension    x15 ea  nv  15 ea             Hip flexor stretch- gentle on step                     Minisquats    x10  nv  10 w/ walker             Side stepping   1 lap   nv                SLS                      Gait Training w/ RW                      step ups/downs w/ u/l rail and quad cane                                                                   Modalities 12/13 12/18 12/20 12/27              CP Home    10'   10'  10'

## 2019-12-31 ENCOUNTER — OFFICE VISIT (OUTPATIENT)
Dept: PHYSICAL THERAPY | Facility: CLINIC | Age: 59
End: 2019-12-31
Payer: COMMERCIAL

## 2019-12-31 DIAGNOSIS — Z96.649 STATUS POST HIP HEMIARTHROPLASTY: Primary | ICD-10-CM

## 2019-12-31 PROCEDURE — 97140 MANUAL THERAPY 1/> REGIONS: CPT | Performed by: PHYSICAL THERAPY ASSISTANT

## 2019-12-31 PROCEDURE — 97110 THERAPEUTIC EXERCISES: CPT | Performed by: PHYSICAL THERAPY ASSISTANT

## 2019-12-31 PROCEDURE — 97112 NEUROMUSCULAR REEDUCATION: CPT | Performed by: PHYSICAL THERAPY ASSISTANT

## 2019-12-31 PROCEDURE — 97116 GAIT TRAINING THERAPY: CPT | Performed by: PHYSICAL THERAPY ASSISTANT

## 2019-12-31 NOTE — PROGRESS NOTES
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 Denney  : 1960  MRN: [id number]  Referring provider: Chandrika Cox MD  Dx:   Encounter Diagnosis     ICD-10-CM    1  Status post hip hemiarthroplasty P78 565                   Subjective:  Pt reports that she was able to get into the car to drive over the weekend without much difficulty  Objective:  See treatment diary below      Assessment:  Focus on standing TE this session with good results  Pt able to complete all activities with seated rest breaks without c/o pain  Reviewed tub transfer with shower chair with pt and pts friend  Pt able to demonstrate safe simulation of tub transfer  Pt also able to progress to ambulation with SPC this session with CG  Pt advised to not use SPC I'ly at home at this time yet as she has tendency to reach for support with opposite UE  Plan: Add more reps as tolerated to gain strength  Add step ups  Add more R LE balance ex to be able to step into tub and into car independently  Daily Treatment Diary    EPOC: 2020  Precautions: Hx of scoliosis/back pain; Hx of mild CP; pt apprehensive to sit on lower chairs; HTN- takes meds;  No hip precautions, but avoid excessive flexion, IR, adduction due to posterolateral approach  Co- Morbidities: Hx of b/l bunionectomy  Patient Active Problem List   Diagnosis    Insomnia    Essential hypertension    Autism disorder    Depression    Other constipation    Fatigue    Closed displaced fracture of right femoral neck ([address] )    Fall    Status post hip hemiarthroplasty    Absence of bladder continence    Leg edema        Manual             Hip PROM (avoid excessive hip flexion, avoid IR/adduction)  NV RA  RA  10'  10'           TPR/ STM in surrounding musculature if needed  NV                                                                Total Time                        Exercise Diary   12/31           HEP instruct/handout Future visits (has handout from home PT already)           Recumbent Bike    def                 DLS: hip abduction    10x  10x  10             DLS: hip adduction    5"x10  5"x10  5" 10  standing HS curls  2x10           DLS: marches      78B  10 standing march  2x10            DLS:Bent knee fall outs        10  heel raises  x10           Bridges    x10  5x2  10             Clamshells L S/L        10             SLR flex/SLR AA abd    x5  x10  2x5             Standing hip abduction/extension    x15 ea  nv  15 ea  x20 ea           Hip flexor stretch- gentle on step                     Minisquats    x10  nv  10 w/ walker  x20 at rail           Side stepping   1 lap   nv    15'x4            SLS                      Gait Training w/ RW          w/SPC 20'x3            step ups/downs w/ u/l rail and quad cane                                                                   Modalities 12/13 12/18 12/20 12/27 12/31            CP Home  10'   10'  10'  def

## 2020-01-03 ENCOUNTER — OFFICE VISIT (OUTPATIENT)
Dept: PHYSICAL THERAPY | Facility: CLINIC | Age: 60
End: 2020-01-03
Payer: COMMERCIAL

## 2020-01-03 DIAGNOSIS — Z96.649 STATUS POST HIP HEMIARTHROPLASTY: Primary | ICD-10-CM

## 2020-01-03 PROCEDURE — 97112 NEUROMUSCULAR REEDUCATION: CPT | Performed by: PHYSICAL THERAPIST

## 2020-01-03 PROCEDURE — 97140 MANUAL THERAPY 1/> REGIONS: CPT | Performed by: PHYSICAL THERAPIST

## 2020-01-03 NOTE — PROGRESS NOTES
Daily Note     Today's date: 1/3/2020  Patient name: Damaso Navarro  : 1960  MRN: [id number]  Referring provider: Denae Goodwin MD  Dx:   Encounter Diagnosis     ICD-10-CM    1  Status post hip hemiarthroplasty T43 312                   Subjective:  Pt reports that she may have overdone house work prior to tx session today causing L shoulder/hand discomfort/pain  Pt denies any chest pain/SOB  Objective:  See treatment diary below      Assessment: Pt able to ambulate w/ SPC this visit, but c/o LUE discomfort during standing TE while WB'ing  Plan:  Progress as tolerated NV  Daily Treatment Diary    EPOC: 2020  Precautions: Hx of scoliosis/back pain; Hx of mild CP; pt apprehensive to sit on lower chairs; HTN- takes meds;  No hip precautions, but avoid excessive flexion, IR, adduction due to posterolateral approach  Co- Morbidities: Hx of b/l bunionectomy  Patient Active Problem List   Diagnosis    Insomnia    Essential hypertension    Autism disorder    Depression    Other constipation    Fatigue    Closed displaced fracture of right femoral neck (HCC)    Fall    Status post hip hemiarthroplasty    Absence of bladder continence    Leg edema        Manual  12/13  12/18  12/20  12/27  12/31  1/3         Hip R PROM (avoid excessive hip flexion, avoid IR/adduction)  NV RA  RA  10'  10'  RB         TPR/ STM in surrounding musculature if needed  NV                                                                Total Time            8'            Exercise Diary  12/13  12/18  12/20  12/27  12/31  1/3       HEP instruct/handout Future visits (has handout from home PT already)          Recumbent Bike    def               DLS: hip abduction    10x  10x  10    10x       DLS: hip adduction    5"x10  5"x10  5" 10  standing HS curls  2x10  10x seated       DLS: marches      38F  10 standing march  2x10   np       Standing HS curls      2x10     Standing March      2x10     HR     10x NV DLS:Bent knee fall outs        10  heel raises  x10  NP       Bridges    x10  5x2  10           Clamshells L S/L        10           SLR flex/SLR AA abd    x5  x10  2x5   3x active flex/5x active abd       Standing hip abduction/extension    x15 ea  nv  15 ea  x20 ea NP- resume nv        Hip flexor stretch- gentle on step            NV       Minisquats    x10  nv  10 w/ walker  x20 at rail  10"x10 at rail       Side stepping   1 lap   nv    15'x4  NP- resume nv        SLS                    Gait Training w/ RW          w/SPC 20'x3 W/ SPC 20' x 4        step ups/downs w/ u/l rail and quad cane           NV                                                 Modalities 12/13 12/18 12/20 12/27 12/31 1/3        CP Home  10'   10'  10'  def  10' to l/s andR ant hip

## 2020-01-07 ENCOUNTER — APPOINTMENT (OUTPATIENT)
Dept: PHYSICAL THERAPY | Facility: CLINIC | Age: 60
End: 2020-01-07
Payer: COMMERCIAL

## 2020-01-09 ENCOUNTER — TELEPHONE (OUTPATIENT)
Dept: FAMILY MEDICINE CLINIC | Facility: HOSPITAL | Age: 60
End: 2020-01-09

## 2020-01-10 ENCOUNTER — APPOINTMENT (OUTPATIENT)
Dept: PHYSICAL THERAPY | Facility: CLINIC | Age: 60
End: 2020-01-10
Payer: COMMERCIAL

## 2020-01-10 DIAGNOSIS — M21.42 FLAT FEET: Primary | ICD-10-CM

## 2020-01-10 DIAGNOSIS — M21.41 FLAT FEET: Primary | ICD-10-CM

## 2020-01-10 NOTE — TELEPHONE ENCOUNTER
Called PT - re-did prior Auth today and sent to [employer] - will let PT know either way when I get an answer

## 2020-01-13 ENCOUNTER — TELEPHONE (OUTPATIENT)
Dept: OBGYN CLINIC | Facility: HOSPITAL | Age: 60
End: 2020-01-13

## 2020-01-13 ENCOUNTER — TELEPHONE (OUTPATIENT)
Dept: PHYSICAL THERAPY | Facility: CLINIC | Age: 60
End: 2020-01-13

## 2020-01-13 NOTE — TELEPHONE ENCOUNTER
Pt called again to cancel appt due to L shoulder/L hand pain  Discussed the therapy can be modified to accommodate LUE pain, but pt reports that she may be "pushing it too hard"  Expressed concern of losing progress gained in hip   Pt reports waiting on phone call from surgeon to see if she would qualify for home care  Discussed w/ pt that she would have to be home bound and unable to get to tx session to qualify  She reports that she is unsure if she can get here due to needing to WB through LUE

## 2020-01-13 NOTE — TELEPHONE ENCOUNTER
Patient sees Dr Humberto Duffy  Patient is calling in stating that she is having increased pain and PT has been helping her but now she is having a really hard time moving at all  Patient is calling in wanting to know if the Dr is able to give her an order for at home PT until this heals up a little bit? She is asking if someone can please contact her back relating this  She stated that she lastly had [place] [facility] and wanted to know if she can have them again    Call back# [phone]

## 2020-01-13 NOTE — TELEPHONE ENCOUNTER
Her hip surgery was done back in 09/2019  If she is having that much pain, she should return early for re-evaluation

## 2020-01-14 ENCOUNTER — APPOINTMENT (OUTPATIENT)
Dept: PHYSICAL THERAPY | Facility: CLINIC | Age: 60
End: 2020-01-14
Payer: COMMERCIAL

## 2020-01-16 ENCOUNTER — TELEPHONE (OUTPATIENT)
Dept: FAMILY MEDICINE CLINIC | Facility: HOSPITAL | Age: 60
End: 2020-01-16

## 2020-01-17 ENCOUNTER — APPOINTMENT (OUTPATIENT)
Dept: PHYSICAL THERAPY | Facility: CLINIC | Age: 60
End: 2020-01-17
Payer: COMMERCIAL

## 2020-01-19 DIAGNOSIS — G47.00 INSOMNIA, UNSPECIFIED TYPE: ICD-10-CM

## 2020-01-20 RX ORDER — TEMAZEPAM 7.5 MG/1
CAPSULE ORAL
Qty: 30 CAPSULE | Refills: 0 | Status: SHIPPED | OUTPATIENT
Start: 2020-01-20 | End: 2020-02-06 | Stop reason: ALTCHOICE

## 2020-01-20 RX ORDER — LORAZEPAM 0.5 MG/1
TABLET ORAL
Qty: 30 TABLET | Refills: 0 | Status: SHIPPED | OUTPATIENT
Start: 2020-01-20 | End: 2020-02-13 | Stop reason: SDUPTHER

## 2020-01-21 ENCOUNTER — APPOINTMENT (OUTPATIENT)
Dept: PHYSICAL THERAPY | Facility: CLINIC | Age: 60
End: 2020-01-21
Payer: COMMERCIAL

## 2020-01-22 DIAGNOSIS — G47.09 OTHER INSOMNIA: Primary | ICD-10-CM

## 2020-01-22 RX ORDER — ZALEPLON 5 MG/1
5 CAPSULE ORAL
Qty: 30 CAPSULE | Refills: 0 | Status: SHIPPED | OUTPATIENT
Start: 2020-01-22 | End: 2020-07-03 | Stop reason: ALTCHOICE

## 2020-01-23 ENCOUNTER — TELEPHONE (OUTPATIENT)
Dept: PHYSICAL THERAPY | Facility: CLINIC | Age: 60
End: 2020-01-23

## 2020-01-23 NOTE — TELEPHONE ENCOUNTER
Pt called again to cancel her last appt  Since she has consistently cancelled her appts since 1/3 including ortho f/u she had this week, did not have pt schedule more appt  However, strongly re-iterated to patient that it continues to be strongly recommended that pt come for formal PT to allow for optimal recovery  Pt reports that she does not feel she is able at this time as her body is too fatigued despite educating pt that tx session could be modified to her needs     Pt to inform PT of progress at home in the next couple of weeks

## 2020-01-24 ENCOUNTER — APPOINTMENT (OUTPATIENT)
Dept: PHYSICAL THERAPY | Facility: CLINIC | Age: 60
End: 2020-01-24
Payer: COMMERCIAL

## 2020-01-27 ENCOUNTER — TELEPHONE (OUTPATIENT)
Dept: FAMILY MEDICINE CLINIC | Facility: HOSPITAL | Age: 60
End: 2020-01-27

## 2020-01-27 NOTE — TELEPHONE ENCOUNTER
Patient called stating that she has not yet started Zaleplon  She states that she is trying to avoid becoming dependant on sleep medication  And instead has been taking an additional Ativan for a total of 1mg and she states that this seems to be working for her  She wanted you to be aware

## 2020-01-30 ENCOUNTER — TELEPHONE (OUTPATIENT)
Dept: FAMILY MEDICINE CLINIC | Facility: HOSPITAL | Age: 60
End: 2020-01-30

## 2020-01-30 NOTE — TELEPHONE ENCOUNTER
Pt called  She wanted to give you a heads up on her Ativan  She has been taking anywhere from 3/4mg to 1mg  This has been working  She has not  taken any of the Sonata  This dose of Ativan has been working for her  She has a whole bottle that she has not used yet so she does not need a refill  but she wanted you to be aware that she may be requesting the dosage be switched to 1mg

## 2020-02-04 ENCOUNTER — EVALUATION (OUTPATIENT)
Dept: PHYSICAL THERAPY | Facility: CLINIC | Age: 60
End: 2020-02-04
Payer: COMMERCIAL

## 2020-02-04 DIAGNOSIS — Z96.649 STATUS POST HIP HEMIARTHROPLASTY: Primary | ICD-10-CM

## 2020-02-04 PROCEDURE — 97140 MANUAL THERAPY 1/> REGIONS: CPT | Performed by: PHYSICAL THERAPIST

## 2020-02-04 NOTE — PROGRESS NOTES
PT Re-Evaluation     Today's date: 2020  Patient name: Matias Mehta  : 1960  MRN: [id number]  Referring provider: Philip Stone MD  Dx:   Encounter Diagnosis     ICD-10-CM    1  Status post hip hemiarthroplasty [id number] [id number]                   Assessment  Assessment details: Matias Mehta is a 63 y o  Female being treated as an outpatient at [facility] PT s/p R hip hemiarthroplasty on 19 as a result of R femoral neck fracture on 19  Pt has not been present for tx for approximately 1 month due to illness and pain in LUE/pec musculature which she reports is slowly improving  Despite absence from skilled PT tx, pt has made gains in ROM and strength since IE, but continues to be very limited from max function  Briefly assessed LUE/pec and it appears that pt has strained her pec muscle and L shoulder/UE  Discussed seeking further medical attention if her cold and LUE/hand pain do not continue to improve  Pt will continue to benefit from skilled PT services in order to max function to allow pt to achieve goals in PT  Thank you  Impairments: abnormal gait, abnormal or restricted ROM, activity intolerance, impaired balance, impaired physical strength and pain with function  Barriers to therapy: Poor compliance/attendance; pt reports that she can only attend 1x/week  Understanding of Dx/Px/POC: fair   Prognosis: fair    Goals  ST  Pain decreased by 25% in 4-6 weeks  - Met (hip)  2  ROM increased by 25% in 4-6 weeks  - Partially met  3  Strength increased by 1/2 to 1 muscle grade in all deficient muscle groups in 4-6 weeks  - Partially Met    LT  Decrease pain to 1-2/10 at worst by d/c - Met (hip)  2  Increase ROM to [facility] for all deficient movements by d/c - Partially met  3  Strength increased to 5 for all deficient muscle groups by d/c  - Not Met  4  IADL performance increased to max function by d/c - Partially Met  5   Recreational performance increased to max function by d/c - Partially Met 6  Ambulation/stair climbing improved to max level of function by d/c - Not Met    Plan  Planned modality interventions: cryotherapy  Other planned modality interventions: other modalities PRN  Planned therapy interventions: abdominal trunk stabilization, ADL retraining, IADL retraining, balance, flexibility, functional ROM exercises, gait training, graded exercise, home exercise program, manual therapy, neuromuscular re-education, patient education, postural training, strengthening, therapeutic exercise, therapeutic activities and transfer training  Other planned therapy interventions: other interventions PRN  Frequency: 1-2x/week (However, pt reports that she can only do 1x/week)  Duration in weeks: 6  Plan of Care beginning date: 2/4/2020  Plan of Care expiration date: 3/17/2020  Treatment plan discussed with: patient        Subjective Evaluation    History of Present Illness  Date of onset: 9/22/2019  Date of surgery: 9/23/2019  Mechanism of injury: CURRENT LEVEL (2/4/2020)  Pt returns today for tx after not being present for approximately 1 month due to being ill w/ a "cold" and LUE/hand/pec pain which she believes was exacerbated by a combination of strenuous housework and using a SPC during skilled PT tx session  Pt had been encouraged weekly to attend skilled PT tx as she would call and cancel due to LUE pain  She was frequently informed that activity could be modified to focus on hip strength w/ modifications to avoid LUE pain  However, she chose to perform her own exercises at home until she was ready to return for tx  Pt reports that she has seen chiropractor for LUE who has helped to improve symptoms  However, due to pain "coming and going", pt leery to use [facility] again and presented to tx session in w/c to "save energy" for ambulation w/ RW during tx session and later on tonight when home      Pt reports that she has been doing more ambulation and less of her specific HEP, but feels that her ROM has improved  INITIAL EVAL (12/13/2019)  Pt reports to IE using RW for ambulation s/p R posterolateral hip hemiarthroplasty  She reports having a fall on 9/22/19 due to RLE that "gave out" resulting in R femoral neck fracture  She reports a hx of sciatic type pain/numbness in RLE prior to fall and would use cane if feeling numbness on a particular day  Otherwise, she did not use AD prior to surgery  Pt had home PT until the beginning of November  As per ortho note (12/11), hip precautions have been lifted, but pt has been instructed to slowly wean into increasing ROM  Pt uses commode and is looking at getting a lift for toilet seat as she reports having very low toilet seats  Pt still sleeping in hospital bed  Pt has been cleared to drive, but not yet comfortable at this time  Pain  Pain scale: LUT/LUE into L hand 3-4/10; R hip: 0/10  Pain scale at highest: LUT/L lat UE into L hand: 10/10- w/ walking/gripping; R hip: 2/10  Location: R hip  Relieving factors: ice, medications and rest (Tylenol PRN)  Exacerbated by: walking/standing for extended periods of time, stair climbing (STS fashion w/ rail), excessive activity; no pain sleeping  Social Support  Steps to enter house: yes (2 LIBORIO)  Stairs in house: yes (1 FF steps up to 2nd floor; 1 FF steps to basement )   Lives in: multiple-level home  Lives with: Mother      Employment status: not working (Has not worked for approximately 1 year due to sickness f/b hip kaitlynn CARMELINA)  Patient Goals  Patient goals for therapy: decreased pain, independence with ADLs/IADLs and return to sport/leisure activities (Partially Met)          Objective     Active Range of Motion   Left Shoulder   Flexion: WFL  Abduction: WFL  External rotation 45°: WFL  Internal rotation 45°: WFL    Right Hip   Flexion: 110 degrees   Extension: 5 degrees   Abduction: 25 degrees   External rotation (90/90): 28 degrees     Additional Active Range of Motion Details  *pec and scap pain w/ LUE AROM    L Elbow Flexion/Extension: WFL/WFL    L digit/Hand AROM: WFL/WFL    Passive Range of Motion     Right Hip   Flexion: 112 degrees with pain  Extension: 8 degrees with pain  Abduction: 25 degrees   External rotation (90/90): 28 degrees     Strength/Myotome Testing     Right Hip   Planes of Motion   Flexion: 4 (No quad lag)  Extension: 4+ (assessed in standing)  Abduction: 3+  External rotation: 3+    Additional Strength Details  Strong quad set    Tests     Additional Tests Details  (-) Neer impingement  (-) Katarina Guard    General Comments:      Shoulder Comments   Tenderness/hypertonicity w/ palpation to L pec, LUT into L lateral UE, and L palmar surface of hand    Hip Comments   Observation/gait: 7 5 cm posterolateral incision healed well w/o s/s of infection    Palpation: Adhesions palpated t/o incision     Gait: Pt ambulates w/ RW              Daily Treatment Diary    EPO: 3/17/2020  Precautions: Hx of scoliosis/back pain; Hx of mild CP; pt apprehensive to sit on lower chairs; HTN- takes meds;  No hip precautions, but avoid excessive flexion, IR, adduction due to posterolateral approach  Co- Morbidities: Hx of b/l bunionectomy  Patient Active Problem List   Diagnosis    Insomnia    Essential hypertension    Autism disorder    Depression    Other constipation    Fatigue    Closed displaced fracture of right femoral neck ([address] )    Fall    Status post hip hemiarthroplasty    Absence of bladder continence    Leg edema        Manual  12/13  12/18  12/20  12/27  12/31  1/3  2/4 (modified ex due to time constraints w/ progress note)     Hip R PROM (avoid excessive hip flexion, avoid IR/adduction)  NV RA  RA  10'  10'  RB  NV     TPR/ STM in surrounding musculature if needed  NV            NV                          Progress Note              RB      Total Time            8'  41'        Exercise Diary  12/13  12/18  12/20  12/27  12/31  1/3  2/4  (modified ex due to time constraints w/ progress note)     HEP instruct/handout Future visits (has handout from home PT already)          Recumbent Bike    def               DLS: hip abduction    10x  10x  10    10x       DLS: hip adduction    5"x10  5"x10  5" 10  standing HS curls  2x10  10x seated       DLS: marches      77X  10 standing march  2x10   np       Standing HS curls      2x10     Standing Marches      2x10     HR     10x NV     DLS:Bent knee fall outs        10  heel raises  x10  NP       Bridges    x10  5x2  10           Clamshells L S/L        10           SLR flex/SLR AA abd    x5  x10  2x5   3x active flex/5x active abd       Standing hip abduction/extension    x15 ea  nv  15 ea  x20 ea NP- resume nv        Hip flexor stretch- gentle on step            NV       Minisquats    x10  nv  10 w/ walker  x20 at rail  10"x10 at rail       Side stepping   1 lap   nv    15'x4  NP- resume nv        SLS                    Gait Training w/ RW          w/SPC 20'x3 W/ SPC 20' x 4        step ups/downs w/ u/l rail and quad cane           NV 2 x w/ u/l rail step ups *pain in L hand/ * pain in R hip when holding w/ RUE                                               Modalities 12/13 12/18 12/20 12/27 12/31 1/3  2/4      CP Home  10'   10'  10'  def  10' to l/s andR ant hip  10' to L hip and LUE/hand

## 2020-02-06 ENCOUNTER — DOCUMENTATION (OUTPATIENT)
Dept: FAMILY MEDICINE CLINIC | Facility: HOSPITAL | Age: 60
End: 2020-02-06

## 2020-02-06 ENCOUNTER — TELEPHONE (OUTPATIENT)
Dept: FAMILY MEDICINE CLINIC | Facility: HOSPITAL | Age: 60
End: 2020-02-06

## 2020-02-06 NOTE — TELEPHONE ENCOUNTER
See call- currently has Lorazpam 0 5mg prescribed  Has been needing 1-2 hs  Is asking for her next refill to be a 1mg tab instead of 0 5  Will not need refill for another week  Let her know if increase ok  She even mentioned needing 2 mg!

## 2020-02-11 ENCOUNTER — APPOINTMENT (OUTPATIENT)
Dept: PHYSICAL THERAPY | Facility: CLINIC | Age: 60
End: 2020-02-11
Payer: COMMERCIAL

## 2020-02-13 ENCOUNTER — TELEPHONE (OUTPATIENT)
Dept: PHYSICAL THERAPY | Facility: CLINIC | Age: 60
End: 2020-02-13

## 2020-02-13 DIAGNOSIS — G47.00 INSOMNIA, UNSPECIFIED TYPE: ICD-10-CM

## 2020-02-13 RX ORDER — LORAZEPAM 1 MG/1
TABLET ORAL
Qty: 30 TABLET | Refills: 0 | Status: SHIPPED | OUTPATIENT
Start: 2020-02-13 | End: 2020-03-09 | Stop reason: SDUPTHER

## 2020-02-13 NOTE — TELEPHONE ENCOUNTER
Returned pt's call  Pt states that she thinks it is "fair to everybody" for her to be d/c'd at this time since she is again sick with a cold and continues to frequently cancel appts  Pt requested HEP be emailed

## 2020-02-15 ENCOUNTER — APPOINTMENT (EMERGENCY)
Dept: RADIOLOGY | Facility: HOSPITAL | Age: 60
End: 2020-02-15
Payer: COMMERCIAL

## 2020-02-15 ENCOUNTER — HOSPITAL ENCOUNTER (EMERGENCY)
Facility: HOSPITAL | Age: 60
Discharge: HOME | End: 2020-02-15
Attending: EMERGENCY MEDICINE | Admitting: EMERGENCY MEDICINE
Payer: COMMERCIAL

## 2020-02-15 VITALS
RESPIRATION RATE: 16 BRPM | OXYGEN SATURATION: 100 % | WEIGHT: 154.98 LBS | DIASTOLIC BLOOD PRESSURE: 86 MMHG | HEART RATE: 78 BPM | HEIGHT: 66 IN | SYSTOLIC BLOOD PRESSURE: 136 MMHG | BODY MASS INDEX: 24.91 KG/M2 | TEMPERATURE: 97.9 F

## 2020-02-15 DIAGNOSIS — E87.6 HYPOKALEMIA: ICD-10-CM

## 2020-02-15 DIAGNOSIS — J06.9 VIRAL URI WITH COUGH: Primary | ICD-10-CM

## 2020-02-15 LAB
ANION GAP SERPL CALCULATED.3IONS-SCNC: 5 MMOL/L (ref 4–13)
BASOPHILS # BLD AUTO: 0.05 THOUSANDS/ΜL (ref 0–0.1)
BASOPHILS NFR BLD AUTO: 1 % (ref 0–1)
BUN SERPL-MCNC: 19 MG/DL (ref 5–25)
CALCIUM SERPL-MCNC: 9.4 MG/DL (ref 8.3–10.1)
CHLORIDE SERPL-SCNC: 100 MMOL/L (ref 100–108)
CO2 SERPL-SCNC: 32 MMOL/L (ref 21–32)
CREAT SERPL-MCNC: 0.74 MG/DL (ref 0.6–1.3)
EOSINOPHIL # BLD AUTO: 0.37 THOUSAND/ΜL (ref 0–0.61)
EOSINOPHIL NFR BLD AUTO: 7 % (ref 0–6)
ERYTHROCYTE [DISTWIDTH] IN BLOOD BY AUTOMATED COUNT: 12.6 % (ref 11.6–15.1)
FLUAV RNA NPH QL NAA+PROBE: NORMAL
FLUBV RNA NPH QL NAA+PROBE: NORMAL
GFR SERPL CREATININE-BSD FRML MDRD: 90 ML/MIN/1.73SQ M
GLUCOSE SERPL-MCNC: 94 MG/DL (ref 65–140)
HCT VFR BLD AUTO: 45.9 % (ref 34.8–46.1)
HGB BLD-MCNC: 15.6 G/DL (ref 11.5–15.4)
IMM GRANULOCYTES # BLD AUTO: 0.02 THOUSAND/UL (ref 0–0.2)
IMM GRANULOCYTES NFR BLD AUTO: 0 % (ref 0–2)
LYMPHOCYTES # BLD AUTO: 1.7 THOUSANDS/ΜL (ref 0.6–4.47)
LYMPHOCYTES NFR BLD AUTO: 30 % (ref 14–44)
MCH RBC QN AUTO: 31.6 PG (ref 26.8–34.3)
MCHC RBC AUTO-ENTMCNC: 34 G/DL (ref 31.4–37.4)
MCV RBC AUTO: 93 FL (ref 82–98)
MONOCYTES # BLD AUTO: 0.46 THOUSAND/ΜL (ref 0.17–1.22)
MONOCYTES NFR BLD AUTO: 8 % (ref 4–12)
NEUTROPHILS # BLD AUTO: 3 THOUSANDS/ΜL (ref 1.85–7.62)
NEUTS SEG NFR BLD AUTO: 54 % (ref 43–75)
NRBC BLD AUTO-RTO: 0 /100 WBCS
PLATELET # BLD AUTO: 259 THOUSANDS/UL (ref 149–390)
PMV BLD AUTO: 9.7 FL (ref 8.9–12.7)
POTASSIUM SERPL-SCNC: 3.4 MMOL/L (ref 3.5–5.3)
RBC # BLD AUTO: 4.94 MILLION/UL (ref 3.81–5.12)
RSV RNA NPH QL NAA+PROBE: NORMAL
SODIUM SERPL-SCNC: 137 MMOL/L (ref 136–145)
WBC # BLD AUTO: 5.6 THOUSAND/UL (ref 4.31–10.16)

## 2020-02-15 PROCEDURE — 71046 X-RAY EXAM CHEST 2 VIEWS: CPT

## 2020-02-15 PROCEDURE — 87631 RESP VIRUS 3-5 TARGETS: CPT | Performed by: EMERGENCY MEDICINE

## 2020-02-15 PROCEDURE — 85025 COMPLETE CBC W/AUTO DIFF WBC: CPT | Performed by: EMERGENCY MEDICINE

## 2020-02-15 PROCEDURE — 36415 COLL VENOUS BLD VENIPUNCTURE: CPT | Performed by: EMERGENCY MEDICINE

## 2020-02-15 PROCEDURE — 99285 EMERGENCY DEPT VISIT HI MDM: CPT | Performed by: EMERGENCY MEDICINE

## 2020-02-15 PROCEDURE — 99283 EMERGENCY DEPT VISIT LOW MDM: CPT

## 2020-02-15 PROCEDURE — 80048 BASIC METABOLIC PNL TOTAL CA: CPT | Performed by: EMERGENCY MEDICINE

## 2020-02-15 RX ORDER — POTASSIUM CHLORIDE 20 MEQ/1
40 TABLET, EXTENDED RELEASE ORAL ONCE
Status: COMPLETED | OUTPATIENT
Start: 2020-02-15 | End: 2020-02-15

## 2020-02-15 RX ADMIN — POTASSIUM CHLORIDE 40 MEQ: 1500 TABLET, EXTENDED RELEASE ORAL at 18:12

## 2020-02-15 NOTE — ED PROVIDER NOTES
History  Chief Complaint   Patient presents with    Cough     Pt reports a cough "the year I was born"   Nasal Congestion     Pt reports runny nose today  Reports shes having problems breathing since shes been congested  This is a 81-year-old female who presents for evaluation of nonproductive cough nasal congestion left-sided cervical adenopathy and generalized weakness  She had right-sided hip surgery 5 months ago  There is no unilateral leg swelling or sign of DVT no fevers or chills  History provided by:  Patient  Medical Problem   Location:  Generalized  Quality:  Weakness cough congestion  Severity:  Moderate  Progression:  Worsening  Context:  Cough congestion weakness left-sided cervical adenopathy  Associated symptoms: congestion, cough, ear pain (Chronic left-sided), fatigue and myalgias    Associated symptoms: no abdominal pain and no headaches        Prior to Admission Medications   Prescriptions Last Dose Informant Patient Reported? Taking?    B Complex Vitamins (VITAMIN B COMPLEX PO)  Outside Facility (Specify) Yes No   Sig: Take by mouth 1 daily  (100 mg) per vn gv   CHROMIUM POLYNICOTINATE PO  Self Yes No   Sig: Take 1-2 capsules by mouth daily This is 200 mcg daily   Cholecalciferol (VITAMIN D3) 5000 units CAPS  Self Yes No   Sig: Take by mouth 1 daily   Cyanocobalamin (VITAMIN B 12) 250 MCG LOZG   Yes No   Sig: Take 500 mcg by mouth daily   LORazepam (ATIVAN) 1 mg tablet   No No   Si tablet at hs   Lactobacillus (ACIDOPHILUS) 100 MG CAPS   Yes No   Sig: Take 3 capsules by mouth daily   MAGNESIUM-POTASSIUM PO   Yes No   Sig: Take by mouth 1 bid  (Crebs/Magnesium/Potassium)   Magnesium 250 MG TABS  Self Yes No   Si-2 tabs daily   Menaquinone-7 (VITAMIN K2 PO)  Self Yes No   Sig: Take by mouth   Multiple Vitamins-Minerals (MULTIVITAMIN WITH MINERALS) tablet  Outside Facility (Specify) Yes No   Sig: Take 1 tablet by mouth daily   Potassium 99 MG TABS  Outside Facility (Specify) Yes No   Sig: Take by mouth 1 daily   Resveratrol 100 MG CAPS   Yes No   Sig: Take 1 capsule by mouth daily   Selenium 200 MCG CAPS  Self Yes No   Sig: Take by mouth 2-3 times a week takes 1 tab   VANADYL SULFATE PO  Self Yes No   Sig: Take by mouth 10 mg tabs  Takes 1-2 daily   acetaminophen (TYLENOL) 325 mg tablet   No No   Sig: Take 2 tablets (650 mg total) by mouth every 6 (six) hours as needed for fever   Patient taking differently: 2 every 4 hours alt with 1  mg ASA  ascorbic acid (VITAMIN C) 500 mg tablet   Yes No   Sig: Take 500 mg by mouth 2 (two) times a day    aspirin (ECOTRIN) 325 mg EC tablet  Self Yes No   Si every 4 hrs alt with 2 reg strength tylenol every 4 hrs   aspirin 81 MG tablet  Self Yes No   Sig: Take by mouth Takes 1 daily   docusate sodium (COLACE) 100 mg capsule   No No   Sig: Take 1 capsule (100 mg total) by mouth 2 (two) times a day   fluocinonide (LIDEX) 0 05 % cream   No No   Sig: Apply topically 2 (two) times a day   hydrochlorothiazide (MICROZIDE) 12 5 mg capsule   No No   Sig: Take 1 capsule (12 5 mg total) by mouth daily   losartan (COZAAR) 50 mg tablet   No No   Sig: take 1 tablet by mouth once daily   oxyCODONE-acetaminophen (PERCOCET) 5-325 mg per tablet   No No   Sig: Take 1 tablet by mouth every 4 (four) hours as needed for moderate painMax Daily Amount: 6 tablets   Patient not taking: Reported on 10/15/2019   patient supplied medication   Yes No   zaleplon (SONATA) 5 MG capsule   No No   Sig: Take 1 capsule (5 mg total) by mouth daily at bedtime      Facility-Administered Medications: None       Past Medical History:   Diagnosis Date    Anxiety     Cerebral palsy (HCC)     Hypertension     Pre-diabetes     Scoliosis        Past Surgical History:   Procedure Laterality Date    APPENDECTOMY      BUNIONECTOMY Bilateral     NV PARTIAL HIP REPLACEMENT Right 2019    Procedure: HEMIARTHROPLASTY HIP (BIPOLAR);   Surgeon: Joey Funes MD;  Location: [facility];  Service: Orthopedics       Family History   Problem Relation Age of Onset    Hypertension Mother     Heart disease Mother     Prostate cancer Father     Heart disease Father     Substance Abuse Neg Hx     Mental illness Neg Hx      I have reviewed and agree with the history as documented  Social History     Tobacco Use    Smoking status: Never Smoker    Smokeless tobacco: Never Used   Substance Use Topics    Alcohol use: Never     Frequency: Never    Drug use: Never       Review of Systems   Constitutional: Positive for fatigue  HENT: Positive for congestion and ear pain (Chronic left-sided)  Respiratory: Positive for cough  Gastrointestinal: Negative for abdominal pain  Musculoskeletal: Positive for myalgias  Right-sided hip surgery 5 months ago reproducible left-sided chest wall pain   Neurological: Negative for headaches  All other systems reviewed and are negative  Physical Exam  Physical Exam   Constitutional: She is oriented to person, place, and time  She appears well-developed and well-nourished  No distress  HENT:   Head: Atraumatic  Right Ear: External ear normal    Left Ear: External ear normal    Mouth/Throat: Oropharynx is clear and moist  No oropharyngeal exudate  Eyes: Pupils are equal, round, and reactive to light  EOM are normal  Right eye exhibits no discharge  Left eye exhibits no discharge  No scleral icterus  Neck: Neck supple  No JVD present  No tracheal deviation present  Cardiovascular: Normal rate, regular rhythm and intact distal pulses  Exam reveals no gallop and no friction rub  No murmur heard  Pulmonary/Chest: Breath sounds normal  No stridor  No respiratory distress  She has no wheezes  She exhibits tenderness ( left chest wall)  Abdominal: Soft  Bowel sounds are normal  She exhibits no distension  There is no tenderness  There is no rebound and no guarding  Musculoskeletal: She exhibits no edema     Patient uses walker and wheelchair secondary to right hip surgery decreased range of motion secondary to the recent procedure no sign of DVT   Lymphadenopathy:     She has cervical adenopathy (Slight left upper cervical anterior adenopathy)  Neurological: She is alert and oriented to person, place, and time  No cranial nerve deficit or sensory deficit  She exhibits normal muscle tone  Coordination normal    Skin: Skin is warm and dry  No rash noted  She is not diaphoretic  Psychiatric: She has a normal mood and affect  Her behavior is normal  Thought content normal    Nursing note and vitals reviewed        Vital Signs  ED Triage Vitals [02/15/20 1645]   Temperature Pulse Respirations Blood Pressure SpO2   97 9 °F (36 6 °C) 78 16 136/86 100 %      Temp Source Heart Rate Source Patient Position - Orthostatic VS BP Location FiO2 (%)   Temporal Monitor Sitting Right arm --      Pain Score       No Pain           Vitals:    02/15/20 1645   BP: 136/86   Pulse: 78   Patient Position - Orthostatic VS: Sitting         Visual Acuity      ED Medications  Medications   potassium chloride (K-DUR,KLOR-CON) CR tablet 40 mEq (40 mEq Oral Given 2/15/20 1812)       Diagnostic Studies  Results Reviewed     Procedure Component Value Units Date/Time    Influenza A/B and RSV PCR [[id number]]  (Normal) Collected:  02/15/20 1732    Lab Status:  Final result Specimen:  Nasopharyngeal Swab Updated:  02/15/20 1817     INFLUENZA A PCR None Detected     INFLUENZA B PCR None Detected     RSV PCR None Detected    Basic metabolic panel [[id number]]  (Abnormal) Collected:  02/15/20 1731    Lab Status:  Final result Specimen:  Blood from Arm, Right Updated:  02/15/20 1801     Sodium 137 mmol/L      Potassium 3 4 mmol/L      Chloride 100 mmol/L      CO2 32 mmol/L      ANION GAP 5 mmol/L      BUN 19 mg/dL      Creatinine 0 74 mg/dL      Glucose 94 mg/dL      Calcium 9 4 mg/dL      eGFR 90 ml/min/1 73sq m     Narrative:       [place] guidelines for Chronic Kidney Disease (CKD):     Stage 1 with normal or high GFR (GFR > 90 mL/min/1 73 square meters)    Stage 2 Mild CKD (GFR = 60-89 mL/min/1 73 square meters)    Stage 3A Moderate CKD (GFR = 45-59 mL/min/1 73 square meters)    Stage 3B Moderate CKD (GFR = 30-44 mL/min/1 73 square meters)    Stage 4 Severe CKD (GFR = 15-29 mL/min/1 73 square meters)    Stage 5 End Stage CKD (GFR <15 mL/min/1 73 square meters)  Note: GFR calculation is accurate only with a steady state creatinine    CBC and differential [[id number]]  (Abnormal) Collected:  02/15/20 1731    Lab Status:  Final result Specimen:  Blood from Arm, Right Updated:  02/15/20 1739     WBC 5 60 Thousand/uL      RBC 4 94 Million/uL      Hemoglobin 15 6 g/dL      Hematocrit 45 9 %      MCV 93 fL      MCH 31 6 pg      MCHC 34 0 g/dL      RDW 12 6 %      MPV 9 7 fL      Platelets 931 Thousands/uL      nRBC 0 /100 WBCs      Neutrophils Relative 54 %      Immat GRANS % 0 %      Lymphocytes Relative 30 %      Monocytes Relative 8 %      Eosinophils Relative 7 %      Basophils Relative 1 %      Neutrophils Absolute 3 00 Thousands/µL      Immature Grans Absolute 0 02 Thousand/uL      Lymphocytes Absolute 1 70 Thousands/µL      Monocytes Absolute 0 46 Thousand/µL      Eosinophils Absolute 0 37 Thousand/µL      Basophils Absolute 0 05 Thousands/µL                  XR chest 2 views   ED Interpretation by Reese Rogers DO (02/15 1750)   No acute infiltrate or cardiomegaly or congestive heart failure                 Procedures  Procedures         ED Course                               MDM      Disposition  Final diagnoses:   Viral URI with cough   Hypokalemia - Mild     Time reflects when diagnosis was documented in both MDM as applicable and the Disposition within this note     Time User Action Codes Description Comment    2/15/2020  6:25 PM Marie Odor Add [J06 9,  B97 89] Viral URI with cough     2/15/2020  6:25 PM Marie Odor Add [E87 6] Hypokalemia     2/15/2020  6:25 PM Jovanna Blaze Modify [E87 6] Hypokalemia Mild      ED Disposition     ED Disposition Condition Date/Time Comment    Discharge Stable Sat Feb 15, 2020  6:25 PM [place] discharge to home/self care  Follow-up Information     Follow up With Specialties Details Why Contact Info    Elda Gilliam MD Internal Medicine In 1 week Follow-up recheck [place] [address] [facility]  [phone]            Patient's Medications   Discharge Prescriptions    No medications on file     No discharge procedures on file      PDMP Review       Value Time User    PDMP Reviewed  Yes 9/25/2019  9:54 AM Rigo Bruner MD          ED Provider  Electronically Signed by           Sawyer Walls DO  02/15/20 1846

## 2020-02-17 ENCOUNTER — TELEPHONE (OUTPATIENT)
Dept: FAMILY MEDICINE CLINIC | Facility: HOSPITAL | Age: 60
End: 2020-02-17

## 2020-02-17 NOTE — TELEPHONE ENCOUNTER
Please advise PT Potassium was 3 4     Pt also wants to know what could be the cause of why her potassium is dropping    DD

## 2020-02-17 NOTE — TELEPHONE ENCOUNTER
Pt was in the Er Saturday due to a fall  While there, her Potassium was checked and it was low  She is taking a Potassium supplement and a dieretic and is wondering what would make her Potassium drop    She can be reached at [phone]

## 2020-02-17 NOTE — TELEPHONE ENCOUNTER
The water pilll makes potassium drop  Increase potassium to twice a day for 2 weeks and get repeat bmp

## 2020-02-18 ENCOUNTER — TELEPHONE (OUTPATIENT)
Dept: FAMILY MEDICINE CLINIC | Facility: HOSPITAL | Age: 60
End: 2020-02-18

## 2020-02-18 ENCOUNTER — APPOINTMENT (OUTPATIENT)
Dept: PHYSICAL THERAPY | Facility: CLINIC | Age: 60
End: 2020-02-18
Payer: COMMERCIAL

## 2020-02-18 DIAGNOSIS — E87.6 LOW BLOOD POTASSIUM: Primary | ICD-10-CM

## 2020-02-18 NOTE — TELEPHONE ENCOUNTER
Pt called again had a very lengthy call with her  She wants to know what dose potassium she is supposed to be on and what brand to use for potassium otc she wants to know what is the better brand

## 2020-02-18 NOTE — TELEPHONE ENCOUNTER
TO TAKE - ALSO FOR THE BMP IS IT NECESSARY FOR HER TO FAST? - SHE SAID DOES SHE REALLY NEED HER WATER PILL? IS THERE SOMETHING ELSE SHE CAN TAKE IN ITS PLACE - BECAUSE SHE FEELS THIS IS THROWING IT OVER THE EDGE    ALSO SHE FEELS WHEN HER BODY IS DOWN, INJURY, VIRUS THIS HAPPENS W/ HER POTASSIUM - PLEASE ADVISE

## 2020-02-18 NOTE — TELEPHONE ENCOUNTER
Advised pt that fasting not necessary for bmp  Take 2 otc K+, as well as continue the other supplement that she takes   CR

## 2020-02-19 ENCOUNTER — TELEPHONE (OUTPATIENT)
Dept: FAMILY MEDICINE CLINIC | Facility: HOSPITAL | Age: 60
End: 2020-02-19

## 2020-02-19 DIAGNOSIS — R53.83 FATIGUE, UNSPECIFIED TYPE: Primary | ICD-10-CM

## 2020-02-19 NOTE — TELEPHONE ENCOUNTER
Her potassium is barely below normal   I suggest stop her microzide, water pill, and be sure to eat a banana a few times a week

## 2020-02-19 NOTE — TELEPHONE ENCOUNTER
Patient states that she spoke with her pharmacist and he suggested switching her to Spironolactone as this does not affect the postassium levels  Patient would like to try this  Please advise

## 2020-02-19 NOTE — TELEPHONE ENCOUNTER
Been there her entire life    She has gotten fresh air and has opened the windows to circulate air but she is still feeling very very tired and she said at her age she shouldn't feel this tired    She uses [facility]     **she said she is having a bmp done next week

## 2020-02-20 ENCOUNTER — TELEPHONE (OUTPATIENT)
Dept: FAMILY MEDICINE CLINIC | Facility: HOSPITAL | Age: 60
End: 2020-02-20

## 2020-02-20 NOTE — TELEPHONE ENCOUNTER
Just an FYI - Pt called with something  that she would like to talk to you about at her upcoming visit on 3/10  She is wondering if she could have fibromyalgia  Along with the lack of enegry, she has muscle tenderness, muscles burn and bone tenderness  She is ok if she is not doing anything

## 2020-02-25 ENCOUNTER — APPOINTMENT (OUTPATIENT)
Dept: PHYSICAL THERAPY | Facility: CLINIC | Age: 60
End: 2020-02-25
Payer: COMMERCIAL

## 2020-02-25 ENCOUNTER — TELEPHONE (OUTPATIENT)
Dept: FAMILY MEDICINE CLINIC | Facility: HOSPITAL | Age: 60
End: 2020-02-25

## 2020-02-25 DIAGNOSIS — R53.82 CHRONIC FATIGUE: Primary | ICD-10-CM

## 2020-02-25 NOTE — TELEPHONE ENCOUNTER
Pt would like an iron test ordered as she is feeling extremely tired  Can an environmental toxins test be ordered?   Pt can be reached at [phone]

## 2020-03-04 ENCOUNTER — TELEPHONE (OUTPATIENT)
Dept: FAMILY MEDICINE CLINIC | Facility: HOSPITAL | Age: 60
End: 2020-03-04

## 2020-03-04 LAB
25(OH)D3+25(OH)D2 SERPL-MCNC: 85.9 NG/ML (ref 30–100)
BUN SERPL-MCNC: 17 MG/DL (ref 6–24)
BUN/CREAT SERPL: 20 (ref 9–23)
CALCIUM SERPL-MCNC: 10 MG/DL (ref 8.7–10.2)
CHLORIDE SERPL-SCNC: 100 MMOL/L (ref 96–106)
CO2 SERPL-SCNC: 25 MMOL/L (ref 20–29)
CREAT SERPL-MCNC: 0.85 MG/DL (ref 0.57–1)
FERRITIN SERPL-MCNC: 137 NG/ML (ref 15–150)
GLUCOSE SERPL-MCNC: 89 MG/DL (ref 65–99)
IRON SATN MFR SERPL: 40 % (ref 15–55)
IRON SERPL-MCNC: 104 UG/DL (ref 27–159)
POTASSIUM SERPL-SCNC: 4.3 MMOL/L (ref 3.5–5.2)
SL AMB EGFR AFRICAN AMERICAN: 87 ML/MIN/1.73
SL AMB EGFR NON AFRICAN AMERICAN: 76 ML/MIN/1.73
SODIUM SERPL-SCNC: 140 MMOL/L (ref 134–144)
TIBC SERPL-MCNC: 257 UG/DL (ref 250–450)
TSH SERPL DL<=0.005 MIU/L-ACNC: 0.8 UIU/ML (ref 0.45–4.5)
UIBC SERPL-MCNC: 153 UG/DL (ref 131–425)

## 2020-03-04 NOTE — TELEPHONE ENCOUNTER
Please call patient and inform that at this point, due to on-gong and persistnet sleep prolems, I have nothing to offer  I would like her to see Dr Humphries Check for a sleep consult

## 2020-03-05 ENCOUNTER — TELEPHONE (OUTPATIENT)
Dept: FAMILY MEDICINE CLINIC | Facility: HOSPITAL | Age: 60
End: 2020-03-05

## 2020-03-05 NOTE — TELEPHONE ENCOUNTER
Pt called  Wanted you to know that she slept well last night on the new dose of med  It worked well with bone broth and increased Vit C  She is not going to go for a sleep study right now  She wants to see how she does on this dosage  She respects your medical  opinion but wants to give this a try since she had a good night sleep last night

## 2020-03-09 DIAGNOSIS — G47.00 INSOMNIA, UNSPECIFIED TYPE: ICD-10-CM

## 2020-03-09 RX ORDER — LORAZEPAM 1 MG/1
TABLET ORAL
Qty: 45 TABLET | Refills: 0 | OUTPATIENT
Start: 2020-03-09 | End: 2020-04-14 | Stop reason: SDUPTHER

## 2020-03-09 NOTE — TELEPHONE ENCOUNTER
I called this in, this is just for documentation purposes  This is set for phone in so it will not go to the pharmacy

## 2020-03-10 ENCOUNTER — TELEPHONE (OUTPATIENT)
Dept: FAMILY MEDICINE CLINIC | Facility: HOSPITAL | Age: 60
End: 2020-03-10

## 2020-03-10 NOTE — TELEPHONE ENCOUNTER
HER BLOOD PRESSURE SHE HAS AN OLD CUFF SHE CAN USE - WE JUST NEED TO TELL HER WHAT TIME OF DAY (TO BE HONEST NOT SURE WHAT THIS WAS ALL ABOUT)

## 2020-03-11 ENCOUNTER — TELEPHONE (OUTPATIENT)
Dept: FAMILY MEDICINE CLINIC | Facility: HOSPITAL | Age: 60
End: 2020-03-11

## 2020-03-11 NOTE — TELEPHONE ENCOUNTER
Refer to allergy  This would be a first test, they can do tests for allergens and molds and will advise her

## 2020-03-11 NOTE — TELEPHONE ENCOUNTER
See call re: Microzide  02/19 phone message you suggested stop Microzide  03/03 BMP normal  Should she continue to hold med  Pt cancelled ov 03/10/20   Please advise re: BP

## 2020-03-11 NOTE — TELEPHONE ENCOUNTER
Patient has cough, intermittent shortness of breath, fatigue, swollen glands, and her left ear feels clogged  She thought that it was a viral infection at first but this has been going on for over 2 months  She states that she did have mold inside her home but that has been resolved, and now she thinks the mold may be inside of her and is making her sick  She is asking if you have any suggestions on how  to get rid of the mold in her body,  or if there are specialists that deal with toxic environmental exposures  Please advise

## 2020-03-11 NOTE — TELEPHONE ENCOUNTER
Yes, instruct to discontinue this med  It is a water pill     If concerned can check BP at a pharmacy, they usually have machines or schedule a nurse visit in 2 weeks to get BP check

## 2020-03-12 NOTE — TELEPHONE ENCOUNTER
Pt bert leyva  Has foll up ov set for 04/06  Feels she has mold poisoning  Is asking for recommendation on who to see to be evaluated for mold exposure? ?

## 2020-03-12 NOTE — TELEPHONE ENCOUNTER
I got this same message yesterday, I think Pablo Wray addressed it  My answer is to refer to allergist for evaluation of this issue

## 2020-04-09 ENCOUNTER — TELEPHONE (OUTPATIENT)
Dept: FAMILY MEDICINE CLINIC | Facility: HOSPITAL | Age: 60
End: 2020-04-09

## 2020-04-10 ENCOUNTER — TELEPHONE (OUTPATIENT)
Dept: FAMILY MEDICINE CLINIC | Facility: HOSPITAL | Age: 60
End: 2020-04-10

## 2020-04-13 DIAGNOSIS — G47.00 INSOMNIA, UNSPECIFIED TYPE: ICD-10-CM

## 2020-04-13 RX ORDER — LORAZEPAM 0.5 MG/1
1 TABLET ORAL
Qty: 30 TABLET | Refills: 3 | Status: SHIPPED | OUTPATIENT
Start: 2020-04-13 | End: 2020-04-14 | Stop reason: SDUPTHER

## 2020-04-14 ENCOUNTER — TELEMEDICINE (OUTPATIENT)
Dept: FAMILY MEDICINE CLINIC | Facility: HOSPITAL | Age: 60
End: 2020-04-14
Payer: COMMERCIAL

## 2020-04-14 ENCOUNTER — TELEPHONE (OUTPATIENT)
Dept: FAMILY MEDICINE CLINIC | Facility: HOSPITAL | Age: 60
End: 2020-04-14

## 2020-04-14 VITALS
HEART RATE: 78 BPM | TEMPERATURE: 97.5 F | WEIGHT: 149 LBS | BODY MASS INDEX: 23.95 KG/M2 | HEIGHT: 66 IN | DIASTOLIC BLOOD PRESSURE: 78 MMHG | SYSTOLIC BLOOD PRESSURE: 116 MMHG

## 2020-04-14 DIAGNOSIS — F84.0 AUTISM DISORDER: Chronic | ICD-10-CM

## 2020-04-14 DIAGNOSIS — I10 ESSENTIAL HYPERTENSION: Primary | ICD-10-CM

## 2020-04-14 DIAGNOSIS — G47.00 INSOMNIA, UNSPECIFIED TYPE: ICD-10-CM

## 2020-04-14 DIAGNOSIS — G47.09 OTHER INSOMNIA: ICD-10-CM

## 2020-04-14 DIAGNOSIS — I10 HYPERTENSION, ESSENTIAL: ICD-10-CM

## 2020-04-14 DIAGNOSIS — R53.82 CHRONIC FATIGUE: ICD-10-CM

## 2020-04-14 PROBLEM — Z96.649 STATUS POST HIP HEMIARTHROPLASTY: Status: RESOLVED | Noted: 2019-10-04 | Resolved: 2020-04-14

## 2020-04-14 PROBLEM — S72.001A CLOSED DISPLACED FRACTURE OF RIGHT FEMORAL NECK (HCC): Status: RESOLVED | Noted: 2019-09-22 | Resolved: 2020-04-14

## 2020-04-14 PROCEDURE — G2012 BRIEF CHECK IN BY MD/QHP: HCPCS | Performed by: INTERNAL MEDICINE

## 2020-04-14 RX ORDER — LORAZEPAM 0.5 MG/1
1 TABLET ORAL
Qty: 30 TABLET | Refills: 3 | Status: SHIPPED | OUTPATIENT
Start: 2020-04-14 | End: 2020-05-26

## 2020-04-14 RX ORDER — LOSARTAN POTASSIUM 50 MG/1
50 TABLET ORAL DAILY
Qty: 90 TABLET | Refills: 3 | Status: SHIPPED | OUTPATIENT
Start: 2020-04-14 | End: 2020-04-15

## 2020-04-15 DIAGNOSIS — I10 HYPERTENSION, ESSENTIAL: ICD-10-CM

## 2020-04-15 RX ORDER — LORAZEPAM 1 MG/1
TABLET ORAL
Qty: 45 TABLET | Refills: 0 | Status: SHIPPED | OUTPATIENT
Start: 2020-04-15 | End: 2020-05-26

## 2020-04-15 RX ORDER — LORAZEPAM 1 MG/1
TABLET ORAL
Qty: 45 TABLET | OUTPATIENT
Start: 2020-04-15

## 2020-04-15 RX ORDER — LOSARTAN POTASSIUM 50 MG/1
TABLET ORAL
Qty: 90 TABLET | Refills: 3 | Status: ON HOLD | OUTPATIENT
Start: 2020-04-15 | End: 2021-03-09 | Stop reason: SDUPTHER

## 2020-05-04 ENCOUNTER — TELEPHONE (OUTPATIENT)
Dept: FAMILY MEDICINE CLINIC | Facility: HOSPITAL | Age: 60
End: 2020-05-04

## 2020-05-19 ENCOUNTER — TELEPHONE (OUTPATIENT)
Dept: FAMILY MEDICINE CLINIC | Facility: HOSPITAL | Age: 60
End: 2020-05-19

## 2020-05-21 ENCOUNTER — TELEMEDICINE (OUTPATIENT)
Dept: FAMILY MEDICINE CLINIC | Facility: HOSPITAL | Age: 60
End: 2020-05-21
Payer: COMMERCIAL

## 2020-05-21 ENCOUNTER — TELEPHONE (OUTPATIENT)
Dept: INFECTIOUS DISEASES | Facility: CLINIC | Age: 60
End: 2020-05-21

## 2020-05-21 VITALS
BODY MASS INDEX: 23.75 KG/M2 | HEIGHT: 66 IN | TEMPERATURE: 97.4 F | SYSTOLIC BLOOD PRESSURE: 123 MMHG | DIASTOLIC BLOOD PRESSURE: 75 MMHG | WEIGHT: 147.8 LBS | HEART RATE: 78 BPM

## 2020-05-21 DIAGNOSIS — F32.A DEPRESSION, UNSPECIFIED DEPRESSION TYPE: Primary | ICD-10-CM

## 2020-05-21 DIAGNOSIS — I10 ESSENTIAL HYPERTENSION: ICD-10-CM
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
## 2020-05-21 PROBLEM — R53.83 FATIGUE: Status: RESOLVED | Noted: 2019-03-22 | Resolved: 2020-05-21

## 2020-05-21 PROBLEM — R60.0 LEG EDEMA: Status: RESOLVED | Noted: 2019-12-09 | Resolved: 2020-05-21

## 2020-05-21 PROCEDURE — G2012 BRIEF CHECK IN BY MD/QHP: HCPCS | Performed by: INTERNAL MEDICINE

## 2020-05-26 ENCOUNTER — TELEPHONE (OUTPATIENT)
Dept: FAMILY MEDICINE CLINIC | Facility: HOSPITAL | Age: 60
End: 2020-05-26

## 2020-05-26 DIAGNOSIS — G47.00 INSOMNIA, UNSPECIFIED TYPE: ICD-10-CM

## 2020-05-26 RX ORDER — LORAZEPAM 1 MG/1
TABLET ORAL
Qty: 60 TABLET | Refills: 0 | Status: SHIPPED | OUTPATIENT
Start: 2020-05-26 | End: 2020-07-03 | Stop reason: SDUPTHER

## 2020-06-02 ENCOUNTER — TELEMEDICINE (OUTPATIENT)
Dept: FAMILY MEDICINE CLINIC | Facility: HOSPITAL | Age: 60
End: 2020-06-02
Payer: COMMERCIAL

## 2020-06-02 VITALS — TEMPERATURE: 97 F | HEART RATE: 80 BPM | DIASTOLIC BLOOD PRESSURE: 80 MMHG | SYSTOLIC BLOOD PRESSURE: 112 MMHG

## 2020-06-02 DIAGNOSIS — Z20.828 EXPOSURE TO SARS-ASSOCIATED CORONAVIRUS: ICD-10-CM

## 2020-06-02 DIAGNOSIS — Z20.822 EXPOSURE TO COVID-19 VIRUS: Primary | ICD-10-CM

## 2020-06-02 PROCEDURE — G2012 BRIEF CHECK IN BY MD/QHP: HCPCS | Performed by: INTERNAL MEDICINE

## 2020-06-03 DIAGNOSIS — Z20.828 EXPOSURE TO SARS-ASSOCIATED CORONAVIRUS: ICD-10-CM

## 2020-06-03 PROCEDURE — U0003 INFECTIOUS AGENT DETECTION BY NUCLEIC ACID (DNA OR RNA); SEVERE ACUTE RESPIRATORY SYNDROME CORONAVIRUS 2 (SARS-COV-2) (CORONAVIRUS DISEASE [COVID-19]), AMPLIFIED PROBE TECHNIQUE, MAKING USE OF HIGH THROUGHPUT TECHNOLOGIES AS DESCRIBED BY CMS-2020-01-R: HCPCS

## 2020-06-05 ENCOUNTER — TELEPHONE (OUTPATIENT)
Dept: OTHER | Facility: OTHER | Age: 60
End: 2020-06-05

## 2020-06-05 LAB — SARS-COV-2 RNA SPEC QL NAA+PROBE: NOT DETECTED

## 2020-06-09 ENCOUNTER — TELEMEDICINE (OUTPATIENT)
Dept: BEHAVIORAL/MENTAL HEALTH CLINIC | Facility: CLINIC | Age: 60
End: 2020-06-09
Payer: COMMERCIAL

## 2020-06-09 DIAGNOSIS — Z86.59 HISTORY OF ATTENTION DEFICIT HYPERACTIVITY DISORDER (ADHD): ICD-10-CM

## 2020-06-09 DIAGNOSIS — Z86.59 HISTORY OF ASPERGER'S SYNDROME: Primary | ICD-10-CM

## 2020-06-09 DIAGNOSIS — F43.23 ADJUSTMENT DISORDER WITH MIXED ANXIETY AND DEPRESSED MOOD: ICD-10-CM

## 2020-06-09 PROCEDURE — 90837 PSYTX W PT 60 MINUTES: CPT | Performed by: SOCIAL WORKER

## 2020-06-10 ENCOUNTER — APPOINTMENT (OUTPATIENT)
Dept: RADIOLOGY | Facility: CLINIC | Age: 60
End: 2020-06-10
Payer: COMMERCIAL

## 2020-06-10 ENCOUNTER — TELEPHONE (OUTPATIENT)
Dept: FAMILY MEDICINE CLINIC | Facility: HOSPITAL | Age: 60
End: 2020-06-10

## 2020-06-10 ENCOUNTER — OFFICE VISIT (OUTPATIENT)
Dept: OBGYN CLINIC | Facility: CLINIC | Age: 60
End: 2020-06-10
Payer: COMMERCIAL

## 2020-06-10 VITALS — BODY MASS INDEX: 23.3 KG/M2 | WEIGHT: 145 LBS | HEIGHT: 66 IN | TEMPERATURE: 99.9 F

## 2020-06-10 DIAGNOSIS — Z96.649 STATUS POST HIP HEMIARTHROPLASTY: ICD-10-CM

## 2020-06-10 DIAGNOSIS — Z96.649 STATUS POST HIP HEMIARTHROPLASTY: Primary | ICD-10-CM

## 2020-06-10 PROCEDURE — 3008F BODY MASS INDEX DOCD: CPT | Performed by: ORTHOPAEDIC SURGERY

## 2020-06-10 PROCEDURE — 3079F DIAST BP 80-89 MM HG: CPT | Performed by: ORTHOPAEDIC SURGERY

## 2020-06-10 PROCEDURE — 1036F TOBACCO NON-USER: CPT | Performed by: ORTHOPAEDIC SURGERY

## 2020-06-10 PROCEDURE — 3074F SYST BP LT 130 MM HG: CPT | Performed by: ORTHOPAEDIC SURGERY

## 2020-06-10 PROCEDURE — 99213 OFFICE O/P EST LOW 20 MIN: CPT | Performed by: ORTHOPAEDIC SURGERY

## 2020-06-10 PROCEDURE — 73502 X-RAY EXAM HIP UNI 2-3 VIEWS: CPT

## 2020-06-17 ENCOUNTER — HOSPITAL ENCOUNTER (EMERGENCY)
Facility: HOSPITAL | Age: 60
Discharge: HOME | End: 2020-06-17
Attending: EMERGENCY MEDICINE | Admitting: EMERGENCY MEDICINE
Payer: COMMERCIAL

## 2020-06-17 VITALS
HEART RATE: 88 BPM | TEMPERATURE: 98.2 F | WEIGHT: 145 LBS | SYSTOLIC BLOOD PRESSURE: 135 MMHG | BODY MASS INDEX: 23.76 KG/M2 | OXYGEN SATURATION: 98 % | RESPIRATION RATE: 18 BRPM | DIASTOLIC BLOOD PRESSURE: 92 MMHG

## 2020-06-17 DIAGNOSIS — R11.0 NAUSEA: Primary | ICD-10-CM

## 2020-06-17 DIAGNOSIS — R53.83 FATIGUE: ICD-10-CM

## 2020-06-17 LAB
ALBUMIN SERPL BCP-MCNC: 3.8 G/DL (ref 3.5–5)
ALP SERPL-CCNC: 83 U/L (ref 46–116)
ALT SERPL W P-5'-P-CCNC: 29 U/L (ref 12–78)
ANION GAP SERPL CALCULATED.3IONS-SCNC: 8 MMOL/L (ref 4–13)
AST SERPL W P-5'-P-CCNC: 15 U/L (ref 5–45)
BASOPHILS # BLD AUTO: 0.04 THOUSANDS/ΜL (ref 0–0.1)
BASOPHILS NFR BLD AUTO: 1 % (ref 0–1)
BILIRUB SERPL-MCNC: 0.4 MG/DL (ref 0.2–1)
BUN SERPL-MCNC: 16 MG/DL (ref 5–25)
CALCIUM SERPL-MCNC: 9.2 MG/DL (ref 8.3–10.1)
CHLORIDE SERPL-SCNC: 104 MMOL/L (ref 100–108)
CO2 SERPL-SCNC: 29 MMOL/L (ref 21–32)
CREAT SERPL-MCNC: 0.7 MG/DL (ref 0.6–1.3)
EOSINOPHIL # BLD AUTO: 0.07 THOUSAND/ΜL (ref 0–0.61)
EOSINOPHIL NFR BLD AUTO: 2 % (ref 0–6)
ERYTHROCYTE [DISTWIDTH] IN BLOOD BY AUTOMATED COUNT: 12.7 % (ref 11.6–15.1)
GFR SERPL CREATININE-BSD FRML MDRD: 95 ML/MIN/1.73SQ M
GLUCOSE SERPL-MCNC: 113 MG/DL (ref 65–140)
HCT VFR BLD AUTO: 48.5 % (ref 34.8–46.1)
HGB BLD-MCNC: 16.7 G/DL (ref 11.5–15.4)
IMM GRANULOCYTES # BLD AUTO: 0.01 THOUSAND/UL (ref 0–0.2)
IMM GRANULOCYTES NFR BLD AUTO: 0 % (ref 0–2)
LYMPHOCYTES # BLD AUTO: 1.21 THOUSANDS/ΜL (ref 0.6–4.47)
LYMPHOCYTES NFR BLD AUTO: 25 % (ref 14–44)
MCH RBC QN AUTO: 31.9 PG (ref 26.8–34.3)
MCHC RBC AUTO-ENTMCNC: 34.4 G/DL (ref 31.4–37.4)
MCV RBC AUTO: 93 FL (ref 82–98)
MONOCYTES # BLD AUTO: 0.32 THOUSAND/ΜL (ref 0.17–1.22)
MONOCYTES NFR BLD AUTO: 7 % (ref 4–12)
NEUTROPHILS # BLD AUTO: 3.16 THOUSANDS/ΜL (ref 1.85–7.62)
NEUTS SEG NFR BLD AUTO: 65 % (ref 43–75)
NRBC BLD AUTO-RTO: 0 /100 WBCS
PLATELET # BLD AUTO: 256 THOUSANDS/UL (ref 149–390)
PMV BLD AUTO: 10.2 FL (ref 8.9–12.7)
POTASSIUM SERPL-SCNC: 3.9 MMOL/L (ref 3.5–5.3)
PROT SERPL-MCNC: 7.2 G/DL (ref 6.4–8.2)
RBC # BLD AUTO: 5.24 MILLION/UL (ref 3.81–5.12)
SODIUM SERPL-SCNC: 141 MMOL/L (ref 136–145)
TSH SERPL DL<=0.05 MIU/L-ACNC: 0.43 UIU/ML (ref 0.36–3.74)
WBC # BLD AUTO: 4.81 THOUSAND/UL (ref 4.31–10.16)

## 2020-06-17 PROCEDURE — 99284 EMERGENCY DEPT VISIT MOD MDM: CPT | Performed by: PHYSICIAN ASSISTANT

## 2020-06-17 PROCEDURE — 99283 EMERGENCY DEPT VISIT LOW MDM: CPT

## 2020-06-17 PROCEDURE — 80053 COMPREHEN METABOLIC PANEL: CPT | Performed by: PHYSICIAN ASSISTANT

## 2020-06-17 PROCEDURE — 84443 ASSAY THYROID STIM HORMONE: CPT | Performed by: PHYSICIAN ASSISTANT

## 2020-06-17 PROCEDURE — 85025 COMPLETE CBC W/AUTO DIFF WBC: CPT | Performed by: PHYSICIAN ASSISTANT

## 2020-06-17 PROCEDURE — 96374 THER/PROPH/DIAG INJ IV PUSH: CPT

## 2020-06-17 PROCEDURE — 96361 HYDRATE IV INFUSION ADD-ON: CPT

## 2020-06-17 PROCEDURE — 36415 COLL VENOUS BLD VENIPUNCTURE: CPT | Performed by: PHYSICIAN ASSISTANT

## 2020-06-17 RX ORDER — ONDANSETRON 2 MG/ML
4 INJECTION INTRAMUSCULAR; INTRAVENOUS ONCE
Status: COMPLETED | OUTPATIENT
Start: 2020-06-17 | End: 2020-06-17

## 2020-06-17 RX ORDER — ONDANSETRON 4 MG/1
4 TABLET, ORALLY DISINTEGRATING ORAL EVERY 6 HOURS PRN
Qty: 20 TABLET | Refills: 0 | Status: SHIPPED | OUTPATIENT
Start: 2020-06-17 | End: 2020-07-06 | Stop reason: SDUPTHER

## 2020-06-17 RX ADMIN — SODIUM CHLORIDE 1000 ML: 0.9 INJECTION, SOLUTION INTRAVENOUS at 12:25

## 2020-06-17 RX ADMIN — ONDANSETRON 4 MG: 2 INJECTION INTRAMUSCULAR; INTRAVENOUS at 12:23

## 2020-06-19 ENCOUNTER — VBI (OUTPATIENT)
Dept: FAMILY MEDICINE CLINIC | Facility: HOSPITAL | Age: 60
End: 2020-06-19

## 2020-06-24 ENCOUNTER — TELEPHONE (OUTPATIENT)
Dept: FAMILY MEDICINE CLINIC | Facility: HOSPITAL | Age: 60
End: 2020-06-24

## 2020-06-29 ENCOUNTER — ANNUAL EXAM (OUTPATIENT)
Dept: OBGYN CLINIC | Facility: CLINIC | Age: 60
End: 2020-06-29
Payer: COMMERCIAL

## 2020-06-29 VITALS
SYSTOLIC BLOOD PRESSURE: 128 MMHG | DIASTOLIC BLOOD PRESSURE: 68 MMHG | HEIGHT: 66 IN | BODY MASS INDEX: 23.78 KG/M2 | WEIGHT: 148 LBS

## 2020-06-29 DIAGNOSIS — Z01.419 ENCOUNTER FOR GYNECOLOGICAL EXAMINATION WITHOUT ABNORMAL FINDING: Primary | ICD-10-CM

## 2020-06-29 DIAGNOSIS — Z12.31 ENCOUNTER FOR SCREENING MAMMOGRAM FOR MALIGNANT NEOPLASM OF BREAST: ICD-10-CM

## 2020-06-29 DIAGNOSIS — Z12.4 SCREENING FOR CERVICAL CANCER: ICD-10-CM

## 2020-06-29 DIAGNOSIS — N89.8 VAGINAL DISCHARGE: ICD-10-CM

## 2020-06-29 DIAGNOSIS — Z11.51 SCREENING FOR HPV (HUMAN PAPILLOMAVIRUS): ICD-10-CM

## 2020-06-29 LAB
BV WHIFF TEST VAG QL: 0
CLUE CELLS SPEC QL WET PREP: 0
PH SMN: 0 [PH]
SL AMB POCT WET MOUNT: 0
T VAGINALIS VAG QL WET PREP: 0
YEAST VAG QL WET PREP: 0

## 2020-06-29 PROCEDURE — 3074F SYST BP LT 130 MM HG: CPT | Performed by: OBSTETRICS & GYNECOLOGY

## 2020-06-29 PROCEDURE — 99396 PREV VISIT EST AGE 40-64: CPT | Performed by: OBSTETRICS & GYNECOLOGY

## 2020-06-29 PROCEDURE — G0145 SCR C/V CYTO,THINLAYER,RESCR: HCPCS | Performed by: OBSTETRICS & GYNECOLOGY

## 2020-06-29 PROCEDURE — 3008F BODY MASS INDEX DOCD: CPT | Performed by: OBSTETRICS & GYNECOLOGY

## 2020-06-29 PROCEDURE — 87210 SMEAR WET MOUNT SALINE/INK: CPT | Performed by: OBSTETRICS & GYNECOLOGY

## 2020-06-29 PROCEDURE — 3078F DIAST BP <80 MM HG: CPT | Performed by: OBSTETRICS & GYNECOLOGY

## 2020-06-29 PROCEDURE — 87624 HPV HI-RISK TYP POOLED RSLT: CPT | Performed by: OBSTETRICS & GYNECOLOGY

## 2020-06-30 LAB
HPV HR 12 DNA CVX QL NAA+PROBE: NEGATIVE
HPV16 DNA CVX QL NAA+PROBE: NEGATIVE
HPV18 DNA CVX QL NAA+PROBE: NEGATIVE

## 2020-07-01 ENCOUNTER — TELEPHONE (OUTPATIENT)
Dept: FAMILY MEDICINE CLINIC | Facility: HOSPITAL | Age: 60
End: 2020-07-01

## 2020-07-02 DIAGNOSIS — G47.09 OTHER INSOMNIA: Primary | ICD-10-CM

## 2020-07-02 LAB
LAB AP GYN PRIMARY INTERPRETATION: NORMAL
Lab: NORMAL

## 2020-07-03 ENCOUNTER — TELEPHONE (OUTPATIENT)
Dept: FAMILY MEDICINE CLINIC | Facility: HOSPITAL | Age: 60
End: 2020-07-03

## 2020-07-03 DIAGNOSIS — G47.00 INSOMNIA, UNSPECIFIED TYPE: ICD-10-CM

## 2020-07-03 RX ORDER — LORAZEPAM 1 MG/1
TABLET ORAL
Qty: 60 TABLET | Refills: 2 | Status: SHIPPED | OUTPATIENT
Start: 2020-07-03 | End: 2021-01-11 | Stop reason: SDUPTHER

## 2020-07-03 RX ORDER — TEMAZEPAM 15 MG/1
15 CAPSULE ORAL
Qty: 30 CAPSULE | Refills: 3 | Status: SHIPPED | OUTPATIENT
Start: 2020-07-03 | End: 2021-01-11 | Stop reason: SDUPTHER

## 2020-07-03 RX ORDER — LORAZEPAM 1 MG/1
TABLET ORAL
Qty: 60 TABLET | Refills: 0 | Status: CANCELLED | OUTPATIENT
Start: 2020-07-03

## 2020-07-03 NOTE — TELEPHONE ENCOUNTER
PT WANTS A REFILL ON SLEEP MEDS AND ALSO STATES SHE FEELS SHE IS BREAKIING DOWN DUE TO LACK OF SLEEP

## 2020-07-03 NOTE — TELEPHONE ENCOUNTER
Health Call    Patient calling back for verification on her Lorazepam 1mg  States she has never taken 1 mg before and states she thought she was told to take a half  Bottle says 1mg  I paged Dr Torres Memos to call patient back to verify

## 2020-07-03 NOTE — TELEPHONE ENCOUNTER
Healthcall/Arpita De Santiago [phone]/pt stated was speaking with you about refill and got disconnected

## 2020-07-03 NOTE — TELEPHONE ENCOUNTER
Patient stated " I was discussing my refill with the MD on call and got disconnected " On call provider was contacted and patient infomred ot call back if does not hear from the provider in 30 minutes

## 2020-07-04 ENCOUNTER — TELEPHONE (OUTPATIENT)
Dept: OTHER | Facility: OTHER | Age: 60
End: 2020-07-04

## 2020-07-04 NOTE — TELEPHONE ENCOUNTER
Patient states she took one Temazepam and 1mg of Ativan last night and slept very well    She also began to address mold on Wednesday  And she has begun to see counselor

## 2020-07-06 ENCOUNTER — TELEPHONE (OUTPATIENT)
Dept: FAMILY MEDICINE CLINIC | Facility: HOSPITAL | Age: 60
End: 2020-07-06

## 2020-07-06 DIAGNOSIS — R11.0 NAUSEA: ICD-10-CM

## 2020-07-06 RX ORDER — ONDANSETRON 4 MG/1
4 TABLET, ORALLY DISINTEGRATING ORAL EVERY 6 HOURS PRN
Qty: 20 TABLET | Refills: 0 | Status: SHIPPED | OUTPATIENT
Start: 2020-07-06 | End: 2021-03-31 | Stop reason: HOSPADM

## 2020-07-06 NOTE — TELEPHONE ENCOUNTER
Unfortunately, there are no medications that work instantly  It is best if she waits until she speaks with a counsellor and should also consider a psychiatric referral   We would refer her to [facility] if she would like

## 2020-07-06 NOTE — TELEPHONE ENCOUNTER
WHICH SHE FEELS LIKE SHE COULD BE STABILIZING - SHE WILL BE SEEING THE [facility] AND SHE HAS AN APPT  W/ [facility] COUNSELING - TELEPHONE APPT   ON Monday - SHE FEELS SHE IS CLINCALLY DEPRESSED AND THAT A CLOUD IS OVER HER HEAD - SHE FEELS THE COUNSELOR WILL BE PRESCRIBING SOMETHING FOR THE DEPRESSION BUT DOESN'T FEEL SHE CAN WAIT THAT LONG IF SOMETHING COULD BE PRESCRIBED SOONER    MAINOR [address]STON [phone]      SHE SAID SHE HASN'T BEEN IN A GOOD PLACE FOR AWHILE - SHE SAID SHE HAS A LOT ON HER PLATE

## 2020-07-07 ENCOUNTER — TELEPHONE (OUTPATIENT)
Dept: FAMILY MEDICINE CLINIC | Facility: HOSPITAL | Age: 60
End: 2020-07-07

## 2020-07-08 DIAGNOSIS — M54.30 SCIATIC LEG PAIN: Primary | ICD-10-CM

## 2020-07-08 NOTE — TELEPHONE ENCOUNTER
Refer to pain management  They do the steroid injections  Meatime, can use aleve 2 tablets bid for 4 days and apply heating pad to area as needed

## 2020-08-05 ENCOUNTER — HOSPITAL ENCOUNTER (EMERGENCY)
Facility: HOSPITAL | Age: 60
End: 2020-08-06
Attending: EMERGENCY MEDICINE | Admitting: EMERGENCY MEDICINE
Payer: COMMERCIAL

## 2020-08-05 VITALS
HEART RATE: 82 BPM | WEIGHT: 148 LBS | DIASTOLIC BLOOD PRESSURE: 89 MMHG | RESPIRATION RATE: 18 BRPM | SYSTOLIC BLOOD PRESSURE: 136 MMHG | OXYGEN SATURATION: 99 % | BODY MASS INDEX: 24.25 KG/M2 | TEMPERATURE: 98.9 F

## 2020-08-05 DIAGNOSIS — G47.09 OTHER INSOMNIA: Primary | ICD-10-CM

## 2020-08-05 DIAGNOSIS — Z87.898: ICD-10-CM

## 2020-08-05 LAB
ALBUMIN SERPL BCP-MCNC: 3.6 G/DL (ref 3.5–5)
ALP SERPL-CCNC: 83 U/L (ref 46–116)
ALT SERPL W P-5'-P-CCNC: 29 U/L (ref 12–78)
AMPHETAMINES SERPL QL SCN: NEGATIVE
ANION GAP SERPL CALCULATED.3IONS-SCNC: 8 MMOL/L (ref 4–13)
AST SERPL W P-5'-P-CCNC: 15 U/L (ref 5–45)
BARBITURATES UR QL: NEGATIVE
BASOPHILS # BLD AUTO: 0.06 THOUSANDS/ΜL (ref 0–0.1)
BASOPHILS NFR BLD AUTO: 1 % (ref 0–1)
BENZODIAZ UR QL: NEGATIVE
BILIRUB SERPL-MCNC: 0.3 MG/DL (ref 0.2–1)
BILIRUB UR QL STRIP: NEGATIVE
BUN SERPL-MCNC: 15 MG/DL (ref 5–25)
CALCIUM SERPL-MCNC: 9.3 MG/DL (ref 8.3–10.1)
CHLORIDE SERPL-SCNC: 105 MMOL/L (ref 100–108)
CLARITY UR: CLEAR
CO2 SERPL-SCNC: 26 MMOL/L (ref 21–32)
COCAINE UR QL: NEGATIVE
COLOR UR: YELLOW
CREAT SERPL-MCNC: 0.74 MG/DL (ref 0.6–1.3)
EOSINOPHIL # BLD AUTO: 0.2 THOUSAND/ΜL (ref 0–0.61)
EOSINOPHIL NFR BLD AUTO: 3 % (ref 0–6)
ERYTHROCYTE [DISTWIDTH] IN BLOOD BY AUTOMATED COUNT: 12.8 % (ref 11.6–15.1)
ETHANOL EXG-MCNC: 0 MG/DL
GFR SERPL CREATININE-BSD FRML MDRD: 89 ML/MIN/1.73SQ M
GLUCOSE SERPL-MCNC: 135 MG/DL (ref 65–140)
GLUCOSE UR STRIP-MCNC: NEGATIVE MG/DL
HCT VFR BLD AUTO: 48.4 % (ref 34.8–46.1)
HGB BLD-MCNC: 16.6 G/DL (ref 11.5–15.4)
HGB UR QL STRIP.AUTO: NEGATIVE
IMM GRANULOCYTES # BLD AUTO: 0.02 THOUSAND/UL (ref 0–0.2)
IMM GRANULOCYTES NFR BLD AUTO: 0 % (ref 0–2)
KETONES UR STRIP-MCNC: NEGATIVE MG/DL
LEUKOCYTE ESTERASE UR QL STRIP: NEGATIVE
LYMPHOCYTES # BLD AUTO: 1.91 THOUSANDS/ΜL (ref 0.6–4.47)
LYMPHOCYTES NFR BLD AUTO: 29 % (ref 14–44)
MCH RBC QN AUTO: 31.9 PG (ref 26.8–34.3)
MCHC RBC AUTO-ENTMCNC: 34.3 G/DL (ref 31.4–37.4)
MCV RBC AUTO: 93 FL (ref 82–98)
METHADONE UR QL: NEGATIVE
MONOCYTES # BLD AUTO: 0.52 THOUSAND/ΜL (ref 0.17–1.22)
MONOCYTES NFR BLD AUTO: 8 % (ref 4–12)
NEUTROPHILS # BLD AUTO: 3.88 THOUSANDS/ΜL (ref 1.85–7.62)
NEUTS SEG NFR BLD AUTO: 59 % (ref 43–75)
NITRITE UR QL STRIP: NEGATIVE
NRBC BLD AUTO-RTO: 0 /100 WBCS
OPIATES UR QL SCN: NEGATIVE
OXYCODONE+OXYMORPHONE UR QL SCN: NEGATIVE
PCP UR QL: NEGATIVE
PH UR STRIP.AUTO: 6 [PH]
PLATELET # BLD AUTO: 289 THOUSANDS/UL (ref 149–390)
PMV BLD AUTO: 10.4 FL (ref 8.9–12.7)
POTASSIUM SERPL-SCNC: 3.5 MMOL/L (ref 3.5–5.3)
PROT SERPL-MCNC: 7.1 G/DL (ref 6.4–8.2)
PROT UR STRIP-MCNC: NEGATIVE MG/DL
RBC # BLD AUTO: 5.21 MILLION/UL (ref 3.81–5.12)
SARS-COV-2 RNA RESP QL NAA+PROBE: NEGATIVE
SODIUM SERPL-SCNC: 139 MMOL/L (ref 136–145)
SP GR UR STRIP.AUTO: 1.02 (ref 1–1.03)
THC UR QL: NEGATIVE
TSH SERPL DL<=0.05 MIU/L-ACNC: 0.54 UIU/ML (ref 0.36–3.74)
UROBILINOGEN UR QL STRIP.AUTO: 0.2 E.U./DL
WBC # BLD AUTO: 6.59 THOUSAND/UL (ref 4.31–10.16)

## 2020-08-05 PROCEDURE — 81003 URINALYSIS AUTO W/O SCOPE: CPT | Performed by: EMERGENCY MEDICINE

## 2020-08-05 PROCEDURE — 99284 EMERGENCY DEPT VISIT MOD MDM: CPT | Performed by: EMERGENCY MEDICINE

## 2020-08-05 PROCEDURE — 80307 DRUG TEST PRSMV CHEM ANLYZR: CPT | Performed by: EMERGENCY MEDICINE

## 2020-08-05 PROCEDURE — 99285 EMERGENCY DEPT VISIT HI MDM: CPT

## 2020-08-05 PROCEDURE — 84443 ASSAY THYROID STIM HORMONE: CPT | Performed by: EMERGENCY MEDICINE

## 2020-08-05 PROCEDURE — 80053 COMPREHEN METABOLIC PANEL: CPT | Performed by: EMERGENCY MEDICINE

## 2020-08-05 PROCEDURE — 82075 ASSAY OF BREATH ETHANOL: CPT | Performed by: EMERGENCY MEDICINE

## 2020-08-05 PROCEDURE — 85025 COMPLETE CBC W/AUTO DIFF WBC: CPT | Performed by: EMERGENCY MEDICINE

## 2020-08-05 PROCEDURE — 87635 SARS-COV-2 COVID-19 AMP PRB: CPT | Performed by: EMERGENCY MEDICINE

## 2020-08-05 PROCEDURE — 36415 COLL VENOUS BLD VENIPUNCTURE: CPT | Performed by: EMERGENCY MEDICINE

## 2020-08-05 RX ORDER — LOSARTAN POTASSIUM 50 MG/1
50 TABLET ORAL ONCE
Status: COMPLETED | OUTPATIENT
Start: 2020-08-05 | End: 2020-08-05

## 2020-08-05 RX ORDER — LORAZEPAM 1 MG/1
1 TABLET ORAL ONCE
Status: COMPLETED | OUTPATIENT
Start: 2020-08-05 | End: 2020-08-05

## 2020-08-05 RX ORDER — ASCORBIC ACID 500 MG
1000 TABLET ORAL ONCE
Status: COMPLETED | OUTPATIENT
Start: 2020-08-05 | End: 2020-08-05

## 2020-08-05 RX ORDER — TEMAZEPAM 15 MG/1
15 CAPSULE ORAL ONCE
Status: COMPLETED | OUTPATIENT
Start: 2020-08-05 | End: 2020-08-05

## 2020-08-05 RX ADMIN — OXYCODONE HYDROCHLORIDE AND ACETAMINOPHEN 1000 MG: 500 TABLET ORAL at 21:26

## 2020-08-05 RX ADMIN — LORAZEPAM 1 MG: 1 TABLET ORAL at 21:29

## 2020-08-05 RX ADMIN — TEMAZEPAM 15 MG: 15 CAPSULE ORAL at 21:26

## 2020-08-05 RX ADMIN — LOSARTAN POTASSIUM 50 MG: 50 TABLET, FILM COATED ORAL at 21:26

## 2020-08-05 RX ADMIN — LORAZEPAM 1 MG: 1 TABLET ORAL at 23:38

## 2020-08-05 NOTE — ED NOTES
Pt provided with box lunch  Sitting on stretcher with visitor  Denies any needs  No s/s distress  Changed into paper scrubs  Call wilson in reach       Son Jay RN  08/05/20 1929

## 2020-08-06 NOTE — ED CARE HANDOFF
Emergency Department Sign Out Note        Sign out and transfer of care from Dr Tramaine Benjamin  See Separate Emergency Department note  The patient, Jose Luis Alcantara, was evaluated by the previous provider for inability to care for self  Workup Completed:  No orders to display      Labs Reviewed   CBC AND DIFFERENTIAL - Abnormal       Result Value Ref Range Status    WBC 6 59  4 31 - 10 16 Thousand/uL Final    RBC 5 21 (*) 3 81 - 5 12 Million/uL Final    Hemoglobin 16 6 (*) 11 5 - 15 4 g/dL Final    Hematocrit 48 4 (*) 34 8 - 46 1 % Final    MCV 93  82 - 98 fL Final    MCH 31 9  26 8 - 34 3 pg Final    MCHC 34 3  31 4 - 37 4 g/dL Final    RDW 12 8  11 6 - 15 1 % Final    MPV 10 4  8 9 - 12 7 fL Final    Platelets 035  675 - 390 Thousands/uL Final    nRBC 0  /100 WBCs Final    Neutrophils Relative 59  43 - 75 % Final    Immat GRANS % 0  0 - 2 % Final    Lymphocytes Relative 29  14 - 44 % Final    Monocytes Relative 8  4 - 12 % Final    Eosinophils Relative 3  0 - 6 % Final    Basophils Relative 1  0 - 1 % Final    Neutrophils Absolute 3 88  1 85 - 7 62 Thousands/µL Final    Immature Grans Absolute 0 02  0 00 - 0 20 Thousand/uL Final    Lymphocytes Absolute 1 91  0 60 - 4 47 Thousands/µL Final    Monocytes Absolute 0 52  0 17 - 1 22 Thousand/µL Final    Eosinophils Absolute 0 20  0 00 - 0 61 Thousand/µL Final    Basophils Absolute 0 06  0 00 - 0 10 Thousands/µL Final   NOVEL CORONAVIRUS (COVID-19), PCR [facility] - Normal    SARS-CoV-2 Negative  Negative Final    Narrative: The specimen collection materials, transport medium, and/or testing methodology utilized in the production of these test results have been proven to be reliable in a limited validation with an abbreviated program under the Emergency Utilization Authorization provided by the FDA  Testing reported as "Presumptive positive" will be confirmed with secondary testing with a reference laboratory to ensure result accuracy    Clinical caution and judgement should be used with the interpretation of these results with consideration of the clinical impression and other laboratory testing  Testing reported as "Positive" or "Negative" has been proven to be accurate according to standard laboratory validation requirements  All testing is performed with control materials showing appropriate reactivity at standard intervals  RAPID DRUG SCREEN, URINE - Normal    Amph/Meth UR Negative  Negative Final    Barbiturate Ur Negative  Negative Final    Benzodiazepine Urine Negative  Negative Final    Cocaine Urine Negative  Negative Final    Methadone Urine Negative  Negative Final    Opiate Urine Negative  Negative Final    PCP Ur Negative  Negative Final    THC Urine Negative  Negative Final    Oxycodone Urine Negative  Negative Final    Narrative:     FOR MEDICAL PURPOSES ONLY  IF CONFIRMATION NEEDED PLEASE CONTACT THE LAB WITHIN 5 DAYS  Drug Screen Cutoff Levels:  AMPHETAMINE/METHAMPHETAMINES  1000 ng/mL  BARBITURATES     200 ng/mL  BENZODIAZEPINES     200 ng/mL  COCAINE      300 ng/mL  METHADONE      300 ng/mL  OPIATES      300 ng/mL  PHENCYCLIDINE     25 ng/mL  THC       50 ng/mL  OXYCODONE      100 ng/mL   TSH, 3RD GENERATION WITH FREE T4 REFLEX - Normal    TSH 3RD GENERATON 0 538  0 358 - 3 740 uIU/mL Final    Comment: The recommended reference ranges for TSH during pregnancy are as follows:   First trimester 0 1 to 2 5 uIU/mL   Second trimester  0 2 to 3 0 uIU/mL   Third trimester 0 3 to 3 0 uIU/m    Note: Normal ranges may not apply to patients who are transgender, non-binary, or whose legal sex, sex at birth, and gender identity differ  Using supplements with high doses of biotin 20 to more than 300 times greater than the adequate daily intake for adults of 30 mcg/day as established by the [employer], can cause falsely depress results      Narrative:     Patients undergoing fluorescein dye angiography may retain small amounts of fluorescein in the body for 48-72 hours post procedure  Samples containing fluorescein can produce falsely depressed TSH values  If the patient had this procedure,a specimen should be resubmitted post fluorescein clearance  POCT ALCOHOL BREATH TEST - Normal    EXTBreath Alcohol 0 00   Final   COMPREHENSIVE METABOLIC PANEL    Sodium 764  136 - 145 mmol/L Final    Potassium 3 5  3 5 - 5 3 mmol/L Final    Chloride 105  100 - 108 mmol/L Final    CO2 26  21 - 32 mmol/L Final    ANION GAP 8  4 - 13 mmol/L Final    BUN 15  5 - 25 mg/dL Final    Creatinine 0 74  0 60 - 1 30 mg/dL Final    Comment: Standardized to IDMS reference method    Glucose 135  65 - 140 mg/dL Final    Comment: If the patient is fasting, the ADA then defines impaired fasting glucose as > 100 mg/dL and diabetes as > or equal to 123 mg/dL  Specimen collection should occur prior to Sulfasalazine administration due to the potential for falsely depressed results  Specimen collection should occur prior to Sulfapyridine administration due to the potential for falsely elevated results  Calcium 9 3  8 3 - 10 1 mg/dL Final    AST 15  5 - 45 U/L Final    Comment: Specimen collection should occur prior to Sulfasalazine administration due to the potential for falsely depressed results  ALT 29  12 - 78 U/L Final    Comment: Specimen collection should occur prior to Sulfasalazine administration due to the potential for falsely depressed results  Alkaline Phosphatase 83  46 - 116 U/L Final    Total Protein 7 1  6 4 - 8 2 g/dL Final    Albumin 3 6  3 5 - 5 0 g/dL Final    Total Bilirubin 0 30  0 20 - 1 00 mg/dL Final    Comment: Use of this assay is not recommended for patients undergoing treatment with eltrombopag due to the potential for falsely elevated results      eGFR 89  ml/min/1 73sq m Final    Narrative:     [place] guidelines for Chronic Kidney Disease (CKD):     Stage 1 with normal or high GFR (GFR > 90 mL/min/1 73 square meters)    Stage 2 Mild CKD (GFR = 60-89 mL/min/1 73 square meters)    Stage 3A Moderate CKD (GFR = 45-59 mL/min/1 73 square meters)    Stage 3B Moderate CKD (GFR = 30-44 mL/min/1 73 square meters)    Stage 4 Severe CKD (GFR = 15-29 mL/min/1 73 square meters)    Stage 5 End Stage CKD (GFR <15 mL/min/1 73 square meters)  Note: GFR calculation is accurate only with a steady state creatinine   UA W REFLEX TO MICROSCOPIC WITH REFLEX TO CULTURE    Color, UA Yellow   Final    Clarity, UA Clear   Final    Specific Gravity, UA 1 020  1 003 - 1 030 Final    pH, UA 6 0  4 5, 5 0, 5 5, 6 0, 6 5, 7 0, 7 5, 8 0 Final    Leukocytes, UA Negative  Negative Final    Nitrite, UA Negative  Negative Final    Protein, UA Negative  Negative mg/dl Final    Glucose, UA Negative  Negative mg/dl Final    Ketones, UA Negative  Negative mg/dl Final    Urobilinogen, UA 0 2  0 2, 1 0 E U /dl E U /dl Final    Bilirubin, UA Negative  Negative Final    Blood, UA Negative  Negative Final        ED Course / Workup Pending (followup):                            ED Course as of Aug 06 0304   Wed Aug 05, 2020   2214 Pt signed out by Dr Rigo Godinez  201 signed  Awaiting placement  Medically cleared by Dr Rigo Godinez          Procedures  MDM  Number of Diagnoses or Management Options  History of inability to cope with activities of daily living: new and requires workup  Other insomnia: new and requires workup     Amount and/or Complexity of Data Reviewed  Clinical lab tests: reviewed  Discussion of test results with the performing providers: yes    Risk of Complications, Morbidity, and/or Mortality  Presenting problems: low  Diagnostic procedures: low  Management options: low    Patient Progress  Patient progress: stable      Disposition  Final diagnoses:   Other insomnia   History of inability to cope with activities of daily living     Time reflects when diagnosis was documented in both MDM as applicable and the Disposition within this note     Time User Action Codes Description Comment    8/6/2020  3:03 AM Arcelia ÁLVAREZ Add [G47 09] Other insomnia     8/6/2020  3:04 AM Baby Tin Add [R06 313] History of inability to cope with activities of daily living       ED Disposition     ED Disposition Condition Date/Time Comment    Transfer to [id number] Aug 5, 2020 10:41 PM Alida Cortes should be transferred out to [facility] and has been medically cleared  MD Documentation      Most Recent Value   Patient Condition  The patient has been stabilized such that within reasonable medical probability, no material deterioration of the patient condition or the condition of the unborn child(lauren) is likely to result from the transfer   Reason for Transfer  Level of Care needed not available at this facility   Benefits of Transfer  Other benefits (Include comment)_______________________ Danna Ratel MH tx]   Risks of Transfer  Potential for delay in receiving treatment   Accepting Physician  Dr Chencho Carias Name, [address] [place] [address]    (Name & Tel number)  NALINI Arauz   Transported by (Company and Unit #)  [employer]   Provider Certification  General risk, such as traffic hazards, adverse weather conditions, rough terrain or turbulence, possible failure of equipment (including vehicle or aircraft), or consequences of actions of persons outside the control of the transport personnel      RN Documentation      [address] Name, [address] [place] [address]    (Name & Tel number)  NALINI Arauz   Transport Mode  Ambulance   [facility]  [facility]   Level of Care  Basic life support      Follow-up Information    None       Patient's Medications   Discharge Prescriptions    No medications on file     No discharge procedures on file         ED Provider  Electronically Signed by     Samson Oneill MD  08/06/20 [id number]

## 2020-08-06 NOTE — ED PROVIDER NOTES
History  Chief Complaint   Patient presents with    Insomnia     pt states her "mind and body are collapsing" she hasnt slept in months  states her doctor gave her sleeping medication which isnt helping  denies SI/HI      78-year-old female presents for evaluation of insomnia and inability to care for self  Patient states she was prescribed lorazepam and temazepam which was working well but then for the last few weeks has been unable to sleep  Patient states this has affected her activities of daily living and she is unable to care for herself  She lives at home with her elderly mother  She denies suicidal or homicidal ideations at this time  Prior to Admission Medications   Prescriptions Last Dose Informant Patient Reported? Taking? B Complex Vitamins (VITAMIN B COMPLEX PO)  Outside Facility (Specify) Yes No   Sig: Take by mouth 1 daily  (100 mg) per vn gv   CHROMIUM POLYNICOTINATE PO  Self Yes No   Sig: Take 1-2 capsules by mouth daily This is 200 mcg daily   Cholecalciferol (VITAMIN D3) 5000 units CAPS  Self Yes No   Sig: Take by mouth 1 daily   Cyanocobalamin (VITAMIN B 12) 250 MCG LOZG   Yes No   Sig: Take 500 mcg by mouth daily   LORazepam (ATIVAN) 1 mg tablet   No No   Sig: Take one tablet at bedtime, may take an additional  tablet at bedtime as needed     Lactobacillus (ACIDOPHILUS) 100 MG CAPS   Yes No   Sig: Take 3 capsules by mouth daily   MAGNESIUM-POTASSIUM PO   Yes No   Sig: Take by mouth 1 bid  (Crebs/Magnesium/Potassium)   Magnesium 250 MG TABS  Self Yes No   Si-2 tabs daily   Menaquinone-7 (VITAMIN K2 PO)  Self Yes No   Sig: Take by mouth   Multiple Vitamins-Minerals (MULTIVITAMIN WITH MINERALS) tablet  Outside Facility (Specify) Yes No   Sig: Take 1 tablet by mouth daily   Potassium 99 MG TABS  Outside Facility (Specify) Yes No   Sig: Take by mouth 1 daily   Resveratrol 100 MG CAPS   Yes No   Sig: Take 1 capsule by mouth daily   Selenium 200 MCG CAPS  Self Yes No   Sig: Take by mouth 2-3 times a week takes 1 tab   VANADYL SULFATE PO  Self Yes No   Sig: Take by mouth 10 mg tabs  Takes 1-2 daily   acetaminophen (TYLENOL) 325 mg tablet   No No   Sig: Take 2 tablets (650 mg total) by mouth every 6 (six) hours as needed for fever   Patient taking differently: 2 every 4 hours alt with 1  mg ASA  ascorbic acid (VITAMIN C) 500 mg tablet   Yes No   Sig: Take 500 mg by mouth 2 (two) times a day    aspirin 81 MG tablet  Self Yes No   Sig: Take by mouth Takes 1 daily   fluocinonide (LIDEX) 0 05 % cream   No No   Sig: Apply topically 2 (two) times a day   losartan (COZAAR) 50 mg tablet   No No   Sig: take 1 tablet by mouth daily   ondansetron (ZOFRAN-ODT) 4 mg disintegrating tablet   No No   Sig: Take 1 tablet (4 mg total) by mouth every 6 (six) hours as needed for nausea or vomiting   patient supplied medication   Yes No   temazepam (RESTORIL) 15 mg capsule   No No   Sig: Take 1 capsule (15 mg total) by mouth daily at bedtime as needed for sleep      Facility-Administered Medications: None       Past Medical History:   Diagnosis Date    Anxiety     Cerebral palsy ([address] )     Hypertension     Pre-diabetes     Scoliosis        Past Surgical History:   Procedure Laterality Date    APPENDECTOMY      BUNIONECTOMY Bilateral     ND PARTIAL HIP REPLACEMENT Right 9/23/2019    Procedure: HEMIARTHROPLASTY HIP (BIPOLAR); Surgeon: Jolly Lei MD;  Location[facility] OR;  Service: Orthopedics       Family History   Problem Relation Age of Onset    Hypertension Mother     Heart disease Mother     Prostate cancer Father     Heart disease Father     Substance Abuse Neg Hx     Mental illness Neg Hx      I have reviewed and agree with the history as documented      E-Cigarette/Vaping    E-Cigarette Use Never User      E-Cigarette/Vaping Substances    Nicotine No     THC No     CBD No     Flavoring No     Other No     Unknown No      Social History     Tobacco Use    Smoking status: Never Smoker    Smokeless tobacco: Never Used   Substance Use Topics    Alcohol use: Never     Frequency: Never    Drug use: Never       Review of Systems   Constitutional: Negative for chills, diaphoresis and fever  HENT: Negative for congestion and rhinorrhea  Eyes: Negative for pain and visual disturbance  Respiratory: Negative for cough, shortness of breath and wheezing  Cardiovascular: Negative for chest pain and leg swelling  Gastrointestinal: Negative for abdominal pain, diarrhea, nausea and vomiting  Genitourinary: Negative for difficulty urinating, dysuria, frequency and urgency  Musculoskeletal: Negative for back pain and neck pain  Skin: Negative for color change and rash  Neurological: Negative for syncope, numbness and headaches  Psychiatric/Behavioral: Positive for sleep disturbance  Negative for suicidal ideas  The patient is nervous/anxious  All other systems reviewed and are negative  Physical Exam  Physical Exam  Vitals signs and nursing note reviewed  Constitutional:       Appearance: She is well-developed  HENT:      Head: Normocephalic and atraumatic  Eyes:      Conjunctiva/sclera: Conjunctivae normal    Neck:      Musculoskeletal: Normal range of motion and neck supple  Cardiovascular:      Rate and Rhythm: Normal rate and regular rhythm  Pulmonary:      Effort: Pulmonary effort is normal  No respiratory distress  Abdominal:      Palpations: Abdomen is soft  Tenderness: There is no abdominal tenderness  Musculoskeletal: Normal range of motion  General: No tenderness  Skin:     General: Skin is warm  Findings: No erythema  Neurological:      Mental Status: She is alert and oriented to person, place, and time     Psychiatric:         Behavior: Behavior normal          Vital Signs  ED Triage Vitals [08/05/20 1641]   Temperature Pulse Respirations Blood Pressure SpO2   98 9 °F (37 2 °C) 85 19 (!) 153/123 96 %      Temp src Heart Rate Source Patient Position - Orthostatic VS BP Location FiO2 (%)   -- Monitor Lying Right arm --      Pain Score       --           Vitals:    08/05/20 1641 08/05/20 1850 08/05/20 2125   BP: (!) 153/123 139/85 136/89   Pulse: 85 81 82   Patient Position - Orthostatic VS: Lying           Visual Acuity      ED Medications  Medications   losartan (COZAAR) tablet 50 mg (50 mg Oral Given 8/5/20 2126)   LORazepam (ATIVAN) tablet 1 mg (1 mg Oral Given 8/5/20 2129)   temazepam (RESTORIL) capsule 15 mg (15 mg Oral Given 8/5/20 2126)   ascorbic acid (VITAMIN C) tablet 1,000 mg (1,000 mg Oral Given 8/5/20 2126)   LORazepam (ATIVAN) tablet 1 mg (1 mg Oral Given 8/5/20 2338)       Diagnostic Studies  Results Reviewed     Procedure Component Value Units Date/Time    Rapid drug screen, urine [[id number]]  (Normal) Collected:  08/05/20 1749    Lab Status:  Final result Specimen:  Urine, Clean Catch Updated:  08/05/20 2054     Amph/Meth UR Negative     Barbiturate Ur Negative     Benzodiazepine Urine Negative     Cocaine Urine Negative     Methadone Urine Negative     Opiate Urine Negative     PCP Ur Negative     THC Urine Negative     Oxycodone Urine Negative    Narrative:       FOR MEDICAL PURPOSES ONLY  IF CONFIRMATION NEEDED PLEASE CONTACT THE LAB WITHIN 5 DAYS  Drug Screen Cutoff Levels:  AMPHETAMINE/METHAMPHETAMINES  1000 ng/mL  BARBITURATES     200 ng/mL  BENZODIAZEPINES     200 ng/mL  COCAINE      300 ng/mL  METHADONE      300 ng/mL  OPIATES      300 ng/mL  PHENCYCLIDINE     25 ng/mL  THC       50 ng/mL  OXYCODONE      100 ng/mL    Novel Coronavirus (Covid-19),PCR [facility] [[id number]]  (Normal) Collected:  08/05/20 1727    Lab Status:  Final result Specimen:  Nares from Nose Updated:  08/05/20 1825     SARS-CoV-2 Negative    Narrative:        The specimen collection materials, transport medium, and/or testing methodology utilized in the production of these test results have been proven to be reliable in a limited validation with an abbreviated program under the Emergency Utilization Authorization provided by the FDA  Testing reported as "Presumptive positive" will be confirmed with secondary testing with a reference laboratory to ensure result accuracy  Clinical caution and judgement should be used with the interpretation of these results with consideration of the clinical impression and other laboratory testing  Testing reported as "Positive" or "Negative" has been proven to be accurate according to standard laboratory validation requirements  All testing is performed with control materials showing appropriate reactivity at standard intervals  TSH, 3rd generation with Free T4 reflex [[id number]]  (Normal) Collected:  08/05/20 1727    Lab Status:  Final result Specimen:  Blood from Arm, Right Updated:  08/05/20 1807     TSH 3RD GENERATON 0 538 uIU/mL     Narrative:       Patients undergoing fluorescein dye angiography may retain small amounts of fluorescein in the body for 48-72 hours post procedure  Samples containing fluorescein can produce falsely depressed TSH values  If the patient had this procedure,a specimen should be resubmitted post fluorescein clearance        Comprehensive metabolic panel [[id number]] Collected:  08/05/20 1727    Lab Status:  Final result Specimen:  Blood from Arm, Right Updated:  08/05/20 1802     Sodium 139 mmol/L      Potassium 3 5 mmol/L      Chloride 105 mmol/L      CO2 26 mmol/L      ANION GAP 8 mmol/L      BUN 15 mg/dL      Creatinine 0 74 mg/dL      Glucose 135 mg/dL      Calcium 9 3 mg/dL      AST 15 U/L      ALT 29 U/L      Alkaline Phosphatase 83 U/L      Total Protein 7 1 g/dL      Albumin 3 6 g/dL      Total Bilirubin 0 30 mg/dL      eGFR 89 ml/min/1 73sq m     Narrative:       [place] guidelines for Chronic Kidney Disease (CKD):     Stage 1 with normal or high GFR (GFR > 90 mL/min/1 73 square meters)    Stage 2 Mild CKD (GFR = 60-89 mL/min/1 73 square meters)    Stage 3A Moderate CKD (GFR = 45-59 mL/min/1 73 square meters)    Stage 3B Moderate CKD (GFR = 30-44 mL/min/1 73 square meters)    Stage 4 Severe CKD (GFR = 15-29 mL/min/1 73 square meters)    Stage 5 End Stage CKD (GFR <15 mL/min/1 73 square meters)  Note: GFR calculation is accurate only with a steady state creatinine    UA w Reflex to Microscopic w Reflex to Culture [[id number]] Collected:  08/05/20 1750    Lab Status:  Final result Specimen:  Urine, Clean Catch Updated:  08/05/20 1757     Color, UA Yellow     Clarity, UA Clear     Specific [place], UA 1 020     pH, UA 6 0     Leukocytes, UA Negative     Nitrite, UA Negative     Protein, UA Negative mg/dl      Glucose, UA Negative mg/dl      Ketones, UA Negative mg/dl      Urobilinogen, UA 0 2 E U /dl      Bilirubin, UA Negative     Blood, UA Negative    CBC and differential [[id number]]  (Abnormal) Collected:  08/05/20 1727    Lab Status:  Final result Specimen:  Blood from Arm, Right Updated:  08/05/20 1733     WBC 6 59 Thousand/uL      RBC 5 21 Million/uL      Hemoglobin 16 6 g/dL      Hematocrit 48 4 %      MCV 93 fL      MCH 31 9 pg      MCHC 34 3 g/dL      RDW 12 8 %      MPV 10 4 fL      Platelets 513 Thousands/uL      nRBC 0 /100 WBCs      Neutrophils Relative 59 %      Immat GRANS % 0 %      Lymphocytes Relative 29 %      Monocytes Relative 8 %      Eosinophils Relative 3 %      Basophils Relative 1 %      Neutrophils Absolute 3 88 Thousands/µL      Immature Grans Absolute 0 02 Thousand/uL      Lymphocytes Absolute 1 91 Thousands/µL      Monocytes Absolute 0 52 Thousand/µL      Eosinophils Absolute 0 20 Thousand/µL      Basophils Absolute 0 06 Thousands/µL     POCT alcohol breath test [[id number]]  (Normal) Resulted:  08/05/20 1727    Lab Status:  Final result Updated:  08/05/20 1727     EXTBreath Alcohol 0 00                 No orders to display              Procedures  Procedures         ED Course       US AUDIT      Most Recent Value Initial Alcohol Screen: US AUDIT-C    1  How often do you have a drink containing alcohol?  0 Filed at: 08/05/2020 1642   2  How many drinks containing alcohol do you have on a typical day you are drinking? 0 Filed at: 08/05/2020 1642   3a  Male UNDER 65: How often do you have five or more drinks on one occasion? 0 Filed at: 08/05/2020 1642   3b  FEMALE Any Age, or MALE 65+: How often do you have 4 or more drinks on one occassion? 0 Filed at: 08/05/2020 1642   Audit-C Score  0 Filed at: 08/05/2020 1642                  AJIT/DAST-10      Most Recent Value   How many times in the past year have you    Used an illegal drug or used a prescription medication for non-medical reasons? Never Filed at: 08/05/2020 1642                                MDM  Number of Diagnoses or Management Options  Diagnosis management comments: 50-year-old female presenting with insomnia  Discussed involuntary inpatient admission for medication management  I believe patient has undiagnosed psychiatric disease and would benefit from inpatient treatment  Patient will sign a 201  Disposition  Final diagnoses:   Other insomnia   History of inability to cope with activities of daily living     Time reflects when diagnosis was documented in both MDM as applicable and the Disposition within this note     Time User Action Codes Description Comment    8/6/2020  3:03 AM Bienvenido ÁLVAREZ Add [G47 09] Other insomnia     8/6/2020  3:04 AM Tani Burrell Add [Q76 920] History of inability to cope with activities of daily living       ED Disposition     ED Disposition Condition Date/Time Comment    Transfer to [facility] Aug 5, 2020 10:41 PM Wandy Roberts should be transferred out to [facility] and has been medically cleared          MD Documentation      Most Recent Value   Patient Condition  The patient has been stabilized such that within reasonable medical probability, no material deterioration of the patient condition or the condition of the unborn child(lauren) is likely to result from the transfer   Reason for Transfer  Level of Care needed not available at this facility   Benefits of Transfer  Other benefits (Include comment)_______________________ Osmarrobby Ugarte  tx]   Risks of Transfer  Potential for delay in receiving treatment   Accepting Physician  Dr Justine Yuan Name, [facility]    (Name & Tel number)  NALINI Blanchard   Transported by [employer] and Unit #)  Dene Carrel Sending MD Dr Melissa Shutter   Provider Certification  General risk, such as traffic hazards, adverse weather conditions, rough terrain or turbulence, possible failure of equipment (including vehicle or aircraft), or consequences of actions of persons outside the control of the transport personnel      RN Documentation      [address] Name, [address] [facility]    (Name & Tel number)  NALINI Blanchard   Transport Mode  Ambulance   Transported by [employer] and Unit #)  [facility]   Level of Care  Basic life support      Follow-up Information    None         Discharge Medication List as of 8/6/2020  3:15 AM      CONTINUE these medications which have NOT CHANGED    Details   acetaminophen (TYLENOL) 325 mg tablet Take 2 tablets (650 mg total) by mouth every 6 (six) hours as needed for fever, Starting Wed 9/25/2019, Normal      ascorbic acid (VITAMIN C) 500 mg tablet Take 500 mg by mouth 2 (two) times a day , Historical Med      aspirin 81 MG tablet Take by mouth Takes 1 daily, Historical Med      B Complex Vitamins (VITAMIN B COMPLEX PO) Take by mouth 1 daily  (100 mg) per vn gv, Historical Med      Cholecalciferol (VITAMIN D3) 5000 units CAPS Take by mouth 1 daily, Historical Med      CHROMIUM POLYNICOTINATE PO Take 1-2 capsules by mouth daily This is 200 mcg daily, Historical Med      Cyanocobalamin (VITAMIN B 12) 250 MCG LOZG Take 500 mcg by mouth daily, Historical Med      fluocinonide (LIDEX) 0 05 % cream Apply topically 2 (two) times a day, Starting Tue 3/5/2019, Normal      Lactobacillus (ACIDOPHILUS) 100 MG CAPS Take 3 capsules by mouth daily, Historical Med      LORazepam (ATIVAN) 1 mg tablet Take one tablet at bedtime, may take an additional  tablet at bedtime as needed , Normal      losartan (COZAAR) 50 mg tablet take 1 tablet by mouth daily, Normal      Magnesium 250 MG TABS 1-2 tabs daily, Historical Med      MAGNESIUM-POTASSIUM PO Take by mouth 1 bid  (Crebs/Magnesium/Potassium), Historical Med      Menaquinone-7 (VITAMIN K2 PO) Take by mouth, Historical Med      Multiple Vitamins-Minerals (MULTIVITAMIN WITH MINERALS) tablet Take 1 tablet by mouth daily, Historical Med      ondansetron (ZOFRAN-ODT) 4 mg disintegrating tablet Take 1 tablet (4 mg total) by mouth every 6 (six) hours as needed for nausea or vomiting, Starting Mon 7/6/2020, Normal      patient supplied medication Historical Med      Potassium 99 MG TABS Take by mouth 1 daily, Historical Med      Resveratrol 100 MG CAPS Take 1 capsule by mouth daily, Historical Med      Selenium 200 MCG CAPS Take by mouth 2-3 times a week takes 1 tab, Historical Med      temazepam (RESTORIL) 15 mg capsule Take 1 capsule (15 mg total) by mouth daily at bedtime as needed for sleep, Starting Fri 7/3/2020, Normal      VANADYL SULFATE PO Take by mouth 10 mg tabs  Takes 1-2 daily, Historical Med           No discharge procedures on file      PDMP Review       Value Time User    PDMP Reviewed  Yes 9/25/2019  9:54 AM Juani Schwartz MD          ED Provider  Electronically Signed by           Scot Farias DO  08/06/20 2967

## 2020-08-06 NOTE — ED NOTES
Patient is accepted at [facility]  Patient is accepted by Dr Enrico Buchanan per [employer] in Admissions  Transportation is arranged with [facility]  Transportation is scheduled for 0230  Patient may go to the floor at anytime  *Nurse report is to be called to [phone] prior to patient transfer  NALINI Francis  08/05/20   1106

## 2020-08-06 NOTE — ED NOTES
Call placed to [facility], spoke with Stephanie Gomez who reports bed availability; chart faxed for review       NALINI Bautista  08/05/20   [id number]
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
## 2020-08-06 NOTE — EMTALA/ACUTE CARE TRANSFER
[facility] EMERGENCY DEPARTMENT  [address]  [facility] [address]  Dept: [phone]      [facility] TRANSFER CONSENT    NAME Miranda Forrest                                         1960                              MRN [id number]    I have been informed of my rights regarding examination, treatment, and transfer   by Dr Mckinley Schroeder MD    Benefits: Other benefits (Include comment)_______________________(Inpt MH tx)    Risks: Potential for delay in receiving treatment      Transfer Request   I acknowledge that my medical condition has been evaluated and explained to me by the emergency department physician or other qualified medical person and/or my attending physician who has recommended and offered to me further medical examination and treatment  I understand the Hospital's obligation with respect to the treatment and stabilization of my emergency medical condition  I nevertheless request to be transferred  I release the Hospital, the doctor, and any other persons caring for me from all responsibility or liability for any injury or ill effects that may result from my transfer and agree to accept all responsibility for the consequences of my choice to transfer, rather than receive stabilizing treatment at the Hospital  I understand that because the transfer is my request, my insurance may not provide reimbursement for the services  The Hospital will assist and direct me and my family in how to make arrangements for transfer, but the hospital is not liable for any fees charged by the transport service  In spite of this understanding, I refuse to consent to further medical examination and treatment which has been offered to me, and request transfer to  [facility] Name, [address] : [address] [facility]  I authorize the performance of emergency medical procedures and treatments upon me in both transit and upon arrival at the receiving facility    Additionally, I authorize the release of any and all medical records to the receiving facility and request they be transported with me, if possible  I authorize the performance of emergency medical procedures and treatments upon me in both transit and upon arrival at the receiving facility  Additionally, I authorize the release of any and all medical records to the receiving facility and request they be transported with me, if possible  I understand that the safest mode of transportation during a medical emergency is an ambulance and that the Hospital advocates the use of this mode of transport  Risks of traveling to the receiving facility by car, including absence of medical control, life sustaining equipment, such as oxygen, and medical personnel has been explained to me and I fully understand them  (STEPHON CORRECT BOX BELOW)  [  ]  I consent to the stated transfer and to be transported by ambulance/helicopter  [  ]  I consent to the stated transfer, but refuse transportation by ambulance and accept full responsibility for my transportation by car  I understand the risks of non-ambulance transfers and I exonerate the Hospital and its staff from any deterioration in my condition that results from this refusal     X___________________________________________    DATE  20  TIME________  Signature of patient or legally responsible individual signing on patient behalf           RELATIONSHIP TO PATIENT_________________________          Provider Certification    NAME Gautam Narayan                                        [facility] 1960                              MRN [id number]    A medical screening exam was performed on the above named patient  Based on the examination:    Condition Necessitating Transfer There were no encounter diagnoses      Patient Condition: The patient has been stabilized such that within reasonable medical probability, no material deterioration of the patient condition or the condition of the unborn child(lauren) is likely to result from the transfer    Reason for Transfer: Level of Care needed not available at this facility    Transfer Requirements: [address]   · Space available and qualified personnel available for treatment as acknowledged by NALINI Bennett  · Agreed to accept transfer and to provide appropriate medical treatment as acknowledged by       Dr Rolan Gilliam  · Appropriate medical records of the examination and treatment of the patient are provided at the time of transfer   [address], [address] _______  · Transfer will be performed by qualified personnel from [place] [address]   and appropriate transfer equipment as required, including the use of necessary and appropriate life support measures  Provider Certification: I have examined the patient and explained the following risks and benefits of being transferred/refusing transfer to the patient/family:  General risk, such as traffic hazards, adverse weather conditions, rough terrain or turbulence, possible failure of equipment (including vehicle or aircraft), or consequences of actions of persons outside the control of the transport personnel      Based on these reasonable risks and benefits to the patient and/or the unborn child(lauren), and based upon the information available at the time of the patients examination, I certify that the medical benefits reasonably to be expected from the provision of appropriate medical treatments at another medical facility outweigh the increasing risks, if any, to the individuals medical condition, and in the case of labor to the unborn child, from effecting the transfer      X____________________________________________ DATE 08/05/20        TIME_______      ORIGINAL - SEND TO MEDICAL RECORDS   COPY - SEND WITH PATIENT DURING TRANSFER

## 2020-08-06 NOTE — ED NOTES
Pt ambulatory with rollator walker to bathroom  Steady gait  No s/s distress  Medicated for anxiety as ordered       Daria Lema RN  08/05/20 0183

## 2020-08-06 NOTE — ED NOTES
Pt requesting hospital bed, states she is uncomfortable on ED stretcher  PM home medications ordered        Shirley Hardy RN  08/05/20 2018

## 2020-08-06 NOTE — ED NOTES
Insurance Authorization for admission:   Phone call placed to [employer]  Phone number: [phone]  Spoke to [employer]      5 days approved  Level of care: Acute Inpt  (201)  Review on 08/10/20  Authorization # Accepting facility to call upon arrival          EVS (Eligibility Verification System) called - [phone]  Automated system indicates:  Active with [address] [place] [address] for Transportation:    Coordinated through [place], [place]  08/05/20   6825

## 2020-09-09 ENCOUNTER — TELEPHONE (OUTPATIENT)
Dept: FAMILY MEDICINE CLINIC | Facility: HOSPITAL | Age: 60
End: 2020-09-09

## 2020-09-09 NOTE — TELEPHONE ENCOUNTER
Luda Raygoza called stated [facility] is in [facility] said pt is having a lot of issues walking and cant walk without walker  Luda Raygoza was hoping he could have Dr Pieter Maravilla call the facility where she at and order an x-ray  I told him I wasn't sure we could do that       [phone] or work [phone]

## 2020-09-11 ENCOUNTER — TELEPHONE (OUTPATIENT)
Dept: FAMILY MEDICINE CLINIC | Facility: HOSPITAL | Age: 60
End: 2020-09-11

## 2020-09-14 NOTE — TELEPHONE ENCOUNTER
Ok to call and take message  Please be sure he knows that I do not provide any psychiatric care for patients and he will need to provide a name to patient when he discharges her so she can get appropriate follow up

## 2020-11-09 ENCOUNTER — OFFICE VISIT (OUTPATIENT)
Dept: FAMILY MEDICINE CLINIC | Facility: HOSPITAL | Age: 60
End: 2020-11-09
Payer: COMMERCIAL

## 2020-11-09 VITALS
SYSTOLIC BLOOD PRESSURE: 120 MMHG | HEIGHT: 66 IN | TEMPERATURE: 97.7 F | HEART RATE: 77 BPM | BODY MASS INDEX: 23.18 KG/M2 | WEIGHT: 144.2 LBS | OXYGEN SATURATION: 98 % | DIASTOLIC BLOOD PRESSURE: 80 MMHG

## 2020-11-09 DIAGNOSIS — F43.23 ADJUSTMENT DISORDER WITH MIXED ANXIETY AND DEPRESSED MOOD: Primary | ICD-10-CM

## 2020-11-09 DIAGNOSIS — Z96.641 HISTORY OF RIGHT HIP HEMIARTHROPLASTY: ICD-10-CM

## 2020-11-09 DIAGNOSIS — R39.81 FUNCTIONAL URINARY INCONTINENCE: ICD-10-CM

## 2020-11-09 DIAGNOSIS — I10 ESSENTIAL HYPERTENSION: ICD-10-CM

## 2020-11-09 DIAGNOSIS — G47.09 OTHER INSOMNIA: ICD-10-CM

## 2020-11-09 DIAGNOSIS — F32.A DEPRESSION, UNSPECIFIED DEPRESSION TYPE: ICD-10-CM

## 2020-11-09 PROBLEM — Z20.822 EXPOSURE TO COVID-19 VIRUS: Status: RESOLVED | Noted: 2020-06-02 | Resolved: 2020-11-09

## 2020-11-09 PROCEDURE — 99214 OFFICE O/P EST MOD 30 MIN: CPT | Performed by: INTERNAL MEDICINE

## 2020-11-09 PROCEDURE — 1036F TOBACCO NON-USER: CPT | Performed by: INTERNAL MEDICINE

## 2020-11-09 PROCEDURE — 3074F SYST BP LT 130 MM HG: CPT | Performed by: INTERNAL MEDICINE

## 2020-11-09 PROCEDURE — 3725F SCREEN DEPRESSION PERFORMED: CPT | Performed by: INTERNAL MEDICINE

## 2020-11-09 PROCEDURE — 3008F BODY MASS INDEX DOCD: CPT | Performed by: INTERNAL MEDICINE

## 2020-11-09 PROCEDURE — 3079F DIAST BP 80-89 MM HG: CPT | Performed by: INTERNAL MEDICINE

## 2020-11-09 RX ORDER — OXYBUTYNIN CHLORIDE 5 MG/1
5 TABLET, EXTENDED RELEASE ORAL DAILY
Qty: 30 TABLET | Refills: 0 | Status: SHIPPED | OUTPATIENT
Start: 2020-11-09 | End: 2020-12-07

## 2020-11-09 RX ORDER — PHENOL 1.4 %
AEROSOL, SPRAY (ML) MUCOUS MEMBRANE
COMMUNITY
End: 2021-03-31 | Stop reason: HOSPADM

## 2020-11-10 ENCOUNTER — TELEPHONE (OUTPATIENT)
Dept: FAMILY MEDICINE CLINIC | Facility: HOSPITAL | Age: 60
End: 2020-11-10

## 2020-11-10 DIAGNOSIS — Z86.59 HISTORY OF ASPERGER'S SYNDROME: ICD-10-CM

## 2020-11-10 DIAGNOSIS — F84.0 AUTISM DISORDER: Primary | Chronic | ICD-10-CM

## 2020-11-10 DIAGNOSIS — Z79.899 MEDICATION MANAGEMENT: ICD-10-CM

## 2020-11-10 DIAGNOSIS — Z78.9 NEED FOR FOLLOW-UP BY SOCIAL WORKER: Primary | ICD-10-CM

## 2020-11-10 DIAGNOSIS — Z96.641 HISTORY OF RIGHT HIP HEMIARTHROPLASTY: ICD-10-CM

## 2020-11-10 DIAGNOSIS — M54.30 SCIATIC LEG PAIN: ICD-10-CM

## 2020-11-11 ENCOUNTER — TELEPHONE (OUTPATIENT)
Dept: FAMILY MEDICINE CLINIC | Facility: HOSPITAL | Age: 60
End: 2020-11-11

## 2020-11-12 ENCOUNTER — PATIENT OUTREACH (OUTPATIENT)
Dept: FAMILY MEDICINE CLINIC | Facility: HOSPITAL | Age: 60
End: 2020-11-12

## 2020-11-16 ENCOUNTER — TELEPHONE (OUTPATIENT)
Dept: FAMILY MEDICINE CLINIC | Facility: HOSPITAL | Age: 60
End: 2020-11-16

## 2020-11-16 ENCOUNTER — PATIENT OUTREACH (OUTPATIENT)
Dept: FAMILY MEDICINE CLINIC | Facility: HOSPITAL | Age: 60
End: 2020-11-16

## 2020-11-17 ENCOUNTER — PATIENT OUTREACH (OUTPATIENT)
Dept: FAMILY MEDICINE CLINIC | Facility: HOSPITAL | Age: 60
End: 2020-11-17

## 2020-11-17 DIAGNOSIS — R19.7 DIARRHEA, UNSPECIFIED TYPE: Primary | ICD-10-CM

## 2020-11-18 ENCOUNTER — PATIENT OUTREACH (OUTPATIENT)
Dept: FAMILY MEDICINE CLINIC | Facility: HOSPITAL | Age: 60
End: 2020-11-18

## 2020-11-25 ENCOUNTER — TELEPHONE (OUTPATIENT)
Dept: FAMILY MEDICINE CLINIC | Facility: HOSPITAL | Age: 60
End: 2020-11-25

## 2020-11-27 ENCOUNTER — PATIENT OUTREACH (OUTPATIENT)
Dept: FAMILY MEDICINE CLINIC | Facility: HOSPITAL | Age: 60
End: 2020-11-27

## 2020-12-06 DIAGNOSIS — R39.81 FUNCTIONAL URINARY INCONTINENCE: ICD-10-CM

## 2020-12-07 RX ORDER — OXYBUTYNIN CHLORIDE 5 MG/1
TABLET, EXTENDED RELEASE ORAL
Qty: 30 TABLET | Refills: 0 | Status: SHIPPED | OUTPATIENT
Start: 2020-12-07 | End: 2020-12-15 | Stop reason: SDUPTHER

## 2020-12-08 ENCOUNTER — TELEPHONE (OUTPATIENT)
Dept: FAMILY MEDICINE CLINIC | Facility: HOSPITAL | Age: 60
End: 2020-12-08

## 2020-12-08 DIAGNOSIS — M54.50 LOW BACK PAIN, UNSPECIFIED BACK PAIN LATERALITY, UNSPECIFIED CHRONICITY, UNSPECIFIED WHETHER SCIATICA PRESENT: Primary | ICD-10-CM

## 2020-12-08 DIAGNOSIS — M25.551 RIGHT HIP PAIN: ICD-10-CM

## 2020-12-09 ENCOUNTER — TELEPHONE (OUTPATIENT)
Dept: OBGYN CLINIC | Facility: HOSPITAL | Age: 60
End: 2020-12-09

## 2020-12-09 ENCOUNTER — PATIENT OUTREACH (OUTPATIENT)
Dept: FAMILY MEDICINE CLINIC | Facility: HOSPITAL | Age: 60
End: 2020-12-09

## 2020-12-11 ENCOUNTER — APPOINTMENT (OUTPATIENT)
Dept: RADIOLOGY | Facility: CLINIC | Age: 60
End: 2020-12-11
Payer: COMMERCIAL

## 2020-12-11 ENCOUNTER — OFFICE VISIT (OUTPATIENT)
Dept: OBGYN CLINIC | Facility: CLINIC | Age: 60
End: 2020-12-11
Payer: COMMERCIAL

## 2020-12-11 VITALS
TEMPERATURE: 97.2 F | WEIGHT: 144 LBS | SYSTOLIC BLOOD PRESSURE: 122 MMHG | BODY MASS INDEX: 23.99 KG/M2 | HEIGHT: 65 IN | DIASTOLIC BLOOD PRESSURE: 70 MMHG

## 2020-12-11 DIAGNOSIS — R29.898 WEAKNESS OF RIGHT LOWER EXTREMITY: ICD-10-CM

## 2020-12-11 DIAGNOSIS — M25.551 PAIN IN RIGHT HIP: Primary | ICD-10-CM

## 2020-12-11 DIAGNOSIS — Z47.1 AFTERCARE FOLLOWING RIGHT HIP JOINT REPLACEMENT SURGERY: ICD-10-CM

## 2020-12-11 DIAGNOSIS — M47.816 SPONDYLOSIS OF LUMBAR SPINE: ICD-10-CM

## 2020-12-11 DIAGNOSIS — M25.551 PAIN IN RIGHT HIP: ICD-10-CM

## 2020-12-11 DIAGNOSIS — Z47.89 AFTERCARE FOLLOWING SURGERY OF THE MUSCULOSKELETAL SYSTEM: ICD-10-CM

## 2020-12-11 DIAGNOSIS — Z96.641 AFTERCARE FOLLOWING RIGHT HIP JOINT REPLACEMENT SURGERY: ICD-10-CM

## 2020-12-11 PROCEDURE — 1036F TOBACCO NON-USER: CPT | Performed by: ORTHOPAEDIC SURGERY

## 2020-12-11 PROCEDURE — 3074F SYST BP LT 130 MM HG: CPT | Performed by: ORTHOPAEDIC SURGERY

## 2020-12-11 PROCEDURE — 72110 X-RAY EXAM L-2 SPINE 4/>VWS: CPT

## 2020-12-11 PROCEDURE — 3078F DIAST BP <80 MM HG: CPT | Performed by: ORTHOPAEDIC SURGERY

## 2020-12-11 PROCEDURE — 99213 OFFICE O/P EST LOW 20 MIN: CPT | Performed by: ORTHOPAEDIC SURGERY

## 2020-12-11 PROCEDURE — 3008F BODY MASS INDEX DOCD: CPT | Performed by: ORTHOPAEDIC SURGERY

## 2020-12-11 PROCEDURE — 73502 X-RAY EXAM HIP UNI 2-3 VIEWS: CPT

## 2020-12-11 PROCEDURE — 77073 BONE LENGTH STUDIES: CPT

## 2020-12-14 ENCOUNTER — TELEPHONE (OUTPATIENT)
Dept: FAMILY MEDICINE CLINIC | Facility: HOSPITAL | Age: 60
End: 2020-12-14

## 2020-12-15 DIAGNOSIS — R39.81 FUNCTIONAL URINARY INCONTINENCE: ICD-10-CM

## 2020-12-15 RX ORDER — OXYBUTYNIN CHLORIDE 5 MG/1
5 TABLET, EXTENDED RELEASE ORAL DAILY
Qty: 30 TABLET | Refills: 5 | Status: ON HOLD | OUTPATIENT
Start: 2020-12-15 | End: 2021-03-09 | Stop reason: SDUPTHER

## 2021-01-11 ENCOUNTER — TELEPHONE (OUTPATIENT)
Dept: FAMILY MEDICINE CLINIC | Facility: HOSPITAL | Age: 61
End: 2021-01-11

## 2021-01-11 DIAGNOSIS — G47.00 INSOMNIA, UNSPECIFIED TYPE: ICD-10-CM

## 2021-01-11 RX ORDER — LORAZEPAM 1 MG/1
TABLET ORAL
Qty: 60 TABLET | Refills: 2 | Status: SHIPPED | OUTPATIENT
Start: 2021-01-11 | End: 2021-03-31 | Stop reason: HOSPADM

## 2021-01-11 RX ORDER — TEMAZEPAM 15 MG/1
15 CAPSULE ORAL
Qty: 30 CAPSULE | Refills: 3 | Status: SHIPPED | OUTPATIENT
Start: 2021-01-11 | End: 2021-03-31 | Stop reason: HOSPADM

## 2021-01-11 NOTE — TELEPHONE ENCOUNTER
Pt states she needs refills for LORazepam (ATIVAN) 1 mg tablet     And temazepam (RESTORIL) 15 mg capsule     Sent to [employer]

## 2021-01-19 ENCOUNTER — TELEPHONE (OUTPATIENT)
Dept: FAMILY MEDICINE CLINIC | Facility: HOSPITAL | Age: 61
End: 2021-01-19

## 2021-01-19 NOTE — TELEPHONE ENCOUNTER
Call patient  She will not be able to get hospice services  They have strict criteria and she will not meet them as she does not have terminal medical diagnosis

## 2021-01-19 NOTE — TELEPHONE ENCOUNTER
Pt is aware, she states that her complaint is she is just not eating  She eats what she can but that's it    DD

## 2021-02-20 ENCOUNTER — HOSPITAL ENCOUNTER (EMERGENCY)
Facility: HOSPITAL | Age: 61
End: 2021-02-20
Attending: EMERGENCY MEDICINE | Admitting: EMERGENCY MEDICINE
Payer: COMMERCIAL

## 2021-02-20 ENCOUNTER — HOSPITAL ENCOUNTER (INPATIENT)
Facility: HOSPITAL | Age: 61
LOS: 39 days | Discharge: NURSING, REHAB OR LONG-TERM CARE | DRG: 751 | End: 2021-03-31
Attending: PSYCHIATRY & NEUROLOGY | Admitting: PSYCHIATRY & NEUROLOGY
Payer: COMMERCIAL

## 2021-02-20 VITALS
BODY MASS INDEX: 23.3 KG/M2 | DIASTOLIC BLOOD PRESSURE: 81 MMHG | RESPIRATION RATE: 18 BRPM | SYSTOLIC BLOOD PRESSURE: 137 MMHG | WEIGHT: 140 LBS | TEMPERATURE: 98.2 F | OXYGEN SATURATION: 100 % | HEART RATE: 96 BPM

## 2021-02-20 DIAGNOSIS — L60.2 OVERGROWN TOENAILS: ICD-10-CM

## 2021-02-20 DIAGNOSIS — F22 DELUSIONAL DISORDER (HCC): ICD-10-CM

## 2021-02-20 DIAGNOSIS — F29 PSYCHOSIS (HCC): Primary | ICD-10-CM

## 2021-02-20 DIAGNOSIS — F51.01 PRIMARY INSOMNIA: ICD-10-CM

## 2021-02-20 DIAGNOSIS — E11.9 DIABETES (HCC): ICD-10-CM

## 2021-02-20 DIAGNOSIS — L30.4 INTERTRIGO: ICD-10-CM

## 2021-02-20 DIAGNOSIS — E43 SEVERE PROTEIN-CALORIE MALNUTRITION (HCC): ICD-10-CM

## 2021-02-20 DIAGNOSIS — E55.9 VITAMIN D DEFICIENCY: ICD-10-CM

## 2021-02-20 DIAGNOSIS — R39.81 FUNCTIONAL URINARY INCONTINENCE: ICD-10-CM

## 2021-02-20 DIAGNOSIS — I10 ESSENTIAL HYPERTENSION: ICD-10-CM

## 2021-02-20 DIAGNOSIS — G47.09 OTHER INSOMNIA: ICD-10-CM

## 2021-02-20 DIAGNOSIS — F33.3 SEVERE EPISODE OF RECURRENT MAJOR DEPRESSIVE DISORDER, WITH PSYCHOTIC FEATURES (HCC): Primary | ICD-10-CM

## 2021-02-20 DIAGNOSIS — I10 HYPERTENSION, ESSENTIAL: ICD-10-CM

## 2021-02-20 DIAGNOSIS — F29 PSYCHOSIS (HCC): ICD-10-CM

## 2021-02-20 DIAGNOSIS — R19.7 DIARRHEA: ICD-10-CM

## 2021-02-20 DIAGNOSIS — S72.001A FRACTURE, HIP, RIGHT, CLOSED, INITIAL ENCOUNTER (HCC): ICD-10-CM

## 2021-02-20 DIAGNOSIS — Z86.59 HISTORY OF ASPERGER'S SYNDROME: ICD-10-CM

## 2021-02-20 LAB
ALBUMIN SERPL BCP-MCNC: 3.8 G/DL (ref 3.5–5)
ALP SERPL-CCNC: 90 U/L (ref 46–116)
ALT SERPL W P-5'-P-CCNC: 28 U/L (ref 12–78)
AMPHETAMINES SERPL QL SCN: NEGATIVE
ANION GAP SERPL CALCULATED.3IONS-SCNC: 11 MMOL/L (ref 4–13)
AST SERPL W P-5'-P-CCNC: 12 U/L (ref 5–45)
BARBITURATES UR QL: NEGATIVE
BASOPHILS # BLD AUTO: 0.04 THOUSANDS/ΜL (ref 0–0.1)
BASOPHILS NFR BLD AUTO: 1 % (ref 0–1)
BENZODIAZ UR QL: NEGATIVE
BILIRUB SERPL-MCNC: 0.6 MG/DL (ref 0.2–1)
BILIRUB UR QL STRIP: NEGATIVE
BUN SERPL-MCNC: 11 MG/DL (ref 5–25)
CALCIUM SERPL-MCNC: 9.9 MG/DL (ref 8.3–10.1)
CHLORIDE SERPL-SCNC: 103 MMOL/L (ref 100–108)
CLARITY UR: CLEAR
CO2 SERPL-SCNC: 26 MMOL/L (ref 21–32)
COCAINE UR QL: NEGATIVE
COLOR UR: YELLOW
CREAT SERPL-MCNC: 0.73 MG/DL (ref 0.6–1.3)
EOSINOPHIL # BLD AUTO: 0.04 THOUSAND/ΜL (ref 0–0.61)
EOSINOPHIL NFR BLD AUTO: 1 % (ref 0–6)
ERYTHROCYTE [DISTWIDTH] IN BLOOD BY AUTOMATED COUNT: 12.7 % (ref 11.6–15.1)
ETHANOL EXG-MCNC: 0 MG/DL
FLUAV RNA RESP QL NAA+PROBE: NEGATIVE
FLUBV RNA RESP QL NAA+PROBE: NEGATIVE
GFR SERPL CREATININE-BSD FRML MDRD: 90 ML/MIN/1.73SQ M
GLUCOSE SERPL-MCNC: 109 MG/DL (ref 65–140)
GLUCOSE UR STRIP-MCNC: NEGATIVE MG/DL
HCT VFR BLD AUTO: 53.5 % (ref 34.8–46.1)
HGB BLD-MCNC: 17.9 G/DL (ref 11.5–15.4)
HGB UR QL STRIP.AUTO: NEGATIVE
IMM GRANULOCYTES # BLD AUTO: 0.01 THOUSAND/UL (ref 0–0.2)
IMM GRANULOCYTES NFR BLD AUTO: 0 % (ref 0–2)
KETONES UR STRIP-MCNC: NEGATIVE MG/DL
LEUKOCYTE ESTERASE UR QL STRIP: NEGATIVE
LYMPHOCYTES # BLD AUTO: 1.26 THOUSANDS/ΜL (ref 0.6–4.47)
LYMPHOCYTES NFR BLD AUTO: 23 % (ref 14–44)
MCH RBC QN AUTO: 30.9 PG (ref 26.8–34.3)
MCHC RBC AUTO-ENTMCNC: 33.5 G/DL (ref 31.4–37.4)
MCV RBC AUTO: 92 FL (ref 82–98)
METHADONE UR QL: NEGATIVE
MONOCYTES # BLD AUTO: 0.37 THOUSAND/ΜL (ref 0.17–1.22)
MONOCYTES NFR BLD AUTO: 7 % (ref 4–12)
NEUTROPHILS # BLD AUTO: 3.72 THOUSANDS/ΜL (ref 1.85–7.62)
NEUTS SEG NFR BLD AUTO: 68 % (ref 43–75)
NITRITE UR QL STRIP: NEGATIVE
NRBC BLD AUTO-RTO: 0 /100 WBCS
OPIATES UR QL SCN: NEGATIVE
OXYCODONE+OXYMORPHONE UR QL SCN: NEGATIVE
PCP UR QL: NEGATIVE
PH UR STRIP.AUTO: 7.5 [PH]
PLATELET # BLD AUTO: 288 THOUSANDS/UL (ref 149–390)
PMV BLD AUTO: 10.1 FL (ref 8.9–12.7)
POTASSIUM SERPL-SCNC: 3.9 MMOL/L (ref 3.5–5.3)
PROT SERPL-MCNC: 7.5 G/DL (ref 6.4–8.2)
PROT UR STRIP-MCNC: NEGATIVE MG/DL
RBC # BLD AUTO: 5.8 MILLION/UL (ref 3.81–5.12)
RSV RNA RESP QL NAA+PROBE: NEGATIVE
SARS-COV-2 RNA RESP QL NAA+PROBE: NEGATIVE
SODIUM SERPL-SCNC: 140 MMOL/L (ref 136–145)
SP GR UR STRIP.AUTO: 1.01 (ref 1–1.03)
THC UR QL: NEGATIVE
TSH SERPL DL<=0.05 MIU/L-ACNC: 0.51 UIU/ML (ref 0.36–3.74)
UROBILINOGEN UR QL STRIP.AUTO: 0.2 E.U./DL
WBC # BLD AUTO: 5.44 THOUSAND/UL (ref 4.31–10.16)

## 2021-02-20 PROCEDURE — 96360 HYDRATION IV INFUSION INIT: CPT

## 2021-02-20 PROCEDURE — 36415 COLL VENOUS BLD VENIPUNCTURE: CPT | Performed by: EMERGENCY MEDICINE

## 2021-02-20 PROCEDURE — 81003 URINALYSIS AUTO W/O SCOPE: CPT | Performed by: PHYSICIAN ASSISTANT

## 2021-02-20 PROCEDURE — 82075 ASSAY OF BREATH ETHANOL: CPT | Performed by: EMERGENCY MEDICINE

## 2021-02-20 PROCEDURE — 93005 ELECTROCARDIOGRAM TRACING: CPT

## 2021-02-20 PROCEDURE — 80053 COMPREHEN METABOLIC PANEL: CPT | Performed by: EMERGENCY MEDICINE

## 2021-02-20 PROCEDURE — 85025 COMPLETE CBC W/AUTO DIFF WBC: CPT | Performed by: EMERGENCY MEDICINE

## 2021-02-20 PROCEDURE — 99285 EMERGENCY DEPT VISIT HI MDM: CPT

## 2021-02-20 PROCEDURE — 0241U HB NFCT DS VIR RESP RNA 4 TRGT: CPT | Performed by: PHYSICIAN ASSISTANT

## 2021-02-20 PROCEDURE — 84443 ASSAY THYROID STIM HORMONE: CPT | Performed by: PHYSICIAN ASSISTANT

## 2021-02-20 PROCEDURE — 99284 EMERGENCY DEPT VISIT MOD MDM: CPT | Performed by: PHYSICIAN ASSISTANT

## 2021-02-20 PROCEDURE — 80307 DRUG TEST PRSMV CHEM ANLYZR: CPT | Performed by: EMERGENCY MEDICINE

## 2021-02-20 RX ORDER — OLANZAPINE 10 MG/1
5 INJECTION, POWDER, LYOPHILIZED, FOR SOLUTION INTRAMUSCULAR
Status: DISCONTINUED | OUTPATIENT
Start: 2021-02-20 | End: 2021-03-31 | Stop reason: HOSPADM

## 2021-02-20 RX ORDER — LORAZEPAM 2 MG/ML
0.5 INJECTION INTRAMUSCULAR EVERY 4 HOURS PRN
Status: CANCELLED | OUTPATIENT
Start: 2021-02-20

## 2021-02-20 RX ORDER — LORAZEPAM 2 MG/ML
1 INJECTION INTRAMUSCULAR
Status: DISCONTINUED | OUTPATIENT
Start: 2021-02-20 | End: 2021-03-31 | Stop reason: HOSPADM

## 2021-02-20 RX ORDER — OLANZAPINE 10 MG/1
10 TABLET ORAL
Status: DISCONTINUED | OUTPATIENT
Start: 2021-02-20 | End: 2021-03-31 | Stop reason: HOSPADM

## 2021-02-20 RX ORDER — OLANZAPINE 2.5 MG/1
2.5 TABLET ORAL
Status: CANCELLED | OUTPATIENT
Start: 2021-02-20

## 2021-02-20 RX ORDER — ACETAMINOPHEN 325 MG/1
975 TABLET ORAL EVERY 6 HOURS PRN
Status: DISCONTINUED | OUTPATIENT
Start: 2021-02-20 | End: 2021-03-31 | Stop reason: HOSPADM

## 2021-02-20 RX ORDER — HYDROXYZINE HYDROCHLORIDE 25 MG/1
25 TABLET, FILM COATED ORAL
Status: DISCONTINUED | OUTPATIENT
Start: 2021-02-20 | End: 2021-03-31 | Stop reason: HOSPADM

## 2021-02-20 RX ORDER — BENZTROPINE MESYLATE 1 MG/1
1 TABLET ORAL
Status: DISCONTINUED | OUTPATIENT
Start: 2021-02-20 | End: 2021-03-31 | Stop reason: HOSPADM

## 2021-02-20 RX ORDER — LORAZEPAM 2 MG/ML
0.5 INJECTION INTRAMUSCULAR EVERY 4 HOURS PRN
Status: DISCONTINUED | OUTPATIENT
Start: 2021-02-20 | End: 2021-03-31 | Stop reason: HOSPADM

## 2021-02-20 RX ORDER — ACETAMINOPHEN 325 MG/1
650 TABLET ORAL EVERY 4 HOURS PRN
Status: CANCELLED | OUTPATIENT
Start: 2021-02-20

## 2021-02-20 RX ORDER — LOSARTAN POTASSIUM 50 MG/1
50 TABLET ORAL DAILY
Status: COMPLETED | OUTPATIENT
Start: 2021-02-21 | End: 2021-03-07

## 2021-02-20 RX ORDER — LORAZEPAM 1 MG/1
1 TABLET ORAL
Status: CANCELLED | OUTPATIENT
Start: 2021-02-20

## 2021-02-20 RX ORDER — OLANZAPINE 5 MG/1
5 TABLET ORAL
Status: DISCONTINUED | OUTPATIENT
Start: 2021-02-20 | End: 2021-03-31 | Stop reason: HOSPADM

## 2021-02-20 RX ORDER — OLANZAPINE 2.5 MG/1
10 TABLET ORAL
Status: CANCELLED | OUTPATIENT
Start: 2021-02-20

## 2021-02-20 RX ORDER — LORAZEPAM 2 MG/ML
1 INJECTION INTRAMUSCULAR
Status: CANCELLED | OUTPATIENT
Start: 2021-02-20

## 2021-02-20 RX ORDER — OXYBUTYNIN CHLORIDE 5 MG/1
5 TABLET, EXTENDED RELEASE ORAL DAILY
Status: DISCONTINUED | OUTPATIENT
Start: 2021-02-21 | End: 2021-03-31 | Stop reason: HOSPADM

## 2021-02-20 RX ORDER — ACETAMINOPHEN 325 MG/1
975 TABLET ORAL EVERY 6 HOURS PRN
Status: CANCELLED | OUTPATIENT
Start: 2021-02-20

## 2021-02-20 RX ORDER — LORAZEPAM 1 MG/1
1 TABLET ORAL
Status: DISCONTINUED | OUTPATIENT
Start: 2021-02-20 | End: 2021-03-31 | Stop reason: HOSPADM

## 2021-02-20 RX ORDER — OLANZAPINE 10 MG/1
10 INJECTION, POWDER, LYOPHILIZED, FOR SOLUTION INTRAMUSCULAR
Status: CANCELLED | OUTPATIENT
Start: 2021-02-20

## 2021-02-20 RX ORDER — OLANZAPINE 10 MG/1
10 INJECTION, POWDER, LYOPHILIZED, FOR SOLUTION INTRAMUSCULAR
Status: DISCONTINUED | OUTPATIENT
Start: 2021-02-20 | End: 2021-03-31 | Stop reason: HOSPADM

## 2021-02-20 RX ORDER — BENZTROPINE MESYLATE 1 MG/ML
1 INJECTION INTRAMUSCULAR; INTRAVENOUS
Status: DISCONTINUED | OUTPATIENT
Start: 2021-02-20 | End: 2021-03-31 | Stop reason: HOSPADM

## 2021-02-20 RX ORDER — BENZTROPINE MESYLATE 0.5 MG/1
1 TABLET ORAL
Status: CANCELLED | OUTPATIENT
Start: 2021-02-20

## 2021-02-20 RX ORDER — BENZTROPINE MESYLATE 1 MG/ML
1 INJECTION INTRAMUSCULAR; INTRAVENOUS
Status: CANCELLED | OUTPATIENT
Start: 2021-02-20

## 2021-02-20 RX ORDER — ACETAMINOPHEN 325 MG/1
650 TABLET ORAL EVERY 4 HOURS PRN
Status: DISCONTINUED | OUTPATIENT
Start: 2021-02-20 | End: 2021-03-31 | Stop reason: HOSPADM

## 2021-02-20 RX ORDER — TRAZODONE HYDROCHLORIDE 50 MG/1
50 TABLET ORAL
Status: DISCONTINUED | OUTPATIENT
Start: 2021-02-20 | End: 2021-03-31 | Stop reason: HOSPADM

## 2021-02-20 RX ORDER — HYDROXYZINE HYDROCHLORIDE 25 MG/1
50 TABLET, FILM COATED ORAL
Status: CANCELLED | OUTPATIENT
Start: 2021-02-20

## 2021-02-20 RX ORDER — ACETAMINOPHEN 325 MG/1
650 TABLET ORAL EVERY 6 HOURS PRN
Status: DISCONTINUED | OUTPATIENT
Start: 2021-02-20 | End: 2021-03-31 | Stop reason: HOSPADM

## 2021-02-20 RX ORDER — HYDROXYZINE HYDROCHLORIDE 25 MG/1
50 TABLET, FILM COATED ORAL
Status: DISCONTINUED | OUTPATIENT
Start: 2021-02-20 | End: 2021-03-31 | Stop reason: HOSPADM

## 2021-02-20 RX ORDER — OLANZAPINE 2.5 MG/1
2.5 TABLET ORAL
Status: DISCONTINUED | OUTPATIENT
Start: 2021-02-20 | End: 2021-03-31 | Stop reason: HOSPADM

## 2021-02-20 RX ORDER — ACETAMINOPHEN 325 MG/1
650 TABLET ORAL EVERY 6 HOURS PRN
Status: CANCELLED | OUTPATIENT
Start: 2021-02-20

## 2021-02-20 RX ORDER — OLANZAPINE 10 MG/1
5 INJECTION, POWDER, LYOPHILIZED, FOR SOLUTION INTRAMUSCULAR
Status: CANCELLED | OUTPATIENT
Start: 2021-02-20

## 2021-02-20 RX ORDER — HYDROXYZINE HYDROCHLORIDE 25 MG/1
25 TABLET, FILM COATED ORAL
Status: CANCELLED | OUTPATIENT
Start: 2021-02-20

## 2021-02-20 RX ORDER — TRAZODONE HYDROCHLORIDE 50 MG/1
50 TABLET ORAL
Status: CANCELLED | OUTPATIENT
Start: 2021-02-20

## 2021-02-20 RX ORDER — OLANZAPINE 2.5 MG/1
5 TABLET ORAL
Status: CANCELLED | OUTPATIENT
Start: 2021-02-20

## 2021-02-20 RX ADMIN — SODIUM CHLORIDE 1000 ML: 0.9 INJECTION, SOLUTION INTRAVENOUS at 11:08

## 2021-02-20 RX ADMIN — LORAZEPAM 1 MG: 1 TABLET ORAL at 23:13

## 2021-02-20 NOTE — PROGRESS NOTES
Pt was admitted with the following belongings:    - pink underwear  - navy blue socks  - navy blue long sleeve shirt  - mask  - black sneakers  - plaid pants  - [employer] cars  - ACCESS card

## 2021-02-20 NOTE — ED NOTES
Patient is accepted at [facility]  Patient is accepted by Edis Laurent per [address] is arranged with [facility]   Transportation is scheduled for 1630  Patient may go to the floor at anytime        Nurse report is to be called to [phone] prior to patient transfer

## 2021-02-20 NOTE — ED NOTES
Reno Do from [address] called and stated that transport will be at 1630 with [facility]  CW called and let Jennyfer Snyder know and ED at [facility] of transport time

## 2021-02-20 NOTE — ED NOTES
CW called [facility] and spoke to [place] and requested [facility] transport for Pt to [facility]  He will call CW back once arranged

## 2021-02-20 NOTE — ED NOTES
CW called and spoke to intake and referral specialist, Rica Wallace at [place] [address] who stated that there is a bed available at [address] and they will review

## 2021-02-20 NOTE — ED PROVIDER NOTES
History  Chief Complaint   Patient presents with    Psychiatric Evaluation     Patient states that there are demons that are preventing her from eating and she has not been taking her medications for the past month  Denies SI/HI     Patient is a 61 y/o F with h/o HTN, anxiety, CP that was BIBA for paranoid delusions, not eating or drinking  Patient states she found out she was not a Claudean Sprout, so she cursed God's name and signed a contract with the devil and has had demonic interactions since  Her son called the ambulance because patient has not been eating or drinking and just laying in bed for 1 month  Patient denies SI/HI  She appears weak and dehydrated  History limited due to patient's psychiatric disorder  History provided by:  Patient and EMS personnel  History limited by:  Psychiatric disorder  Psychiatric Evaluation  Presenting symptoms: delusional    Presenting symptoms: no hallucinations, no homicidal ideas, no self-mutilation and no suicidal thoughts    Degree of incapacity (severity): Moderate  Onset quality:  Gradual  Duration:  1 month  Timing:  Constant  Progression:  Worsening  Chronicity:  Recurrent  Context: not alcohol use and not drug abuse    Relieved by:  Nothing  Worsened by:  Nothing  Ineffective treatments:  None tried  Associated symptoms: appetite change, fatigue and feelings of worthlessness    Associated symptoms: no abdominal pain and no chest pain    Risk factors: hx of mental illness        Prior to Admission Medications   Prescriptions Last Dose Informant Patient Reported? Taking?    B Complex Vitamins (VITAMIN B COMPLEX PO)  Self Yes No   Sig: Take by mouth 1 daily  (100 mg) per vn gv   CHROMIUM POLYNICOTINATE PO  Self Yes No   Sig: Take 1-2 capsules by mouth daily This is 200 mcg daily   Cholecalciferol (VITAMIN D3) 5000 units CAPS  Self Yes No   Sig: Take by mouth 1 daily   Cyanocobalamin (VITAMIN B 12) 250 MCG LOZG  Self Yes No   Sig: Take 500 mcg by mouth daily LORazepam (ATIVAN) 1 mg tablet   No No   Sig: Take one tablet at bedtime, may take an additional  tablet at bedtime as needed  Lactobacillus (ACIDOPHILUS) 100 MG CAPS  Self Yes No   Sig: Take 3 capsules by mouth daily   MAGNESIUM-POTASSIUM PO  Self Yes No   Sig: Take by mouth 1 bid  (Crebs/Magnesium/Potassium)   Magnesium 250 MG TABS  Self Yes No   Si-2 tabs daily   Melatonin 10 MG TABS  Self Yes No   Sig: Take by mouth   Menaquinone-7 (VITAMIN K2 PO)  Self Yes No   Sig: Take by mouth   Multiple Vitamins-Minerals (MULTIVITAMIN WITH MINERALS) tablet  Self Yes No   Sig: Take 1 tablet by mouth daily   Potassium 99 MG TABS  Self Yes No   Sig: Take by mouth 1 daily   Resveratrol 100 MG CAPS  Self Yes No   Sig: Take 1 capsule by mouth daily   Selenium 200 MCG CAPS  Self Yes No   Sig: Take by mouth 2-3 times a week takes 1 tab   VANADYL SULFATE PO  Self Yes No   Sig: Take by mouth 10 mg tabs  Takes 1-2 daily   acetaminophen (TYLENOL) 325 mg tablet  Self No No   Sig: Take 2 tablets (650 mg total) by mouth every 6 (six) hours as needed for fever   Patient taking differently: 2 every 4 hours alt with 1  mg ASA     ascorbic acid (VITAMIN C) 500 mg tablet  Self Yes No   Sig: Take 500 mg by mouth 2 (two) times a day    aspirin 81 MG tablet  Self Yes No   Sig: Take by mouth Takes 1 daily   fluocinonide (LIDEX) 0 05 % cream  Self No No   Sig: Apply topically 2 (two) times a day   losartan (COZAAR) 50 mg tablet  Self No No   Sig: take 1 tablet by mouth daily   ondansetron (ZOFRAN-ODT) 4 mg disintegrating tablet  Self No No   Sig: Take 1 tablet (4 mg total) by mouth every 6 (six) hours as needed for nausea or vomiting   oxybutynin (DITROPAN-XL) 5 mg 24 hr tablet   No No   Sig: Take 1 tablet (5 mg total) by mouth daily   patient supplied medication  Self Yes No   temazepam (RESTORIL) 15 mg capsule   No No   Sig: Take 1 capsule (15 mg total) by mouth daily at bedtime as needed for sleep      Facility-Administered Medications: None       Past Medical History:   Diagnosis Date    Anxiety     [facility] pals[facility]     Depression     Hypertension     Pre-diabetes     Psychiatric disorder     Schizoaffective disorder ([facility] )     Scoliosis        Past Surgical History:   Procedure Laterality Date    APPENDECTOMY      BUNIONECTOMY Bilateral     WA PARTIAL HIP REPLACEMENT Right 9/23/2019    Procedure: HEMIARTHROPLASTY HIP (BIPOLAR); Surgeon: Israel Christine MD;  Location:  MAIN OR;  Service: Orthopedics       Family History   Problem Relation Age of Onset    Hypertension Mother     Heart disease Mother     Prostate cancer Father     Heart disease Father     Substance Abuse Neg Hx     Mental illness Neg Hx      I have reviewed and agree with the history as documented  E-Cigarette/Vaping    E-Cigarette Use Never User      E-Cigarette/Vaping Substances    Nicotine No     THC No     CBD No     Flavoring No     Other No     Unknown No      Social History     Tobacco Use    Smoking status: Never Smoker    Smokeless tobacco: Never Used   Substance Use Topics    Alcohol use: Never     Frequency: Never    Drug use: Never       Review of Systems   Unable to perform ROS: Psychiatric disorder   Constitutional: Positive for appetite change and fatigue  Cardiovascular: Negative for chest pain  Gastrointestinal: Negative for abdominal pain  Skin: Negative for color change and rash  Neurological: Positive for weakness  Psychiatric/Behavioral: Negative for hallucinations, homicidal ideas, self-injury and suicidal ideas  Physical Exam  Physical Exam  Vitals signs and nursing note reviewed  Constitutional:       General: She is not in acute distress  Appearance: She is normal weight  Comments: Patient appears disheveled, dehydrated  HENT:      Head: Normocephalic and atraumatic        Right Ear: Hearing normal       Left Ear: Hearing normal       Nose: Nose normal       Mouth/Throat: Mouth: Mucous membranes are pale and dry  Eyes:      Conjunctiva/sclera: Conjunctivae normal       Pupils: Pupils are equal    Neck:      Musculoskeletal: Normal range of motion  Cardiovascular:      Rate and Rhythm: Normal rate and regular rhythm  Heart sounds: Normal heart sounds  Pulmonary:      Effort: Pulmonary effort is normal       Breath sounds: Normal breath sounds  No wheezing, rhonchi or rales  Abdominal:      General: Abdomen is flat  Bowel sounds are normal       Palpations: Abdomen is soft  Tenderness: There is no abdominal tenderness  Musculoskeletal: Normal range of motion  General: No tenderness  Right lower leg: No edema  Left lower leg: No edema  Skin:     General: Skin is warm and dry  Coloration: Skin is not pale  Findings: No rash  Neurological:      Mental Status: She is alert  GCS: GCS eye subscore is 3  GCS verbal subscore is 5  GCS motor subscore is 6  Sensory: Sensation is intact  Motor: Motor function is intact  Psychiatric:         Mood and Affect: Mood is depressed  Affect is flat  Speech: Speech is delayed  Behavior: Behavior is slowed  Behavior is cooperative  Thought Content: Thought content is delusional  Thought content does not include homicidal or suicidal ideation  Comments: Poor eye contact, patient keeps eyes closed during exam    Episcopalian preoccupation            Vital Signs  ED Triage Vitals   Temperature Pulse Respirations Blood Pressure SpO2   02/20/21 1044 02/20/21 1044 02/20/21 1044 02/20/21 1130 02/20/21 1044   98 2 °F (36 8 °C) 89 21 167/81 100 %      Temp Source Heart Rate Source Patient Position - Orthostatic VS BP Location FiO2 (%)   02/20/21 1044 02/20/21 1413 02/20/21 1413 02/20/21 1413 --   Temporal Monitor Lying Right arm       Pain Score       02/20/21 1130       No Pain           Vitals:    02/20/21 1130 02/20/21 1413 02/20/21 1430 02/20/21 1600   BP: 167/81 (!) 173/84 128/67 137/81   Pulse: 87 86 98 96   Patient Position - Orthostatic VS:  Lying           Visual Acuity      ED Medications  Medications   sodium chloride 0 9 % bolus 1,000 mL (0 mL Intravenous Stopped 2/20/21 1230)       Diagnostic Studies  Results Reviewed     Procedure Component Value Units Date/Time    COVID19, Influenza A/B, RSV PCR, [facility] [[id number]]  (Normal) Collected: 02/20/21 1106    Lab Status: Final result Specimen: Nasopharyngeal Swab Updated: 02/20/21 1201     SARS-CoV-2 Negative     INFLUENZA A PCR Negative     INFLUENZA B PCR Negative     RSV PCR Negative    Narrative: This test has been authorized by FDA under an EUA (Emergency Use Assay) for use by authorized laboratories  Clinical caution and judgement should be used with the interpretation of these results with consideration of the clinical impression and other laboratory testing  Testing reported as "Positive" or "Negative" has been proven to be accurate according to standard laboratory validation requirements  All testing is performed with control materials showing appropriate reactivity at standard intervals  UA w Reflex to Microscopic w Reflex to Culture [[id number]] Collected: 02/20/21 1107    Lab Status: Final result Specimen: Urine, Clean Catch Updated: 02/20/21 1158     Color, UA Yellow     Clarity, UA Clear     Specific Gravity, UA 1 010     pH, UA 7 5     Leukocytes, UA Negative     Nitrite, UA Negative     Protein, UA Negative mg/dl      Glucose, UA Negative mg/dl      Ketones, UA Negative mg/dl      Urobilinogen, UA 0 2 E U /dl      Bilirubin, UA Negative     Blood, UA Negative    TSH [[id number]]  (Normal) Collected: 02/20/21 1106    Lab Status: Final result Specimen: Blood from Arm, Right Updated: 02/20/21 1147     TSH 3RD GENERATON 0 506 uIU/mL     Narrative:      Patients undergoing fluorescein dye angiography may retain small amounts of fluorescein in the body for 48-72 hours post procedure   Samples containing fluorescein can produce falsely depressed TSH values  If the patient had this procedure,a specimen should be resubmitted post fluorescein clearance  Comprehensive metabolic panel [[id number]] Collected: 02/20/21 1106    Lab Status: Final result Specimen: Blood from Arm, Right Updated: 02/20/21 1139     Sodium 140 mmol/L      Potassium 3 9 mmol/L      Chloride 103 mmol/L      CO2 26 mmol/L      ANION GAP 11 mmol/L      BUN 11 mg/dL      Creatinine 0 73 mg/dL      Glucose 109 mg/dL      Calcium 9 9 mg/dL      AST 12 U/L      ALT 28 U/L      Alkaline Phosphatase 90 U/L      Total Protein 7 5 g/dL      Albumin 3 8 g/dL      Total Bilirubin 0 60 mg/dL      eGFR 90 ml/min/1 73sq m     Narrative:      National Kidney Disease Foundation guidelines for Chronic Kidney Disease (CKD):     Stage 1 with normal or high GFR (GFR > 90 mL/min/1 73 square meters)    Stage 2 Mild CKD (GFR = 60-89 mL/min/1 73 square meters)    Stage 3A Moderate CKD (GFR = 45-59 mL/min/1 73 square meters)    Stage 3B Moderate CKD (GFR = 30-44 mL/min/1 73 square meters)    Stage 4 Severe CKD (GFR = 15-29 mL/min/1 73 square meters)    Stage 5 End Stage CKD (GFR <15 mL/min/1 73 square meters)  Note: GFR calculation is accurate only with a steady state creatinine    Rapid drug screen, urine [[id number]]  (Normal) Collected: 02/20/21 1107    Lab Status: Final result Specimen: Urine, Clean Catch Updated: 02/20/21 1133     Amph/Meth UR Negative     Barbiturate Ur Negative     Benzodiazepine Urine Negative     Cocaine Urine Negative     Methadone Urine Negative     Opiate Urine Negative     PCP Ur Negative     THC Urine Negative     Oxycodone Urine Negative    Narrative:      FOR MEDICAL PURPOSES ONLY  IF CONFIRMATION NEEDED PLEASE CONTACT THE LAB WITHIN 5 DAYS      Drug Screen Cutoff Levels:  AMPHETAMINE/METHAMPHETAMINES  1000 ng/mL  BARBITURATES     200 ng/mL  BENZODIAZEPINES     200 ng/mL  COCAINE      300 ng/mL  METHADONE      300 ng/mL  OPIATES      300 ng/mL  PHENCYCLIDINE     25 ng/mL  THC       50 ng/mL  OXYCODONE      100 ng/mL    CBC and differential [[id number]]  (Abnormal) Collected: 02/20/21 1106    Lab Status: Final result Specimen: Blood from Arm, Right Updated: 02/20/21 1113     WBC 5 44 Thousand/uL      RBC 5 80 Million/uL      Hemoglobin 17 9 g/dL      Hematocrit 53 5 %      MCV 92 fL      MCH 30 9 pg      MCHC 33 5 g/dL      RDW 12 7 %      MPV 10 1 fL      Platelets 205 Thousands/uL      nRBC 0 /100 WBCs      Neutrophils Relative 68 %      Immat GRANS % 0 %      Lymphocytes Relative 23 %      Monocytes Relative 7 %      Eosinophils Relative 1 %      Basophils Relative 1 %      Neutrophils Absolute 3 72 Thousands/µL      Immature Grans Absolute 0 01 Thousand/uL      Lymphocytes Absolute 1 26 Thousands/µL      Monocytes Absolute 0 37 Thousand/µL      Eosinophils Absolute 0 04 Thousand/µL      Basophils Absolute 0 04 Thousands/µL     POCT alcohol breath test [[id number]]  (Normal) Resulted: 02/20/21 1110    Lab Status: Final result Updated: 02/20/21 1110     EXTBreath Alcohol 0 000                 No orders to display              Procedures  ECG 12 Lead Documentation Only    Date/Time: 2/20/2021 11:15 AM  Performed by: Brigitte Tsang PA-C  Authorized by: Brigitte Tsang PA-C     Indications / Diagnosis:  BHU clearance  ECG reviewed by me, the ED Provider: yes (also by Dr Vida Lazo)    Patient location:  ED  Previous ECG:     Previous ECG:  Compared to current    Comparison ECG info:  PVC now present    Similarity:  Changes noted  Rate:     ECG rate:  91  Rhythm:     Rhythm: sinus rhythm    Ectopy:     Ectopy: PVCs      PVCs:  Unifocal  Conduction:     Conduction: normal    ST segments:     ST segments:  Normal             ED Course  ED Course as of Feb 20 1651   Sat Feb 20, 2021   1250 [facility]        1250 Crisis paged for evaluation         1314 Patient currently talking to Ashley Garza from crisis over the phone        [phone] Patient signed 12, it was faxed back to [facility]  Number of Diagnoses or Management Options  Delusional disorder [facility]): established and worsening  Psychosis ([facility] ): established and worsening  Diagnosis management comments: Patient with delusional disorder, not eating or drinking because she believes she is being controlled by demons, will check labs, to r/o dehydration and electrolyte abnormality  Patient medically cleared for BHU, crisis consulted for inpatient treatment  Patient willing to sign 201  Amount and/or Complexity of Data Reviewed  Clinical lab tests: ordered and reviewed  Discuss the patient with other providers: yes (Dell Acharya from crisis  )    Patient Progress  Patient progress: stable      Disposition  Final diagnoses:   Psychosis ([facility] )   Delusional disorder ([facility] )     Time reflects when diagnosis was documented in both MDM as applicable and the Disposition within this note     Time User Action Codes Description Comment    2/20/2021  2:03 PM Juana Brow Add [Z86 59] History of Asperger's syndrome     2/20/2021  2:03 PM Juana Brow Modify [Z86 59] History of Asperger's syndrome     2/20/2021  2:03 PM Lodics Estela Remove [Z86 59] History of Asperger's syndrome     2/20/2021  2:04 PM LodEstela simmons Add [F29] Psychosis ([facility] )     2/20/2021  3:15 PM Metro Prows Modify [F29] Psychosis ([facility] )     2/20/2021  3:15 PM Metro Prows Add [F22] Delusional disorder [facility])       ED Disposition     ED Disposition Condition Date/Time Comment    Transfer to [facility] Feb 20, 2021  3:14 PM Mary Lou Callahan should be [facility] and has been medically cleared          MD Documentation      Most Recent Value   Patient Condition  The patient has been stabilized such that within reasonable medical probability, no material deterioration of the patient condition or the condition of the unborn child(lauren) is likely to result from the transfer   Reason for Transfer  Level of Care needed not available at this facility   Benefits of Transfer  Other benefits (Include comment)_______________________ [facility] Durie of mental health]   Risks of Transfer  Other: (Include comment)__________________________ [decompensation]   Accepting Physician  [facility], [place], [place]    (Name & Tel number)  Adrianne , [phone]   Transported by (Company and Unit #)  [employer]   Sending MD Dr Cesilia Menjivar Name, Juana Fry[place], [place]    (Name & Tel number)  Adrianne , [phone]   Transported by [employer] and Unit #)  [employer]      Follow-up Information    None         Patient's Medications   Discharge Prescriptions    No medications on file     No discharge procedures on file      PDMP Review       Value Time User    PDMP Reviewed  Yes 9/25/2019  9:54 AM Fernanda Olivas MD          ED Provider  Electronically Signed by           Derian Akins PA-C  02/20/21 9116

## 2021-02-20 NOTE — PLAN OF CARE
Problem: Alteration in Thoughts and Perception  Goal: Treatment Goal: Gain control of psychotic behaviors/thinking, reduce/eliminate presenting symptoms and demonstrate improved reality functioning upon discharge  Outcome: Progressing  Goal: Verbalize thoughts and feelings  Description: Interventions:  - Promote a nonjudgmental and trusting relationship with the patient through active listening and therapeutic communication  - Assess patient's level of functioning, behavior and potential for risk  - Engage patient in 1 on 1 interactions  - Encourage patient to express fears, feelings, frustrations, and discuss symptoms    - Alma patient to reality, help patient recognize reality-based thinking   - Administer medications as ordered and assess for potential side effects  - Provide the patient education related to the signs and symptoms of the illness and desired effects of prescribed medications  Outcome: Progressing  Goal: Refrain from acting on delusional thinking/internal stimuli  Description: Interventions:  - Monitor patient closely, per order   - Utilize least restrictive measures   - Set reasonable limits, give positive feedback for acceptable   - Administer medications as ordered and monitor of potential side effects  Outcome: Progressing  Goal: Agree to be compliant with medication regime, as prescribed and report medication side effects  Description: Interventions:  - Offer appropriate PRN medication and supervise ingestion; conduct AIMS, as needed   Outcome: Progressing  Goal: Attend and participate in unit activities, including therapeutic, recreational, and educational groups  Description: Interventions:  -Encourage Visitation and family involvement in care  Outcome: Progressing  Goal: Recognize dysfunctional thoughts, communicate reality-based thoughts at the time of discharge  Description: Interventions:  - Provide medication and psycho-education to assist patient in compliance and developing insight into his/her illness   Outcome: Progressing  Goal: Complete daily ADLs, including personal hygiene independently, as able  Description: Interventions:  - Observe, teach, and assist patient with ADLS  - Monitor and promote a balance of rest/activity, with adequate nutrition and elimination   Outcome: Progressing     Problem: Ineffective Coping  Goal: Cooperates with admission process  Description: Interventions:   - Complete admission process  Outcome: Progressing  Goal: Identifies ineffective coping skills  Outcome: Progressing  Goal: Identifies healthy coping skills  Outcome: Progressing  Goal: Demonstrates healthy coping skills  Outcome: Progressing  Goal: Participates in unit activities  Description: Interventions:  - Provide therapeutic environment   - Provide required programming   - Redirect inappropriate behaviors   Outcome: Progressing  Goal: Patient/Family participate in treatment and DC plans  Description: Interventions:  - Provide therapeutic environment  Outcome: Progressing  Goal: Patient/Family verbalizes awareness of resources  Outcome: Progressing  Goal: Understands least restrictive measures  Description: Interventions:  - Utilize least restrictive behavior  Outcome: Progressing  Goal: Free from restraint events  Description: - Utilize least restrictive measures   - Provide behavioral interventions   - Redirect inappropriate behaviors   Outcome: Progressing     Problem: Depression  Goal: Treatment Goal: Demonstrate behavioral control of depressive symptoms, verbalize feelings of improved mood/affect, and adopt new coping skills prior to discharge  Outcome: Progressing  Goal: Verbalize thoughts and feelings  Description: Interventions:  - Assess and re-assess patient's level of risk   - Engage patient in 1:1 interactions, daily, for a minimum of 15 minutes   - Encourage patient to express feelings, fears, frustrations, hopes   Outcome: Progressing  Goal: Refrain from harming self  Description: Interventions:  - Monitor patient closely, per order   - Supervise medication ingestion, monitor effects and side effects   Outcome: Progressing  Goal: Refrain from isolation  Description: Interventions:  - Develop a trusting relationship   - Encourage socialization   Outcome: Progressing  Goal: Refrain from self-neglect  Outcome: Progressing  Goal: Attend and participate in unit activities, including therapeutic, recreational, and educational groups  Description: Interventions:  - Provide therapeutic and educational activities daily, encourage attendance and participation, and document same in the medical record   Outcome: Progressing  Goal: Complete daily ADLs, including personal hygiene independently, as able  Description: Interventions:  - Observe, teach, and assist patient with ADLS  -  Monitor and promote a balance of rest/activity, with adequate nutrition and elimination   Outcome: Progressing     Problem: Anxiety  Goal: Anxiety is at manageable level  Description: Interventions:  - Assess and monitor patient's anxiety level  - Monitor for signs and symptoms (heart palpitations, chest pain, shortness of breath, headaches, nausea, feeling jumpy, restlessness, irritable, apprehensive)  - Collaborate with interdisciplinary team and initiate plan and interventions as ordered    - Nahma patient to unit/surroundings  - Explain treatment plan  - Encourage participation in care  - Encourage verbalization of concerns/fears  - Identify coping mechanisms  - Assist in developing anxiety-reducing skills  - Administer/offer alternative therapies  - Limit or eliminate stimulants  Outcome: Progressing

## 2021-02-20 NOTE — EMTALA/ACUTE CARE TRANSFER
[facility] EMERGENCY DEPARTMENT  [address]  [facility] [address]  Dept: [phone]      HPRLQW TRANSFER CONSENT    NAME Lino Elelr                                         1960                              MRN [id number]    I have been informed of my rights regarding examination, treatment, and transfer   by Dr Nick Kamara DO    Benefits: Other benefits (Include comment)_______________________(stabilization of mental health)    Risks: Other: (Include comment)__________________________(decompensation)      Consent for Transfer:  I acknowledge that my medical condition has been evaluated and explained to me by the emergency department physician or other qualified medical person and/or my attending physician, who has recommended that I be transferred to the service of  Accepting Physician: KAM Pulido at [address], [address] : [place], [place]  The above potential benefits of such transfer, the potential risks associated with such transfer, and the probable risks of not being transferred have been explained to me, and I fully understand them  The doctor has explained that, in my case, the benefits of transfer outweigh the risks  I agree to be transferred  I authorize the performance of emergency medical procedures and treatments upon me in both transit and upon arrival at the receiving facility  Additionally, I authorize the release of any and all medical records to the receiving facility and request they be transported with me, if possible  I understand that the safest mode of transportation during a medical emergency is an ambulance and that the Hospital advocates the use of this mode of transport  Risks of traveling to the receiving facility by car, including absence of medical control, life sustaining equipment, such as oxygen, and medical personnel has been explained to me and I fully understand them      ([address] CORRECT [address]OX BELOW)  [ X ]  I consent to the stated transfer and to be transported by ambulance/helicopter  [  ]  I consent to the stated transfer, but refuse transportation by ambulance and accept full responsibility for my transportation by car  I understand the risks of non-ambulance transfers and I exonerate the Hospital and its staff from any deterioration in my condition that results from this refusal     X___________________________________________    DATE  21  TIME________  Signature of patient or legally responsible individual signing on patient behalf           RELATIONSHIP TO PATIENT_________________________          Provider Certification    NAME Blossom Barnes                                         1960                              MRN [id number]    A medical screening exam was performed on the above named patient  Based on the examination:    Condition Necessitating Transfer The primary encounter diagnosis was Psychosis ([facility] [address] )  A diagnosis of Delusional disorder ([facility] [address] ) was also pertinent to this visit      Patient Condition: The patient has been stabilized such that within reasonable medical probability, no material deterioration of the patient condition or the condition of the unborn child(lauren) is likely to result from the transfer    Reason for Transfer: Level of Care needed not available at this facility    Transfer Requirements: [place], [place]   · Space available and qualified personnel available for treatment as acknowledged by Nidia Wiseman, [phone]  · Agreed to accept transfer and to provide appropriate medical treatment as acknowledged by       KAM Jalloh  · Appropriate medical records of the examination and treatment of the patient are provided at the time of transfer   [address], [address] _______  · Transfer will be performed by qualified personnel from [address] [place] [address]  and appropriate transfer equipment as required, including the use of necessary and appropriate life support measures  Provider Certification: I have examined the patient and explained the following risks and benefits of being transferred/refusing transfer to the patient/family:         Based on these reasonable risks and benefits to the patient and/or the unborn child(lauren), and based upon the information available at the time of the patients examination, I certify that the medical benefits reasonably to be expected from the provision of appropriate medical treatments at another medical facility outweigh the increasing risks, if any, to the individuals medical condition, and in the case of labor to the unborn child, from effecting the transfer      X____________________________________________ DATE 02/20/21        TIME_______      ORIGINAL - SEND TO MEDICAL RECORDS   COPY - SEND WITH PATIENT DURING TRANSFER

## 2021-02-20 NOTE — PROGRESS NOTES
Patient admitted to [facility] from [address] ED under 201  Patient had been experiencing delusional thoughts of possession at home and had been neglecting to care for herself  She is alert and oriented x4  She is guarded in conversation  She alluded to delusional thoughts, stating that she was depressed and anxious because she believes she is going to hell  She states she has not been eating or sleeping well lately  Denies hallucinations  Denies suicidal thoughts  Patient has a history of Asperger's and does easily become overstimulated  She becomes irritable when overstimulated  She is needy and unsure of herself  Needs lots of reassurance  She showered independently with staff stand by  Ambulates with walker  No complaints of pain at this time   Will continue monitoring,

## 2021-02-20 NOTE — ED NOTES
Crisis Worker (CW) competed intake and safety assessment with patient (Pt) remotely from [facility] after Pt verbally consented  Pt admits to being possed by a demon and signing a contract with the devil weeks ago  Pt admits to not taking medications and not eating and drinking a lot due to fear of being poisoned  Pt denies suicidal and homicidal ideation  Pt is agreeable to inpatient mental health treatment and stated that she was last inpatient in October 2020  Pt stated that she lives at home with her mother  Pt's son called EMS as Pt is not taking care of herself and laying in bed all the time  CW faxed [phone] and 201 handout to [address]  [facility]

## 2021-02-20 NOTE — ED NOTES
Call [facility] from [facility] [address] [place] [address], stating that Catherine is not in network with [facility], however she would be able to approve a non-par agreement  Appropriate information provided to [facility]  Unit to call upon arrival for authorization number       Nga Linette, W  02/20/21   [id number]

## 2021-02-20 NOTE — ED NOTES
Insurance Authorization for admission: precertification  Phone call placed to [facility]  Phone number: [phone]  Spoke to Guanaco Zeng   4 days approved  Level of care: IP  Review on **  Authorization # pending arrival        EVS (Eligibility Verification System) called - [phone]    Automated system indicates: eligible, managed care

## 2021-02-21 LAB
ATRIAL RATE: 85 BPM
P AXIS: 70 DEGREES
PR INTERVAL: 156 MS
QRS AXIS: -23 DEGREES
QRSD INTERVAL: 86 MS
QT INTERVAL: 356 MS
QTC INTERVAL: 423 MS
T WAVE AXIS: 69 DEGREES
VENTRICULAR RATE: 85 BPM

## 2021-02-21 PROCEDURE — 93010 ELECTROCARDIOGRAM REPORT: CPT | Performed by: INTERNAL MEDICINE

## 2021-02-21 PROCEDURE — 99253 IP/OBS CNSLTJ NEW/EST LOW 45: CPT | Performed by: PSYCHIATRY & NEUROLOGY

## 2021-02-21 PROCEDURE — 93005 ELECTROCARDIOGRAM TRACING: CPT

## 2021-02-21 PROCEDURE — 86592 SYPHILIS TEST NON-TREP QUAL: CPT | Performed by: NURSE PRACTITIONER

## 2021-02-21 RX ORDER — LANOLIN ALCOHOL/MO/W.PET/CERES
3 CREAM (GRAM) TOPICAL
Status: DISCONTINUED | OUTPATIENT
Start: 2021-02-21 | End: 2021-03-22

## 2021-02-21 RX ORDER — RISPERIDONE 1 MG/1
1 TABLET, FILM COATED ORAL
Status: DISCONTINUED | OUTPATIENT
Start: 2021-02-21 | End: 2021-02-23

## 2021-02-21 RX ORDER — HYDROXYZINE HYDROCHLORIDE 25 MG/1
50 TABLET, FILM COATED ORAL
Status: DISCONTINUED | OUTPATIENT
Start: 2021-02-21 | End: 2021-02-27

## 2021-02-21 RX ADMIN — LOSARTAN POTASSIUM 50 MG: 50 TABLET, FILM COATED ORAL at 08:06

## 2021-02-21 RX ADMIN — HYDROXYZINE HYDROCHLORIDE 50 MG: 25 TABLET, FILM COATED ORAL at 21:13

## 2021-02-21 RX ADMIN — LORAZEPAM 1 MG: 1 TABLET ORAL at 03:15

## 2021-02-21 RX ADMIN — RISPERIDONE 1 MG: 1 TABLET ORAL at 21:13

## 2021-02-21 RX ADMIN — MELATONIN 3 MG: at 21:13

## 2021-02-21 RX ADMIN — OXYBUTYNIN 5 MG: 5 TABLET, FILM COATED, EXTENDED RELEASE ORAL at 08:06

## 2021-02-21 NOTE — PROGRESS NOTES
Patient is anxious and needy  Needs frequent encouragement and reassurance  She denies hallucinations  She did briefly discuss her delusional thoughts regarding the devil  Endorses depression and anxiety  Denies suicidal thoughts  No complaints of pain  Will continue monitoring

## 2021-02-21 NOTE — PROGRESS NOTES
Progress Note - Luis A Ferrell 62 y o  female MRN: [id number]    Unit/Bed#: [facility] 200-02 Encounter: [id number]      Assessment:  1  Anxiety and depression/schizoaffective disorder  This will be managed by our psychiatric team   2  History cerebral palsy  3  Hypertension  Will continue Cozaar or formulary alternative  4  Patient gives history of prediabetes  Will check A1c      Plan:  See above    Subjective:   No subjective complaint    Objective:     Vitals: Blood pressure 107/63, pulse 90, temperature 98 8 °F (37 1 °C), temperature source Temporal, resp  rate 20, height 5' 5" (1 651 m), weight 56 kg (123 lb 7 3 oz), SpO2 100 %, not currently breastfeeding  ,Body mass index is 20 54 kg/m²        Intake/Output Summary (Last 24 hours) at 2/21/2021 1811  Last data filed at 2/21/2021 1710  Gross per 24 hour   Intake 240 ml   Output --   Net 240 ml       Physical Exam: /63 (BP Location: Right arm)   Pulse 90   Temp 98 8 °F (37 1 °C) (Temporal)   Resp 20   Ht 5' 5" (1 651 m)   Wt 56 kg (123 lb 7 3 oz)   LMP  (LMP Unknown)   SpO2 100%   BMI 20 54 kg/m²     General Appearance:    Alert, cooperative, no distress, appears stated age   Head:    Normocephalic, without obvious abnormality, atraumatic                   Neck:   Supple, symmetrical, trachea midline, no adenopathy;     thyroid:  no enlargement/tenderness/nodules; no carotid    bruit or JVD   Back:     Symmetric, no curvature, ROM normal, no CVA tenderness   Lungs:     Clear to auscultation bilaterally, respirations unlabored   Chest Wall:    No tenderness or deformity    Heart:    Regular rate and rhythm, S1 and S2 normal, no murmur, rub   or gallop       Abdomen:     Soft, non-tender, bowel sounds active all four quadrants,     no masses, no organomegaly           Extremities:   Extremities normal, atraumatic, no cyanosis or edema   Pulses:   2+ and symmetric all extremities   Skin:   Skin color, texture, turgor normal, no rashes or lesions   Lymph nodes:   Cervical, supraclavicular, and axillary nodes normal   Neurologic:   CNII-XII intact, normal strength, sensation and reflexes     throughout        Invasive Devices     Peripheral Intravenous Line            Peripheral IV 02/20/21 Right Antecubital 1 day                Lab, Imaging and other studies: I have personally reviewed pertinent reports

## 2021-02-21 NOTE — H&P
Psychiatric Evaluation - Behavioral Health     Chief Complaint:'simply I cannot eat there is no sense of hunger what 1 I can do I just do not want to eat I cursed the got out I am cursed [place] do not want to eat'    History of Present Illness this is a 81-year-old white woman never  who resides with her mom of 80year-old, with history of depression in the past, who developed delusional thinking and psychosis in the last couple of months, and she felt like that she have a horrible relationship with the Creator, and she cursed the got out, she feels she has cursed back, she feels that she should not eat, unable to explain why, she is eating but very little, lost significant amount of weight, she has stated that she is not [employer] and she has a contract with the [employer] and had demon interaction, her family called the ambulance and took her to the hospital as she was refusing to eat and drink and has been laying in the bed for 1 month  Patient has not taken any medication  Patient report and October September of last year she was admitted to psychiatric unit [facility] in the [facility] and treated with the medication but she does not remember what is the name of medication and she ran out of these medication in November  She stated the reason of hospitalization was that she was depressed and became suicidal and was admitted but she did not do anything to hurt herself  Denied any other psychiatric hospitalizations or treatment          Psychiatric Review Of Systems:  Psychosis depression    Past Psychiatric History:  As stated in the history    Substance Abuse History:  Denied      Family Psychiatric History:  Denied      Social History:  Education:  Patient has a 4 year of college taking  Learning Disabilities:  Denied  Marital history:  She has never been , indicated that no relationship pleasant to a level that she was going to  the person  Living arrangement, social support:  Patient resides with her mother of 68-year-old and she get along with each other very well  Occupational History:  Patient has worked in , 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, 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, until 2018 when she could not work because of her chronic back pain issues  Functioning Relationships:  With the mom   Other Pertinent History:    Traumatic History:   Abuse:  Denied  Other Traumatic Events:  Denied    No past medical history on file    Patient has history of hypertension back    Medical Review Of Systems:  Back and hip patient and hypertension      Meds/Allergies     current meds:   Current Facility-Administered Medications   Medication Dose Route Frequency    acetaminophen (TYLENOL) tablet 650 mg  650 mg Oral Q6H PRN    acetaminophen (TYLENOL) tablet 650 mg  650 mg Oral Q4H PRN    acetaminophen (TYLENOL) tablet 975 mg  975 mg Oral Q6H PRN    benztropine (COGENTIN) injection 1 mg  1 mg Intramuscular Q4H PRN Max 6/day    benztropine (COGENTIN) tablet 1 mg  1 mg Oral Q4H PRN Max 6/day    hydrOXYzine HCL (ATARAX) tablet 25 mg  25 mg Oral Q6H PRN Max 4/day    hydrOXYzine HCL (ATARAX) tablet 50 mg  50 mg Oral Q4H PRN Max 4/day    Or    LORazepam (ATIVAN) injection 0 5 mg  0 5 mg Intramuscular Q4H PRN    hydrOXYzine HCL (ATARAX) tablet 50 mg  50 mg Oral HS    LORazepam (ATIVAN) tablet 1 mg  1 mg Oral Q4H PRN Max 6/day    Or    LORazepam (ATIVAN) injection 1 mg  1 mg Intramuscular Q6H PRN Max 3/day    losartan (COZAAR) tablet 50 mg  50 mg Oral Daily    melatonin tablet 3 mg  3 mg Oral HS    OLANZapine (ZyPREXA) tablet 10 mg  10 mg Oral Q3H PRN Max 3/day    Or    OLANZapine (ZyPREXA) IM injection 10 mg  10 mg Intramuscular Q3H PRN Max 3/day    OLANZapine (ZyPREXA) tablet 5 mg  5 mg Oral Q3H PRN Max 6/day    Or    OLANZapine (ZyPREXA) IM injection 5 mg  5 mg Intramuscular Q3H PRN Max 6/day    OLANZapine (ZyPREXA) tablet 2 5 mg  2 5 mg Oral Q3H PRN Max 8/day    oxybutynin (DITROPAN-XL) 24 hr tablet 5 mg  5 mg Oral Daily    risperiDONE (RisperDAL) tablet 1 mg  1 mg Oral HS    traZODone (DESYREL) tablet 50 mg  50 mg Oral HS PRN        Allergies   Allergen Reactions    Latex Swelling     facial    Onion Hives    Penicillins Rash    Sulfa Antibiotics Rash and Other (See Comments)     Abdominal pain       Objective  Vital signs in last 24 hours:  Pulse:  [108] 108  Respirations:  [16] 16  Blood Pressure: (115)/(65) 115/65    No intake or output data in the 24 hours ending 01/09/16 4187    Mental Status Evaluation:  Appearance:  Tall white woman skinny, disheveled, walks with the walker, has facial hair on the upper lip and chin,   Behavior:  Cooperative but irritable   Speech:  Normal   Mood:  Blunted   Affect:  Blunted   Language: English   Thought Process:  Paranoid   Thought Content:  Delusion   Perceptual Disturbances: Denies   Risk Potential: Denied   Sensorium:  Intact   Cognition:  Average   Consciousness:  Alert oriented to time place and person   Attention: Fair   Intellect: Average   Fund of Knowledge: Average   Insight:  Poor   Judgment: Limited   Muscle Strength and Tone: Within normal range   Gait/Station: Within normal range   Motor Activity: No abnormal active     Lab Results: Labs reviewed WNL   Imaging Studies:Wnl  EKG, Pathology, and Other Studies: WNl    Assessment and plan 1-Psychotic disorder nos, HX depression, 11-def 111-HTN ,Scoliosis, back problems, Hirsutism    Counseling / Coordination of Care  Total floor / unit time spent today 70 minutes  Greater than 50% of total time was spent with the patient and / or family counseling and / or coordination of care   A description of the counseling / coordination of care: start Risperdal 1 mg bed time melatonin 3 mg bed time hydroxyzine 50 mg bed time      Jimmy Clayton MD

## 2021-02-21 NOTE — PROGRESS NOTES
Pt was present in her room at time of assessment  Pt was anxious and reported that she was going to die  RN asked pt why she felt that way and pt responded, "The devil is sucking my life out  Orabe Gerardo is killing me  I am going to die " Pt was reassured that she is safe here but pt wouldn't calm down or accept the information  Pt asked for assistance going to the bathroom which RN accommodated  Pt requested PRN ativan because she couldn't calm herself down  Pt eventually calmed down and went to sleep  Pt reported high anxiety and depression  Pt reported auditory hallucinations no vh  Pt did not report pain at this time  Continuous visual safety checks performed throughout the shift  Safety precautions maintained  Will continue to monitor

## 2021-02-21 NOTE — PROGRESS NOTES
Pt woke up once during the night with restlessness and anxiety, prn ativan given which was effective and pt fell asleep  Continuous visual safety checks performed throughout the night  No sign of distress or labored breathing  Safety precautions maintained  Will continue to monitor

## 2021-02-21 NOTE — ASSESSMENT & PLAN NOTE
Hypertension controlled by Cozaar on outpatient basis  It is unclear whether the patient was taking her medications compliantly  Vital signs stable  Hold Cozaar for now

## 2021-02-21 NOTE — CONSULTS
Recommendations for Discharge:  · Per Primary Service    Counseling / Coordination of Care Time: 30 minutes  Greater than 50% of total time spent on patient counseling and coordination of care  History of Present Illness:  Gricelda Vance is a 62 y o  female who is originally admitted to the emergency service due to worsening depression and delusions  We are consulted for management of her medical comorbidities  Review of Systems:  Review of Systems   Constitutional: Positive for activity change  HENT: Negative  Eyes: Negative  Respiratory: Negative  Cardiovascular: Negative  Gastrointestinal: Negative  Endocrine: Negative  Genitourinary: Positive for urgency  Musculoskeletal: Positive for arthralgias  Skin: Negative  Allergic/Immunologic: Negative  Neurological: Positive for weakness  Hematological: Negative  Psychiatric/Behavioral: Positive for confusion, decreased concentration and dysphoric mood  The patient is nervous/anxious  Past Medical and Surgical History:   Past Medical History:   Diagnosis Date    Anxiety     [facility]     Depression     Hypertension     Pre-diabetes     Psychiatric disorder     Schizoaffective disorder [facility] )     Scoliosis        Past Surgical History:   Procedure Laterality Date    APPENDECTOMY      BUNIONECTOMY Bilateral     [place] PARTIAL HIP REPLACEMENT Right 9/23/2019    Procedure: HEMIARTHROPLASTY HIP (BIPOLAR); Surgeon: Amber Singh MD;  Location: [facility] OR;  Service: Orthopedics       Meds/Allergies:  all medications and allergies reviewed    Allergies:    Allergies   Allergen Reactions    Bactrim [Sulfamethoxazole-Trimethoprim]     Sulfa Antibiotics Rash and Itching       Social History:     Marital Status: Single    Substance Use History:   Social History     Substance and Sexual Activity   Alcohol Use Never    Frequency: Never     Social History     Tobacco Use   Smoking Status Never Smoker   Smokeless Tobacco Never Used     Social History     Substance and Sexual Activity   Drug Use Never       Family History:  I have reviewed the patients family history    Physical Exam:   Vitals:   Blood Pressure: 158/97 (02/20/21 1733)  Pulse: (!) 109 (02/20/21 1733)  Temperature: 97 7 °F (36 5 °C) (02/20/21 1733)  Temp Source: Temporal (02/20/21 1733)  Respirations: 22 (02/20/21 1733)  Height: 5' 5" (165 1 cm) (02/20/21 1733)  Weight - Scale: 56 kg (123 lb 7 3 oz) (02/20/21 1733)  SpO2: 98 % (02/20/21 1733)    Physical Exam  Vitals signs and nursing note reviewed  Constitutional:       Appearance: Normal appearance  HENT:      Head: Normocephalic and atraumatic  Eyes:      Extraocular Movements: Extraocular movements intact  Conjunctiva/sclera: Conjunctivae normal       Pupils: Pupils are equal, round, and reactive to light  Neck:      Musculoskeletal: Normal range of motion and neck supple  Cardiovascular:      Rate and Rhythm: Normal rate and regular rhythm  Pulmonary:      Effort: Pulmonary effort is normal       Breath sounds: Normal breath sounds  Abdominal:      General: Abdomen is flat  Bowel sounds are normal       Palpations: Abdomen is soft  Tenderness: There is no abdominal tenderness  Musculoskeletal: Normal range of motion  Skin:     General: Skin is warm and dry  Neurological:      General: No focal deficit present  Mental Status: She is alert and oriented to person, place, and time  Psychiatric:         Mood and Affect: Mood normal          Additional Data:   Lab Results: I have personally reviewed pertinent reports        Results from last 7 days   Lab Units 02/20/21  1106   WBC Thousand/uL 5 44   HEMOGLOBIN g/dL 17 9*   HEMATOCRIT % 53 5*   PLATELETS Thousands/uL 288   NEUTROS PCT % 68   LYMPHS PCT % 23   MONOS PCT % 7   EOS PCT % 1     Results from last 7 days   Lab Units 02/20/21  1106   SODIUM mmol/L 140   POTASSIUM mmol/L 3 9   CHLORIDE mmol/L 103   CO2 mmol/L 26   BUN mg/dL 11   CREATININE mg/dL 0 73   CALCIUM mg/dL 9 9   ALK PHOS U/L 90   ALT U/L 28   AST U/L 12             Lab Results   Component Value Date/Time    HGBA1C 5 7 (H) 03/28/2018 02:00 PM           Imaging: I have personally reviewed pertinent reports  No orders to display       EKG, Pathology, and Other Studies Reviewed on Admission:   · EKG:  Pending    ** Please Note: This note has been constructed using a voice recognition system  **    Consult- Alfrdeo Blow 1960, 62 y o  female MRN: [id number]    Unit/Bed#: [facility] Encounter: [id number]    Primary Care Provider: Tanvir Haley MD   Date and time admitted to hospital: 2/20/2021  5:31 PM      Consults    Aftercare following right hip joint replacement surgery  Assessment & Plan  Treat with simple analgesics  PT OT as indicated    Absence of bladder continence  Assessment & Plan  Patient has been on Ditropan XL 5 mg on outpatient basis  Continue Ditropan    Other constipation  Assessment & Plan  Will treat symptomatically    Depression  Assessment & Plan  To be managed by our psychiatric team    Essential hypertension  Assessment & Plan  Hypertension controlled by Cozaar on outpatient basis  It is unclear whether the patient was taking her medications compliantly  Vital signs stable  Hold Cozaar for now

## 2021-02-22 PROBLEM — F33.9 MDD (MAJOR DEPRESSIVE DISORDER), RECURRENT EPISODE (HCC): Status: ACTIVE | Noted: 2021-02-22

## 2021-02-22 LAB
EST. AVERAGE GLUCOSE BLD GHB EST-MCNC: 105 MG/DL
HBA1C MFR BLD: 5.3 %
RPR SER QL: NORMAL

## 2021-02-22 PROCEDURE — 83036 HEMOGLOBIN GLYCOSYLATED A1C: CPT | Performed by: INTERNAL MEDICINE

## 2021-02-22 PROCEDURE — 82306 VITAMIN D 25 HYDROXY: CPT | Performed by: FAMILY MEDICINE

## 2021-02-22 PROCEDURE — 99232 SBSQ HOSP IP/OBS MODERATE 35: CPT | Performed by: PSYCHIATRY & NEUROLOGY

## 2021-02-22 PROCEDURE — 82607 VITAMIN B-12: CPT | Performed by: FAMILY MEDICINE

## 2021-02-22 PROCEDURE — 82746 ASSAY OF FOLIC ACID SERUM: CPT | Performed by: FAMILY MEDICINE

## 2021-02-22 RX ORDER — TRAZODONE HYDROCHLORIDE 50 MG/1
50 TABLET ORAL
Status: DISCONTINUED | OUTPATIENT
Start: 2021-02-22 | End: 2021-02-24

## 2021-02-22 RX ADMIN — TRAZODONE HYDROCHLORIDE 50 MG: 50 TABLET ORAL at 21:21

## 2021-02-22 RX ADMIN — HYDROXYZINE HYDROCHLORIDE 50 MG: 25 TABLET, FILM COATED ORAL at 21:20

## 2021-02-22 RX ADMIN — OXYBUTYNIN 5 MG: 5 TABLET, FILM COATED, EXTENDED RELEASE ORAL at 09:02

## 2021-02-22 RX ADMIN — MELATONIN 3 MG: at 21:21

## 2021-02-22 RX ADMIN — LOSARTAN POTASSIUM 50 MG: 50 TABLET, FILM COATED ORAL at 09:02

## 2021-02-22 RX ADMIN — HYDROXYZINE HYDROCHLORIDE 50 MG: 25 TABLET, FILM COATED ORAL at 00:07

## 2021-02-22 RX ADMIN — RISPERIDONE 1 MG: 1 TABLET ORAL at 21:20
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
## 2021-02-22 NOTE — PROGRESS NOTES
02/22/21 0932   Team Meeting   Meeting Type Daily Rounds   Team Members Present   Team Members Present Physician;Nurse;; Occupational Therapist   Physician Team Member Dr Romie Keating MD   Nursing Team Member Pieter Galaviz RN   Social Work Team Member NALINI Soriano   OT Team Member Zeeshan Dickerson [place]   Patient/Family Present   Patient Present No   Patient's Family Present No     NEW - 12; AH, generalized weakness, delusions, religiously preoccupied; on unit: "feels numb"; endorses AH - "possessed by demonic forces"; lives with mother; poor appetite, ambulates with walker; hx Asperger; up x1 overnight - redirectable back to bed

## 2021-02-22 NOTE — PROGRESS NOTES
Pt present in her room at time of assessment  Pt c/o anxiety, depression but did not endorse hallucinations at time of assessment  Pt does not believe she can ambulate independently although she does fine with a staff nearby to keep an eye on her  Pt gait is steady and no weakness apparent  Pt wants assistance to walk to dining room, however walked there on her own unassisted earlier in the shift  Pt denies ah, vh  Pt compliant with medications and went to bed  Continuous visual safety checks performed throughout the shift  Safety precautions maintained  Will continue to monitor

## 2021-02-22 NOTE — MALNUTRITION/BMI
This medical record reflects one or more clinical indicators suggestive of malnutrition and/or morbid obesity  Malnutrition Findings:   Adult Malnutrition type: Acute illness  Adult Degree of Malnutrition: Other severe protein calorie malnutrition(in setting of inadequate energy intake as evidenced by significant wt  loss 17% in <3 months, moderate fat loss orbital/buccal pads, moderate muscle loss temporalis treated with regular diet, ensure clear dinner)  Malnutrition Characteristics: Fat loss, Muscle loss, Weight loss    BMI Findings:  Adult BMI Classifications: (20 54)     Body mass index is 20 54 kg/m²  See Nutrition note dated 2/22/21 for additional details  Completed nutrition assessment is viewable in the nutrition documentation

## 2021-02-22 NOTE — PLAN OF CARE
Problem: Depression  Goal: Verbalize thoughts and feelings  Description: Interventions:  - Assess and re-assess patient's level of risk   - Engage patient in 1:1 interactions, daily, for a minimum of 15 minutes   - Encourage patient to express feelings, fears, frustrations, hopes   Outcome: Progressing  Goal: Refrain from harming self  Description: Interventions:  - Monitor patient closely, per order   - Supervise medication ingestion, monitor effects and side effects   Outcome: Progressing  Goal: Refrain from self-neglect  Outcome: Progressing  Goal: Attend and participate in unit activities, including therapeutic, recreational, and educational groups  Description: Interventions:  - Provide therapeutic and educational activities daily, encourage attendance and participation, and document same in the medical record   Outcome: Progressing  Goal: Complete daily ADLs, including personal hygiene independently, as able  Description: Interventions:  - Observe, teach, and assist patient with ADLS  -  Monitor and promote a balance of rest/activity, with adequate nutrition and elimination   Outcome: Progressing     Problem: Anxiety  Goal: Anxiety is at manageable level  Description: Interventions:  - Assess and monitor patient's anxiety level  - Monitor for signs and symptoms (heart palpitations, chest pain, shortness of breath, headaches, nausea, feeling jumpy, restlessness, irritable, apprehensive)  - Collaborate with interdisciplinary team and initiate plan and interventions as ordered    - [place] patient to unit/surroundings  - Explain treatment plan  - Encourage participation in care  - Encourage verbalization of concerns/fears  - Identify coping mechanisms  - Assist in developing anxiety-reducing skills  - Administer/offer alternative therapies  - Limit or eliminate stimulants  Outcome: Progressing     Problem: Depression  Goal: Treatment Goal: Demonstrate behavioral control of depressive symptoms, verbalize feelings of improved mood/affect, and adopt new coping skills prior to discharge  Outcome: Not Progressing  Goal: Refrain from isolation  Description: Interventions:  - Develop a trusting relationship   - Encourage socialization   Outcome: Not Progressing

## 2021-02-22 NOTE — PROGRESS NOTES
02/22/21 1300   Team Meeting   Meeting Type Tx Team Meeting   Initial Conference Date 02/22/21   Next Conference Date 03/24/21   Team Members Present   Team Members Present Physician;Nurse;   Physician Team Member Dr Naldo Sandoval Team Member Charly Carlson RN   Social Work Team Member Thai Triplett   Patient/Family Present   Patient Present Yes   Patient's Family Present No     Treatment plan reviewed with patient  Patient expressed understanding and agreed to sign the document  No other questions/concerns made known at this time  Treatment team will continue to monitor patient's progress and adjust treatment plan as needed

## 2021-02-22 NOTE — PROGRESS NOTES
Pt appeared to be sleeping for several hours at a time but woke up and called the nurses alert stating she was having trouble sleeping  Pt asked RN for something to help her sleep because she was restless and fidgeting  RN offered PRN atarax 50mg which pt accepted  Pt went back to sleep  Pt woke back up later and asked [facility] to get her a cup of water because she was feeling like she wouldn't be able to fall back to sleep  Pt reported to [facility] that she knew what would help her fall asleep (ativan) but that "they" wouldn't give it to her to sleep because she wasn't feeling anxious  Pt reported poor sleep to [facility] but drank her water and went back to sleep  Continuous visual safety checks performed throughout the night  No sign of distress or labored breathing  Safety precautions maintained  Will continue to monitor

## 2021-02-22 NOTE — PROGRESS NOTES
Patient resting in room earlier in shift  OOB for dinner  Ate more than 50% of dinner  Cooperative and compliant  Did state her depression remains high  Denies SI  Did state she still has auditory hallucinations regarding demonic forces, denies command  Enforced feelings of safe environment and patient did state she feels safe  Mood is flat  Denies pain  Q7min checks maintained  Will continue to monitor

## 2021-02-22 NOTE — NURSING NOTE
Pt currently attending group  Pt ate 100% of breakfast but had stated prior having poor appetite and not eating like she used to  Pt states she feels numb and is unsure of what could be triggering her high depression  Pt endorses AH but no command  AH are intermittent  Pt agrees to come to staff if feeling she is feeling unsafe with the intermittent voices  Pt states, "I'm under the influence of demonic forces " Pt declined second group  Affect flat and mood is depressed  Q 7 min safety checks maintained

## 2021-02-22 NOTE — PLAN OF CARE
Problem: DISCHARGE PLANNING  Goal: Discharge to home or other facility with appropriate resources  Description: INTERVENTIONS:  - Identify barriers to discharge w/patient and caregiver  - Arrange for needed discharge resources and transportation as appropriate  - Identify discharge learning needs (meds, wound care, etc )  - Arrange for interpretive services to assist at discharge as needed  - Refer to Case Management Department for coordinating discharge planning if the patient needs post-hospital services based on physician/advanced practitioner order or complex needs related to functional status, cognitive ability, or social support system  Outcome: Not Progressing

## 2021-02-22 NOTE — TREATMENT PLAN
TREATMENT PLAN REVIEW - [address] 62 y o  1960 female MRN: [id number]    [address]  Room / Bed: Jerod Lozano 200/[facility] 565-21 Encounter: [id number]          Admit Date/Time:  2/20/2021  5:31 PM    Treatment Team: Attending Provider: Papo Milton MD; Patient Care Assistant: Magali Olson; Consulting Physician: Nora Anderson MD; Patient Care Technician: Mark Dee; Patient Care Technician: Reji Villasenor; Recreational Therapist: Leslie Salgado; Certified Nursing Assistant: Ashley Waters; Patient Care Technician: Alfred Grimm; Certified Nursing Assistant: Tip Guillory; Care Manager: Amrit Weinberg RN; Registered Nurse: Felicia Burkett RN; : Keli Child    Diagnosis: Principal Problem:    MDD (major depressive disorder), recurrent episode [facility])  Active Problems:    Essential hypertension    Depression    Other constipation    Absence of bladder continence    History of Asperger's syndrome    Aftercare following right hip joint replacement surgery      Patient Strengths/Assets: negotiates basic needs, patient is on a voluntary commitment, verbal    Patient Barriers/Limitations: difficulty adapting, poor insight, self-care deficit    Short Term Goals: decrease in depressive symptoms, decrease in anxiety symptoms, decrease in psychotic symptoms, decrease in level of agitation, ability to stay safe on the unit, improvement in insight, improvement in reality testing, improvement in self care, sleep improvement, improvement in appetite, mood stabilization, acceptance of need for psychiatric treatment, acceptance of psychiatric medications    Long Term Goals: improvement in depression, improvement in anxiety, resolution of depressive symptoms, free of suicidal thoughts, resolution of psychotic symptoms, improvement in reality testing, improved insight, acceptance of need for psychiatric medications, acceptance of need for psychiatric treatment, acceptance of need for psychiatric follow up after discharge, acceptance of psychiatric diagnosis, adequate self care, adequate sleep, adequate appetite, adequate oral intake, appropriate interaction with peers    Progress Towards Goals: starting psychiatric medications as prescribed    Recommended Treatment: medication management, patient medication education, group therapy, milieu therapy, continued Behavioral Health psychiatric evaluation/assessment process, medical follow up with medical team    Treatment Frequency: daily medication monitoring, group and milieu therapy daily, monitoring through interdisciplinary rounds, monitoring through weekly patient care conferences    Expected Discharge Date: 10 midnights     Discharge Plan: will be determined    Treatment Plan Created/Updated By: Maria Teresa Matute MD

## 2021-02-22 NOTE — H&P
DonovanjulietaArpita  [facility]:7[birth year]85/9404 F  [facility]:[id number]    [facility]:[id number]  Adm Date: 2/20/2021 1731  5:31 PM   ATT PHY: Brandon Madden, [address]         Chief Complaint:  Worsening psychotic symptoms    History of Presenting Illness: Kendra Luong is a(n) 62y o  year old female who was admitted with worsening psychotic symptoms  Patient developed delusional thinking which was getting worse over the past 2 months  Patient examined at bedside  Patient denied any active suicidal homicidal ideations  Allergies   Allergen Reactions    Bactrim [Sulfamethoxazole-Trimethoprim]     Sulfa Antibiotics Rash and Itching       No current facility-administered medications on file prior to encounter  Current Outpatient Medications on File Prior to Encounter   Medication Sig Dispense Refill    ascorbic acid (VITAMIN C) 500 mg tablet Take 500 mg by mouth 2 (two) times a day       aspirin 81 MG tablet Take by mouth Takes 1 daily      B Complex Vitamins (VITAMIN B COMPLEX PO) Take by mouth 1 daily  (100 mg) per vn gv      Cholecalciferol (VITAMIN D3) 5000 units CAPS Take by mouth 1 daily      CHROMIUM POLYNICOTINATE PO Take 1-2 capsules by mouth daily This is 200 mcg daily      Lactobacillus (ACIDOPHILUS) 100 MG CAPS Take 1 capsule by mouth daily       LORazepam (ATIVAN) 1 mg tablet Take one tablet at bedtime, may take an additional  tablet at bedtime as needed  60 tablet 2    losartan (COZAAR) 50 mg tablet take 1 tablet by mouth daily 90 tablet 3    Magnesium 250 MG TABS 1-2 tabs daily      Potassium 99 MG TABS Take by mouth 1 daily      Resveratrol 100 MG CAPS Take 1 capsule by mouth daily      Selenium 200 MCG CAPS Take by mouth 2-3 times a week takes 1 tab      temazepam (RESTORIL) 15 mg capsule Take 1 capsule (15 mg total) by mouth daily at bedtime as needed for sleep 30 capsule 3    VANADYL SULFATE PO Take by mouth 10 mg tabs    Takes 1-2 daily  acetaminophen (TYLENOL) 325 mg tablet Take 2 tablets (650 mg total) by mouth every 6 (six) hours as needed for fever (Patient taking differently: 2 every 4 hours alt with 1  mg ASA  ) 30 tablet 0    Cyanocobalamin (VITAMIN B 12) 250 MCG LOZG Take 500 mcg by mouth daily      fluocinonide (LIDEX) 0 05 % cream Apply topically 2 (two) times a day (Patient not taking: Reported on 2/20/2021) 30 g 1    MAGNESIUM-POTASSIUM PO Take by mouth 1 bid  (Crebs/Magnesium/Potassium)      Melatonin 10 MG TABS Take by mouth      Menaquinone-7 (VITAMIN K2 PO) Take by mouth      Multiple Vitamins-Minerals (MULTIVITAMIN WITH MINERALS) tablet Take 1 tablet by mouth daily      ondansetron (ZOFRAN-ODT) 4 mg disintegrating tablet Take 1 tablet (4 mg total) by mouth every 6 (six) hours as needed for nausea or vomiting (Patient not taking: Reported on 2/20/2021) 20 tablet 0    oxybutynin (DITROPAN-XL) 5 mg 24 hr tablet Take 1 tablet (5 mg total) by mouth daily (Patient not taking: Reported on 2/20/2021) 30 tablet 5    patient supplied medication          Active Ambulatory Problems     Diagnosis Date Noted    Insomnia 02/28/2018    Essential hypertension 02/28/2018    Autism disorder 02/28/2018    Depression 08/01/2018    Other constipation 11/29/2018    Status post hip hemiarthroplasty 10/04/2019    Absence of bladder continence 12/09/2019    Adjustment disorder with mixed anxiety and depressed mood 06/09/2020    History of attention deficit hyperactivity disorder (ADHD) 06/09/2020    History of Asperger's syndrome 06/09/2020    Aftercare following right hip joint replacement surgery 12/11/2020    Spondylosis of lumbar spine 12/11/2020    Weakness of right lower extremity 12/11/2020     Resolved Ambulatory Problems     Diagnosis Date Noted    Health care maintenance 02/27/2018    Hyperglycemia 02/28/2018    Hypokalemia 04/04/2018    Viral upper respiratory tract infection 03/05/2019    Other atopic dermatitis 03/05/2019    Fatigue 03/22/2019    Closed displaced fracture of right femoral neck ([facility] ) 09/22/2019    Fall 09/22/2019    Leg edema 12/09/2019    Exposure to COVID-19 virus 06/02/2020     Past Medical History:   Diagnosis Date    Anxiety     Cerebral palsy ([facility] )     Hypertension     Pre-diabetes     Psychiatric disorder     Schizoaffective disorder ([place] [address] )     Scoliosis        Past Surgical History:   Procedure Laterality Date    APPENDECTOMY      BUNIONECTOMY Bilateral     NH PARTIAL HIP REPLACEMENT Right 9/23/2019    Procedure: HEMIARTHROPLASTY HIP (BIPOLAR);   Surgeon: Kayleigh Peoples MD;  [facility];  Service: Orthopedics       Social History:   Social History     Socioeconomic History    Marital status: Single     Spouse name: None    Number of children: None    Years of education: None    Highest education level: None   Occupational History    Occupation: unemployed   Social Needs    Financial resource strain: None    Food insecurity     Worry: Never true     Inability: Never true    Transportation needs     Medical: No     Non-medical: No   Tobacco Use    Smoking status: Never Smoker    Smokeless tobacco: Never Used   Substance and Sexual Activity    Alcohol use: Never     Frequency: Never    Drug use: Never    Sexual activity: Never   Lifestyle    Physical activity     Days per week: None     Minutes per session: None    Stress: None   Relationships    Social connections     Talks on phone: Twice a week     Gets together: More than three times a week     Attends Islam service: None     Active member of club or organization: No     Attends meetings of clubs or organizations: Never     Relationship status: None    Intimate partner violence     Fear of current or ex partner: None     Emotionally abused: None     Physically abused: None     Forced sexual activity: None   Other Topics Concern    None   Social History Narrative    Always uses seat belt Caffeine use    Lives with mother  Feels safe  No living will  Sees dentist reg  Family History:   Family History   Problem Relation Age of Onset    Hypertension Mother     Heart disease Mother     Prostate cancer Father     Heart disease Father     Substance Abuse Neg Hx     Mental illness Neg Hx        Review of Systems   Musculoskeletal: Positive for arthralgias, back pain and gait problem  Neurological: Positive for weakness  All other systems reviewed and are negative  Physical Exam   Constitutional: Awake and Alert  Well-developed and well-nourished  No distress  HENT: PERR,EOMI, conjunctiva normal  Head: Normocephalic and atraumatic  Mouth/Throat: Oropharynx is clear and moist     Eyes: Conjunctivae and EOM are normal  Pupils are equal, round, and reactive to light  Right and left eye exhibits no discharge  Neck: Neck supple  No tracheal deviation present  No thyromegaly present  Cardiovascular: Normal rate, regular rhythm and normal heart sounds  Exam reveals no friction rub  No murmur heard  Pulmonary/Chest: Effort normal and breath sounds normal  No respiratory distress  She has no wheezes  Abdominal: Soft  Bowel sounds are normal  She exhibits no distension  There is no tenderness  There is no rebound and no guarding  Neurological: Cranial Nerves grossly intact  No sensory deficit  Coordination normal    Musculoskeletal:   Nontender spine  Skin: Skin is warm and dry  No rash noted  No diaphoresis  No erythema  No edema  No cyanosis  Assessment     Humza Ceron is a(n) 62y o  year old female with psychotic disorder NOS    1  Cardiac with history of hypertension  Patient will be continued on losartan  2  DJD/osteoarthritis  Patient may get Tylenol on as needed basis  3  Gait abnormality  Patient seems stable at this time  4  Insomnia  Patient is on melatonin  5  Overactive urinary bladder  Patient is on oxybutynin    6  Psych with history of psychotic disorder NOS/major depressive disorder  Patient is being managed by psych  Prognosis: Fair  Discharge Plan: In progress  Advanced Directives: I have discussed in detail the patient the advanced directives  Patient has not appointed anybody as her POA and has no living will with advanced healthcare directives  Patient's 1st contact is her brother Rolo Goldberg and his phone number is [phone]  When discussing cardiac and pulmonary resuscitation efforts with the patient, the patient wishes to be FULL CODE  I have spent more than 50 minutes gathering data, doing physical examination, and discussing the advanced directives, which was witnessed by caring staff

## 2021-02-22 NOTE — PROGRESS NOTES
Progress Note - Behavioral Health   Terri White 62 y o  female MRN: [id number]  Unit/Bed#: [facility] 200-02 Encounter: [id number]    Assessment/Plan   Principal Problem:    MDD (major depressive disorder), recurrent episode ([address] )  Active Problems:    Essential hypertension    Depression    Other constipation    Absence of bladder continence    History of Asperger's syndrome    Aftercare following right hip joint replacement surgery    Behavior over the last 24 hours: unchanged  Sleep: insomnia  Appetite: poor  Medication side effects: No  ROS: no complaints  All other systems are negative for acute changes  Mental Status Evaluation:  Appearance:  age appropriate  Behavior:  restless and fidgety, evasive    Speech:  normal volume, short phrases  Mood:  irritable   Affect:  mood-congruent   Thought Process:  goal directed   Thought Content:  increased Hoahaoism preoccupation  Perceptual Disturbances: Auditory hallucinations without commands   Risk Potential: Suicidal Ideations none  Homicidal Ideations none  Potential for Aggression No   Sensorium:  person, place, time/date, situation and month of year   Memory:  recent and remote memory grossly intact   Consciousness:  alert and awake    Attention: attention span and concentration were age appropriate   Insight:  poor   Judgment: limited   Gait/Station: normal gait/station   Motor Activity: no abnormal movements     Progress Toward Goals: Patient states that she is feeling numb and depressed and that she has had no motivation to do anything in life  She states that she feels guilty that she has turned her back on god and that god is the reason for her feeling this way  She has been hearing "demon voices" which tell her that she is going to hell and that "the demons are controlling" her body ever since she turned her back on god  She admits she very is depressed and cannot  take care of herself at home        Recommended Treatment: Continue with group therapy, milieu therapy and occupational therapy  Risks, benefits and possible side effects of Medications:   Risks, benefits, and possible side effects of medications explained to patient and patient verbalizes understanding  Medications: all current active meds have been reviewed  Labs: I have personally reviewed all pertinent laboratory/tests results  Most Recent Labs:   Lab Results   Component Value Date    WBC 5 44 02/20/2021    RBC 5 80 (H) 02/20/2021    HGB 17 9 (H) 02/20/2021    HCT 53 5 (H) 02/20/2021     02/20/2021    RDW 12 7 02/20/2021    NEUTROABS 3 72 02/20/2021    SODIUM 140 02/20/2021    K 3 9 02/20/2021     02/20/2021    CO2 26 02/20/2021    BUN 11 02/20/2021    CREATININE 0 73 02/20/2021    GLUC 109 02/20/2021    CALCIUM 9 9 02/20/2021    AST 12 02/20/2021    ALT 28 02/20/2021    ALKPHOS 90 02/20/2021    TP 7 5 02/20/2021    ALB 3 8 02/20/2021    TBILI 0 60 02/20/2021    CHOLESTEROL 257 (H) 12/05/2019    HDL 71 12/05/2019    TRIG 112 12/05/2019    LDLCALC 164 (H) 12/05/2019    [id number] 0 506 02/20/2021    HGBA1C 5 3 02/22/2021     02/22/2021       Counseling / Coordination of Care  Total floor / unit time spent today 20 minutes  Greater than 50% of total time was spent with the patient and / or family counseling and / or coordination of care

## 2021-02-23 PROBLEM — E43 SEVERE PROTEIN-CALORIE MALNUTRITION (HCC): Status: ACTIVE | Noted: 2021-02-23

## 2021-02-23 LAB
25(OH)D3 SERPL-MCNC: 38.1 NG/ML (ref 30–100)
FOLATE SERPL-MCNC: 14.9 NG/ML (ref 3.1–17.5)
GLUCOSE SERPL-MCNC: 102 MG/DL (ref 65–140)
VIT B12 SERPL-MCNC: 626 PG/ML (ref 100–900)

## 2021-02-23 PROCEDURE — 99233 SBSQ HOSP IP/OBS HIGH 50: CPT | Performed by: NURSE PRACTITIONER

## 2021-02-23 PROCEDURE — 82948 REAGENT STRIP/BLOOD GLUCOSE: CPT

## 2021-02-23 RX ORDER — RISPERIDONE 2 MG/1
2 TABLET, FILM COATED ORAL
Status: DISCONTINUED | OUTPATIENT
Start: 2021-02-23 | End: 2021-02-27

## 2021-02-23 RX ADMIN — OLANZAPINE 5 MG: 5 TABLET, FILM COATED ORAL at 09:57

## 2021-02-23 RX ADMIN — SITAGLIPTIN 50 MG: 25 TABLET, FILM COATED ORAL at 09:48

## 2021-02-23 RX ADMIN — RISPERIDONE 2 MG: 2 TABLET ORAL at 21:07

## 2021-02-23 RX ADMIN — MELATONIN 3 MG: at 21:06

## 2021-02-23 RX ADMIN — HYDROXYZINE HYDROCHLORIDE 50 MG: 25 TABLET, FILM COATED ORAL at 21:07

## 2021-02-23 RX ADMIN — LOSARTAN POTASSIUM 50 MG: 50 TABLET, FILM COATED ORAL at 09:48

## 2021-02-23 RX ADMIN — OXYBUTYNIN 5 MG: 5 TABLET, FILM COATED, EXTENDED RELEASE ORAL at 09:48

## 2021-02-23 RX ADMIN — TRAZODONE HYDROCHLORIDE 50 MG: 50 TABLET ORAL at 21:07

## 2021-02-23 RX ADMIN — HYDROXYZINE HYDROCHLORIDE 50 MG: 25 TABLET, FILM COATED ORAL at 00:34

## 2021-02-23 RX ADMIN — HYDROXYZINE HYDROCHLORIDE 50 MG: 25 TABLET, FILM COATED ORAL at 09:57

## 2021-02-23 NOTE — NURSING NOTE
Patient withdrawn to room  Stating she has no energy to get up or to eat due to the "mocking voices" she is hearing  She states "the demons" are keeping me from eating  Pt did not eat breakfast  Compliant with medications  Started on Januvia 50 mg for hyperglycemia and elevated A1c  Will be receiving Glucerna at dinnertime  Started on a JANE/CHO controlled diet  States she is not depressed but is feeling very anxious related to the voices that she is continuously hearing  PRN Zyprexa 5 mg and PRN Atarax 50 mg administered at 0957  Will CTM  Q7 minute safety checks in progress

## 2021-02-23 NOTE — PROGRESS NOTES
Progress Note - Behavioral Health   Merissa Byers 62 y o  female MRN: [id number]  Unit/Bed#: Sia Epps 200-02 Encounter: [id number]    Assessment/Plan   Principal Problem:    MDD (major depressive disorder), recurrent episode ([facility] [address] )  Active Problems:    Essential hypertension    Depression    Other constipation    Absence of bladder continence    History of Asperger's syndrome    Aftercare following right hip joint replacement surgery    Severe protein-calorie malnutrition ([facility] [address] )      Behavior over the last 24 hours:  unchanged  Sleep:  Poor  Appetite:  Fair  Medication side effects: No  ROS: no complaints and All other systems negative for acute change     Sharmila Hernandez was seen today for follow-up and case discussed with treatment team this morning  Upon interaction, Sharmila Hernandez was laying in bed and appeared disheveled  She reports she is not doing well and that the voices are keeping her from eating and sleeping  She described the voices she is hearing as the devil and that he talks to her all the time and prevents her from doing anything but lay in bed  She denies any visual hallucinations or SI/HI  She reports her anxiety and depression is through the roof," and "constant " She describes she does not sleep well at night  According to nursing documentation, patient's appetite is fair, however she did not eat breakfast this morning  Patient remains withdrawn to room and does not socialize with staff or peers  Patient is in agreement to increase Risperdal for psychosis  She was calm and cooperative throughout encounter  Mental Status Evaluation:  Appearance:  disheveled and Laying in bed   Behavior:  Bizarre, somewhat guarded, poor eye contact   Speech:  normal pitch and normal volume   Mood:  Not good   Affect:  flat   Thought Process:  perserverative   Thought Content:  delusions  Muslim preoccupation   Perceptual Disturbances:  Auditory hallucinations with commands Telling her not to eat or drink   Risk Potential: Suicidal Ideations none  Homicidal Ideations none  Potential for Aggression No   Sensorium:  person and place   Memory:  recent and remote memory grossly intact   Consciousness:  alert and awake    Attention: attention span appeared shorter than expected for age   Insight:  limited   Judgment: Poor   Gait/Station: Laying in bed   Motor Activity: no abnormal movements     Progress Toward Goals: Will increase HS Risperdal for psychosis and poor sleep  Otherwise continue current psychotropic regimen  Patient will return home upon stabilization  No discharge date at this time    Recommended Treatment: Continue with group therapy, milieu therapy and occupational therapy  Risks, benefits and possible side effects of Medications:   Risks, benefits, and possible side effects of medications explained to patient and patient verbalizes understanding  Medications: all current active meds have been reviewed and planned medication changes: Increase Risperdal 2mg PO QHS  Labs: I have personally reviewed all pertinent laboratory/tests results  Counseling / Coordination of Care  Total floor / unit time spent today 20 minutes  Greater than 50% of total time was spent with the patient and / or family counseling and / or coordination of care

## 2021-02-23 NOTE — PROGRESS NOTES
Pt woke up and pushed the nurse call button  Pt stated she was having trouble sleeping and asked for PRN ativan for sleep  Pt was told that we cannot administer ativan as a sleep aid but we could give her PRN atarax to help her relax if she's feeling restless  Pt accepted the atarax and fell asleep  Pt didn't wake up for the remainder of the night  Continuous visual safety checks performed throughout the night  No sign of distress or labored breathing  Safety precautions maintained  Will continue to monitor

## 2021-02-23 NOTE — APP STUDENT NOTE
Progress Note - [address] 62 y o  female MRN: [id number]  Unit/Bed#: Carlton Haines 200-02 Encounter: [id number]    Assessment/Plan   Principal Problem:    MDD (major depressive disorder), recurrent episode ([facility] [address] )  Active Problems:    Essential hypertension    Depression    Other constipation    Absence of bladder continence    History of Asperger's syndrome    Aftercare following right hip joint replacement surgery    Severe protein-calorie malnutrition (H[facility])      Behavior over the last 24 hours:  unchanged  Sleep: insomnia  Appetite: fair  Medication side effects: No  ROS: no complaints    Mental Status Evaluation:  Appearance:  disheveled and older than stated age   Behavior:  bizarre   Speech:  soft   Mood:  constricted and dysthymic   Affect:  mood-congruent   Thought Process:  illogical   Thought Content:  delusions  persecutory   Perceptual Disturbances: Auditory hallucinations with commands to not eat or drink   Risk Potential: Suicidal Ideations none  Homicidal Ideations none  Potential for Aggression No   Sensorium:  person and place   Memory:  recent and remote memory grossly intact   Consciousness:  alert and awake    Attention: attention span and concentration were age appropriate   Insight:  limited   Judgment: limited   Gait/Station: did not assess, patient laying in bed   Motor Activity: no abnormal movements     Progress Toward Goals: No change    Horacio Knife was observed laying in bed under the covers  She appeared disheveled  She stated that she is not doing well "I'm not eating and I'm not drinking and I can't"  She states that she has had auditory hallucinations from the devil for several "months to years" and reports that the devil is preventing her from doing anything  Patient did request a drink of water during the interview  Additionally, per nursing documentation, her appetite has been "fair"  She denies any visual hallucinations   She also states "I am too weak to move, I can't get up"  She denies SI/HI  She endorses anxiety and depression and states that they are "off the chart"  She reports that she is not sleeping "even with medication"  Per staff report, she sleeps in 2-3 hour increments at night  Patient denies adverse effects to medications at this time  Will increase Risperdal to 2 mg po at HS  Recommended Treatment: Continue with group therapy, milieu therapy and occupational therapy  Risks, benefits and possible side effects of Medications:   Risks, benefits, and possible side effects of medications explained to patient and patient verbalizes understanding  Medications: all current active meds have been reviewed  Labs: I have personally reviewed all pertinent laboratory/tests results  Most Recent Labs:   Lab Results   Component Value Date    WBC 5 44 02/20/2021    RBC 5 80 (H) 02/20/2021    HGB 17 9 (H) 02/20/2021    HCT 53 5 (H) 02/20/2021     02/20/2021    RDW 12 7 02/20/2021    NEUTROABS 3 72 02/20/2021    SODIUM 140 02/20/2021    K 3 9 02/20/2021     02/20/2021    CO2 26 02/20/2021    BUN 11 02/20/2021    CREATININE 0 73 02/20/2021    GLUC 109 02/20/2021    CALCIUM 9 9 02/20/2021    AST 12 02/20/2021    ALT 28 02/20/2021    ALKPHOS 90 02/20/2021    TP 7 5 02/20/2021    ALB 3 8 02/20/2021    TBILI 0 60 02/20/2021    CHOLESTEROL 257 (H) 12/05/2019    HDL 71 12/05/2019    TRIG 112 12/05/2019    LDLCALC 164 (H) 12/05/2019    [id number] 0 506 02/20/2021    RPR Non-Reactive 02/21/2021    HGBA1C 5 3 02/22/2021     02/22/2021       Counseling / Coordination of Care  Total floor / unit time spent today 20 minutes  Greater than 50% of total time was spent with the patient and / or family counseling and / or coordination of care

## 2021-02-23 NOTE — SOCIAL WORK
CM called and spoke with patient's brother, Laura Wall ([phone]), to gather additional psychosocial information  Laura Francoisant reported that patient was hospitalized for the same symptoms at the end of the summer/beginning of autumn at [facility] in [place] and he feels she has just gotten worse since that hospitalization  Laura Wall did note that patient was discharged from [facility] to a SNF for rehab, where she rehabbed for about a month before returning home  Laura Duke notes patient is extremely hopeless and helpless at home and there is no reasoning with her due to her intense Cheondoism preoccupation  Laura Duke reports he is questioning if patient has an undiagnosed Asperger's Disorder diagnosis due to the severity of her depression and fixation on Episcopalian  Laura Duke also stated that he needed to pass on what a friend of Venkatesh reported to Laura Wall prior to this admission  Laura Duke reports a friend of Venkatesh witnessed Sheldon Carrizales speaking in two distinct voices (one male, one child) and Arpita's friend started asking for prayers and had a  see Sheldon Carrizales as well  CM thanked Laura Wall for the information given and provided a direct phone number for Laura Duke to call this CM with any other questions/concerns he may have while patient is hospitalized  CM will continue to follow patient's progress and assist with discharge planning needs

## 2021-02-23 NOTE — PROGRESS NOTES
02/23/21 0931   Team Meeting   Meeting Type Daily Rounds   Team Members Present   Team Members Present Physician;Nurse;; Occupational Therapist   Physician Team Member Dr Walt Herman MD; Gladys Irizarry, [address] [place] [address]   Nursing Team Member Deven Stoddard, RN   Social Work Team Member Rosalina Kern [place]   OT Team Member Linda Ruth RN (covering RT)   Patient/Family Present   Patient Present No   Patient's Family Present No     No DC Date - will return home upon stabilization; needy at times; endorses increased depression; guarded at times; intermittent irritability; slept 2-3 hours

## 2021-02-23 NOTE — PROGRESS NOTES
Progress Note - Costa Morales 62 y o  female MRN: [id number]    Unit/Bed#: Dawood Jiang 200-02 Encounter: [id number]        Subjective:   Patient seen and examined at bedside after reviewing the chart and discussing the case with the caring staff  Patient examined at bedside  Patient reports that she has no energy  Patient reports that she can not eat or drink but denied any nausea or vomitings  On review of patient's labs it was found that patient has hyperglycemia with blood sugars in 130s to 140s  Patient's hemoglobin A1c was found to be 5 3  Patient's vitamin B12 levels were found to be within normal limits 626 and vitamin-D levels are still pending  Physical Exam   Vitals: Blood pressure 130/70, pulse 89, temperature 98 5 °F (36 9 °C), temperature source Temporal, resp  rate 18, height 5' 5" (1 651 m), weight 56 kg (123 lb 7 3 oz), SpO2 98 %, not currently breastfeeding  ,Body mass index is 20 54 kg/m²  Constitutional: He appears well-developed  HEENT: PERR, EOMI, MMM  Cardiovascular: Normal rate and regular rhythm  Pulmonary/Chest: Effort normal and breath sounds normal    Abdomen: Soft, + BS, NT    Assessment/Plan:     Costa Morales is a(n) 62y o  year old female with psychotic disorder NOS     1  Cardiac with history of hypertension  Patient will be continued on losartan  2  DJD/osteoarthritis  Patient may get Tylenol on as needed basis  3  Gait abnormality  Patient seems stable at this time  4  Insomnia  Patient is on melatonin  5  Overactive urinary bladder  Patient is on oxybutynin  6  Hyperglycemia  We will closely monitor patient's blood sugars by doing Accu-Cheks 2 times daily  I will start the patient on Januvia 50 mg daily along with carb controlled diet  Patient's hemoglobin A1c was 5 3  I will consult nutritionist for dietary changes  Patient has been put on Glucerna supplements

## 2021-02-23 NOTE — PLAN OF CARE
Problem: Alteration in Thoughts and Perception  Goal: Treatment Goal: Gain control of psychotic behaviors/thinking, reduce/eliminate presenting symptoms and demonstrate improved reality functioning upon discharge  Outcome: Progressing  Goal: Verbalize thoughts and feelings  Description: Interventions:  - Promote a nonjudgmental and trusting relationship with the patient through active listening and therapeutic communication  - Assess patient's level of functioning, behavior and potential for risk  - Engage patient in 1 on 1 interactions  - Encourage patient to express fears, feelings, frustrations, and discuss symptoms    - [place] patient to reality, help patient recognize reality-based thinking   - Administer medications as ordered and assess for potential side effects  - Provide the patient education related to the signs and symptoms of the illness and desired effects of prescribed medications  Outcome: Progressing  Goal: Refrain from acting on delusional thinking/internal stimuli  Description: Interventions:  - Monitor patient closely, per order   - Utilize least restrictive measures   - Set reasonable limits, give positive feedback for acceptable   - Administer medications as ordered and monitor of potential side effects  Outcome: Progressing  Goal: Agree to be compliant with medication regime, as prescribed and report medication side effects  Description: Interventions:  - Offer appropriate PRN medication and supervise ingestion; conduct AIMS, as needed   Outcome: Progressing  Goal: Attend and participate in unit activities, including therapeutic, recreational, and educational groups  Description: Interventions:  -Encourage Visitation and family involvement in care  Outcome: Progressing  Goal: Recognize dysfunctional thoughts, communicate reality-based thoughts at the time of discharge  Description: Interventions:  - Provide medication and psycho-education to assist patient in compliance and developing insight into his/her illness   Outcome: Progressing  Goal: Complete daily ADLs, including personal hygiene independently, as able  Description: Interventions:  - Observe, teach, and assist patient with ADLS  - Monitor and promote a balance of rest/activity, with adequate nutrition and elimination   Outcome: Progressing     Problem: Ineffective Coping  Goal: Cooperates with admission process  Description: Interventions:   - Complete admission process  Outcome: Progressing  Goal: Identifies ineffective coping skills  Outcome: Progressing  Goal: Identifies healthy coping skills  Outcome: Progressing  Goal: Demonstrates healthy coping skills  Outcome: Progressing  Goal: Participates in unit activities  Description: Interventions:  - Provide therapeutic environment   - Provide required programming   - Redirect inappropriate behaviors   Outcome: Progressing  Goal: Patient/Family participate in treatment and DC plans  Description: Interventions:  - Provide therapeutic environment  Outcome: Progressing  Goal: Patient/Family verbalizes awareness of resources  Outcome: Progressing  Goal: Understands least restrictive measures  Description: Interventions:  - Utilize least restrictive behavior  Outcome: Progressing  Goal: Free from restraint events  Description: - Utilize least restrictive measures   - Provide behavioral interventions   - Redirect inappropriate behaviors   Outcome: Progressing     Problem: Depression  Goal: Treatment Goal: Demonstrate behavioral control of depressive symptoms, verbalize feelings of improved mood/affect, and adopt new coping skills prior to discharge  Outcome: Progressing  Goal: Verbalize thoughts and feelings  Description: Interventions:  - Assess and re-assess patient's level of risk   - Engage patient in 1:1 interactions, daily, for a minimum of 15 minutes   - Encourage patient to express feelings, fears, frustrations, hopes   Outcome: Progressing  Goal: Refrain from harming self  Description: Interventions:  - Monitor patient closely, per order   - Supervise medication ingestion, monitor effects and side effects   Outcome: Progressing  Goal: Refrain from isolation  Description: Interventions:  - Develop a trusting relationship   - Encourage socialization   Outcome: Progressing  Goal: Refrain from self-neglect  Outcome: Progressing  Goal: Attend and participate in unit activities, including therapeutic, recreational, and educational groups  Description: Interventions:  - Provide therapeutic and educational activities daily, encourage attendance and participation, and document same in the medical record   Outcome: Progressing  Goal: Complete daily ADLs, including personal hygiene independently, as able  Description: Interventions:  - Observe, teach, and assist patient with ADLS  -  Monitor and promote a balance of rest/activity, with adequate nutrition and elimination   Outcome: Progressing     Problem: Anxiety  Goal: Anxiety is at manageable level  Description: Interventions:  - Assess and monitor patient's anxiety level  - Monitor for signs and symptoms (heart palpitations, chest pain, shortness of breath, headaches, nausea, feeling jumpy, restlessness, irritable, apprehensive)  - Collaborate with interdisciplinary team and initiate plan and interventions as ordered  - [place] patient to unit/surroundings  - Explain treatment plan  - Encourage participation in care  - Encourage verbalization of concerns/fears  - Identify coping mechanisms  - Assist in developing anxiety-reducing skills  - Administer/offer alternative therapies  - Limit or eliminate stimulants  Outcome: Progressing     Problem: Nutrition/Hydration-ADULT  Goal: Nutrient/Hydration intake appropriate for improving, restoring or maintaining nutritional needs  Description: Monitor and assess patient's nutrition/hydration status for malnutrition   Collaborate with interdisciplinary team and initiate plan and interventions as ordered  Monitor patient's weight and dietary intake as ordered or per policy  Utilize nutrition screening tool and intervene as necessary  Determine patient's food preferences and provide high-protein, high-caloric foods as appropriate       INTERVENTIONS:  - Monitor oral intake, urinary output, labs, and treatment plans  - Assess nutrition and hydration status and recommend course of action  - Evaluate amount of meals eaten  - Assist patient with eating if necessary   - Allow adequate time for meals  - Recommend/ encourage appropriate diets, oral nutritional supplements, and vitamin/mineral supplements  - Order, calculate, and assess calorie counts as needed  - Recommend, monitor, and adjust tube feedings and TPN/PPN based on assessed needs  - Assess need for intravenous fluids  - Provide specific nutrition/hydration education as appropriate  - Include patient/family/caregiver in decisions related to nutrition  Outcome: Progressing     Problem: DISCHARGE PLANNING  Goal: Discharge to home or other facility with appropriate resources  Description: INTERVENTIONS:  - Identify barriers to discharge w/patient and caregiver  - Arrange for needed discharge resources and transportation as appropriate  - Identify discharge learning needs (meds, wound care, etc )  - Arrange for interpretive services to assist at discharge as needed  - Refer to Case Management Department for coordinating discharge planning if the patient needs post-hospital services based on physician/advanced practitioner order or complex needs related to functional status, cognitive ability, or social support system  Outcome: Progressing

## 2021-02-24 LAB
ATRIAL RATE: 91 BPM
GLUCOSE SERPL-MCNC: 108 MG/DL (ref 65–140)
GLUCOSE SERPL-MCNC: 96 MG/DL (ref 65–140)
P AXIS: 72 DEGREES
PR INTERVAL: 154 MS
QRS AXIS: -34 DEGREES
QRSD INTERVAL: 88 MS
QT INTERVAL: 356 MS
QTC INTERVAL: 437 MS
T WAVE AXIS: 76 DEGREES
VENTRICULAR RATE: 91 BPM

## 2021-02-24 PROCEDURE — 99232 SBSQ HOSP IP/OBS MODERATE 35: CPT | Performed by: NURSE PRACTITIONER

## 2021-02-24 PROCEDURE — 93010 ELECTROCARDIOGRAM REPORT: CPT | Performed by: INTERNAL MEDICINE

## 2021-02-24 PROCEDURE — 82948 REAGENT STRIP/BLOOD GLUCOSE: CPT

## 2021-02-24 RX ORDER — MELATONIN
1000 DAILY
Status: DISCONTINUED | OUTPATIENT
Start: 2021-02-24 | End: 2021-03-31 | Stop reason: HOSPADM

## 2021-02-24 RX ORDER — ECHINACEA PURPUREA EXTRACT 125 MG
1 TABLET ORAL EVERY 2 HOUR PRN
Status: DISCONTINUED | OUTPATIENT
Start: 2021-02-24 | End: 2021-03-31 | Stop reason: HOSPADM

## 2021-02-24 RX ORDER — TRAZODONE HYDROCHLORIDE 50 MG/1
100 TABLET ORAL
Status: DISCONTINUED | OUTPATIENT
Start: 2021-02-24 | End: 2021-03-13

## 2021-02-24 RX ORDER — POLYETHYLENE GLYCOL 3350 17 G/17G
17 POWDER, FOR SOLUTION ORAL DAILY PRN
Status: DISCONTINUED | OUTPATIENT
Start: 2021-02-24 | End: 2021-03-31 | Stop reason: HOSPADM

## 2021-02-24 RX ADMIN — RISPERIDONE 2 MG: 2 TABLET ORAL at 21:24

## 2021-02-24 RX ADMIN — LORAZEPAM 1 MG: 1 TABLET ORAL at 01:09

## 2021-02-24 RX ADMIN — OXYBUTYNIN 5 MG: 5 TABLET, FILM COATED, EXTENDED RELEASE ORAL at 08:29

## 2021-02-24 RX ADMIN — TRAZODONE HYDROCHLORIDE 100 MG: 50 TABLET ORAL at 21:24

## 2021-02-24 RX ADMIN — Medication 1000 UNITS: at 10:58

## 2021-02-24 RX ADMIN — SITAGLIPTIN 50 MG: 25 TABLET, FILM COATED ORAL at 08:29

## 2021-02-24 RX ADMIN — MELATONIN 3 MG: at 21:24

## 2021-02-24 RX ADMIN — LOSARTAN POTASSIUM 50 MG: 50 TABLET, FILM COATED ORAL at 08:29

## 2021-02-24 RX ADMIN — HYDROXYZINE HYDROCHLORIDE 50 MG: 25 TABLET, FILM COATED ORAL at 21:24

## 2021-02-24 NOTE — PROGRESS NOTES
Progress Note - Kendra Luong 62 y o  female MRN: [id number]    Unit/Bed#: [facility] 200-02 Encounter: [id number]        Subjective:   Patient seen and examined at bedside after reviewing the chart and discussing the case with the caring staff  Patient examined at bedside  Patient reports increased nasal congestion and dryness and is requesting saline nasal spray  On review of patient's labs, her vitamin-D level slightly low at 38 1  Physical Exam   Vitals: Blood pressure 116/64, pulse (!) 110, temperature 98 °F (36 7 °C), temperature source Temporal, resp  rate 18, height 5' 5" (1 651 m), weight 56 kg (123 lb 7 3 oz), SpO2 97 %, not currently breastfeeding  ,Body mass index is 20 54 kg/m²  Constitutional: He appears well-developed  HEENT: PERR, EOMI, MMM  Cardiovascular: Normal rate and regular rhythm  Pulmonary/Chest: Effort normal and breath sounds normal    Abdomen: Soft, + BS, NT    Assessment/Plan:     Kendra Luong is a(n) 62y o  year old female with psychotic disorder      1  Cardiac with history of hypertension  Patient will be continued on losartan  2  DJD/osteoarthritis  Patient may get Tylenol on as needed basis  3  Gait abnormality  Patient seems stable at this time  4  Insomnia  Patient is on melatonin  5  Overactive urinary bladder  Patient is on oxybutynin  6  Hyperglycemia  We will closely monitor patient's blood sugars by doing Accu-Cheks 2 times daily  Continue Januvia 50 mg daily along with carb controlled diet  Patient's hemoglobin A1c was 5 3  Nutrition is on consult  Patient has been put on Glucerna supplements  7  Nasal congestion/dryness  I will start the patient on saline nasal spray every 2 hours as needed  8  Vitamin D deficiency  I will start the patient on daily vitamin-D supplements  The patient was discussed with Dr Elvin Jeter and he is in agreement with the above note

## 2021-02-24 NOTE — PROGRESS NOTES
Patient withdrawn to room  Out for meals only  Denies depression, SI,HI  Anxiety elevated due to voices  Complaint with medications  Q 7 minute checks maintained

## 2021-02-24 NOTE — APP STUDENT NOTE
Progress Note - Nilton [age] y o  female MRN: [id number]  Unit/Bed#: [facility] 200-02 Encounter: [id number]    Assessment/Plan   Principal Problem:    MDD (major depressive disorder), recurrent episode ([address] )  Active Problems:    Essential hypertension    Depression    Other constipation    Absence of bladder continence    History of Asperger's syndrome    Aftercare following right hip joint replacement surgery    Severe protein-calorie malnutrition (HCC)      Behavior over the last 24 hours:  improved  Sleep: insomnia  Appetite: normal  Medication side effects: No  ROS: no complaints    Mental Status Evaluation:  Appearance:  disheveled and older than stated age   Behavior:  bizarre   Speech:  normal volume   Mood:  anxious and depressed   Affect:  constricted   Thought Process:  illogical   Thought Content:  delusions  persecutory   Perceptual Disturbances: Auditory hallucinations without commands   Risk Potential: Suicidal Ideations none  Homicidal Ideations none  Potential for Aggression No   Sensorium:  person, place and situation   Memory:  recent and remote memory grossly intact   Consciousness:  alert and awake    Attention: attention span and concentration were age appropriate   Insight:  limited   Judgment: limited   Gait/Station: uses walker   Motor Activity: no abnormal movements     Progress Toward Goals: improving    Patient was pleasant and cooperative on approach  She appears disheveled  She continues to endorse anxiety and depression  She also continues to have auditory hallucinations of the devil saying "got ya"  She additionally reports that the devil is telling her that she messed up her life without a chance of fixing it  She states that she does feel "better" in general  She reported that she is eating, drinking, and sleeping better  Sonia Clutter does appear clearer with forward thinking about managing ADLs at home   She also stated that she wants to figure out how to designate someone as power of  although she does not know anyone in her family that would be able to commit to that responsibility  She reports that she did attend group but did not say much  She was encouraged to participate more verbally in group  She denies SI/HI and visual hallucinations  She denies adverse effects to medications at this time  Recommended Treatment: Continue with group therapy, milieu therapy and occupational therapy  Risks, benefits and possible side effects of Medications:   Risks, benefits, and possible side effects of medications explained to patient and patient verbalizes understanding  Medications: all current active meds have been reviewed  Labs: I have personally reviewed all pertinent laboratory/tests results  Most Recent Labs:   Lab Results   Component Value Date    WBC 5 44 02/20/2021    RBC 5 80 (H) 02/20/2021    HGB 17 9 (H) 02/20/2021    HCT 53 5 (H) 02/20/2021     02/20/2021    RDW 12 7 02/20/2021    NEUTROABS 3 72 02/20/2021    SODIUM 140 02/20/2021    K 3 9 02/20/2021     02/20/2021    CO2 26 02/20/2021    BUN 11 02/20/2021    CREATININE 0 73 02/20/2021    GLUC 109 02/20/2021    CALCIUM 9 9 02/20/2021    AST 12 02/20/2021    ALT 28 02/20/2021    ALKPHOS 90 02/20/2021    TP 7 5 02/20/2021    ALB 3 8 02/20/2021    TBILI 0 60 02/20/2021    CHOLESTEROL 257 (H) 12/05/2019    HDL 71 12/05/2019    TRIG 112 12/05/2019    LDLCALC 164 (H) 12/05/2019    YQP6SQPTOGSB 0 506 02/20/2021    RPR Non-Reactive 02/21/2021    HGBA1C 5 3 02/22/2021     02/22/2021       Counseling / Coordination of Care  Total floor / unit time spent today 15 minutes  Greater than 50% of total time was spent with the patient and / or family counseling and / or coordination of care

## 2021-02-24 NOTE — PROGRESS NOTES
Ativan given at 0109 was effective at decreasing anxiety  Patient was able to fall asleep and remains so at present  No further complaints overnight  Maintained on q 7 minute checks  Will continue to monitor

## 2021-02-24 NOTE — PLAN OF CARE
Problem: Ineffective Coping  Goal: Participates in unit activities  Description: Interventions:  - Provide therapeutic environment   - Provide required programming   - Redirect inappropriate behaviors   Outcome: Progressing   Patient attended AM RT group during the last half of the session  Attentive and receptive to process; minimal personal comments/or responses  Able to remain throughout last half

## 2021-02-24 NOTE — PROGRESS NOTES
02/24/21 0945   Team Meeting   Meeting Type Daily Rounds   Team Members Present   Team Members Present Physician;Nurse;   Physician Team Member Dr Ping Cornelius MD   Nursing Team Member Stephanie Jimenes RN   Social Work Team Member NALINI Monreal   Patient/Family Present   Patient Present No   Patient's Family Present No     No DC date - return home upon stabilization; prn utilized overnight for severe anxiety; depressed at times; needy; irritable edge; somatic at times

## 2021-02-24 NOTE — NUTRITION
02/24/21 1104   Assessment   Timepoint Reassess  (consult diabetes)   Labs & Meds   Labs/Meds Review Lab values reviewed; Meds reviewed   Feeding Route   PO Independent   Adequacy of Intake   Nutrition Modality PO  (CCD3 diet)   Current Meal Intake %   Intake Supplements %   Estimated Calorie Intake %   Estimated Protein Intake  %   Estimated Fluid Intake %   Estimated calorie intake compared to estimated need pt endorsed modest appetite, intakes are noted at 100%, tolerating glucerna, request for flavor change to chocolate   Nutrition Prognosis   Nutrition Concerns Early satiety; No edema documented; No skin issues documented   Nutrition Precautions & Barriers to Adequate Nutrition Other (Comment)  (malnutrition)   Nutrition Considerations Other (Comment)  (reviewed diabetes education with pt)   PES Statement   Problem Continue previous diagnosis   Additional PES needed? Yes   PES Statement 2   Problem Clinical   As evidenced by Abnormal lab (Specify)  (increased BG, dx by md)   Related to Other (Comment)  (altered endocrine function)   Biochemical (2) Altered nutrition-related laboratory values (specify) NC-2 2  (glucose)   Patient Nutrition Goals   Goal Meet PO needs; Comprehend education   Goal Status met;extended;revised   Timeframe to complete goal by next f/u   Recommendations/Interventions   Summary CCD3 diet, meal intakes mostly 100%, pt endorsed somehwat depressed appetite  Supplement changed to glucerna vanilla at dinner  Pt tolerating requests flavor change to chooclate, will also decreased diet to CCD2, more in appropriate range for energy needs/pt appetite  Initiated diabetes education for foods containing CHO  Adjust diet and supplement order     Malnutrition/BMI Present Yes  (previously noted)   Interventions Diet: order adjustment;Supplement adjust;Education initiated/completed   Nutrition Recommendations Other (Specify)  (see summary)   Nutrition Complexity Risk Nutrition complexity level High risk   Follow up date 03/02/21

## 2021-02-24 NOTE — NURSING NOTE
Patient observed in the community for breakfast/ group  Pt states she is "feeling much better than yesterday"  Ate 100%% of breakfast  Ambulated with walker to dining area  Pt states her anxiety and depression are a 5/10  Denies pain  Pt states the voices she has been hearing have been coming and going but are not as intense as they have been the last few days  Denies SI/HI and hallucinations  Will CTM  Q7 minute checks in progress

## 2021-02-24 NOTE — PLAN OF CARE
Problem: Alteration in Thoughts and Perception  Goal: Treatment Goal: Gain control of psychotic behaviors/thinking, reduce/eliminate presenting symptoms and demonstrate improved reality functioning upon discharge  Outcome: Progressing  Goal: Verbalize thoughts and feelings  Description: Interventions:  - Promote a nonjudgmental and trusting relationship with the patient through active listening and therapeutic communication  - Assess patient's level of functioning, behavior and potential for risk  - Engage patient in 1 on 1 interactions  - Encourage patient to express fears, feelings, frustrations, and discuss symptoms    - [place] patient to reality, help patient recognize reality-based thinking   - Administer medications as ordered and assess for potential side effects  - Provide the patient education related to the signs and symptoms of the illness and desired effects of prescribed medications  Outcome: Progressing  Goal: Refrain from acting on delusional thinking/internal stimuli  Description: Interventions:  - Monitor patient closely, per order   - Utilize least restrictive measures   - Set reasonable limits, give positive feedback for acceptable   - Administer medications as ordered and monitor of potential side effects  Outcome: Progressing  Goal: Agree to be compliant with medication regime, as prescribed and report medication side effects  Description: Interventions:  - Offer appropriate PRN medication and supervise ingestion; conduct AIMS, as needed   Outcome: Progressing  Goal: Attend and participate in unit activities, including therapeutic, recreational, and educational groups  Description: Interventions:  -Encourage Visitation and family involvement in care  Outcome: Progressing  Goal: Recognize dysfunctional thoughts, communicate reality-based thoughts at the time of discharge  Description: Interventions:  - Provide medication and psycho-education to assist patient in compliance and developing insight into his/her illness   Outcome: Progressing  Goal: Complete daily ADLs, including personal hygiene independently, as able  Description: Interventions:  - Observe, teach, and assist patient with ADLS  - Monitor and promote a balance of rest/activity, with adequate nutrition and elimination   Outcome: Progressing     Problem: Ineffective Coping  Goal: Cooperates with admission process  Description: Interventions:   - Complete admission process  Outcome: Progressing  Goal: Identifies ineffective coping skills  Outcome: Progressing  Goal: Identifies healthy coping skills  Outcome: Progressing  Goal: Demonstrates healthy coping skills  Outcome: Progressing  Goal: Participates in unit activities  Description: Interventions:  - Provide therapeutic environment   - Provide required programming   - Redirect inappropriate behaviors   Outcome: Progressing  Goal: Patient/Family participate in treatment and DC plans  Description: Interventions:  - Provide therapeutic environment  Outcome: Progressing  Goal: Patient/Family verbalizes awareness of resources  Outcome: Progressing  Goal: Understands least restrictive measures  Description: Interventions:  - Utilize least restrictive behavior  Outcome: Progressing  Goal: Free from restraint events  Description: - Utilize least restrictive measures   - Provide behavioral interventions   - Redirect inappropriate behaviors   Outcome: Progressing     Problem: Depression  Goal: Treatment Goal: Demonstrate behavioral control of depressive symptoms, verbalize feelings of improved mood/affect, and adopt new coping skills prior to discharge  Outcome: Progressing  Goal: Verbalize thoughts and feelings  Description: Interventions:  - Assess and re-assess patient's level of risk   - Engage patient in 1:1 interactions, daily, for a minimum of 15 minutes   - Encourage patient to express feelings, fears, frustrations, hopes   Outcome: Progressing  Goal: Refrain from harming self  Description: Interventions:  - Monitor patient closely, per order   - Supervise medication ingestion, monitor effects and side effects   Outcome: Progressing  Goal: Refrain from isolation  Description: Interventions:  - Develop a trusting relationship   - Encourage socialization   Outcome: Progressing  Goal: Refrain from self-neglect  Outcome: Progressing  Goal: Attend and participate in unit activities, including therapeutic, recreational, and educational groups  Description: Interventions:  - Provide therapeutic and educational activities daily, encourage attendance and participation, and document same in the medical record   Outcome: Progressing  Goal: Complete daily ADLs, including personal hygiene independently, as able  Description: Interventions:  - Observe, teach, and assist patient with ADLS  -  Monitor and promote a balance of rest/activity, with adequate nutrition and elimination   Outcome: Progressing     Problem: Anxiety  Goal: Anxiety is at manageable level  Description: Interventions:  - Assess and monitor patient's anxiety level  - Monitor for signs and symptoms (heart palpitations, chest pain, shortness of breath, headaches, nausea, feeling jumpy, restlessness, irritable, apprehensive)  - Collaborate with interdisciplinary team and initiate plan and interventions as ordered  - [place] patient to unit/surroundings  - Explain treatment plan  - Encourage participation in care  - Encourage verbalization of concerns/fears  - Identify coping mechanisms  - Assist in developing anxiety-reducing skills  - Administer/offer alternative therapies  - Limit or eliminate stimulants  Outcome: Progressing     Problem: Nutrition/Hydration-ADULT  Goal: Nutrient/Hydration intake appropriate for improving, restoring or maintaining nutritional needs  Description: Monitor and assess patient's nutrition/hydration status for malnutrition   Collaborate with interdisciplinary team and initiate plan and interventions as ordered  Monitor patient's weight and dietary intake as ordered or per policy  Utilize nutrition screening tool and intervene as necessary  Determine patient's food preferences and provide high-protein, high-caloric foods as appropriate       INTERVENTIONS:  - Monitor oral intake, urinary output, labs, and treatment plans  - Assess nutrition and hydration status and recommend course of action  - Evaluate amount of meals eaten  - Assist patient with eating if necessary   - Allow adequate time for meals  - Recommend/ encourage appropriate diets, oral nutritional supplements, and vitamin/mineral supplements  - Order, calculate, and assess calorie counts as needed  - Recommend, monitor, and adjust tube feedings and TPN/PPN based on assessed needs  - Assess need for intravenous fluids  - Provide specific nutrition/hydration education as appropriate  - Include patient/family/caregiver in decisions related to nutrition  Outcome: Progressing

## 2021-02-24 NOTE — PLAN OF CARE
Problem: Alteration in Thoughts and Perception  Goal: Treatment Goal: Gain control of psychotic behaviors/thinking, reduce/eliminate presenting symptoms and demonstrate improved reality functioning upon discharge  Outcome: Progressing  Goal: Verbalize thoughts and feelings  Description: Interventions:  - Promote a nonjudgmental and trusting relationship with the patient through active listening and therapeutic communication  - Assess patient's level of functioning, behavior and potential for risk  - Engage patient in 1 on 1 interactions  - Encourage patient to express fears, feelings, frustrations, and discuss symptoms    - [place] patient to reality, help patient recognize reality-based thinking   - Administer medications as ordered and assess for potential side effects  - Provide the patient education related to the signs and symptoms of the illness and desired effects of prescribed medications  Outcome: Progressing  Goal: Refrain from acting on delusional thinking/internal stimuli  Description: Interventions:  - Monitor patient closely, per order   - Utilize least restrictive measures   - Set reasonable limits, give positive feedback for acceptable   - Administer medications as ordered and monitor of potential side effects  Outcome: Progressing  Goal: Agree to be compliant with medication regime, as prescribed and report medication side effects  Description: Interventions:  - Offer appropriate PRN medication and supervise ingestion; conduct AIMS, as needed   Outcome: Progressing  Goal: Attend and participate in unit activities, including therapeutic, recreational, and educational groups  Description: Interventions:  -Encourage Visitation and family involvement in care  Outcome: Progressing  Goal: Recognize dysfunctional thoughts, communicate reality-based thoughts at the time of discharge  Description: Interventions:  - Provide medication and psycho-education to assist patient in compliance and developing insight into his/her illness   Outcome: Progressing  Goal: Complete daily ADLs, including personal hygiene independently, as able  Description: Interventions:  - Observe, teach, and assist patient with ADLS  - Monitor and promote a balance of rest/activity, with adequate nutrition and elimination   Outcome: Progressing     Problem: Ineffective Coping  Goal: Cooperates with admission process  Description: Interventions:   - Complete admission process  Outcome: Progressing  Goal: Identifies ineffective coping skills  Outcome: Progressing  Goal: Identifies healthy coping skills  Outcome: Progressing  Goal: Demonstrates healthy coping skills  Outcome: Progressing  Goal: Participates in unit activities  Description: Interventions:  - Provide therapeutic environment   - Provide required programming   - Redirect inappropriate behaviors   Outcome: Progressing  Goal: Patient/Family participate in treatment and DC plans  Description: Interventions:  - Provide therapeutic environment  Outcome: Progressing  Goal: Patient/Family verbalizes awareness of resources  Outcome: Progressing  Goal: Understands least restrictive measures  Description: Interventions:  - Utilize least restrictive behavior  Outcome: Progressing  Goal: Free from restraint events  Description: - Utilize least restrictive measures   - Provide behavioral interventions   - Redirect inappropriate behaviors   Outcome: Progressing     Problem: Depression  Goal: Treatment Goal: Demonstrate behavioral control of depressive symptoms, verbalize feelings of improved mood/affect, and adopt new coping skills prior to discharge  Outcome: Progressing  Goal: Verbalize thoughts and feelings  Description: Interventions:  - Assess and re-assess patient's level of risk   - Engage patient in 1:1 interactions, daily, for a minimum of 15 minutes   - Encourage patient to express feelings, fears, frustrations, hopes   Outcome: Progressing  Goal: Refrain from harming self  Description: Interventions:  - Monitor patient closely, per order   - Supervise medication ingestion, monitor effects and side effects   Outcome: Progressing  Goal: Refrain from isolation  Description: Interventions:  - Develop a trusting relationship   - Encourage socialization   Outcome: Progressing  Goal: Refrain from self-neglect  Outcome: Progressing  Goal: Attend and participate in unit activities, including therapeutic, recreational, and educational groups  Description: Interventions:  - Provide therapeutic and educational activities daily, encourage attendance and participation, and document same in the medical record   Outcome: Progressing  Goal: Complete daily ADLs, including personal hygiene independently, as able  Description: Interventions:  - Observe, teach, and assist patient with ADLS  -  Monitor and promote a balance of rest/activity, with adequate nutrition and elimination   Outcome: Progressing     Problem: Anxiety  Goal: Anxiety is at manageable level  Description: Interventions:  - Assess and monitor patient's anxiety level  - Monitor for signs and symptoms (heart palpitations, chest pain, shortness of breath, headaches, nausea, feeling jumpy, restlessness, irritable, apprehensive)  - Collaborate with interdisciplinary team and initiate plan and interventions as ordered  - [place] patient to unit/surroundings  - Explain treatment plan  - Encourage participation in care  - Encourage verbalization of concerns/fears  - Identify coping mechanisms  - Assist in developing anxiety-reducing skills  - Administer/offer alternative therapies  - Limit or eliminate stimulants  Outcome: Progressing     Problem: Nutrition/Hydration-ADULT  Goal: Nutrient/Hydration intake appropriate for improving, restoring or maintaining nutritional needs  Description: Monitor and assess patient's nutrition/hydration status for malnutrition   Collaborate with interdisciplinary team and initiate plan and interventions as ordered  Monitor patient's weight and dietary intake as ordered or per policy  Utilize nutrition screening tool and intervene as necessary  Determine patient's food preferences and provide high-protein, high-caloric foods as appropriate       INTERVENTIONS:  - Monitor oral intake, urinary output, labs, and treatment plans  - Assess nutrition and hydration status and recommend course of action  - Evaluate amount of meals eaten  - Assist patient with eating if necessary   - Allow adequate time for meals  - Recommend/ encourage appropriate diets, oral nutritional supplements, and vitamin/mineral supplements  - Order, calculate, and assess calorie counts as needed  - Recommend, monitor, and adjust tube feedings and TPN/PPN based on assessed needs  - Assess need for intravenous fluids  - Provide specific nutrition/hydration education as appropriate  - Include patient/family/caregiver in decisions related to nutrition  Outcome: Progressing

## 2021-02-24 NOTE — SOCIAL WORK
CM met and spoke with patient this morning on the unit to complete patient check in  Patient was up and walking with her RW and reported she was starting to feel a little better  CM encouraged her to continue working with the treatment team and patient agreed to do so  Patient then asked this CM to write down her brother, Franklin's, phone number so she could call him later today about possibly bringing some clothing up for her since she does not have any here  CM provided her with his number  CM will continue to follow patient's progress and assist with discharge planning needs

## 2021-02-24 NOTE — PROGRESS NOTES
Progress Note - Behavioral Health   Mariluz Parra 62 y o  female MRN: [id number]  Unit/Bed#: [facility] 200-02 Encounter: [id number]    Assessment/Plan   Principal Problem:    MDD (major depressive disorder), recurrent episode ([address] )  Active Problems:    Essential hypertension    Depression    Other constipation    Absence of bladder continence    History of Asperger's syndrome    Aftercare following right hip joint replacement surgery    Severe protein-calorie malnutrition (HCC)      Behavior over the last 24 hours:  unchanged  Sleep: insomnia  Appetite:  Improved  Medication side effects: No  ROS: no complaints and All other systems negative for acute change     Gigi Luna was seen today for follow-up and case discussed with treatment team this morning  Gigi Luna remains bizarre, but somewhat improved from yesterday  She continues to endorse auditory hallucinations of the devil telling her I can not fix anything that already happened " AH is non commanding  However patient reports that her appetite has improved but her sleep was not good last night  Patient asked if she could have her trazodone increased to help with sleep  Collaborative decision made to increase trazodone to 100 mg at HS  Gigi Luna continues to rate her anxiety and depression as high  Despite high anxiety and depression, patient did attend groups today but was withdrawn to self and participated minimally  She continues to be somewhat religiously preoccupied and paranoid  She denies any visual hallucinations/SI/HI  Mental Status Evaluation:  Appearance:  disheveled   Behavior:  Bizarre, cooperative, good eye contact   Speech:  normal pitch and normal volume   Mood:  decreased range   Affect:  flat   Thought Process:  perserverative   Thought Content:  delusions  persecutory and Paranoid, religous preoccupation   Perceptual Disturbances:  Auditory hallucinations without commands   Risk Potential: Suicidal Ideations none  Homicidal Ideations none  Potential for Aggression No   Sensorium:  person, place and time/date   Memory:  recent and remote memory grossly intact   Consciousness:  alert and awake    Attention: attention span appeared shorter than expected for age   Insight:  limited   Judgment: Poor   Gait/Station: normal gait/station and normal balance   Motor Activity: no abnormal movements     Progress Toward Goals:  Adjustments made to Risperdal yesterday  Patient complained of insomnia so will increase trazodone at HS tonight  No discharge date at this time  Patient will return home upon stabilization  Recommended Treatment: Continue with group therapy, milieu therapy and occupational therapy  Risks, benefits and possible side effects of Medications:   Risks, benefits, and possible side effects of medications explained to patient and patient verbalizes understanding  Medications: all current active meds have been reviewed and planned medication changes: Increase Trazodone 100mg PO QHS  Labs: I have personally reviewed all pertinent laboratory/tests results  Counseling / Coordination of Care  Total floor / unit time spent today 20 minutes  Greater than 50% of total time was spent with the patient and / or family counseling and / or coordination of care

## 2021-02-24 NOTE — PROGRESS NOTES
Patient awake, complaints of anxiety, racing thoughts, inability to sleep  Requested and received Ativan 1 mg at 0109 PO  Damon Scale 25  Requested fresh water, received  Restless, wide awake

## 2021-02-25 LAB
GLUCOSE SERPL-MCNC: 88 MG/DL (ref 65–140)
GLUCOSE SERPL-MCNC: 95 MG/DL (ref 65–140)

## 2021-02-25 PROCEDURE — 82948 REAGENT STRIP/BLOOD GLUCOSE: CPT

## 2021-02-25 PROCEDURE — 99232 SBSQ HOSP IP/OBS MODERATE 35: CPT | Performed by: NURSE PRACTITIONER

## 2021-02-25 RX ORDER — RISPERIDONE 1 MG/1
1 TABLET, FILM COATED ORAL DAILY
Status: DISCONTINUED | OUTPATIENT
Start: 2021-02-25 | End: 2021-02-27

## 2021-02-25 RX ADMIN — HYDROXYZINE HYDROCHLORIDE 50 MG: 25 TABLET, FILM COATED ORAL at 21:43

## 2021-02-25 RX ADMIN — MELATONIN 3 MG: at 21:43

## 2021-02-25 RX ADMIN — SITAGLIPTIN 50 MG: 25 TABLET, FILM COATED ORAL at 08:47

## 2021-02-25 RX ADMIN — TRAZODONE HYDROCHLORIDE 100 MG: 50 TABLET ORAL at 21:43

## 2021-02-25 RX ADMIN — LOSARTAN POTASSIUM 50 MG: 50 TABLET, FILM COATED ORAL at 08:47

## 2021-02-25 RX ADMIN — Medication 1000 UNITS: at 08:47

## 2021-02-25 RX ADMIN — RISPERIDONE 1 MG: 1 TABLET ORAL at 11:08

## 2021-02-25 RX ADMIN — RISPERIDONE 2 MG: 2 TABLET ORAL at 21:43

## 2021-02-25 RX ADMIN — OXYBUTYNIN 5 MG: 5 TABLET, FILM COATED, EXTENDED RELEASE ORAL at 08:47

## 2021-02-25 NOTE — PROGRESS NOTES
02/25/21 0931   Team Meeting   Meeting Type Daily Rounds   Team Members Present   Team Members Present Physician;Nurse;   Physician Team Member Dr Kasandra Becerra MD   Nursing Team Member Hayes June, RN   Social Work Team Member Hany Broderick [facility]   OT Team Member Kehinde Brandon RN   Patient/Family Present   Patient Present No   Patient's Family Present No     No DC date - will return home upon stabilization; anx/dep 4/10; endorses AH - "not as loud" brightens upon approach; slept well; denies SI/HI; no prns

## 2021-02-25 NOTE — NURSING NOTE
Assumed care of this patient from prior shift nurse at 0300  Patient is in bed sleeping at this time  Will  CTM via q7 minute safety checks

## 2021-02-25 NOTE — PROGRESS NOTES
Progress Note - Fahad Mckinley 62 y o  female MRN: [id number]    Unit/Bed#: [facility] 200-02 Encounter: [id number]        Subjective:   Patient seen and examined at bedside after reviewing the chart and discussing the case with the caring staff  Patient examined at bedside  Patient reports decreased appetite and weight loss of approximately 20 lb in the past 3 months  Patient denies nausea or vomiting with eating  She denies changes in bowel habits or abdominal pain  Physical Exam   Vitals: Blood pressure 157/83, pulse 95, temperature 98 1 °F (36 7 °C), temperature source Temporal, resp  rate 18, height 5' 5" (1 651 m), weight 56 kg (123 lb 7 3 oz), SpO2 94 %, not currently breastfeeding  ,Body mass index is 20 54 kg/m²  Constitutional: He appears well-developed  HEENT: PERR, EOMI, MMM  Cardiovascular: Normal rate and regular rhythm  Pulmonary/Chest: Effort normal and breath sounds normal    Abdomen: Soft, + BS, NT    Assessment/Plan:     Fahad Mckinley is a(n) 62y o  year old female with psychotic disorder      1  Cardiac with history of hypertension  Patient will be continued on losartan  2  DJD/osteoarthritis  Patient may get Tylenol on as needed basis  3  Gait abnormality  Patient seems stable at this time  4  Insomnia  Patient is on melatonin  5  Overactive urinary bladder  Patient is on oxybutynin  6  Hyperglycemia/weight loss  We will closely monitor patient's blood sugars by doing Accu-Cheks 2 times daily  Continue Januvia 50 mg daily along with carb controlled diet  Patient's hemoglobin A1c was 5 3  Nutrition is on consult  Patient has been put on Glucerna supplements  7  Nasal congestion/dryness  Continue saline nasal spray every 2 hours as needed  8  Vitamin D deficiency  Continue daily vitamin-D supplements  The patient was discussed with Dr Piyush Valerio and he is in agreement with the above note

## 2021-02-25 NOTE — SOCIAL WORK
Patient requested living will documents for her to review  CM provided a living will template and encouraged her to review the form closely before signing and having it notarized when she returns to the community  Patient then stated she was not able to call her brother last night for clothing and asked this CM to call him on her behalf  CM will continue to follow patient's progress and assist with discharge planning needs

## 2021-02-25 NOTE — PLAN OF CARE
Problem: Ineffective Coping  Goal: Participates in unit activities  Description: Interventions:  - Provide therapeutic environment   - Provide required programming   - Redirect inappropriate behaviors   Outcome: Progressing   Patient selectively social and interactive  Slow to respond at times; requires repetitive directives when providing instruction  Declined attendance in initial RT AM group  Attended later AM group; requires encouragement to participtae

## 2021-02-25 NOTE — PROGRESS NOTES
Progress Note - Behavioral Health   Costa Andrew 62 y o  female MRN: [id number]  Unit/Bed#: [facility] 200-02 Encounter: [id number]    Assessment/Plan   Principal Problem:    MDD (major depressive disorder), recurrent episode ([facility] [address] )  Active Problems:    Essential hypertension    Depression    Other constipation    Absence of bladder continence    History of Asperger's syndrome    Aftercare following right hip joint replacement surgery    Severe protein-calorie malnutrition ([facility] [address] )      Behavior over the last 24 hours:  unchanged  Sleep: normal  Appetite: fair  Medication side effects: No  ROS: no complaints and All other systems negative for acute change     Mendez Holguin was seen today for follow-up and case discussed with treatment team this morning  Per nursing documentation, patient did not require additional PRN medication last night for sleep and patient was observed sleeping throughout night  However, patient presented immediately as religiously preoccupied and stated she did not sleep a blank last night  She was speaking about how she knows she is going to hell and was laughing inappropriately  She remained religiously preoccupied throughout duration of interview and stated I cook to my own goose and "my mind is in irrevocable mortal danger " She reports she has ruminating thoughts of her going to hell and not being able to fix things  She also made statements like medication cannot fix me because the devil has got a hold of me    Patient was agreeable to add Risperdal to morning medication regimen  She denies any visual hallucinations but continues to endorse non commanding auditory hallucinations of the devil talking to her  She denies any SI/HI  She rates her anxiety and depression as a 4/10  She also said that she is "entwined in a cycle of not sleeping, eating, or drinking " Per nursing documentation, patient's appetite has been adequate      Mental Status Evaluation:  Appearance:  disheveled and older than stated age   Behavior:  Bizarre, somewhat guarded, cooperative, suspicious   Speech:  normal pitch, normal volume and tangential   Mood:  decreased range   Affect:  flat   Thought Process:  illogical and perserverative   Thought Content:  delusions  persecutory and Religiously preoccupied   Perceptual Disturbances: Auditory hallucinations without commands   Risk Potential: Suicidal Ideations none  Homicidal Ideations none  Potential for Aggression No   Sensorium:  person and place   Memory:  recent and remote memory grossly intact   Consciousness:  alert and awake    Attention: attention span appeared shorter than expected for age   Insight:  Poor   Judgment: Poor   Gait/Station: Slow, uses walker   Motor Activity: no abnormal movements     Progress Toward Goals: Will add Risperdal to morning medication regimen  Continue with other psychotropic medications as ordered  Will return home upon stabilization  No discharge date at this time    Recommended Treatment: Continue with group therapy, milieu therapy and occupational therapy  Risks, benefits and possible side effects of Medications:   Risks, benefits, and possible side effects of medications explained to patient and patient verbalizes understanding  Medications: all current active meds have been reviewed and Add Risperdal 1mg PO QD (0900)  Labs: I have personally reviewed all pertinent laboratory/tests results  Counseling / Coordination of Care  Total floor / unit time spent today 20 minutes  Greater than 50% of total time was spent with the patient and / or family counseling and / or coordination of care

## 2021-02-25 NOTE — PLAN OF CARE
Problem: Alteration in Thoughts and Perception  Goal: Treatment Goal: Gain control of psychotic behaviors/thinking, reduce/eliminate presenting symptoms and demonstrate improved reality functioning upon discharge  Outcome: Progressing  Goal: Verbalize thoughts and feelings  Description: Interventions:  - Promote a nonjudgmental and trusting relationship with the patient through active listening and therapeutic communication  - Assess patient's level of functioning, behavior and potential for risk  - Engage patient in 1 on 1 interactions  - Encourage patient to express fears, feelings, frustrations, and discuss symptoms    - [place] patient to reality, help patient recognize reality-based thinking   - Administer medications as ordered and assess for potential side effects  - Provide the patient education related to the signs and symptoms of the illness and desired effects of prescribed medications  Outcome: Progressing  Goal: Refrain from acting on delusional thinking/internal stimuli  Description: Interventions:  - Monitor patient closely, per order   - Utilize least restrictive measures   - Set reasonable limits, give positive feedback for acceptable   - Administer medications as ordered and monitor of potential side effects  Outcome: Progressing  Goal: Agree to be compliant with medication regime, as prescribed and report medication side effects  Description: Interventions:  - Offer appropriate PRN medication and supervise ingestion; conduct AIMS, as needed   Outcome: Progressing  Goal: Attend and participate in unit activities, including therapeutic, recreational, and educational groups  Description: Interventions:  -Encourage Visitation and family involvement in care  Outcome: Progressing  Goal: Recognize dysfunctional thoughts, communicate reality-based thoughts at the time of discharge  Description: Interventions:  - Provide medication and psycho-education to assist patient in compliance and developing insight into his/her illness   Outcome: Progressing  Goal: Complete daily ADLs, including personal hygiene independently, as able  Description: Interventions:  - Observe, teach, and assist patient with ADLS  - Monitor and promote a balance of rest/activity, with adequate nutrition and elimination   Outcome: Progressing     Problem: Ineffective Coping  Goal: Cooperates with admission process  Description: Interventions:   - Complete admission process  Outcome: Progressing  Goal: Identifies ineffective coping skills  Outcome: Progressing  Goal: Identifies healthy coping skills  Outcome: Progressing  Goal: Demonstrates healthy coping skills  Outcome: Progressing  Goal: Participates in unit activities  Description: Interventions:  - Provide therapeutic environment   - Provide required programming   - Redirect inappropriate behaviors   Outcome: Progressing  Goal: Patient/Family participate in treatment and DC plans  Description: Interventions:  - Provide therapeutic environment  Outcome: Progressing  Goal: Patient/Family verbalizes awareness of resources  Outcome: Progressing  Goal: Understands least restrictive measures  Description: Interventions:  - Utilize least restrictive behavior  Outcome: Progressing  Goal: Free from restraint events  Description: - Utilize least restrictive measures   - Provide behavioral interventions   - Redirect inappropriate behaviors   Outcome: Progressing     Problem: Depression  Goal: Treatment Goal: Demonstrate behavioral control of depressive symptoms, verbalize feelings of improved mood/affect, and adopt new coping skills prior to discharge  Outcome: Progressing  Goal: Verbalize thoughts and feelings  Description: Interventions:  - Assess and re-assess patient's level of risk   - Engage patient in 1:1 interactions, daily, for a minimum of 15 minutes   - Encourage patient to express feelings, fears, frustrations, hopes   Outcome: Progressing  Goal: Refrain from harming self  Description: Interventions:  - Monitor patient closely, per order   - Supervise medication ingestion, monitor effects and side effects   Outcome: Progressing  Goal: Refrain from isolation  Description: Interventions:  - Develop a trusting relationship   - Encourage socialization   Outcome: Progressing  Goal: Refrain from self-neglect  Outcome: Progressing  Goal: Attend and participate in unit activities, including therapeutic, recreational, and educational groups  Description: Interventions:  - Provide therapeutic and educational activities daily, encourage attendance and participation, and document same in the medical record   Outcome: Progressing  Goal: Complete daily ADLs, including personal hygiene independently, as able  Description: Interventions:  - Observe, teach, and assist patient with ADLS  -  Monitor and promote a balance of rest/activity, with adequate nutrition and elimination   Outcome: Progressing     Problem: Anxiety  Goal: Anxiety is at manageable level  Description: Interventions:  - Assess and monitor patient's anxiety level  - Monitor for signs and symptoms (heart palpitations, chest pain, shortness of breath, headaches, nausea, feeling jumpy, restlessness, irritable, apprehensive)  - Collaborate with interdisciplinary team and initiate plan and interventions as ordered  - [place] patient to unit/surroundings  - Explain treatment plan  - Encourage participation in care  - Encourage verbalization of concerns/fears  - Identify coping mechanisms  - Assist in developing anxiety-reducing skills  - Administer/offer alternative therapies  - Limit or eliminate stimulants  Outcome: Progressing     Problem: Nutrition/Hydration-ADULT  Goal: Nutrient/Hydration intake appropriate for improving, restoring or maintaining nutritional needs  Description: Monitor and assess patient's nutrition/hydration status for malnutrition   Collaborate with interdisciplinary team and initiate plan and interventions as ordered  Monitor patient's weight and dietary intake as ordered or per policy  Utilize nutrition screening tool and intervene as necessary  Determine patient's food preferences and provide high-protein, high-caloric foods as appropriate       INTERVENTIONS:  - Monitor oral intake, urinary output, labs, and treatment plans  - Assess nutrition and hydration status and recommend course of action  - Evaluate amount of meals eaten  - Assist patient with eating if necessary   - Allow adequate time for meals  - Recommend/ encourage appropriate diets, oral nutritional supplements, and vitamin/mineral supplements  - Order, calculate, and assess calorie counts as needed  - Recommend, monitor, and adjust tube feedings and TPN/PPN based on assessed needs  - Assess need for intravenous fluids  - Provide specific nutrition/hydration education as appropriate  - Include patient/family/caregiver in decisions related to nutrition  Outcome: Progressing     Problem: DISCHARGE PLANNING  Goal: Discharge to home or other facility with appropriate resources  Description: INTERVENTIONS:  - Identify barriers to discharge w/patient and caregiver  - Arrange for needed discharge resources and transportation as appropriate  - Identify discharge learning needs (meds, wound care, etc )  - Arrange for interpretive services to assist at discharge as needed  - Refer to Case Management Department for coordinating discharge planning if the patient needs post-hospital services based on physician/advanced practitioner order or complex needs related to functional status, cognitive ability, or social support system  Outcome: Progressing

## 2021-02-25 NOTE — NURSING NOTE
Patient observed in dinging area and attended group  Pt needed coercion to get out of bed this morning  Denies having an appetite but ate 100% of her meal  Pt appears internally preoccupied  States she is hearing non commanding demonic voices that are causing her unrest  Rates her anxiety and depression a 4/10  Denies pain  Denies SI/HI and VH  Compliant with medications  Will CTM  Q7 minute safety checks in progress

## 2021-02-25 NOTE — PLAN OF CARE
Problem: Alteration in Thoughts and Perception  Goal: Treatment Goal: Gain control of psychotic behaviors/thinking, reduce/eliminate presenting symptoms and demonstrate improved reality functioning upon discharge  Outcome: Progressing  Goal: Verbalize thoughts and feelings  Description: Interventions:  - Promote a nonjudgmental and trusting relationship with the patient through active listening and therapeutic communication  - Assess patient's level of functioning, behavior and potential for risk  - Engage patient in 1 on 1 interactions  - Encourage patient to express fears, feelings, frustrations, and discuss symptoms    - [place] patient to reality, help patient recognize reality-based thinking   - Administer medications as ordered and assess for potential side effects  - Provide the patient education related to the signs and symptoms of the illness and desired effects of prescribed medications  Outcome: Progressing  Goal: Refrain from acting on delusional thinking/internal stimuli  Description: Interventions:  - Monitor patient closely, per order   - Utilize least restrictive measures   - Set reasonable limits, give positive feedback for acceptable   - Administer medications as ordered and monitor of potential side effects  Outcome: Progressing  Goal: Agree to be compliant with medication regime, as prescribed and report medication side effects  Description: Interventions:  - Offer appropriate PRN medication and supervise ingestion; conduct AIMS, as needed   Outcome: Progressing  Goal: Attend and participate in unit activities, including therapeutic, recreational, and educational groups  Description: Interventions:  -Encourage Visitation and family involvement in care  Outcome: Progressing  Goal: Recognize dysfunctional thoughts, communicate reality-based thoughts at the time of discharge  Description: Interventions:  - Provide medication and psycho-education to assist patient in compliance and developing insight into his/her illness   Outcome: Progressing  Goal: Complete daily ADLs, including personal hygiene independently, as able  Description: Interventions:  - Observe, teach, and assist patient with ADLS  - Monitor and promote a balance of rest/activity, with adequate nutrition and elimination   Outcome: Progressing     Problem: Ineffective Coping  Goal: Cooperates with admission process  Description: Interventions:   - Complete admission process  Outcome: Progressing  Goal: Identifies ineffective coping skills  Outcome: Progressing  Goal: Identifies healthy coping skills  Outcome: Progressing  Goal: Demonstrates healthy coping skills  Outcome: Progressing  Goal: Participates in unit activities  Description: Interventions:  - Provide therapeutic environment   - Provide required programming   - Redirect inappropriate behaviors   Outcome: Progressing  Goal: Patient/Family participate in treatment and DC plans  Description: Interventions:  - Provide therapeutic environment  Outcome: Progressing  Goal: Patient/Family verbalizes awareness of resources  Outcome: Progressing  Goal: Understands least restrictive measures  Description: Interventions:  - Utilize least restrictive behavior  Outcome: Progressing  Goal: Free from restraint events  Description: - Utilize least restrictive measures   - Provide behavioral interventions   - Redirect inappropriate behaviors   Outcome: Progressing     Problem: Depression  Goal: Treatment Goal: Demonstrate behavioral control of depressive symptoms, verbalize feelings of improved mood/affect, and adopt new coping skills prior to discharge  Outcome: Progressing  Goal: Verbalize thoughts and feelings  Description: Interventions:  - Assess and re-assess patient's level of risk   - Engage patient in 1:1 interactions, daily, for a minimum of 15 minutes   - Encourage patient to express feelings, fears, frustrations, hopes   Outcome: Progressing  Goal: Refrain from harming self  Description: Interventions:  - Monitor patient closely, per order   - Supervise medication ingestion, monitor effects and side effects   Outcome: Progressing  Goal: Refrain from isolation  Description: Interventions:  - Develop a trusting relationship   - Encourage socialization   Outcome: Progressing  Goal: Refrain from self-neglect  Outcome: Progressing  Goal: Attend and participate in unit activities, including therapeutic, recreational, and educational groups  Description: Interventions:  - Provide therapeutic and educational activities daily, encourage attendance and participation, and document same in the medical record   Outcome: Progressing  Goal: Complete daily ADLs, including personal hygiene independently, as able  Description: Interventions:  - Observe, teach, and assist patient with ADLS  -  Monitor and promote a balance of rest/activity, with adequate nutrition and elimination   Outcome: Progressing     Problem: Anxiety  Goal: Anxiety is at manageable level  Description: Interventions:  - Assess and monitor patient's anxiety level  - Monitor for signs and symptoms (heart palpitations, chest pain, shortness of breath, headaches, nausea, feeling jumpy, restlessness, irritable, apprehensive)  - Collaborate with interdisciplinary team and initiate plan and interventions as ordered  - [place] patient to unit/surroundings  - Explain treatment plan  - Encourage participation in care  - Encourage verbalization of concerns/fears  - Identify coping mechanisms  - Assist in developing anxiety-reducing skills  - Administer/offer alternative therapies  - Limit or eliminate stimulants  Outcome: Progressing     Problem: Nutrition/Hydration-ADULT  Goal: Nutrient/Hydration intake appropriate for improving, restoring or maintaining nutritional needs  Description: Monitor and assess patient's nutrition/hydration status for malnutrition   Collaborate with interdisciplinary team and initiate plan and interventions as ordered  Monitor patient's weight and dietary intake as ordered or per policy  Utilize nutrition screening tool and intervene as necessary  Determine patient's food preferences and provide high-protein, high-caloric foods as appropriate       INTERVENTIONS:  - Monitor oral intake, urinary output, labs, and treatment plans  - Assess nutrition and hydration status and recommend course of action  - Evaluate amount of meals eaten  - Assist patient with eating if necessary   - Allow adequate time for meals  - Recommend/ encourage appropriate diets, oral nutritional supplements, and vitamin/mineral supplements  - Order, calculate, and assess calorie counts as needed  - Recommend, monitor, and adjust tube feedings and TPN/PPN based on assessed needs  - Assess need for intravenous fluids  - Provide specific nutrition/hydration education as appropriate  - Include patient/family/caregiver in decisions related to nutrition  Outcome: Progressing     Problem: DISCHARGE PLANNING  Goal: Discharge to home or other facility with appropriate resources  Description: INTERVENTIONS:  - Identify barriers to discharge w/patient and caregiver  - Arrange for needed discharge resources and transportation as appropriate  - Identify discharge learning needs (meds, wound care, etc )  - Arrange for interpretive services to assist at discharge as needed  - Refer to Case Management Department for coordinating discharge planning if the patient needs post-hospital services based on physician/advanced practitioner order or complex needs related to functional status, cognitive ability, or social support system  Outcome: Progressing

## 2021-02-25 NOTE — SOCIAL WORK
CM called and spoke with patient's brother, Yasmin Cote ([phone]), informing him of patient's need for her clothing at the hospital  CM informed Yasmin Cote where patient believes her pants, shirts, socks and underwear is in her home  Yasmin Cote reported he will have her clothing shipped to the hospital  CM provided hospital address and will notify PCM of the anticipated packages  CM will continue to follow patient's progress and assist with discharge planning needs

## 2021-02-25 NOTE — NURSING NOTE
No acute behaviors observed  Patient appears to have slept through the overnight period without issue  Remains in bed sleeping at this time  No complaints voiced  Will CTM via q7 minute safety checks

## 2021-02-25 NOTE — PROGRESS NOTES
Patient more visible on the unit today  Appears flat and depressed  Brightens with approach  Denies SI,HI  Anxiety and depression 4/10  Auditory hallucinations less frequent and not as loud  Complaint with meals and medications  Q 7 minute checks maintained

## 2021-02-26 LAB
GLUCOSE SERPL-MCNC: 100 MG/DL (ref 65–140)
GLUCOSE SERPL-MCNC: 103 MG/DL (ref 65–140)

## 2021-02-26 PROCEDURE — 82948 REAGENT STRIP/BLOOD GLUCOSE: CPT

## 2021-02-26 PROCEDURE — 99232 SBSQ HOSP IP/OBS MODERATE 35: CPT | Performed by: NURSE PRACTITIONER

## 2021-02-26 RX ADMIN — Medication 1000 UNITS: at 08:42

## 2021-02-26 RX ADMIN — MELATONIN 3 MG: at 21:19

## 2021-02-26 RX ADMIN — LOSARTAN POTASSIUM 50 MG: 50 TABLET, FILM COATED ORAL at 08:42

## 2021-02-26 RX ADMIN — LORAZEPAM 1 MG: 1 TABLET ORAL at 19:58

## 2021-02-26 RX ADMIN — HYDROXYZINE HYDROCHLORIDE 50 MG: 25 TABLET, FILM COATED ORAL at 21:19

## 2021-02-26 RX ADMIN — SITAGLIPTIN 50 MG: 25 TABLET, FILM COATED ORAL at 08:42

## 2021-02-26 RX ADMIN — RISPERIDONE 1 MG: 1 TABLET ORAL at 08:42

## 2021-02-26 RX ADMIN — OXYBUTYNIN 5 MG: 5 TABLET, FILM COATED, EXTENDED RELEASE ORAL at 08:42

## 2021-02-26 RX ADMIN — RISPERIDONE 2 MG: 2 TABLET ORAL at 21:19

## 2021-02-26 RX ADMIN — TRAZODONE HYDROCHLORIDE 100 MG: 50 TABLET ORAL at 21:19

## 2021-02-26 NOTE — PROGRESS NOTES
Patient OOB to dining room for dinner  Ambulating with walker  Gait shuffled at times  Did c/o constipation  Offered PRN Miralax  Patient did refuse and stated she will see how she feels later this evening  Denied any hallucinations  Denied HI/SI  Q7min checks maintained  Will continue to monitor

## 2021-02-26 NOTE — PROGRESS NOTES
The patient noted to be visible on the unit, in the milieu and in the patient's room upon successive rounds throughout the shift  The patient noted to be flat, quiet, states anxiety and depression "are no better," but states sleep pattern "has been better " The patient denies complaints of pain  The patient compliant with meals and medications  RN placed call to Dr Schmid Quiet office to provide notification of podiatry consult, spoke with office answering service staff member, Beto  Q 7 minute safety checks maintained

## 2021-02-26 NOTE — PROGRESS NOTES
Progress Note - Behavioral Health   Naomi Chow 62 y o  female MRN: [id number]  Unit/Bed#: [facility] 200-02 Encounter: [id number]    Assessment/Plan   Principal Problem:    MDD (major depressive disorder), recurrent episode ([address] )  Active Problems:    Essential hypertension    Depression    Other constipation    Absence of bladder continence    History of Asperger's syndrome    Aftercare following right hip joint replacement surgery    Severe protein-calorie malnutrition ([facility] [address] )      Behavior over the last 24 hours:  unchanged  Sleep:  Improved  Appetite: normal  Medication side effects: No  ROS: no complaints and All other systems negative for acute change    Damari Figueroa was seen today for follow-up and case discussed with treatment team this morning  Damari Figueroa was laying in bed upon encounter  She describes her anxiety and depression are 10/10, however she presents with a flat affect  She reports that the devil is still intermittently speaking to her  AH are non commanding  She further described that the devil is keeping her from eating and sleeping  Although, Damari Figueroa did report her sleep was somewhat improved last night  She remains somatically preoccupied and stating that the devil is preventing her from taking care of herself  She continued to state that she is unable to eat or drink because she is "so weak" from the voices, however, patient has been completing 100% of meals  She denies any visual hallucinations/SI/HI  Medication adjustments were made to Risperdal yesterday  Damari Figueroa did get up to attend group this morning  Mental Status Evaluation:  Appearance:  disheveled   Behavior:  Bizarre, cooperative   Speech:  normal pitch, normal volume and tangential   Mood:  anxious and depressed   Affect:  flat   Thought Process:  circumstantial   Thought Content:  delusions  somatic   Perceptual Disturbances:  Auditory hallucinations without commands   Risk Potential: Suicidal Ideations none  Homicidal Ideations none  Potential for Aggression No   Sensorium:  person, place and time/date   Memory:  recent and remote memory grossly intact   Consciousness:  alert and awake    Attention: attention span appeared shorter than expected for age   Insight:  Poor   Judgment: Poor   Gait/Station: slow and Uses walker   Motor Activity: no abnormal movements     Progress Toward Goals:  Risperdal added to morning med regimen yesterday  Continue current medication regimen  Patient will be discharged home upon stabilization; no discharge date at this time  Recommended Treatment: Continue with group therapy, milieu therapy and occupational therapy  Risks, benefits and possible side effects of Medications:   Risks, benefits, and possible side effects of medications explained to patient and patient verbalizes understanding  Medications: all current active meds have been reviewed and continue current psychiatric medications  Labs: I have personally reviewed all pertinent laboratory/tests results  Counseling / Coordination of Care  Total floor / unit time spent today 20 minutes  Greater than 50% of total time was spent with the patient and / or family counseling and / or coordination of care

## 2021-02-26 NOTE — PROGRESS NOTES
Patient OOB ad lila  Compliant with meds and meals  Denies pain  Denies SI  Demonic hallucinations continue  Denies commands  Denies anxiety  Depression exists at 5/10  Q7 min checks maintained  Will continue to monitor

## 2021-02-26 NOTE — PROGRESS NOTES
02/26/21 1003   Team Meeting   Meeting Type Daily Rounds   Team Members Present   Team Members Present Physician;Nurse;; Occupational Therapist   Physician Team Member Dr Stacey Garduno MD; Angela Ascenc[address]   Nursing Team Member Brenden Thurston, CARLOS   Social Work Team Member Anastasiya Rueda [place]   OT Team Member Carla Carmona [place]   Patient/Family Present   Patient Present No   Patient's Family Present No     No DC Date - will return home upon stabilization; cooperative, pleasant; flat and quiet at times; endorses AH - non-command; anx/dep 5/10; slept; med compliant

## 2021-02-26 NOTE — PLAN OF CARE
Problem: Alteration in Thoughts and Perception  Goal: Refrain from acting on delusional thinking/internal stimuli  Description: Interventions:  - Monitor patient closely, per order   - Utilize least restrictive measures   - Set reasonable limits, give positive feedback for acceptable   - Administer medications as ordered and monitor of potential side effects  Outcome: Progressing  Goal: Agree to be compliant with medication regime, as prescribed and report medication side effects  Description: Interventions:  - Offer appropriate PRN medication and supervise ingestion; conduct AIMS, as needed   Outcome: Progressing     Problem: Ineffective Coping  Goal: Understands least restrictive measures  Description: Interventions:  - Utilize least restrictive behavior  Outcome: Progressing  Goal: Free from restraint events  Description: - Utilize least restrictive measures   - Provide behavioral interventions   - Redirect inappropriate behaviors   Outcome: Progressing     Problem: Anxiety  Goal: Anxiety is at manageable level  Description: Interventions:  - Assess and monitor patient's anxiety level  - Monitor for signs and symptoms (heart palpitations, chest pain, shortness of breath, headaches, nausea, feeling jumpy, restlessness, irritable, apprehensive)  - Collaborate with interdisciplinary team and initiate plan and interventions as ordered  - [place] patient to unit/surroundings  - Explain treatment plan  - Encourage participation in care  - Encourage verbalization of concerns/fears  - Identify coping mechanisms  - Assist in developing anxiety-reducing skills  - Administer/offer alternative therapies  - Limit or eliminate stimulants  Outcome: Progressing     Problem: Nutrition/Hydration-ADULT  Goal: Nutrient/Hydration intake appropriate for improving, restoring or maintaining nutritional needs  Description: Monitor and assess patient's nutrition/hydration status for malnutrition   Collaborate with interdisciplinary team and initiate plan and interventions as ordered  Monitor patient's weight and dietary intake as ordered or per policy  Utilize nutrition screening tool and intervene as necessary  Determine patient's food preferences and provide high-protein, high-caloric foods as appropriate       INTERVENTIONS:  - Monitor oral intake, urinary output, labs, and treatment plans  - Assess nutrition and hydration status and recommend course of action  - Evaluate amount of meals eaten  - Assist patient with eating if necessary   - Allow adequate time for meals  - Recommend/ encourage appropriate diets, oral nutritional supplements, and vitamin/mineral supplements  - Order, calculate, and assess calorie counts as needed  - Recommend, monitor, and adjust tube feedings and TPN/PPN based on assessed needs  - Assess need for intravenous fluids  - Provide specific nutrition/hydration education as appropriate  - Include patient/family/caregiver in decisions related to nutrition  Outcome: Progressing

## 2021-02-26 NOTE — PLAN OF CARE
Problem: Alteration in Thoughts and Perception  Goal: Refrain from acting on delusional thinking/internal stimuli  Description: Interventions:  - Monitor patient closely, per order   - Utilize least restrictive measures   - Set reasonable limits, give positive feedback for acceptable   - Administer medications as ordered and monitor of potential side effects  Outcome: Progressing  Goal: Agree to be compliant with medication regime, as prescribed and report medication side effects  Description: Interventions:  - Offer appropriate PRN medication and supervise ingestion; conduct AIMS, as needed   2/26/2021 1252 by Felton Curtis RN  Outcome: Progressing  2/26/2021 1250 by Felton Curtis RN  Outcome: Progressing     Problem: Ineffective Coping  Goal: Understands least restrictive measures  Description: Interventions:  - Utilize least restrictive behavior  2/26/2021 1252 by Felton Curtis RN  Outcome: Progressing  2/26/2021 1250 by Felton Curtis RN  Outcome: Progressing  Goal: Free from restraint events  Description: - Utilize least restrictive measures   - Provide behavioral interventions   - Redirect inappropriate behaviors   2/26/2021 1252 by Felton Curtis RN  Outcome: Progressing  2/26/2021 1250 by Felton Curtis RN  Outcome: Progressing     Problem: Anxiety  Goal: Anxiety is at manageable level  Description: Interventions:  - Assess and monitor patient's anxiety level  - Monitor for signs and symptoms (heart palpitations, chest pain, shortness of breath, headaches, nausea, feeling jumpy, restlessness, irritable, apprehensive)  - Collaborate with interdisciplinary team and initiate plan and interventions as ordered    - [place] patient to unit/surroundings  - Explain treatment plan  - Encourage participation in care  - Encourage verbalization of concerns/fears  - Identify coping mechanisms  - Assist in developing anxiety-reducing skills  - Administer/offer alternative therapies  - Limit or eliminate stimulants  2/26/2021 1252 by Gasper London RN  Outcome: Progressing  2/26/2021 1250 by Gasper London RN  Outcome: Progressing     Problem: Nutrition/Hydration-ADULT  Goal: Nutrient/Hydration intake appropriate for improving, restoring or maintaining nutritional needs  Description: Monitor and assess patient's nutrition/hydration status for malnutrition  Collaborate with interdisciplinary team and initiate plan and interventions as ordered  Monitor patient's weight and dietary intake as ordered or per policy  Utilize nutrition screening tool and intervene as necessary  Determine patient's food preferences and provide high-protein, high-caloric foods as appropriate       INTERVENTIONS:  - Monitor oral intake, urinary output, labs, and treatment plans  - Assess nutrition and hydration status and recommend course of action  - Evaluate amount of meals eaten  - Assist patient with eating if necessary   - Allow adequate time for meals  - Recommend/ encourage appropriate diets, oral nutritional supplements, and vitamin/mineral supplements  - Order, calculate, and assess calorie counts as needed  - Recommend, monitor, and adjust tube feedings and TPN/PPN based on assessed needs  - Assess need for intravenous fluids  - Provide specific nutrition/hydration education as appropriate  - Include patient/family/caregiver in decisions related to nutrition  2/26/2021 1252 by Gasper London RN  Outcome: Progressing  2/26/2021 1250 by Gasper London RN  Outcome: Progressing

## 2021-02-26 NOTE — PROGRESS NOTES
Progress Note - Luis A Ferrell 62 y o  female MRN: [id number]    Unit/Bed#: [facility] 200-02 Encounter: [id number]        Subjective:   Patient seen and examined at bedside after reviewing the chart and discussing the case with the caring staff  Patient examined at bedside  Patient reports difficulty with ambulating and completing her ADLs  She is requesting PT/OT consult  Patient also requesting consult to Podiatry for overgrown toenails  Physical Exam   Vitals: Blood pressure 144/88, pulse (!) 125, temperature 98 3 °F (36 8 °C), temperature source Temporal, resp  rate 18, height 5' 5" (1 651 m), weight 56 kg (123 lb 7 3 oz), SpO2 97 %, not currently breastfeeding  ,Body mass index is 20 54 kg/m²  Constitutional: He appears well-developed  HEENT: PERR, EOMI, MMM  Cardiovascular: Normal rate and regular rhythm  Pulmonary/Chest: Effort normal and breath sounds normal    Abdomen: Soft, + BS, NT    Assessment/Plan:     Luis A Ferrell is a(n) 62y o  year old female with psychotic disorder      1  Cardiac with history of hypertension  Patient will be continued on losartan  2  DJD/osteoarthritis  Patient may get Tylenol on as needed basis  3  Gait abnormality  I will consult PT and OT for further evaluation and treatment  4  Insomnia  Patient is on melatonin  5  Overactive urinary bladder  Patient is on oxybutynin  6  Hyperglycemia/weight loss  We will closely monitor patient's blood sugars by doing Accu-Cheks 2 times daily  Continue Januvia 50 mg daily along with carb controlled diet  Patient's hemoglobin A1c was 5 3  Nutrition is on consult  Patient has been put on Glucerna supplements  7  Nasal congestion/dryness  Continue saline nasal spray every 2 hours as needed  8  Vitamin D deficiency  Continue daily vitamin-D supplements  9  Overgrown toenails  I will consult Podiatry for further evaluation and treatment      The patient was discussed with Dr Jax Locke and he is in agreement with the above note

## 2021-02-27 LAB
GLUCOSE SERPL-MCNC: 94 MG/DL (ref 65–140)
GLUCOSE SERPL-MCNC: 98 MG/DL (ref 65–140)

## 2021-02-27 PROCEDURE — 99232 SBSQ HOSP IP/OBS MODERATE 35: CPT | Performed by: NURSE PRACTITIONER

## 2021-02-27 PROCEDURE — 82948 REAGENT STRIP/BLOOD GLUCOSE: CPT

## 2021-02-27 RX ORDER — MIRTAZAPINE 15 MG/1
15 TABLET, FILM COATED ORAL
Status: DISCONTINUED | OUTPATIENT
Start: 2021-02-27 | End: 2021-03-05

## 2021-02-27 RX ORDER — RISPERIDONE 2 MG/1
2 TABLET, FILM COATED ORAL 2 TIMES DAILY
Status: DISCONTINUED | OUTPATIENT
Start: 2021-02-27 | End: 2021-03-02

## 2021-02-27 RX ADMIN — SITAGLIPTIN 50 MG: 25 TABLET, FILM COATED ORAL at 09:11

## 2021-02-27 RX ADMIN — MELATONIN 3 MG: at 21:46

## 2021-02-27 RX ADMIN — Medication 1000 UNITS: at 09:11

## 2021-02-27 RX ADMIN — RISPERIDONE 1 MG: 1 TABLET ORAL at 09:11

## 2021-02-27 RX ADMIN — OXYBUTYNIN 5 MG: 5 TABLET, FILM COATED, EXTENDED RELEASE ORAL at 09:11

## 2021-02-27 RX ADMIN — LOSARTAN POTASSIUM 50 MG: 50 TABLET, FILM COATED ORAL at 09:11

## 2021-02-27 RX ADMIN — TRAZODONE HYDROCHLORIDE 100 MG: 50 TABLET ORAL at 21:46

## 2021-02-27 RX ADMIN — MIRTAZAPINE 15 MG: 15 TABLET, FILM COATED ORAL at 21:46

## 2021-02-27 RX ADMIN — RISPERIDONE 2 MG: 2 TABLET ORAL at 21:46

## 2021-02-27 NOTE — PROGRESS NOTES
Progress Note - Behavioral Health     Elizabeth Sen 62 y o  female MRN: [id number]   Unit/Bed#: [facility] 200-02 Encounter: [id number]    Behavior over the last 24 hours: unchanged  Izell Arrow is seen in her room  Report she "dissociates" and "allowed the devil to take over me"  Ongoing hallucinations of "the devil"  Depressed mood, she rates 10/10  No active suicidal thought or passive death wish  She report low motivation, poor appetite, ongoing difficulty initiating sleep and needed PRN ativan last night  Will increase Risperdal and add Remeron at night for depression/sleep/appetite  Seclusive to the room  Sleep: difficulty falling asleep  Appetite: fair, decreased  Medication side effects: No   ROS: all other systems are negative    Mental Status Evaluation:    Appearance:  disheveled   Behavior:  guarded   Speech:  decreased volume   Mood:  depressed, dysphoric   Affect:  constricted   Thought Process:  decreased rate of thoughts   Associations: concrete associations   Thought Content:  persecutory delusions   Perceptual Disturbances: auditory hallucinations   Risk Potential: Suicidal ideation - None  Homicidal ideation - None at present  Potential for aggression - No   Sensorium:  oriented to person and place   Memory:  recent memory impaired   Consciousness:  alert and awake   Attention: decreased concentration and decreased attention span   Insight:  limited   Judgment: limited   Gait/Station: uses walker   Motor Activity: no abnormal movements     Vital signs in last 24 hours:    Temp:  [98 2 °F (36 8 °C)-98 9 °F (37 2 °C)] 98 9 °F (37 2 °C)  HR:  [80-98] 80  Resp:  [16-18] 16  BP: (126-138)/(60-74) 130/60    Laboratory results: I have personally reviewed all pertinent laboratory/tests results      Suicide/Homicide Risk Assessment:    Risk of Harm to Self:   Current Specific Risk Factors include: current depressive symptoms  Protective Factors: no current suicidal plan or intent, ability to communicate with staff on the unit, able to contract for safety on the unit, taking medications as ordered on the unit  Based on today's assessment, Mendez Holguin presents the following risk of harm to self: low    Risk of Harm to Others:  Current Specific Risk Factors include: none  Based on today's assessment, Simms Ezio presents the following risk of harm to others: none    The following interventions are recommended: behavioral checks every 7 minutes, continued hospitalization on locked unit     Progress Toward Goals: no significant improvement    Assessment/Plan   Principal Problem:    MDD (major depressive disorder), recurrent episode ([facility] [address] )  Active Problems:    Essential hypertension    Depression    Other constipation    Absence of bladder continence    History of Asperger's syndrome    Aftercare following right hip joint replacement surgery    [facility]    Recommended Treatment:     Planned medication and treatment changes:     All current active medications have been reviewed  Encourage group therapy, milieu therapy and occupational therapy  Behavioral Health checks every 7 minutes  Increase Risperdal 2 mg bid   Add Remeron 15 mg hs    Current Facility-Administered Medications   Medication Dose Route Frequency Provider Last Rate    acetaminophen  650 mg Oral Q6H PRN Estela Marieics, CRNP      acetaminophen  650 mg Oral Q4H PRN Lucia Marieics, CRNP      acetaminophen  975 mg Oral Q6H PRN Lucia Marieics, CRNP      benztropine  1 mg Intramuscular Q4H PRN Max 6/day Estela Marieics, CRNP      benztropine  1 mg Oral Q4H PRN Max 6/day Lucia Marieics, CRNP      cholecalciferol  1,000 Units Oral Daily [place], [place]      hydrOXYzine HCL  25 mg Oral Q6H PRN Max 4/day Estela N Lodics, CRNP      hydrOXYzine HCL  50 mg Oral Q4H PRN Max 4/day Estela N Lodics, CRNP      Or    LORazepam  0 5 mg Intramuscular Q4H PRN Estela N Lodics, CRNP      LORazepam  1 mg Oral Q4H PRN Max 6/day KAM Blackmon      Or    LORazepam  1 mg Intramuscular Q6H PRN Max 3/day Monique Reny Chan, KAM      losartan  50 mg Oral Daily Jackie Hess MD      melatonin  3 mg Oral HS Latrice Dickerson MD      mirtazapine  15 mg Oral HS KAM Andersen      OLANZapine  10 mg Oral Q3H PRN Max 3/day Monique KAM Wynn      Or    OLANZapine  10 mg Intramuscular Q3H PRN Max 3/day KAM Blackmon      OLANZapine  5 mg Oral Q3H PRN Max 6/day EstelaKAM Sinha      Or    OLANZapine  5 mg Intramuscular Q3H PRN Max 6/day KAM Aceves      OLANZapine  2 5 mg Oral Q3H PRN Max 8/day KAM Aceves      oxybutynin  5 mg Oral Daily Jackie Hess MD      polyethylene glycol  17 g Oral Daily PRN Leonila Mansfield PA-C      risperiDONE  2 mg Oral BID KAM Andersen      sitaGLIPtin  50 mg Oral Daily Gricelda Gonzalez MD      sodium chloride  1 spray Each Nare Q2H PRN Leonila Mansfield PA-C      traZODone  100 mg Oral HS KAM Rosado      traZODone  50 mg Oral HS PRN KAM Blackmon         Risks / Benefits of Treatment:    Risks, benefits, and possible side effects of medications explained to patient and patient verbalizes understanding and agreement for treatment  Counseling / Coordination of Care:    Patient's progress discussed with staff in treatment team meeting  Medications, treatment progress and treatment plan reviewed with patient      KAM Andersen 02/27/21

## 2021-02-27 NOTE — NURSING NOTE
[phone]  Pt found to be resting quietly on most of authors rounds  No acute behavioral issues noted  Q 7 min checks maintained  Fall protocol in place

## 2021-02-27 NOTE — PLAN OF CARE
Problem: Depression  Goal: Verbalize thoughts and feelings  Description: Interventions:  - Assess and re-assess patient's level of risk   - Engage patient in 1:1 interactions, daily, for a minimum of 15 minutes   - Encourage patient to express feelings, fears, frustrations, hopes   Outcome: Progressing  Goal: Refrain from harming self  Description: Interventions:  - Monitor patient closely, per order   - Supervise medication ingestion, monitor effects and side effects   Outcome: Progressing  Goal: Refrain from self-neglect  Outcome: Progressing  Goal: Complete daily ADLs, including personal hygiene independently, as able  Description: Interventions:  - Observe, teach, and assist patient with ADLS  -  Monitor and promote a balance of rest/activity, with adequate nutrition and elimination   Outcome: Progressing     Problem: Depression  Goal: Treatment Goal: Demonstrate behavioral control of depressive symptoms, verbalize feelings of improved mood/affect, and adopt new coping skills prior to discharge  Outcome: Not Progressing  Goal: Refrain from isolation  Description: Interventions:  - Develop a trusting relationship   - Encourage socialization   Outcome: Not Progressing  Goal: Attend and participate in unit activities, including therapeutic, recreational, and educational groups  Description: Interventions:  - Provide therapeutic and educational activities daily, encourage attendance and participation, and document same in the medical record   Outcome: Not Progressing     Problem: Anxiety  Goal: Anxiety is at manageable level  Description: Interventions:  - Assess and monitor patient's anxiety level  - Monitor for signs and symptoms (heart palpitations, chest pain, shortness of breath, headaches, nausea, feeling jumpy, restlessness, irritable, apprehensive)  - Collaborate with interdisciplinary team and initiate plan and interventions as ordered    - [place] patient to unit/surroundings  - Explain treatment plan  - Encourage participation in care  - Encourage verbalization of concerns/fears  - Identify coping mechanisms  - Assist in developing anxiety-reducing skills  - Administer/offer alternative therapies  - Limit or eliminate stimulants  Outcome: Not Progressing

## 2021-02-27 NOTE — NURSING NOTE
Pt withdrawn to room  whimpering on initial approach  Affect congruent  Mood dep[ressed and anxious  Voices no suicidal thoughts  Thoughts disorganized  Content sig for delusional thinkning; pt states she has demonic forces pushing against her  Feels she is being punished for previous behaviors  Reassurance provided  Requested/ received prn anxiolytic for severe anxiety  Reports anxiolytic moderately effective       Q 7 min checks ongoing

## 2021-02-27 NOTE — NURSING NOTE
Pt returned to bed after breakfast  Compliant with medications  Affect flat and mood is depressed  Calm and cooperative  Pt states she has continuous high anxiety and depression  Pt states, "I knows I don't have my mind anymore and don't have full control over it " is what is trigger her depression and anxiety  Pt endorses AH of voices that normally mock her but not at this time  Pt agrees to come to staff if they become worse at any time and feels safe on the unit  Pt denies SI/HI/VH  Denies pain or discomfort unless she lays on her back  Q 7 min safety checks maintained

## 2021-02-27 NOTE — PROGRESS NOTES
Progress Note - Miranda Forrest 62 y o  female MRN: [id number]    Unit/Bed#: [facility] 200-02 Encounter: [id number]        Subjective:   Patient seen and examined at bedside after reviewing the chart and discussing the case with the caring staff  Patient examined at bedside  Patient reports no acute problems  Physical Exam   Vitals: Blood pressure 130/60, pulse 80, temperature 98 9 °F (37 2 °C), temperature source Temporal, resp  rate 16, height 5' 5" (1 651 m), weight 62 8 kg (138 lb 6 4 oz), SpO2 97 %, not currently breastfeeding  ,Body mass index is 23 03 kg/m²  Constitutional: He appears well-developed  HEENT: PERR, EOMI, MMM  Cardiovascular: Normal rate and regular rhythm  Pulmonary/Chest: Effort normal and breath sounds normal    Abdomen: Soft, + BS, NT    Assessment/Plan:     Miranda Forrest is a(n) 62y o  year old female with psychotic disorder      1  Cardiac with history of hypertension  Patient will be continued on losartan  2  DJD/osteoarthritis  Patient may get Tylenol on as needed basis  3  Gait abnormality  I will consult PT and OT for further evaluation and treatment  4  Insomnia  Patient is on melatonin  5  Overactive urinary bladder  Patient is on oxybutynin  6  Hyperglycemia/weight loss  We will closely monitor patient's blood sugars by doing Accu-Cheks 2 times daily  Continue Januvia 50 mg daily along with carb controlled diet  Patient's hemoglobin A1c was 5 3  Nutrition is on consult  Patient has been put on Glucerna supplements  7  Nasal congestion/dryness  Continue saline nasal spray every 2 hours as needed  8  Vitamin D deficiency  Continue daily vitamin-D supplements  9  Overgrown toenails  I will consult Podiatry for further evaluation and treatment

## 2021-02-28 LAB
GLUCOSE SERPL-MCNC: 91 MG/DL (ref 65–140)
GLUCOSE SERPL-MCNC: 98 MG/DL (ref 65–140)

## 2021-02-28 PROCEDURE — 82948 REAGENT STRIP/BLOOD GLUCOSE: CPT

## 2021-02-28 PROCEDURE — 99232 SBSQ HOSP IP/OBS MODERATE 35: CPT | Performed by: NURSE PRACTITIONER

## 2021-02-28 RX ADMIN — RISPERIDONE 2 MG: 2 TABLET ORAL at 08:28

## 2021-02-28 RX ADMIN — ACETAMINOPHEN 650 MG: 325 TABLET ORAL at 08:48

## 2021-02-28 RX ADMIN — LORAZEPAM 1 MG: 1 TABLET ORAL at 21:47

## 2021-02-28 RX ADMIN — MELATONIN 3 MG: at 21:42

## 2021-02-28 RX ADMIN — MIRTAZAPINE 15 MG: 15 TABLET, FILM COATED ORAL at 21:42

## 2021-02-28 RX ADMIN — Medication 1000 UNITS: at 08:28

## 2021-02-28 RX ADMIN — RISPERIDONE 2 MG: 2 TABLET ORAL at 21:42

## 2021-02-28 RX ADMIN — TRAZODONE HYDROCHLORIDE 100 MG: 50 TABLET ORAL at 21:42

## 2021-02-28 RX ADMIN — LOSARTAN POTASSIUM 50 MG: 50 TABLET, FILM COATED ORAL at 08:27

## 2021-02-28 RX ADMIN — SITAGLIPTIN 50 MG: 25 TABLET, FILM COATED ORAL at 08:28

## 2021-02-28 RX ADMIN — OXYBUTYNIN 5 MG: 5 TABLET, FILM COATED, EXTENDED RELEASE ORAL at 08:28

## 2021-02-28 RX ADMIN — POLYETHYLENE GLYCOL 3350 17 G: 17 POWDER, FOR SOLUTION ORAL at 21:50

## 2021-02-28 NOTE — PROGRESS NOTES
Progress Note - Gloria Plata 62 y o  female MRN: [id number]    Unit/Bed#: [facility] 200-02 Encounter: [id number]        Subjective:   Patient seen and examined at bedside after reviewing the chart and discussing the case with the caring staff  Patient examined at bedside  Patient reports no acute problems  Physical Exam   Vitals: Blood pressure 122/68, pulse 95, temperature 98 4 °F (36 9 °C), temperature source Temporal, resp  rate 18, height 5' 5" (1 651 m), weight 62 8 kg (138 lb 6 4 oz), SpO2 95 %, not currently breastfeeding  ,Body mass index is 23 03 kg/m²  Constitutional: He appears well-developed  HEENT: PERR, EOMI, MMM  Cardiovascular: Normal rate and regular rhythm  Pulmonary/Chest: Effort normal and breath sounds normal    Abdomen: Soft, + BS, NT    Assessment/Plan:     Gloria Plata is a(n) 62y o  year old female with psychotic disorder      1  Cardiac with history of hypertension  Patient will be continued on losartan  2  DJD/osteoarthritis  Patient may get Tylenol on as needed basis  3  Gait abnormality  I will consult PT and OT for further evaluation and treatment  4  Insomnia  Patient is on melatonin  5  Overactive urinary bladder  Patient is on oxybutynin  6  Hyperglycemia/weight loss  We will closely monitor patient's blood sugars by doing Accu-Cheks 2 times daily  Continue Januvia 50 mg daily along with carb controlled diet  Patient's hemoglobin A1c was 5 3  Nutrition is on consult  Patient has been put on Glucerna supplements  7  Nasal congestion/dryness  Continue saline nasal spray every 2 hours as needed  8  Vitamin D deficiency  Continue daily vitamin-D supplements  9  Overgrown toenails  I will consult Podiatry for further evaluation and treatment

## 2021-02-28 NOTE — NURSING NOTE
[id number]     Pt present in the milieu; affect bright on approach  Mood is anxious and depressed  Reports intermittent AH  Denies SI or HI  No acute behavioral issues noted  Q 7 min checks ongoing

## 2021-02-28 NOTE — NURSING NOTE
[phone]  Pt found to be resting quietly on most of authors rounds  No acute behavioral issues noted  Q 7 min checks maintained  Fall protocol in place

## 2021-02-28 NOTE — PLAN OF CARE
Problem: Alteration in Thoughts and Perception  Goal: Treatment Goal: Gain control of psychotic behaviors/thinking, reduce/eliminate presenting symptoms and demonstrate improved reality functioning upon discharge  Outcome: Progressing  Goal: Verbalize thoughts and feelings  Description: Interventions:  - Promote a nonjudgmental and trusting relationship with the patient through active listening and therapeutic communication  - Assess patient's level of functioning, behavior and potential for risk  - Engage patient in 1 on 1 interactions  - Encourage patient to express fears, feelings, frustrations, and discuss symptoms    - [place] patient to reality, help patient recognize reality-based thinking   - Administer medications as ordered and assess for potential side effects  - Provide the patient education related to the signs and symptoms of the illness and desired effects of prescribed medications  Outcome: Progressing  Goal: Refrain from acting on delusional thinking/internal stimuli  Description: Interventions:  - Monitor patient closely, per order   - Utilize least restrictive measures   - Set reasonable limits, give positive feedback for acceptable   - Administer medications as ordered and monitor of potential side effects  Outcome: Progressing  Goal: Agree to be compliant with medication regime, as prescribed and report medication side effects  Description: Interventions:  - Offer appropriate PRN medication and supervise ingestion; conduct AIMS, as needed   Outcome: Progressing  Goal: Attend and participate in unit activities, including therapeutic, recreational, and educational groups  Description: Interventions:  -Encourage Visitation and family involvement in care  Outcome: Progressing  Goal: Recognize dysfunctional thoughts, communicate reality-based thoughts at the time of discharge  Description: Interventions:  - Provide medication and psycho-education to assist patient in compliance and developing insight into his/her illness   Outcome: Progressing  Goal: Complete daily ADLs, including personal hygiene independently, as able  Description: Interventions:  - Observe, teach, and assist patient with ADLS  - Monitor and promote a balance of rest/activity, with adequate nutrition and elimination   Outcome: Progressing

## 2021-02-28 NOTE — NURSING NOTE
Pt visible for breakfast and returned to her room to rest  Affect flat and mood is depressed  Calm and cooperative  Pt states she has neck pain, tylenol given  Pt reports her anxiety and depression is continuously high  Pt denies SI/HI/AH/VH  Q 7 min safety checks maintained

## 2021-02-28 NOTE — PROGRESS NOTES
Progress Note - Behavioral Health     Yao Márquez 62 y o  female MRN: [id number]   Unit/Bed#: [facility] 200-02 Encounter: [id number]    Behavior over the last 24 hours: unchanged  Sonia Clutter states she slept 4 or 5 hours last night, which is an improvement  Reports some nights she is not able to sleep at all  Tolerating addition of Remeron  Today started increased dose of Risperdal, 2 mg b i d  For ongoing hallucinations-hears the voice of the devil  Seen out of her room a little bit more today  , cooperative on the unit though still mostly isolative  Limited participation in milieu  Sleep: slept off and on  Appetite: fair  Medication side effects: No   ROS: all other systems are negative    Mental Status Evaluation:    Appearance:  disheveled   Behavior:  cooperative, guarded   Speech:  increased latency of response   Mood:  depressed, dysphoric   Affect:  blunted   Thought Process:  perseverative   Associations: intact associations   Thought Content:  persecutory delusions, negative thinking, ruminating thoughts   Perceptual Disturbances: auditory hallucinations   Risk Potential: Suicidal ideation - None  Homicidal ideation - None  Potential for aggression - No   Sensorium:  oriented to person, place and time/date   Memory:  recent and remote memory grossly intact   Consciousness:  alert and awake   Attention: attention span and concentration appear shorter than expected for age   Insight:  fair   Judgment: limited   Gait/Station: uses walker   Motor Activity: no abnormal movements     Vital signs in last 24 hours:    Temp:  [97 8 °F (36 6 °C)-98 4 °F (36 9 °C)] 98 4 °F (36 9 °C)  HR:  [84-95] 95  Resp:  [16-18] 18  BP: (105-122)/(58-75) 122/68    Laboratory results: I have personally reviewed all pertinent laboratory/tests results      Suicide/Homicide Risk Assessment:    Risk of Harm to Self:   Current Specific Risk Factors include: current depressive symptoms  Protective Factors: no current suicidal plan or intent, ability to communicate with staff on the unit, able to contract for safety on the unit, taking medications as ordered on the unit  Based on today's assessment, Nelson Mai presents the following risk of harm to self: low    Risk of Harm to Others:  Current Specific Risk Factors include: none  Based on today's assessment, Nelson Mai presents the following risk of harm to others: none    The following interventions are recommended: behavioral checks every 7 minutes, continued hospitalization on locked unit     Progress Toward Goals: no significant improvement    Assessment/Plan   Principal Problem:    [facility]major depressive [facility]  Active Problems:    Essential hypertension    Depression    Other constipation    Absence of bladder continence    History of Asperger's syndrome    Aftercare following right hip joint replacement surgery    Severe protein-calorie malnutrition ([facility] )    Recommended Treatment:     Planned medication and treatment changes:     All current active medications have been reviewed  Encourage group therapy, milieu therapy and occupational therapy  Behavioral Health checks every 7 minutes  Continue current medications:  Current Facility-Administered Medications   Medication Dose Route Frequency Provider Last Rate    acetaminophen  650 mg Oral Q6H PRN Estela Marieics, CRNP      acetaminophen  650 mg Oral Q4H PRN Sj Chan, CRNP      acetaminophen  975 mg Oral Q6H PRN Sj Chan, CRNP      benztropine  1 mg Intramuscular Q4H PRN Max 6/day Estela Marieics, CRNP      benztropine  1 mg Oral Q4H PRN Max 6/day Sj Chan, CRNP      cholecalciferol  1,000 Units Oral Daily [place], [place]      hydrOXYzine HCL  25 mg Oral Q6H PRN Max 4/day Estela Marieics, CRNP      hydrOXYzine HCL  50 mg Oral Q4H PRN Max 4/day Estela Marieics, CRNP      Or    LORazepam  0 5 mg Intramuscular Q4H PRN Estela Marieics, CRNP      LORazepam  1 mg Oral Q4H PRN Max 6/day KAM Ibanez      Or    LORazepam  1 mg Intramuscular Q6H PRN Max 3/day Jevon Chan, KAM      losartan  50 mg Oral Daily Yoan Corona MD      melatonin  3 mg Oral HS Koffi Mcgee MD      mirtazapine  15 mg Oral HS KAM Temple      OLANZapine  10 mg Oral Q3H PRN Max 3/day Jevon Chan, CASSANDRANP      Or    OLANZapine  10 mg Intramuscular Q3H PRN Max 3/day Jevon Chan, KAM      OLANZapine  5 mg Oral Q3H PRN Max 6/day KAM Aceves      Or    OLANZapine  5 mg Intramuscular Q3H PRN Max 6/day Estela Chan, KAM      OLANZapine  2 5 mg Oral Q3H PRN Max 8/day KAM Aceves      oxybutynin  5 mg Oral Daily Yoan Corona MD      polyethylene glycol  17 g Oral Daily PRN Mike Santo PA-C      risperiDONE  2 mg Oral BID KAM Temple      sitaGLIPtin  50 mg Oral Daily Wendy France MD      sodium chloride  1 spray Each Nare Q2H PRN Mike Santo PA-C      traZODone  100 mg Oral HS KAM Rockwell      traZODone  50 mg Oral HS PRN KAM Ibanez         Risks / Benefits of Treatment:    Risks, benefits, and possible side effects of medications explained to patient and patient verbalizes understanding and agreement for treatment  Counseling / Coordination of Care:    Patient's progress discussed with staff in treatment team meeting  Medications, treatment progress and treatment plan reviewed with patient      KAM Temple 02/28/21

## 2021-03-01 LAB
ALBUMIN SERPL BCP-MCNC: 4.1 G/DL (ref 3.5–5.7)
ALP SERPL-CCNC: 61 U/L (ref 40–150)
ALT SERPL W P-5'-P-CCNC: 11 U/L (ref 7–52)
ANION GAP SERPL CALCULATED.3IONS-SCNC: 7 MMOL/L (ref 4–13)
AST SERPL W P-5'-P-CCNC: 9 U/L (ref 13–39)
BILIRUB SERPL-MCNC: 0.4 MG/DL (ref 0.2–1)
BUN SERPL-MCNC: 18 MG/DL (ref 7–25)
CALCIUM SERPL-MCNC: 9.3 MG/DL (ref 8.6–10.5)
CHLORIDE SERPL-SCNC: 104 MMOL/L (ref 98–107)
CO2 SERPL-SCNC: 26 MMOL/L (ref 21–31)
CREAT SERPL-MCNC: 0.73 MG/DL (ref 0.6–1.2)
GFR SERPL CREATININE-BSD FRML MDRD: 90 ML/MIN/1.73SQ M
GLUCOSE SERPL-MCNC: 102 MG/DL (ref 65–99)
GLUCOSE SERPL-MCNC: 106 MG/DL (ref 65–140)
GLUCOSE SERPL-MCNC: 91 MG/DL (ref 65–140)
POTASSIUM SERPL-SCNC: 4 MMOL/L (ref 3.5–5.5)
PROT SERPL-MCNC: 6.5 G/DL (ref 6.4–8.9)
SODIUM SERPL-SCNC: 137 MMOL/L (ref 134–143)

## 2021-03-01 PROCEDURE — 97167 OT EVAL HIGH COMPLEX 60 MIN: CPT

## 2021-03-01 PROCEDURE — 80053 COMPREHEN METABOLIC PANEL: CPT | Performed by: FAMILY MEDICINE

## 2021-03-01 PROCEDURE — 82948 REAGENT STRIP/BLOOD GLUCOSE: CPT

## 2021-03-01 PROCEDURE — 97163 PT EVAL HIGH COMPLEX 45 MIN: CPT

## 2021-03-01 PROCEDURE — 99232 SBSQ HOSP IP/OBS MODERATE 35: CPT | Performed by: NURSE PRACTITIONER

## 2021-03-01 RX ADMIN — RISPERIDONE 2 MG: 2 TABLET ORAL at 08:58

## 2021-03-01 RX ADMIN — LOSARTAN POTASSIUM 50 MG: 50 TABLET, FILM COATED ORAL at 08:59

## 2021-03-01 RX ADMIN — SITAGLIPTIN 50 MG: 25 TABLET, FILM COATED ORAL at 08:59

## 2021-03-01 RX ADMIN — Medication 1000 UNITS: at 08:58

## 2021-03-01 RX ADMIN — TRAZODONE HYDROCHLORIDE 100 MG: 50 TABLET ORAL at 21:32

## 2021-03-01 RX ADMIN — SALINE NASAL SPRAY 1 SPRAY: 1.5 SOLUTION NASAL at 23:52

## 2021-03-01 RX ADMIN — OXYBUTYNIN 5 MG: 5 TABLET, FILM COATED, EXTENDED RELEASE ORAL at 08:59

## 2021-03-01 RX ADMIN — HYDROXYZINE HYDROCHLORIDE 50 MG: 25 TABLET, FILM COATED ORAL at 20:17

## 2021-03-01 RX ADMIN — MELATONIN 3 MG: at 21:32

## 2021-03-01 RX ADMIN — RISPERIDONE 2 MG: 2 TABLET ORAL at 21:32

## 2021-03-01 RX ADMIN — MIRTAZAPINE 15 MG: 15 TABLET, FILM COATED ORAL at 21:32

## 2021-03-01 RX ADMIN — LORAZEPAM 1 MG: 1 TABLET ORAL at 23:41

## 2021-03-01 NOTE — SOCIAL WORK
CM met with patient this afternoon to complete patient check in  Patient reports she is no longer able to care for herself and asked this CM to get her sent to a nursing home  CM explained how she needs to meet criteria for admission to a SNF and explained the process of the LOD  Patient is insistent she cannot care for herself  CM informed her this will be discussed with the treatment team  CM will continue to follow patient's progress and assist with discharge planning needs

## 2021-03-01 NOTE — NURSING NOTE
Pt visible for breakfast and returned to her room to rest  Affect flat and mood is depressed  Calm and cooperative  Pt reports her anxiety and depression is continuously high  Pt denies SI/HI/VH  Reports AH intermittently and does not feel improved  Q 7 min safety checks maintained

## 2021-03-01 NOTE — PLAN OF CARE
Problem: Alteration in Thoughts and Perception  Goal: Verbalize thoughts and feelings  Description: Interventions:  - Promote a nonjudgmental and trusting relationship with the patient through active listening and therapeutic communication  - Assess patient's level of functioning, behavior and potential for risk  - Engage patient in 1 on 1 interactions  - Encourage patient to express fears, feelings, frustrations, and discuss symptoms    - [place] patient to reality, help patient recognize reality-based thinking   - Administer medications as ordered and assess for potential side effects  - Provide the patient education related to the signs and symptoms of the illness and desired effects of prescribed medications  Outcome: Progressing  Goal: Agree to be compliant with medication regime, as prescribed and report medication side effects  Description: Interventions:  - Offer appropriate PRN medication and supervise ingestion; conduct AIMS, as needed   Outcome: Progressing     Problem: Alteration in Thoughts and Perception  Goal: Treatment Goal: Gain control of psychotic behaviors/thinking, reduce/eliminate presenting symptoms and demonstrate improved reality functioning upon discharge  Outcome: Not Progressing  Goal: Refrain from acting on delusional thinking/internal stimuli  Description: Interventions:  - Monitor patient closely, per order   - Utilize least restrictive measures   - Set reasonable limits, give positive feedback for acceptable   - Administer medications as ordered and monitor of potential side effects  Outcome: Not Progressing  Goal: Attend and participate in unit activities, including therapeutic, recreational, and educational groups  Description: Interventions:  -Encourage Visitation and family involvement in care  Outcome: Not Progressing  Goal: Recognize dysfunctional thoughts, communicate reality-based thoughts at the time of discharge  Description: Interventions:  - Provide medication and psycho-education to assist patient in compliance and developing insight into his/her illness   Outcome: Not Progressing  Goal: Complete daily ADLs, including personal hygiene independently, as able  Description: Interventions:  - Observe, teach, and assist patient with ADLS  - Monitor and promote a balance of rest/activity, with adequate nutrition and elimination   Outcome: Not Progressing

## 2021-03-01 NOTE — PROGRESS NOTES
Progress Note - Behavioral Health   Anya Clayton 62 y o  female MRN: [id number]  Unit/Bed#: [facility] 200-02 Encounter: [id number]    Assessment/Plan   Principal Problem:    MDD (major depressive disorder), recurrent episode ([facility] [address] )  Active Problems:    Essential hypertension    Depression    Other constipation    Absence of bladder continence    History of Asperger's syndrome    Aftercare following right hip joint replacement surgery    Severe protein-calorie malnutrition ([facility] [address] )      Behavior over the last 24 hours:  unchanged  Sleep: improved  Appetite: normal  Medication side effects: No  ROS: no complaints and all other systems negative for acute change     Orlando Garciagartner was seen today for follow-up and case discussed with treatment team this morning  She remains bizarre and religiously preoccupied  She went on to say that she continues to have auditory hallucinations of the devil that are [facility] commanding me to do stuff    She further elaborated that the voices do not tell her to do anything physical but tell her to do non-physical things such as "Amish the dark side " Orlando Garciagartner denies any visual hallucinations/SI/HI  She did report her sleep has improved last night and she is eating normally  She further went on to describe I cannot take care of myself  I need to go to a nursing home when I leave here    Patient was somewhat difficult to redirect  She then went on to talk about how she is physically unable to care for herself despite being ambulatory on unit with walker and eating 100% of her meals  She remains religiously and somatically preoccupied throughout encounter  Rates her anxiety and depression as high  Mani Shields is often withdrawn to self on unit and occasionally attends groups with minimal participation      Mental Status Evaluation:  Appearance:  disheveled   Behavior:  bizarre, somewhat guarded, cooperative   Speech:  loud and pauses at times   Mood:  anxious, decreased range and depressed   Affect: blunted   Thought Process:  circumstantial   Thought Content:  delusions  somatic and religiously preoccupied   Perceptual Disturbances: Auditory hallucinations with commands telling her to "Taoism the dark side "   Risk Potential: Suicidal Ideations none  Homicidal Ideations none  Potential for Aggression No   Sensorium:  person, place and time/date   Memory:  recent and remote memory grossly intact   Consciousness:  alert and awake    Attention: attention span appeared shorter than expected for age   Insight:  limited   Judgment: limited   Gait/Station: slow and uses walker   Motor Activity: no abnormal movements     Progress Toward Goals:  Medication adjustments made over weekend  Continue with current medication regimen  Will be discharged to home upon stabilization; no discharge date at this time  Recommended Treatment: Continue with group therapy, milieu therapy and occupational therapy  Risks, benefits and possible side effects of Medications:   Risks, benefits, and possible side effects of medications explained to patient and patient verbalizes understanding  Medications: all current active meds have been reviewed and continue current psychiatric medications  Labs: I have personally reviewed all pertinent laboratory/tests results  Counseling / Coordination of Care  Total floor / unit time spent today 20 minutes  Greater than 50% of total time was spent with the patient and / or family counseling and / or coordination of care

## 2021-03-01 NOTE — NURSING NOTE
[id number]     Pt withdrawn to self  Continues to report intermittent AH; denies CAH  Reports some mood lability; no acute behavioral issues noted  Pleasant cooperative  Makes needs known  Q 7 min checks ongoing

## 2021-03-01 NOTE — PLAN OF CARE
Problem: PHYSICAL THERAPY ADULT  Goal: Performs mobility at highest level of function for planned discharge setting  See evaluation for individualized goals  Description: Treatment/Interventions: Functional transfer training, LE strengthening/ROM, Therapeutic exercise, Endurance training, Patient/family training, Equipment eval/education, Gait training, Spoke to nursing  Equipment Recommended: Walker(pt  has own)       See flowsheet documentation for full assessment, interventions and recommendations  Note: Prognosis: Fair  Problem List: Decreased strength, Decreased endurance, Decreased coordination, Decreased mobility, Impaired balance, Decreased safety awareness, Impaired sensation, Pain  Assessment: Pt is 62 y o  female seen for PT evaluation s/p admit to [facility] on 2/20/2021 w/ MDD (major depressive disorder), recurrent episode ([facility] [address] )  PT consulted to assess pt's functional mobility and d/c needs  Order placed for PT eval and tx, w/ up and OOB as tolerated order  Comorbidities affecting pt's physical performance at time of assessment include: weakness,major depressive d/o, depression, partial hip replacement R, HTN, Asperger's syndrome, severe protein calorie malnutrition   PTA, pt was independent w/ all functional mobility w/ RW usage  Personal factors affecting pt at time of IE include: ambulating w/ assistive device, inability to navigate community distances, inability to navigate level surfaces w/o external assistance, unable to perform dynamic tasks in community, positive fall history, unable to perform physical activity, limited insight into impairments and inability to perform IADLs   Please find objective findings from PT assessment regarding body systems outlined above with impairments and limitations including weakness, impaired balance, decreased endurance, impaired coordination, gait deviations, pain, decreased activity tolerance, decreased functional mobility tolerance, decreased safety awareness and fall risk  The following objective measures performed on IE also reveal limitations: AM-PAC 6-Clicks: 54/89  Pt's clinical presentation is currently unstable/unpredictable seen in pt's presentation of ongoing psychiatric management, pain in multiple sites  Pt to benefit from continued PT tx to address deficits as defined above and maximize level of functional independent mobility and consistency  From PT/mobility standpoint, recommendation at time of d/c would be Home PT with family support pending progress in order to facilitate return to PLOF  PT Discharge Recommendation: Return to previous environment with social support, Home with skilled therapy     PT - OK to Discharge: No    See flowsheet documentation for full assessment

## 2021-03-01 NOTE — PROGRESS NOTES
Progress Note - Wandy Roberts 62 y o  female MRN: [id number]    Unit/Bed#: [facility] 200-02 Encounter: [id number]        Subjective:   Patient seen and examined at bedside after reviewing the chart and discussing the case with the caring staff  Patient examined at bedside  Patient reports no acute problems except it is reported that patient is drinking more than usual amount of water  Physical Exam   Vitals: Blood pressure 150/88, pulse 83, temperature 98 1 °F (36 7 °C), temperature source Temporal, resp  rate 16, height 5' 5" (1 651 m), weight 62 8 kg (138 lb 6 4 oz), SpO2 98 %, not currently breastfeeding  ,Body mass index is 23 03 kg/m²  Constitutional: He appears well-developed  HEENT: PERR, EOMI, MMM  Cardiovascular: Normal rate and regular rhythm  Pulmonary/Chest: Effort normal and breath sounds normal    Abdomen: Soft, + BS, NT    Assessment/Plan:     Wandy Roberts is a(n) 62y o  year old female with psychotic disorder      1  Cardiac with history of hypertension  Patient will be continued on losartan  2  DJD/osteoarthritis  Patient may get Tylenol on as needed basis  3  Gait abnormality  I will consult PT and OT for further evaluation and treatment  4  Insomnia  Patient is on melatonin  5  Overactive urinary bladder  Patient is on oxybutynin  6  Hyperglycemia/weight loss  We will closely monitor patient's blood sugars by doing Accu-Cheks 2 times daily  Continue Januvia 50 mg daily along with carb controlled diet  Patient's hemoglobin A1c was 5 3  Nutrition is on consult  Patient has been put on Glucerna supplements  7  Nasal congestion/dryness  Continue saline nasal spray every 2 hours as needed  8  Vitamin D deficiency  Continue daily vitamin-D supplements  9  Overgrown toenails  I will consult Podiatry for further evaluation and treatment  10  Polydipsia  I will get CMP to rule out electrolyte imbalance

## 2021-03-01 NOTE — PLAN OF CARE
Problem: OCCUPATIONAL THERAPY ADULT  Goal: Performs self-care activities at highest level of function for planned discharge setting  See evaluation for individualized goals  Description: Treatment Interventions: ADL retraining, Functional transfer training, UE strengthening/ROM, Endurance training, Patient/family training, Equipment evaluation/education, Compensatory technique education, Activityengagement          See flowsheet documentation for full assessment, interventions and recommendations  Note: Limitation: Decreased ADL status, Decreased UE strength, Decreased endurance, Decreased self-care trans, Decreased high-level ADLs, Mood limitation  Prognosis: Good  Assessment: Patient is a 62 y o  female seen for OT evaluation s/p admit to [address] [place] [address]  on 2/20/2021 w/MDD (major depressive disorder), recurrent episode ([facility] [address] )  Commorbidities affecting patient's functional performance at time of assessment include: R hip joint replacement, depression, HTN, Asperger's syndrome, and malnutrition  Orders placed for OT evaluation and treatment and up and OOB as tolerated   Performed at least two patient identifiers during session including name and wristband  Prior to admission, Patient reporting being independent with ADLs, ambulatory with RW and lives with mother in a two story house with 2 LIBORIO  Personal factors affecting patient at time of initial evaluation include: limited caregiver support, steps to enter, decreased initiation and engagement, difficulty performing ADLs and difficulty performing IADLs  Upon evaluation, patient requires supervision assist for UB ADLs, minimal  assist for LB ADLs, transfers and functional ambulation in room and bathroom with supervision assist, with the use of Rolling Walker    Patient is oriented x 4 and presents with ability to recognize a problem, define a problem, identify alternative plan, select a plan, organize steps in a plan, implement plan and evaluate outcome (problem solving)  Occupational performance is affected by the following deficits: decreased muscle strength, dynamic sit/ stand balance deficit with poor standing tolerance time for self care and functional mobility, decreased activity tolerance, increased pain, impaired interpersonal skills, decreased coping skills, delayed righting and equilibrium reactions and postural control and postural alignment deficit, requiring external assistance to complete transitional movements  Patient to benefit from continued Occupational Therapy treatment while in the hospital to address deficits as defined above and maximize level of functional independence with ADLs and functional mobility  Occupational Performance areas to address include: grooming , bathing/ shower, dressing, toilet hygiene, transfer to all surfaces, functional ambulation, medication routine/ management, IADLS: Household maintenance, IADLs: safety procedures and IADLs: meal prep/ clean up  From OT standpoint, recommendation at time of d/c would be Home with skilled therapy         OT Discharge Recommendation: Home with skilled therapy  OT - OK to Discharge: Yes(Once medically cleared )     Augustina Andres, OT

## 2021-03-01 NOTE — PROGRESS NOTES
03/01/21 0942   Team Meeting   Meeting Type Daily Rounds   Team Members Present   Team Members Present Physician;Nurse;; Occupational Therapist   Physician Team Member Dr Deb Greene MD; KAM Wright   Nursing Team Member Emilee Beckham RN   Social Work Team Member Monty Still [facility]   OT Team Member Tika Huizar [place]   Patient/Family Present   Patient Present No   Patient's Family Present No     No DC Date - will return home upon stabilization; no changes; clam, cooperative; rates an/dep high but not congruent with affect; AH "come and go"; slept

## 2021-03-01 NOTE — NURSING NOTE
[phone]  Pt found to be resting quietly on most of authors rounds  No acute behavioral issues noted  Q 7 min checks maintained  Fall protocol in place

## 2021-03-01 NOTE — OCCUPATIONAL THERAPY NOTE
Occupational Therapy Evaluation      Amrik Mariamarian    3/1/2021    Patient Active Problem List   Diagnosis    Insomnia    Essential hypertension    Autism disorder    Depression    Other constipation    Status post hip hemiarthroplasty    Absence of bladder continence    Adjustment disorder with mixed anxiety and depressed mood    History of attention deficit hyperactivity disorder (ADHD)    History of Asperger's syndrome    Aftercare following right hip joint replacement surgery    Spondylosis of lumbar spine    Weakness of right lower extremity    MDD (major depressive disorder), recurrent episode ([facility] [address] )    Severe protein-calorie malnutrition ([facility] [address] )       Past Medical History:   Diagnosis Date    Anxiety     Cerebral palsy ([facility] [address] )     Depression     Hypertension     Pre-diabetes     Psychiatric disorder     Schizoaffective disorder ([facility] [address] )     Scoliosis        Past Surgical History:   Procedure Laterality Date    APPENDECTOMY      BUNIONECTOMY Bilateral     VA PARTIAL HIP REPLACEMENT Right 9/23/2019    Procedure: HEMIARTHROPLASTY HIP (BIPOLAR); Surgeon: Jolly Lei MD;  Location: [facility] OR;  Service: Orthopedics        03/01/21 0836   OT Last Visit   OT Visit Date 03/01/21   Note Type   Note type Evaluation   Restrictions/Precautions   Weight Bearing Precautions Per Order No   Other Precautions Fall Risk;Pain   Pain Assessment   Pain Assessment Tool 0-10   Pain Score 5   Pain Location/Orientation Orientation: Bilateral;Location: Foot; Location: Back   Pain Onset/Description Onset: Ongoing;Frequency: Constant/Continuous; Descriptor: [address] Pain Intervention(s) Ambulation/increased activity;Repositioned; Emotional support   Home Living   Type of [address] [place] [address] Two level;Performs ADLs on one level; Able to live on main level with bedroom/bathroom;Stairs to enter with rails  (2 [address], has been staying on 1st floor )   Bathroom Shower/Tub Tub/shower unit   Bathroom Toilet Standard Bathroom Equipment Toilet raiser;Grab bars in shower; Shower chair   [address] Walker  (utilizes RW at baseline )   Prior Function   Level of Walker Independent with ADLs and functional mobility   Lives With Family  (Mother )   Receives Help From Family   ADL Assistance Independent   IADLs Needs assistance  (needs assist with cooking and laundry )   Falls in the last 6 months 1 to 4  (1 fall reported )   Vocational Unemployed   Comments (-) driving, hasn't driven in months per pt    Lifestyle   Autonomy Patient reporting being independent with ADLs, ambulatory with RW and lives with mother in a two story house with 2 LIBORIO   Reciprocal Relationships supportive mother    Service to Others unemployeed    Intrinsic Gratification enjoys reading    ADL   Eating Assistance 6  Modified independent   Grooming Assistance 6  Modified Independent   UB Bathing Assistance 5  Supervision/Setup   LB Bathing Assistance 4  Minimal Assistance   [address] 5  Supervision/Setup   LB [place]  5  Supervision/Setup   Bed Mobility   Additional Comments DNT bed mobility: pt seated OOB in dinning room upon arrival to room and at end of session    Transfers   Sit to Stand 5  Supervision   Additional items Assist x 1; Armrests; Verbal cues   Stand to Sit 5  Supervision   Additional items Assist x 1; Armrests; Verbal cues   Functional Mobility   Functional Mobility 5  Supervision   Additional Comments Pt ambulated in hallway with no LOB or SOB      Additional items Rolling walker   Balance   Static Sitting Good   Dynamic Sitting Fair +   Static Standing Fair   Dynamic Standing Fair -   Activity Tolerance   Activity Tolerance Patient limited by fatigue;Patient limited by pain   Nurse Made Aware RN verbalized pt appropriate for therapy    RUE Assessment   RUE Assessment WFL  (grossly 4-/5 MMT based on functional assessment )   ETIENNE Assessment   LUE Assessment WFL  (grossly 4-/5 MMT based on functional assessment )   Hand Function   Gross Motor Coordination Functional   Fine Motor Coordination Functional   Sensation   Light Touch Partial deficits in the RLE;Partial deficits in the LLE  (slight numbness in B feet reported )   Vision-Basic Assessment   Current Vision Wears glasses for distance only   Cognition   Overall Cognitive Status [facility]   Arousal/Participation Alert; Responsive; Cooperative   Attention Within functional limits   Orientation Level Oriented X4   Memory Within functional limits   Following Commands Follows one step commands without difficulty   Comments Pt agreeable to OT eval    Assessment   Limitation Decreased ADL status; Decreased UE strength;Decreased endurance;Decreased self-care trans;Decreased high-level ADLs;Mood limitation   Prognosis Good   Assessment Patient is a 62 y o  female seen for OT evaluation s/p admit to [address] [place] [address]  on 2/20/2021 w/MDD (major depressive disorder), recurrent episode ([facility] [address] )  Commorbidities affecting patient's functional performance at time of assessment include: R hip joint replacement, depression, HTN, Asperger's syndrome, and malnutrition  Orders placed for OT evaluation and treatment and up and OOB as tolerated   Performed at least two patient identifiers during session including name and wristband  Prior to admission, Patient reporting being independent with ADLs, ambulatory with RW and lives with mother in a two story house with 2 LIBORIO  Personal factors affecting patient at time of initial evaluation include: limited caregiver support, steps to enter, decreased initiation and engagement, difficulty performing ADLs and difficulty performing IADLs  Upon evaluation, patient requires supervision assist for UB ADLs, minimal  assist for LB ADLs, transfers and functional ambulation in room and bathroom with supervision assist, with the use of Rolling Walker    Patient is oriented x 4 and presents with ability to recognize a problem, define a problem, identify alternative plan, select a plan, organize steps in a plan, implement plan and evaluate outcome (problem solving)  Occupational performance is affected by the following deficits: decreased muscle strength, dynamic sit/ stand balance deficit with poor standing tolerance time for self care and functional mobility, decreased activity tolerance, increased pain, impaired interpersonal skills, decreased coping skills, delayed righting and equilibrium reactions and postural control and postural alignment deficit, requiring external assistance to complete transitional movements  Patient to benefit from continued Occupational Therapy treatment while in the hospital to address deficits as defined above and maximize level of functional independence with ADLs and functional mobility  Occupational Performance areas to address include: grooming , bathing/ shower, dressing, toilet hygiene, transfer to all surfaces, functional ambulation, medication routine/ management, IADLS: Household maintenance, IADLs: safety procedures and IADLs: meal prep/ clean up  From OT standpoint, recommendation at time of d/c would be Home with skilled therapy  Goals   Patient Goals to go to a nursing home upon discharge    Plan   Treatment Interventions ADL retraining;Functional transfer training;UE strengthening/ROM; Endurance training;Patient/family training;Equipment evaluation/education; Compensatory technique education; Activityengagement   Goal Expiration Date 03/11/21   OT Treatment Day 0   OT Frequency 3-5x/wk   Recommendation   OT Discharge Recommendation Home with skilled therapy   OT - OK to Discharge Yes  (Once medically cleared )   AM-PAC Daily Activity Inpatient   Lower Body Dressing 3   Bathing 3   Toileting 3   Upper Body Dressing 3   Grooming 3   Eating 4   Daily Activity Raw Score 19   Daily Activity Standardized Score (Calc for Raw Score >=11) 40 22 AM-PAC Applied Cognition Inpatient   Following a Speech/Presentation 4   Understanding Ordinary Conversation 4   Taking Medications 3   Remembering Where Things Are Placed or Put Away 3   Remembering List of 4-5 Errands 3   Taking Care of Complicated Tasks 3   Applied Cognition Raw Score 20   Applied Cognition Standardized Score 41 76     GOALS:    *ADL transfers with (I) for inc'd independence with ADLs/purposeful tasks    *UB ADL with (I) for inc'd independence with self cares    *LB ADL with (I) using AE prn for inc'd independence with self cares    *Toileting with (I) for clothing management and hygiene for return to PLOF with personal care    *Increase stand tolerance x5 m for inc'd tolerance with standing purposeful tasks    *Participate in 10m UE therex to increase overall stamina/activity tolerance for purposeful tasks    *Bed mobility- (I) for inc'd independence to manage own comfort and initiate EOB & OOB purposeful tasks    *Patient will verbalize 3 safety awareness/ principles to prevent falls in the home setting  *Patient will verbalize and demonstrate use of energy conservation/deep breathing techniques and work simplification skills during functional activities with no verbal cues  *Patient will increase OOB/sitting tolerance to 2-4 hours per day to increase participation in self-care and leisure tasks with no s/s of exertion  *Patient will engage in ongoing cognitive assessment to assist with safe discharge planning/recommendations         Nirmal Anton MS, OTR/L

## 2021-03-01 NOTE — PHYSICAL THERAPY NOTE
Physical Therapy Evaluation     Patient's Name: Costa Morales    Admitting Diagnosis  Major depressive disorder, single episode, unspecified [F32 9]  Psychosis ([facility] ) [F29]    Problem List  Patient Active Problem List   Diagnosis    Insomnia    Essential hypertension    Autism disorder    Depression    Other constipation    Status post hip hemiarthroplasty    Absence of bladder continence    Adjustment disorder with mixed anxiety and depressed mood    History of attention deficit hyperactivity disorder (ADHD)    History of Asperger's syndrome    Aftercare following right hip joint replacement surgery    Spondylosis of lumbar spine    Weakness of right lower extremity    MDD (major depressive disorder), recurrent episode ([facility] )    Severe protein-calorie malnutrition ([facility] )       Past Medical History  Past Medical History:   Diagnosis Date    Anxiety     Cerebral palsy ([facility] )     Depression     Hypertension     Pre-diabetes     Psychiatric disorder     [facility]     Scoliosis        Past Surgical History  Past Surgical History:   Procedure Laterality Date    APPENDECTOMY      BUNIONECTOMY Bilateral     NC PARTIAL HIP REPLACEMENT Right 9/23/2019    Procedure: HEMIARTHROPLASTY HIP (BIPOLAR); Surgeon: Avelino Lorenzo MD;  Location: [facility] OR;  Service: Orthopedics        03/01/21 0827   PT Last Visit   PT Visit Date 03/01/21   Note Type   Note type Evaluation   Pain Assessment   Pain Assessment Tool 0-10   Pain Score 5   Pain Location/Orientation Orientation: Left;Orientation: Mid;Location: Back; Location: Foot  (B pedal neuropathy, back pain attributed to "scoliosis")   Pain Onset/Description Onset: Ongoing;Frequency: Constant/Continuous; Descriptor: Aching   Effect of Pain on Daily Activities yes   Patient's Stated Pain Goal No pain   Hospital Pain Intervention(s) Medication (See MAR); Repositioned; Ambulation/increased activity; Emotional support   Multiple Pain Sites Yes   Home Living   Type of [facility] Two [facility];Performs ADLs on one level; Able to live on main level with bedroom/bathroom;Stairs to enter with rails  ([address], has been staying on 1st floor >1 year)   Bathroom Shower/Tub Tub/shower unit   Bathroom Toilet Standard   Bathroom Equipment Grab bars in shower; Toilet raiser; Shower chair   [address] Walker  (RW usage PTA)   Prior Function   Level of [place] Independent with ADLs and functional mobility   Lives With Family  (mother)   Receives Help From Family   ADL Assistance Independent   IADLs Needs assistance  (cooking, laundry)   Falls in the last 6 months 1 to 4  (x 1 reported)   Vocational Unemployed   Restrictions/Precautions   Wells Sukhwinder Bearing Precautions Per Order No   Other Precautions Fall Risk;Pain   General   Additional Pertinent History Shashi Ferrell reported her functionality declined after her R partial hip replacement in September 2019  Family/Caregiver Present No   Cognition   Overall Cognitive Status WFL   Arousal/Participation Alert   Attention Within functional limits   Orientation Level Oriented X4   Memory Within functional limits   Following Commands Follows one step commands without difficulty   Comments Pt  agreeable to PT assessment, pleasant  RUE Assessment   RUE Assessment WFL   LUE Assessment   LUE Assessment WFL   RLE Assessment   RLE Assessment X  (3/5 gross musculature)   LLE Assessment   LLE Assessment X  (3+/5 gross musculature)   Coordination   Movements are Fluid and Coordinated 0   Coordination and Movement Description Incremental, antalgic mobility requiring increased time  Bed Mobility   Additional Comments DNT bed mobility as Shashi Ferrell was sitting OOB on chair prior/after assessment  Transfers   Sit to Stand 5  Supervision   Additional items Assist x 1; Armrests; Increased time required;Verbal cues   Stand to Sit 5  Supervision   Additional items Assist x 1; Armrests; Increased time required;Verbal cues   Stand pivot   (CGA)   Additional items Assist x 1; Increased time required;Verbal cues   Ambulation/Elevation   Gait pattern Improper Weight shift; Antalgic;Decreased R stance; Short stride   Gait Assistance 5  Supervision  (close)   Additional items Assist x 1;Verbal cues   Assistive Device Rolling walker   Distance 50 feet x 2  (sitting rest break between trials)   Stair Management Assistance Not tested   Balance   Static Sitting Good   Dynamic Sitting Fair +   Static Standing Fair   Dynamic Standing Fair -   Ambulatory Fair -   Endurance Deficit   Endurance Deficit Yes   Activity Tolerance   Activity Tolerance Patient limited by fatigue;Patient limited by pain   Medical Staff Made Aware yes, Scar PERALTA   Nurse Made Aware yes,Monica RN   Assessment   Prognosis Fair   Problem List Decreased strength;Decreased endurance;Decreased coordination;Decreased mobility; Impaired balance;Decreased safety awareness; Impaired sensation;Pain   Assessment Pt is 62 y o  female seen for PT evaluation s/p admit to [facility] on 2/20/2021 w/ MDD (major depressive disorder), recurrent episode ([facility] [address] )  PT consulted to assess pt's functional mobility and d/c needs  Order placed for PT eval and tx, w/ up and OOB as tolerated order  Comorbidities affecting pt's physical performance at time of assessment include: weakness,major depressive d/o, depression, partial hip replacement R, HTN, Asperger's syndrome, severe protein calorie malnutrition   PTA, pt was independent w/ all functional mobility w/ RW usage  Personal factors affecting pt at time of IE include: ambulating w/ assistive device, inability to navigate community distances, inability to navigate level surfaces w/o external assistance, unable to perform dynamic tasks in community, positive fall history, unable to perform physical activity, limited insight into impairments and inability to perform IADLs   Please find objective findings from PT assessment regarding body systems outlined above with impairments and limitations including weakness, impaired balance, decreased endurance, impaired coordination, gait deviations, pain, decreased activity tolerance, decreased functional mobility tolerance, decreased safety awareness and fall risk  The following objective measures performed on IE also reveal limitations: AM-PAC 6-Clicks: 26/95  Pt's clinical presentation is currently unstable/unpredictable seen in pt's presentation of ongoing psychiatric management, pain in multiple sites  Pt to benefit from continued PT tx to address deficits as defined above and maximize level of functional independent mobility and consistency  From PT/mobility standpoint, recommendation at time of d/c would be Home PT with family support pending progress in order to facilitate return to PLOF  Goals   Patient Goals to go to a nursing home upon discharge   LTG Expiration Date 03/22/21   Long Term Goal #1 1 )Patient will complete bed mobility with supervision of 1 for decrease need for caregiver assistance, decrease burden of care  2 ) Patient will complete modified I  to decrease risk of falls, facilitate upright standing posture  3 ) RLE strength to greater than/equal to 4-/5 gross musculature to increase ability to safely transfer, control descent to chair  4 ) Patient will exhibit increase static standing balance to Fair+ >2 minutes without LOB modified I to improve activity tolerance  5 ) Patient will exhibit increase dynamic ambulatory balance to Good >300 feet w/RW modified I  to improve ability to mobilize to toilet, chair and decrease risk for additional medical complications  6 ) Patient will exhibit good self monitoring and ability to follow 2 step commands to increase complexity of tasks and resume ADL's without LOB  PT Treatment Day 0   Plan   Treatment/Interventions Functional transfer training;LE strengthening/ROM; Therapeutic exercise; Endurance training;Patient/family training;Equipment eval/education;Gait training;Spoke to nursing   PT Frequency 1-2x/wk   Recommendation   PT Discharge Recommendation Return to previous environment with social support;Home with skilled therapy   Equipment Recommended [address]  (pt  has own)   PT - OK to Discharge No   Additional Comments Upon conclusion, pt  was resting OOB on chair in community room and eating breakfast    Additional Comments 2 [facility] raw score of 18, standardized score of 41 05  As per the data, this score is correlated most closely with a HHPT recommendation  However,the therapist's discharge disposition recommendation may vary as numerous objective findings contribute to the suggested destination     AM-PAC Basic Mobility Inpatient   Turning in Bed Without Bedrails 3   Lying on Back to Sitting on Edge of Flat Bed 3   Moving Bed to Chair 3   Standing Up From Chair 3   Walk in Room 3   Climb 3-5 Stairs 3   Basic Mobility Inpatient Raw Score 18   Basic Mobility Standardized Score 41 05     Bishop You, PT

## 2021-03-02 LAB
GLUCOSE SERPL-MCNC: 87 MG/DL (ref 65–140)
GLUCOSE SERPL-MCNC: 91 MG/DL (ref 65–140)

## 2021-03-02 PROCEDURE — 82948 REAGENT STRIP/BLOOD GLUCOSE: CPT

## 2021-03-02 PROCEDURE — 0HBRXZZ EXCISION OF TOE NAIL, EXTERNAL APPROACH: ICD-10-PCS | Performed by: PODIATRIST

## 2021-03-02 PROCEDURE — 99233 SBSQ HOSP IP/OBS HIGH 50: CPT | Performed by: NURSE PRACTITIONER

## 2021-03-02 RX ORDER — RISPERIDONE 2 MG/1
2 TABLET, FILM COATED ORAL DAILY
Status: DISCONTINUED | OUTPATIENT
Start: 2021-03-03 | End: 2021-03-17

## 2021-03-02 RX ADMIN — MIRTAZAPINE 15 MG: 15 TABLET, FILM COATED ORAL at 21:06

## 2021-03-02 RX ADMIN — Medication 1000 UNITS: at 08:23

## 2021-03-02 RX ADMIN — MELATONIN 3 MG: at 21:06

## 2021-03-02 RX ADMIN — TRAZODONE HYDROCHLORIDE 100 MG: 50 TABLET ORAL at 21:05

## 2021-03-02 RX ADMIN — SITAGLIPTIN 50 MG: 25 TABLET, FILM COATED ORAL at 08:23

## 2021-03-02 RX ADMIN — RISPERIDONE 2 MG: 2 TABLET ORAL at 08:23

## 2021-03-02 RX ADMIN — OXYBUTYNIN 5 MG: 5 TABLET, FILM COATED, EXTENDED RELEASE ORAL at 08:23

## 2021-03-02 RX ADMIN — LOSARTAN POTASSIUM 50 MG: 50 TABLET, FILM COATED ORAL at 08:23

## 2021-03-02 RX ADMIN — RISPERIDONE 3 MG: 2 TABLET ORAL at 21:05

## 2021-03-02 NOTE — PROGRESS NOTES
Progress Note - Behavioral Health     Luis A Ferrell 62 y o  female MRN: [id number]   Unit/Bed#: [facility]02 Encounter: [id number]    Behavior over the last 24 hours: unchanged  Terri Romero  Is a 50-year-old female with a history of major depressive disorder with psychotic features who presents for psychiatric follow-up  Patient interviewed at bedside, is calm and cooperative upon approach, in no acute distress  Staff reports she continues to complain of feeling anxious and depressed, as well as continued auditory hallucinations  Patient's behavior is rather bizarre and she is religiously preoccupied  States that she feels hopeless because she is "dealing with zev Humphries and there is no chance I can get into his good Graces  "  Her speech is bizarre, tangential and nonsensical at times  States that she grew agitated last night after a phone call with her brother  Endorses passive SI but no plan  No HI  No visual hallucinations       Sleep: normal  Appetite: normal  Medication side effects: No   ROS: no complaints, all other systems are negative    Mental Status Evaluation:    Appearance:  age appropriate, casually dressed, adequate grooming   Behavior:  cooperative, calm, bizarre   Speech:  normal rate and volume, tangential, nonsensical at times   Mood:  depressed   Affect:  flat   Thought Process:  perseverative, at times disorganized, at times tangential   Associations: tangential associations   Thought Content:  bizarre delusions, Faith preoccupation   Perceptual Disturbances: no visual hallucinations, auditory hallucinations of God and spirits talking to her   Risk Potential: Suicidal ideation - Yes, passive death wish, contracts for safety on the unit, but would not feel safe if discharged  Homicidal ideation - None at present  Potential for aggression - No   Sensorium:  oriented to person, place and time/date   Memory:  recent and remote memory grossly intact   Consciousness:  alert and awake Attention/Concentration: attention span and concentration are age appropriate   Insight:  limited   Judgment: partial   Gait/Station: in bed, uses walker   Motor Activity: no abnormal movements     Vital signs in last 24 hours:    Temp:  [97 3 °F (36 3 °C)-98 7 °F (37 1 °C)] 98 7 °F (37 1 °C)  HR:  [] 96  Resp:  [16-20] 20  BP: (113-167)/(62-89) 167/89    Laboratory results: I have personally reviewed all pertinent laboratory/tests results    Most Recent Labs:   Lab Results   Component Value Date    WBC 5 44 02/20/2021    RBC 5 80 (H) 02/20/2021    HGB 17 9 (H) 02/20/2021    HCT 53 5 (H) 02/20/2021     02/20/2021    RDW 12 7 02/20/2021    NEUTROABS 3 72 02/20/2021    SODIUM 137 03/01/2021    K 4 0 03/01/2021     03/01/2021    CO2 26 03/01/2021    BUN 18 03/01/2021    CREATININE 0 73 03/01/2021    GLUC 102 (H) 03/01/2021    CALCIUM 9 3 03/01/2021    AST 9 (L) 03/01/2021    ALT 11 03/01/2021    ALKPHOS 61 03/01/2021    TP 6 5 03/01/2021    ALB 4 1 03/01/2021    TBILI 0 40 03/01/2021    CHOLESTEROL 257 (H) 12/05/2019    HDL 71 12/05/2019    TRIG 112 12/05/2019    LDLCALC 164 (H) 12/05/2019    ACN8TMUASSXI 0 506 02/20/2021    RPR Non-Reactive 02/21/2021    HGBA1C 5 3 02/22/2021     02/22/2021       Progress Toward Goals: still depressed, still at times disorganized, continues to endorse psychotic symptoms    Assessment/Plan   Principal Problem:    MD[facility]major [facility]  Active Problems:    Essential hypertension    Depression    Other constipation    Absence of bladder continence    History of Asperger's syndrome    Aftercare following right hip joint replacement surgery    Severe protein-calorie malnutrition ([facility] [address] )      Recommended Treatment:     Planned medication and treatment changes:     All current active medications have been reviewed  Encourage group therapy, milieu therapy and occupational therapy  Behavioral Health checks every 7 minutes    Increase Risperdal to 2mg qAM and 3mg qHS to target continued psychotic symptoms  Monitor for increased sedation and fall risk  Continue all other medicines      Current Facility-Administered Medications   Medication Dose Route Frequency Provider Last Rate    acetaminophen  650 mg Oral Q6H PRN Debra Loan Lodics, CRNP      acetaminophen  650 mg Oral Q4H PRN Debra Loan Lodics, CRNP      acetaminophen  975 mg Oral Q6H PRN Debra Loan Lodics, CRNP      benztropine  1 mg Intramuscular Q4H PRN Max 6/day Estela N Lodics, CRNP      benztropine  1 mg Oral Q4H PRN Max 6/day Debra Loan Lodics, CRNP      cholecalciferol  1,000 Units Oral Daily Glen Musa      hydrOXYzine HCL  25 mg Oral Q6H PRN Max 4/day Estela N Lodics, CRNP      hydrOXYzine HCL  50 mg Oral Q4H PRN Max 4/day Estela N Lodics, CRNP      Or    LORazepam  0 5 mg Intramuscular Q4H PRN Debra Loan Lodics, CRNP      LORazepam  1 mg Oral Q4H PRN Max 6/day Estela N Lodics, CRNP      Or    LORazepam  1 mg Intramuscular Q6H PRN Max 3/day Debra Loan Lodics, CRNP      losartan  50 mg Oral Daily Derrick Robertson MD      melatonin  3 mg Oral HS Christoph Márquez MD      mirtazapine  15 mg Oral HS CASSANDRA LemaNP      OLANZapine  10 mg Oral Q3H PRN Max 3/day Estela N Lodics, CRNP      Or    OLANZapine  10 mg Intramuscular Q3H PRN Max 3/day Setela N Lodics, CRNP      OLANZapine  5 mg Oral Q3H PRN Max 6/day Estela N Lodics, CRNP      Or    OLANZapine  5 mg Intramuscular Q3H PRN Max 6/day Estela N Lodics, CRNP      OLANZapine  2 5 mg Oral Q3H PRN Max 8/day Estela N Lodics, CRNP      oxybutynin  5 mg Oral Daily Daylin Plata MD      polyethylene glycol  17 g Oral Daily PRN MELISSA Musa      [START ON 3/3/2021] risperiDONE  2 mg Oral Daily Joseph Benavides PA-C      risperiDONE  3 mg Oral HS Joseph Benavides PA-C      sitaGLIPtin  50 mg Oral Daily Derrick Robertson MD      sodium chloride  1 spray Each Nare Q2H PRN June Feast MELISSA Hernandez      traZODone  100 mg Oral HS KAM Kelly      traZODone  50 mg Oral HS PRN KAM Aceves       Risks / Benefits of Treatment:    Risks, benefits, and possible side effects of medications explained to patient and patient verbalizes understanding and agreement for treatment  Counseling / Coordination of Care:    Patient's progress discussed with staff in treatment team meeting  Medications, treatment progress and treatment plan reviewed with patient      Rylee Guillen PA-C 03/02/21

## 2021-03-02 NOTE — PROGRESS NOTES
03/02/21 0939   Team Meeting   Meeting Type Daily Rounds   Team Members Present   Team Members Present Physician;Nurse;Occupational Therapist;   Physician Team Member Dr Ping Cornelius MD; Curry Dickerson, [address] [place] [address]   Nursing Team Member Mahsa Tate RN   Social Work Team Member [facility] [place]   OT Team Member Jake Curiel, [place]   Patient/Family Present   Patient Present No   Patient's Family Present No     No DC date - pt wants SNF placement - not appropriate; drinking a lot of fluids; anx/dep "no better" but no rating; endorses AH - persecutory delusions; prns utilized; flat, isolates, guarded

## 2021-03-02 NOTE — PROGRESS NOTES
Pt up ad lila with walker  Pt c/o depression & anxiety, but would not rate it @ this time  Q 7 min checks maintained to monitor pt's behavior & safety  Pt c/o faint auditory hallucinations @ times  Pt denies any suicidal or homicidal ideations  Pt is flat & cooperative  Pt is compliant with medications

## 2021-03-02 NOTE — NURSING NOTE
Patient complained of restless anxiety  Patient rated her anxiety high  Gave 50 mg Atarax as ordered  Damon scale is "20 " will continue to monitor

## 2021-03-02 NOTE — NURSING NOTE
Patient out in the milieu for snack only  Withdrawn to her room  Upon approach patient's mood and affect is flat and depressed  Patient stated, "the Atarax helped but stated her anxiety is high " Patient complained of AH of persecution of the "devil " Patient uses the walker to ambulate  Patient is guarded with conversation  Denies SI/HI/pain/VH  Took HS medication without difficulty  Will continue to monitor safety and behaviors every 7 minutes

## 2021-03-02 NOTE — PROGRESS NOTES
Progress Note - Merissa Byers 62 y o  female MRN: [id number]    Unit/Bed#: [facility]02 Encounter: [id number]        Subjective:   Patient seen and examined at bedside after reviewing the chart and discussing the case with the caring staff  Patient examined at bedside  Patient reports no acute problems  I reviewed patient's CMP which was done yesterday due to concerns about excessive water intake  Patient's CMP is within normal range  Physical Exam   Vitals: Blood pressure 167/89, pulse 96, temperature 98 7 °F (37 1 °C), temperature source Temporal, resp  rate 20, height 5' 5" (1 651 m), weight 62 8 kg (138 lb 6 4 oz), SpO2 98 %, not currently breastfeeding  ,Body mass index is 23 03 kg/m²  Constitutional: He appears well-developed  HEENT: PERR, EOMI, MMM  Cardiovascular: Normal rate and regular rhythm  Pulmonary/Chest: Effort normal and breath sounds normal    Abdomen: Soft, + BS, NT    Assessment/Plan:     Merissa Byers is a(n) 62y o  year old female with psychotic disorder      1  Cardiac with history of hypertension  Patient will be continued on losartan  2  DJD/osteoarthritis  Patient may get Tylenol on as needed basis  3  Gait abnormality  I will consult PT and OT for further evaluation and treatment  4  Insomnia  Patient is on melatonin  5  Overactive urinary bladder  Patient is on oxybutynin  6  Hyperglycemia/weight loss  We will closely monitor patient's blood sugars by doing Accu-Cheks 2 times daily  Continue Januvia 50 mg daily along with carb controlled diet  Patient's hemoglobin A1c was 5 3  Nutrition is on consult  Patient has been put on Glucerna supplements  7  Nasal congestion/dryness  Continue saline nasal spray every 2 hours as needed  8  Vitamin D deficiency  Continue daily vitamin-D supplements  9  Overgrown toenails  I will consult Podiatry for further evaluation and treatment

## 2021-03-02 NOTE — NURSING NOTE
Patient complained of restless anxiety  Patient stated "the Atarax was really not affective " Patient continues to complain of AH but would not reiterate  Gave 1 mg PRN Ativan as ordered for complaints of symptoms  Damon scale is "26 " Patient requested nasal spray for stuffy nose  Will continue to monitor safety and behaviors every 7 minutes

## 2021-03-02 NOTE — NURSING NOTE
Patient slept without difficulty  Patient was up to the BR x1  Will continue to monitor safety and behaviors every 7 minutes

## 2021-03-02 NOTE — PLAN OF CARE
Problem: Alteration in Thoughts and Perception  Goal: Agree to be compliant with medication regime, as prescribed and report medication side effects  Description: Interventions:  - Offer appropriate PRN medication and supervise ingestion; conduct AIMS, as needed   Outcome: Progressing     Problem: Ineffective Coping  Goal: Understands least restrictive measures  Description: Interventions:  - Utilize least restrictive behavior  Outcome: Progressing  Goal: Free from restraint events  Description: - Utilize least restrictive measures   - Provide behavioral interventions   - Redirect inappropriate behaviors   Outcome: Progressing     Problem: Nutrition/Hydration-ADULT  Goal: Nutrient/Hydration intake appropriate for improving, restoring or maintaining nutritional needs  Description: Monitor and assess patient's nutrition/hydration status for malnutrition  Collaborate with interdisciplinary team and initiate plan and interventions as ordered  Monitor patient's weight and dietary intake as ordered or per policy  Utilize nutrition screening tool and intervene as necessary  Determine patient's food preferences and provide high-protein, high-caloric foods as appropriate       INTERVENTIONS:  - Monitor oral intake, urinary output, labs, and treatment plans  - Assess nutrition and hydration status and recommend course of action  - Evaluate amount of meals eaten  - Assist patient with eating if necessary   - Allow adequate time for meals  - Recommend/ encourage appropriate diets, oral nutritional supplements, and vitamin/mineral supplements  - Order, calculate, and assess calorie counts as needed  - Recommend, monitor, and adjust tube feedings and TPN/PPN based on assessed needs  - Assess need for intravenous fluids  - Provide specific nutrition/hydration education as appropriate  - Include patient/family/caregiver in decisions related to nutrition  Outcome: Progressing

## 2021-03-03 LAB
GLUCOSE SERPL-MCNC: 102 MG/DL (ref 65–140)
GLUCOSE SERPL-MCNC: 102 MG/DL (ref 65–140)
GLUCOSE SERPL-MCNC: 83 MG/DL (ref 65–140)
GLUCOSE SERPL-MCNC: 83 MG/DL (ref 65–140)

## 2021-03-03 PROCEDURE — 82948 REAGENT STRIP/BLOOD GLUCOSE: CPT

## 2021-03-03 PROCEDURE — 97116 GAIT TRAINING THERAPY: CPT

## 2021-03-03 PROCEDURE — 99232 SBSQ HOSP IP/OBS MODERATE 35: CPT | Performed by: NURSE PRACTITIONER

## 2021-03-03 PROCEDURE — 97110 THERAPEUTIC EXERCISES: CPT

## 2021-03-03 RX ADMIN — SITAGLIPTIN 50 MG: 25 TABLET, FILM COATED ORAL at 08:18

## 2021-03-03 RX ADMIN — MELATONIN 3 MG: at 21:41

## 2021-03-03 RX ADMIN — OXYBUTYNIN 5 MG: 5 TABLET, FILM COATED, EXTENDED RELEASE ORAL at 08:18

## 2021-03-03 RX ADMIN — LORAZEPAM 1 MG: 1 TABLET ORAL at 15:55

## 2021-03-03 RX ADMIN — MIRTAZAPINE 15 MG: 15 TABLET, FILM COATED ORAL at 21:41

## 2021-03-03 RX ADMIN — TRAZODONE HYDROCHLORIDE 100 MG: 50 TABLET ORAL at 21:41

## 2021-03-03 RX ADMIN — RISPERIDONE 2 MG: 2 TABLET ORAL at 08:18

## 2021-03-03 RX ADMIN — Medication 1000 UNITS: at 08:18

## 2021-03-03 RX ADMIN — LOSARTAN POTASSIUM 50 MG: 50 TABLET, FILM COATED ORAL at 08:18

## 2021-03-03 RX ADMIN — RISPERIDONE 3 MG: 2 TABLET ORAL at 21:41

## 2021-03-03 NOTE — PLAN OF CARE
Problem: PHYSICAL THERAPY ADULT  Goal: Performs mobility at highest level of function for planned discharge setting  See evaluation for individualized goals  Description: Treatment/Interventions: Functional transfer training, LE strengthening/ROM, Therapeutic exercise, Endurance training, Patient/family training, Equipment eval/education, Gait training, Spoke to nursing  Equipment Recommended: Walker(pt  has own)       See flowsheet documentation for full assessment, interventions and recommendations  Outcome: Progressing  Note: Prognosis: Fair  Problem List: Decreased strength, Decreased endurance, Impaired balance, Decreased mobility, Decreased coordination, Decreased safety awareness, Impaired sensation, Pain  Assessment: Pt seen for PT treatment session this date with interventions consisting of gait training w/ emphasis on improving pt's ability to ambulate level surfaces x 160 feet  with close S provided by therapist with RW and Therapeutic exercise consisting of: AROM 20 reps B LE in sitting position  Pt agreeable to PT treatment session upon arrival, pt found ambulating from BR, in no apparent distress  In comparison to previous session, pt with improvements in amb distance  Post session: pt returned BTB and all needs in reach Continue to recommend Home PT at time of d/c in order to maximize pt's functional independence and safety w/ mobility  Pt continues to be functioning below baseline level, and remains limited 2* factors listed above and including decreased strength, endurance & safe functional mobility  PT will continue to see pt while here in order to address the deficits listed above and provide interventions consistent w/ POC in effort to achieve STGs  PT Discharge Recommendation: Home with skilled therapy, Return to previous environment with social support     PT - OK to Discharge: Yes(when med cleared)    See flowsheet documentation for full assessment

## 2021-03-03 NOTE — PROGRESS NOTES
The patient noted to be visible in the milieu and in the patient's room upon rounds throughout the shift  The patient noted to be flat, quiet and withdrawn  The patient states anxiety and depression are "the same," denies si and hi  The patient states hallucinations are "at [facility] " The patient noted to be compliant with meals and medications  The patient denies complaints of pain  Q 7 minute safety checks maintained

## 2021-03-03 NOTE — SOCIAL WORK
CM met with PT for PT check in  PT indicated that she can not return home, when explored why, PT reports she can not do anything by herself  CM reviewed with PT the outcome of report with team and that they feel she is capable of doing this things  PT denies and says she needs to go to long term care  CM reviewed with PT if there are any barriers in home, PT denies and says she can not do things that she is mentally and physically disabled and needs someone to do them for her  PT indicated that her brother said she can not go home and that he is her mothers POA, but PT reports PT mother has capacity to make her own decisions "48 50"  CM reviewed that his is her residence and she has the right to return there  CM inquired if PT would be interested in home health care for her return home next week  PT in agreement  PT reported that she will need a walker   CM to make request

## 2021-03-03 NOTE — CONSULTS
Patient seen for c/o thick nails  No other complaints  Reports to being recently dx with DM but has good blood sugars  PMH/PSH/Meds/Allergies reviewed as needed for the consult  PE:  Resting in bed  Contractures of L toes (plantarflexory), reduceable  Palp DP pulse, non palp PT pulse  Nails elongated, thickened, subungal debris x 10  Discolored yellow x 10  A/P: Mild PVD and Onychomycosis  Debrided nails x 10  Discussed DM and her feet with the patient    Patient to follow up on a regular basis with a Podiatrist

## 2021-03-03 NOTE — PHYSICAL THERAPY NOTE
03/03/21 1501   PT Last Visit   PT Visit Date 03/03/21   Note Type   Note Type Treatment   Pain Assessment   Pain Assessment Tool 0-10   Pain Score 3   Pain Location/Orientation Orientation: Bilateral;Location: Back; Location: Leg   Pain Onset/Description Onset: Ongoing   Effect of Pain on Daily Activities limits mobility   Patient's Stated Pain Goal No pain   Hospital Pain Intervention(s) Repositioned; Ambulation/increased activity; Rest   Restrictions/Precautions   Weight Bearing Precautions Per Order No   Other Precautions Fall Risk;Pain   General   Chart Reviewed Yes   Family/Caregiver Present No   Cognition   Overall Cognitive Status WFL   Arousal/Participation Alert; Cooperative   Attention Within functional limits   Orientation Level Oriented X4   Memory Within functional limits   Following Commands Follows one step commands without difficulty   Comments pt agreed to PT session   Subjective   Subjective "I can't walk without the walker '   Bed Mobility   Sit to Supine 6  Modified independent   Additional Comments pt in BR to begin session & returned to bed to end session   Transfers   Sit to Stand 5  Supervision   Additional items Assist x 1;Bedrails;Verbal cues   Stand to Sit 5  Supervision   Additional items Assist x 1;Bedrails;Verbal cues   Additional Comments stood at sink to wash/dry hands after using toilet with RW S x 1   Ambulation/Elevation   Gait pattern Improper Weight shift; Antalgic;Decreased R stance; Short stride   Gait Assistance 5  Supervision  (close)   Additional items Assist x 1;Verbal cues   Assistive Device Rolling walker   Distance 160 feet   Stair Management Assistance Not tested   Balance   Static Sitting Good   Dynamic Sitting Fair +   Static Standing Fair   Dynamic Standing Fair -   Ambulatory Fair -   Endurance Deficit   Endurance Deficit Yes   Activity Tolerance   Activity Tolerance Patient limited by fatigue;Patient limited by pain   Exercises   Hip Flexion Sitting;20 reps;AROM; Bilateral   Hip Abduction Sitting;20 reps;AROM; Bilateral  (isometric)   Hip Adduction Sitting;20 reps;AROM  (isometric)   Knee AROM Long Arc Quad Sitting;20 reps;AROM; Bilateral   Ankle Pumps Sitting;20 reps;AROM; Bilateral   Balance training  sitting EOB   Assessment   Prognosis Fair   Problem List Decreased strength;Decreased endurance; Impaired balance;Decreased mobility; Decreased coordination;Decreased safety awareness; Impaired sensation;Pain   Assessment Pt seen for PT treatment session this date with interventions consisting of gait training w/ emphasis on improving pt's ability to ambulate level surfaces x 160 feet  with close S provided by therapist with RW and Therapeutic exercise consisting of: AROM 20 reps B LE in sitting position  Pt agreeable to PT treatment session upon arrival, pt found ambulating from BR, in no apparent distress  In comparison to previous session, pt with improvements in amb distance  Post session: pt returned BTB and all needs in reach Continue to recommend Home PT at time of d/c in order to maximize pt's functional independence and safety w/ mobility  Pt continues to be functioning below baseline level, and remains limited 2* factors listed above and including decreased strength, endurance & safe functional mobility  PT will continue to see pt while here in order to address the deficits listed above and provide interventions consistent w/ POC in effort to achieve STGs  Goals   Patient Goals to get around better   PT Treatment Day 1   Plan   Treatment/Interventions Functional transfer training;LE strengthening/ROM; Therapeutic exercise; Endurance training;Patient/family training;Equipment eval/education; Bed mobility;Gait training   Progress Progressing toward goals   PT Frequency 1-2x/wk   Recommendation   PT Discharge Recommendation Home with skilled therapy; Return to previous environment with social support   Equipment Recommended Walker   PT - OK to Discharge Yes  (when med cleared)   AM-PAC Basic Mobility Inpatient   Turning in Bed Without Bedrails 3   Lying on Back to Sitting on Edge of Flat Bed 3   Moving Bed to Chair 3   Standing Up From Chair 3   Walk in Room 3   Climb 3-5 Stairs 3   Basic Mobility Inpatient Raw Score 18   Basic Mobility Standardized Score 41 05   Janis Wheatley, PTA

## 2021-03-03 NOTE — NURSING NOTE
Patient remains in bed sleeping at this time  She appears to have slept through the overnight period without issue  No complaints voiced  No acute behaviors noted  Will CTM via q7 minute safety checks

## 2021-03-03 NOTE — PROGRESS NOTES
Patient visual on unit  Continues to be compliant with medications and meals  Pt continues to isolate herself in her room Flat affect noted continues to be guarded  Complaint with medications and meals  Q 7 min checks continue

## 2021-03-03 NOTE — PROGRESS NOTES
03/03/21 0936   Team Meeting   Meeting Type Daily Rounds   Team Members Present   Team Members Present Physician;Nurse;Occupational Therapist;   Physician Team Member Dr Varsha Nye MD; KAM Arambula   Nursing Team Member Ramone Meneses RN   Social Work Team Member Ashlyn Loyd [facility]   OT Team Member Yancy Meléndez [place]   Patient/Family Present   Patient Present No   Patient's Family Present No     No DC Date - will return home upon stabilization; med adjustments; flat, hopeless; anx/dep "no better"; denies AH/VH; ambulates with walker; visible on unit

## 2021-03-03 NOTE — PLAN OF CARE
Problem: Alteration in Thoughts and Perception  Goal: Treatment Goal: Gain control of psychotic behaviors/thinking, reduce/eliminate presenting symptoms and demonstrate improved reality functioning upon discharge  Outcome: Progressing  Goal: Verbalize thoughts and feelings  Description: Interventions:  - Promote a nonjudgmental and trusting relationship with the patient through active listening and therapeutic communication  - Assess patient's level of functioning, behavior and potential for risk  - Engage patient in 1 on 1 interactions  - Encourage patient to express fears, feelings, frustrations, and discuss symptoms    - [place] patient to reality, help patient recognize reality-based thinking   - Administer medications as ordered and assess for potential side effects  - Provide the patient education related to the signs and symptoms of the illness and desired effects of prescribed medications  Outcome: Progressing  Goal: Refrain from acting on delusional thinking/internal stimuli  Description: Interventions:  - Monitor patient closely, per order   - Utilize least restrictive measures   - Set reasonable limits, give positive feedback for acceptable   - Administer medications as ordered and monitor of potential side effects  Outcome: Progressing  Goal: Agree to be compliant with medication regime, as prescribed and report medication side effects  Description: Interventions:  - Offer appropriate PRN medication and supervise ingestion; conduct AIMS, as needed   Outcome: Progressing  Goal: Attend and participate in unit activities, including therapeutic, recreational, and educational groups  Description: Interventions:  -Encourage Visitation and family involvement in care  Outcome: Progressing  Goal: Recognize dysfunctional thoughts, communicate reality-based thoughts at the time of discharge  Description: Interventions:  - Provide medication and psycho-education to assist patient in compliance and developing insight into his/her illness   Outcome: Progressing  Goal: Complete daily ADLs, including personal hygiene independently, as able  Description: Interventions:  - Observe, teach, and assist patient with ADLS  - Monitor and promote a balance of rest/activity, with adequate nutrition and elimination   Outcome: Progressing     Problem: Ineffective Coping  Goal: Understands least restrictive measures  Description: Interventions:  - Utilize least restrictive behavior  Outcome: Progressing  Goal: Free from restraint events  Description: - Utilize least restrictive measures   - Provide behavioral interventions   - Redirect inappropriate behaviors   Outcome: Progressing     Problem: Depression  Goal: Treatment Goal: Demonstrate behavioral control of depressive symptoms, verbalize feelings of improved mood/affect, and adopt new coping skills prior to discharge  Outcome: Progressing  Goal: Verbalize thoughts and feelings  Description: Interventions:  - Assess and re-assess patient's level of risk   - Engage patient in 1:1 interactions, daily, for a minimum of 15 minutes   - Encourage patient to express feelings, fears, frustrations, hopes   Outcome: Progressing  Goal: Refrain from harming self  Description: Interventions:  - Monitor patient closely, per order   - Supervise medication ingestion, monitor effects and side effects   Outcome: Progressing  Goal: Refrain from isolation  Description: Interventions:  - Develop a trusting relationship   - Encourage socialization   Outcome: Progressing  Goal: Refrain from self-neglect  Outcome: Progressing  Goal: Attend and participate in unit activities, including therapeutic, recreational, and educational groups  Description: Interventions:  - Provide therapeutic and educational activities daily, encourage attendance and participation, and document same in the medical record   Outcome: Progressing  Goal: Complete daily ADLs, including personal hygiene independently, as able  Description: Interventions:  - Observe, teach, and assist patient with ADLS  -  Monitor and promote a balance of rest/activity, with adequate nutrition and elimination   Outcome: Progressing     Problem: Anxiety  Goal: Anxiety is at manageable level  Description: Interventions:  - Assess and monitor patient's anxiety level  - Monitor for signs and symptoms (heart palpitations, chest pain, shortness of breath, headaches, nausea, feeling jumpy, restlessness, irritable, apprehensive)  - Collaborate with interdisciplinary team and initiate plan and interventions as ordered  - [place] patient to unit/surroundings  - Explain treatment plan  - Encourage participation in care  - Encourage verbalization of concerns/fears  - Identify coping mechanisms  - Assist in developing anxiety-reducing skills  - Administer/offer alternative therapies  - Limit or eliminate stimulants  Outcome: Progressing     Problem: Nutrition/Hydration-ADULT  Goal: Nutrient/Hydration intake appropriate for improving, restoring or maintaining nutritional needs  Description: Monitor and assess patient's nutrition/hydration status for malnutrition  Collaborate with interdisciplinary team and initiate plan and interventions as ordered  Monitor patient's weight and dietary intake as ordered or per policy  Utilize nutrition screening tool and intervene as necessary  Determine patient's food preferences and provide high-protein, high-caloric foods as appropriate       INTERVENTIONS:  - Monitor oral intake, urinary output, labs, and treatment plans  - Assess nutrition and hydration status and recommend course of action  - Evaluate amount of meals eaten  - Assist patient with eating if necessary   - Allow adequate time for meals  - Recommend/ encourage appropriate diets, oral nutritional supplements, and vitamin/mineral supplements  - Order, calculate, and assess calorie counts as needed  - Recommend, monitor, and adjust tube feedings and TPN/PPN based on assessed needs  - Assess need for intravenous fluids  - Provide specific nutrition/hydration education as appropriate  - Include patient/family/caregiver in decisions related to nutrition  Outcome: Progressing

## 2021-03-03 NOTE — PROGRESS NOTES
Pt medicated for c/o increased anxiety 10/10 @ 1085 with Ativan 1 mg po as ordered by MD with moderate relief obtained  [facility] - 19 @ that time

## 2021-03-03 NOTE — PROGRESS NOTES
Progress Note - Yao Márquez 62 y o  female MRN: [id number]    Unit/Bed#: [facility] 200-02 Encounter: [id number]        Subjective:   Patient seen and examined at bedside after reviewing the chart and discussing the case with the caring staff  Patient examined at bedside  Patient reports no acute problems  Physical Exam   Vitals: Blood pressure 135/80, pulse 83, temperature 97 7 °F (36 5 °C), temperature source Temporal, resp  rate 16, height 5' 5" (1 651 m), weight 62 8 kg (138 lb 6 4 oz), SpO2 95 %, not currently breastfeeding  ,Body mass index is 23 03 kg/m²  Constitutional: He appears well-developed  HEENT: PERR, EOMI, MMM  Cardiovascular: Normal rate and regular rhythm  Pulmonary/Chest: Effort normal and breath sounds normal    Abdomen: Soft, + BS, NT    Assessment/Plan:     Yao Márquez is a(n) 62y o  year old female with psychotic disorder      1  Cardiac with history of hypertension  Patient will be continued on losartan  2  DJD/osteoarthritis  Patient may get Tylenol on as needed basis  3  Gait abnormality  I will consult PT and OT for further evaluation and treatment  4  Insomnia  Patient is on melatonin  5  Overactive urinary bladder  Patient is on oxybutynin  6  Hyperglycemia/weight loss  We will closely monitor patient's blood sugars by doing Accu-Cheks 2 times daily  Continue Januvia 50 mg daily along with carb controlled diet  Patient's hemoglobin A1c was 5 3  Nutrition is on consult  Patient has been put on Glucerna supplements  7  Nasal congestion/dryness  Continue saline nasal spray every 2 hours as needed  8  Vitamin D deficiency  Continue daily vitamin-D supplements  9  Overgrown toenails  I will consult Podiatry for further evaluation and treatment

## 2021-03-03 NOTE — NURSING NOTE
Assumed care of this patient from prior shift nurse at [phone]  Patient is currently in bed sleeping  No acute behaviors observed  No complaints voiced  Will CTM via q7 minute safety checks

## 2021-03-03 NOTE — PLAN OF CARE
Problem: DISCHARGE PLANNING  Goal: Discharge to home or other facility with appropriate resources  Description: INTERVENTIONS:  - Identify barriers to discharge w/patient and caregiver  - Arrange for needed discharge resources and transportation as appropriate  - Identify discharge learning needs (meds, wound care, etc )  - Arrange for interpretive services to assist at discharge as needed  - Refer to Case Management Department for coordinating discharge planning if the patient needs post-hospital services based on physician/advanced practitioner order or complex needs related to functional status, cognitive ability, or social support system  Outcome: Progressing   PT will return home next week, will follow up with outpatient psych, home health care and PCP

## 2021-03-03 NOTE — PROGRESS NOTES
Progress Note - Behavioral Health   Gricelda Vance 62 y o  female MRN: [id number]  Unit/Bed#: [facility] 200-02 Encounter: [id number]    Assessment/Plan   Principal Problem:    MDD (major depressive disorder), recurrent episode ([facility] [address] )  Active Problems:    Essential hypertension    Depression    Other constipation    Absence of bladder continence    History of Asperger's syndrome    Aftercare following right hip joint replacement surgery    Severe protein-calorie malnutrition ([facility] [address] )      Behavior over the last 24 hours:  unchanged  Sleep: normal  Appetite: normal  Medication side effects: No  ROS: no complaints and All other systems negative for acute change     Horacio Ceballos was seen today for follow-up and case discussed with treatment team this morning  Upon encounter, patient was resting in bed and appeared somewhat guarded  She reports her anxiety and depression are "still the same" and continues to be religiously and somatically preoccupied  She reports that the auditory hallucinations continue to be intermittent, non commanding, but less loud and less often  She believe she is still hearing the devil trying to talk to her and believes she is doomed    She spoke about how she memorized the scriptures and descriptors are no longer able to help her because I am that far gone    Patient was able to be redirected  She reports she slept well last night and continues to report a decreased appetite  However, meal completions have been 100%  Horacio Ceballos insists that she is unable to take care of herself and was debating whether to shower not this morning  Encouragement provided and patient was agreeable to shower  Horacio Ceballos does ambulate with walker on unit ad lila  She requires scant assistance with ADLs  She denies any VH/SI/HI  She attends and participates in groups appropriately      Mental Status Evaluation:  Appearance:  age appropriate, casually dressed and Resting in bed   Behavior:  Somewhat guarded, cooperative, bizarre Speech:  normal pitch, normal volume and Delayed responses at times   Mood:  anxious and depressed   Affect:  blunted   Thought Process:  perserverative   Thought Content:  Religiously preoccupied, somatically preoccupied   Perceptual Disturbances: Auditory hallucinations without commands   Risk Potential: Suicidal Ideations none  Homicidal Ideations none  Potential for Aggression No   Sensorium:  person, place, time/date and situation   Memory:  recent and remote memory grossly intact   Consciousness:  alert and awake    Attention: attention span and concentration were age appropriate   Insight:  limited   Judgment: limited   Gait/Station: slow and Uses walker   Motor Activity: no abnormal movements     Progress Toward Goals:  Medication adjustments made yesterday to Risperdal   Continue current medication regimen  Will return home upon stabilization  No discharge date at this time  Recommended Treatment: Continue with group therapy, milieu therapy and occupational therapy  Risks, benefits and possible side effects of Medications:   Risks, benefits, and possible side effects of medications explained to patient and patient verbalizes understanding  Medications: all current active meds have been reviewed and continue current psychiatric medications  Labs: I have personally reviewed all pertinent laboratory/tests results  Counseling / Coordination of Care  Total floor / unit time spent today 20 minutes  Greater than 50% of total time was spent with the patient and / or family counseling and / or coordination of care

## 2021-03-04 LAB
GLUCOSE SERPL-MCNC: 90 MG/DL (ref 65–140)
GLUCOSE SERPL-MCNC: 91 MG/DL (ref 65–140)

## 2021-03-04 PROCEDURE — 82948 REAGENT STRIP/BLOOD GLUCOSE: CPT

## 2021-03-04 PROCEDURE — 99232 SBSQ HOSP IP/OBS MODERATE 35: CPT | Performed by: NURSE PRACTITIONER

## 2021-03-04 RX ADMIN — Medication 1000 UNITS: at 08:29

## 2021-03-04 RX ADMIN — MELATONIN 3 MG: at 21:18

## 2021-03-04 RX ADMIN — HYDROXYZINE HYDROCHLORIDE 50 MG: 25 TABLET, FILM COATED ORAL at 16:07

## 2021-03-04 RX ADMIN — RISPERIDONE 2 MG: 2 TABLET ORAL at 08:29

## 2021-03-04 RX ADMIN — RISPERIDONE 3 MG: 2 TABLET ORAL at 21:17

## 2021-03-04 RX ADMIN — MIRTAZAPINE 15 MG: 15 TABLET, FILM COATED ORAL at 21:17

## 2021-03-04 RX ADMIN — TRAZODONE HYDROCHLORIDE 100 MG: 50 TABLET ORAL at 21:18

## 2021-03-04 RX ADMIN — OXYBUTYNIN 5 MG: 5 TABLET, FILM COATED, EXTENDED RELEASE ORAL at 08:29

## 2021-03-04 RX ADMIN — SITAGLIPTIN 50 MG: 25 TABLET, FILM COATED ORAL at 08:29

## 2021-03-04 RX ADMIN — LOSARTAN POTASSIUM 50 MG: 50 TABLET, FILM COATED ORAL at 08:29

## 2021-03-04 NOTE — PLAN OF CARE
Problem: Alteration in Thoughts and Perception  Goal: Treatment Goal: Gain control of psychotic behaviors/thinking, reduce/eliminate presenting symptoms and demonstrate improved reality functioning upon discharge  Outcome: Progressing  Goal: Refrain from acting on delusional thinking/internal stimuli  Description: Interventions:  - Monitor patient closely, per order   - Utilize least restrictive measures   - Set reasonable limits, give positive feedback for acceptable   - Administer medications as ordered and monitor of potential side effects  Outcome: Progressing  Goal: Agree to be compliant with medication regime, as prescribed and report medication side effects  Description: Interventions:  - Offer appropriate PRN medication and supervise ingestion; conduct AIMS, as needed   Outcome: Progressing  Goal: Attend and participate in unit activities, including therapeutic, recreational, and educational groups  Description: Interventions:  -Encourage Visitation and family involvement in care  Outcome: Progressing  Goal: Recognize dysfunctional thoughts, communicate reality-based thoughts at the time of discharge  Description: Interventions:  - Provide medication and psycho-education to assist patient in compliance and developing insight into his/her illness   Outcome: Progressing  Goal: Complete daily ADLs, including personal hygiene independently, as able  Description: Interventions:  - Observe, teach, and assist patient with ADLS  - Monitor and promote a balance of rest/activity, with adequate nutrition and elimination   Outcome: Progressing     Problem: Ineffective Coping  Goal: Patient/Family participate in treatment and DC plans  Description: Interventions:  - Provide therapeutic environment  Outcome: Progressing  Goal: Patient/Family verbalizes awareness of resources  Outcome: Progressing  Goal: Understands least restrictive measures  Description: Interventions:  - Utilize least restrictive behavior  Outcome: Progressing  Goal: Free from restraint events  Description: - Utilize least restrictive measures   - Provide behavioral interventions   - Redirect inappropriate behaviors   Outcome: Progressing     Problem: Depression  Goal: Treatment Goal: Demonstrate behavioral control of depressive symptoms, verbalize feelings of improved mood/affect, and adopt new coping skills prior to discharge  Outcome: Progressing  Goal: Verbalize thoughts and feelings  Description: Interventions:  - Assess and re-assess patient's level of risk   - Engage patient in 1:1 interactions, daily, for a minimum of 15 minutes   - Encourage patient to express feelings, fears, frustrations, hopes   Outcome: Progressing  Goal: Refrain from harming self  Description: Interventions:  - Monitor patient closely, per order   - Supervise medication ingestion, monitor effects and side effects   Outcome: Progressing  Goal: Refrain from isolation  Description: Interventions:  - Develop a trusting relationship   - Encourage socialization   Outcome: Progressing  Goal: Refrain from self-neglect  Outcome: Progressing  Goal: Attend and participate in unit activities, including therapeutic, recreational, and educational groups  Description: Interventions:  - Provide therapeutic and educational activities daily, encourage attendance and participation, and document same in the medical record   Outcome: Progressing  Goal: Complete daily ADLs, including personal hygiene independently, as able  Description: Interventions:  - Observe, teach, and assist patient with ADLS  -  Monitor and promote a balance of rest/activity, with adequate nutrition and elimination   Outcome: Progressing     Problem: Anxiety  Goal: Anxiety is at manageable level  Description: Interventions:  - Assess and monitor patient's anxiety level     - Monitor for signs and symptoms (heart palpitations, chest pain, shortness of breath, headaches, nausea, feeling jumpy, restlessness, irritable, apprehensive)  - Collaborate with interdisciplinary team and initiate plan and interventions as ordered    - [place] patient to unit/surroundings  - Explain treatment plan  - Encourage participation in care  - Encourage verbalization of concerns/fears  - Identify coping mechanisms  - Assist in developing anxiety-reducing skills  - Administer/offer alternative therapies  - Limit or eliminate stimulants  Outcome: Progressing

## 2021-03-04 NOTE — PROGRESS NOTES
Patient ambulating with walker ad lila throughout unit  Quiet and cooperative  Reports anxiety this afternoon  Gave 50mg of atarax at 1607 prn for moderate anxiety  Patient stated she did not want to talk about what was making her anxious but that she has racing thoughts  She did eat dinner in the dining room  Denies pain  I did speak with her regarding her discharge plan  She did state she is anxious about it  I encouraged her and reassured her safety and plans  Patient did offer thanks  Q 7 min checks maintained  Will continue to monitor

## 2021-03-04 NOTE — PROGRESS NOTES
03/04/21    Team Meeting   Meeting Type Daily Rounds   Team Members Present   Team Members Present Physician;Nurse;   Physician Team Member Dr Eller Krabbe, MD; Rosezetta Rubinstein, [facility]   Nursing Team Member Neisha Florez, CARLOS   Care Management Team Member MS London, [facility]   Patient/Family Present   Patient Present No   Patient's Family Present No   PT to D/C sometime next week, will follow up with home health and outpatient psych and PCP  PT in need of walker  PT is flat and guarded, rates anxiety and depression 8  PT reports feeling better, denies AH/VH, Prn yesterday  Ambulates with walker  Much reassurance and encouragement  Irritable with brother on phone

## 2021-03-04 NOTE — PROGRESS NOTES
Progress Note - Costa Morales 62 y o  female MRN: [id number]    Unit/Bed#: [facility] 200-02 Encounter: [id number]        Subjective:   Patient seen and examined at bedside after reviewing the chart and discussing the case with the caring staff  Patient examined at bedside  Patient has no acute concerns  Physical Exam   Vitals: Blood pressure 126/75, pulse 102, temperature 98 1 °F (36 7 °C), temperature source Temporal, resp  rate 18, height 5' 5" (1 651 m), weight 62 8 kg (138 lb 6 4 oz), SpO2 94 %, not currently breastfeeding  ,Body mass index is 23 03 kg/m²  Constitutional: He appears well-developed  HEENT: PERR, EOMI, MMM  Cardiovascular: Normal rate and regular rhythm  Pulmonary/Chest: Effort normal and breath sounds normal    Abdomen: Soft, + BS, NT    Assessment/Plan:     Costa Morales is a(n) 62y o  year old female with psychotic disorder      1  Cardiac with history of hypertension  Patient will be continued on losartan  2  DJD/osteoarthritis  Patient may get Tylenol on as needed basis  3  Gait abnormality  I will consult PT and OT for further evaluation and treatment  4  Insomnia  Patient is on melatonin  5  Overactive urinary bladder  Patient is on oxybutynin  6  Hyperglycemia/weight loss  We will closely monitor patient's blood sugars by doing Accu-Cheks 2 times daily  Continue Januvia 50 mg daily along with carb controlled diet  Patient's hemoglobin A1c was 5 3  Nutrition is on consult  Patient has been put on Glucerna supplements  7  Nasal congestion/dryness  Continue saline nasal spray every 2 hours as needed  8  Vitamin D deficiency  Continue daily vitamin-D supplements  9  Overgrown toenails  Podiatry is on consult  The patient was discussed with Dr Lex Valdes and he is in agreement with the above note

## 2021-03-04 NOTE — NURSING NOTE
Patient was visible in the community this evening  She presents with a flat affect and depressed mood  Denies any pain  She rates anxiety and depression both 10/10 but informs she is "feeling just a little bit better tonight"  Denies SI and HI  Attended group and snack time tonight  Medication compliant at [facility] CTM via q7 minute safety checks

## 2021-03-04 NOTE — PROGRESS NOTES
The patient noted to be visible on the unit, in the milieu and resting in the patient's room upon rounds throughout the shift  The patient noted to be flat, quiet, rates anxiety and depression both 8/10, denies si, hi, and hallucinations  The patient noted to be compliant with meals and medications  The patient denies complaints of pain  Q 7 minute safety checks maintained

## 2021-03-04 NOTE — PROGRESS NOTES
Progress Note - Behavioral Health   Jose Luis Alcantara 62 y o  female MRN: [id number]  Unit/Bed#: [facility] 200-02 Encounter: [id number]    Assessment/Plan   Principal Problem:    MDD (major depressive disorder), recurrent episode ([facility] [address] )  Active Problems:    Essential hypertension    Depression    Other constipation    Absence of bladder continence    History of Asperger's syndrome    Aftercare following right hip joint replacement surgery    Severe protein-calorie malnutrition ([facility] [address] )      Behavior over the last 24 hours:  unchanged  Sleep: improved  Appetite: normal  Medication side effects: No  ROS: no complaints and All other systems negative for acute change     Danny Nye was seen today for follow-up and case discussed with treatment team this morning  Patient continues to be somatically preoccupied and voices I can not take care of myself    She insists that she is in need of nursing home placement due to her inability to care for herself  However, patient is ad lila with walker on unit, feeds and dresses self, utilizes phone spontaneously, and verbalizes wants and needs accordingly  Patient informed that she is not appropriate for SNF in which she argued she needs it because she was in it before, but her mom pulled her out [facility] was a big mistake and insists her family does not want her to return home  Danny Nye continues to endorse intermittent auditory hallucinations, but reports they are less frequent and less loud  She also states she is unable to eat, but her meal completions have been 100%  Danny Nye denies any AVH/SI/HI  She also states her anxiety and depression are no better but her sleep has improved  She continues to be compliant with medication regimen and denies any side effects      Mental Status Evaluation:  Appearance:  age appropriate and casually dressed   Behavior:  guarded and Bizarre, cooperative   Speech:  normal pitch and normal volume   Mood:  anxious and depressed   Affect:  flat   Thought Process: perserverative   Thought Content:  delusions  somatic   Perceptual Disturbances: Auditory hallucinations without commands   Risk Potential: Suicidal Ideations none  Homicidal Ideations none  Potential for Aggression No   Sensorium:  person, place, time/date and situation   Memory:  recent and remote memory grossly intact   Consciousness:  alert and awake    Attention: attention span appeared shorter than expected for age   Insight:  limited   Judgment: Poor   Gait/Station: slow and Uses walker   Motor Activity: no abnormal movements     Progress Toward Goals:  Continue with current medication regimen  Patient to be discharged sometime next week and follow up with home health, outpatient psychiatry, and PCP  Recommended Treatment: Continue with group therapy, milieu therapy and occupational therapy  Risks, benefits and possible side effects of Medications:   Risks, benefits, and possible side effects of medications explained to patient and patient verbalizes understanding  Medications: all current active meds have been reviewed and continue current psychiatric medications  Labs: I have personally reviewed all pertinent laboratory/tests results  Counseling / Coordination of Care  Total floor / unit time spent today 20 minutes  Greater than 50% of total time was spent with the patient and / or family counseling and / or coordination of care

## 2021-03-04 NOTE — NURSING NOTE
Patient appears to have slept through the overnight period without issue  No acute behaviors observed  No complaints voiced  She remains in bed sleeping at this time  Will CTM via q7 minute safety checks

## 2021-03-05 LAB
GLUCOSE SERPL-MCNC: 90 MG/DL (ref 65–140)
GLUCOSE SERPL-MCNC: 90 MG/DL (ref 65–140)
GLUCOSE SERPL-MCNC: 91 MG/DL (ref 65–140)
GLUCOSE SERPL-MCNC: 91 MG/DL (ref 65–140)

## 2021-03-05 PROCEDURE — 97110 THERAPEUTIC EXERCISES: CPT

## 2021-03-05 PROCEDURE — 97530 THERAPEUTIC ACTIVITIES: CPT

## 2021-03-05 PROCEDURE — 82948 REAGENT STRIP/BLOOD GLUCOSE: CPT

## 2021-03-05 PROCEDURE — 99232 SBSQ HOSP IP/OBS MODERATE 35: CPT | Performed by: NURSE PRACTITIONER

## 2021-03-05 PROCEDURE — 97116 GAIT TRAINING THERAPY: CPT

## 2021-03-05 RX ORDER — LOPERAMIDE HYDROCHLORIDE 2 MG/1
2 CAPSULE ORAL 3 TIMES DAILY PRN
Status: DISCONTINUED | OUTPATIENT
Start: 2021-03-05 | End: 2021-03-31 | Stop reason: HOSPADM

## 2021-03-05 RX ORDER — LOPERAMIDE HYDROCHLORIDE 2 MG/1
2 CAPSULE ORAL ONCE
Status: COMPLETED | OUTPATIENT
Start: 2021-03-05 | End: 2021-03-05

## 2021-03-05 RX ORDER — MIRTAZAPINE 15 MG/1
7.5 TABLET, FILM COATED ORAL
Status: DISCONTINUED | OUTPATIENT
Start: 2021-03-05 | End: 2021-03-13

## 2021-03-05 RX ADMIN — OXYBUTYNIN 5 MG: 5 TABLET, FILM COATED, EXTENDED RELEASE ORAL at 08:17

## 2021-03-05 RX ADMIN — TRAZODONE HYDROCHLORIDE 100 MG: 50 TABLET ORAL at 21:32

## 2021-03-05 RX ADMIN — Medication 1000 UNITS: at 08:17

## 2021-03-05 RX ADMIN — MIRTAZAPINE 7.5 MG: 15 TABLET, FILM COATED ORAL at 21:31

## 2021-03-05 RX ADMIN — LOSARTAN POTASSIUM 50 MG: 50 TABLET, FILM COATED ORAL at 08:17

## 2021-03-05 RX ADMIN — MELATONIN 3 MG: at 21:31

## 2021-03-05 RX ADMIN — LOPERAMIDE HYDROCHLORIDE 2 MG: 2 CAPSULE ORAL at 12:30

## 2021-03-05 RX ADMIN — RISPERIDONE 2 MG: 2 TABLET ORAL at 08:17

## 2021-03-05 RX ADMIN — SITAGLIPTIN 50 MG: 25 TABLET, FILM COATED ORAL at 08:17

## 2021-03-05 RX ADMIN — RISPERIDONE 3 MG: 2 TABLET ORAL at 21:32

## 2021-03-05 NOTE — PROGRESS NOTES
Progress Note - Anya Clayton 62 y o  female MRN: [id number]    Unit/Bed#: [facility] 200-02 Encounter: [id number]        Subjective:   Patient seen and examined at bedside after reviewing the chart and discussing the case with the caring staff  Patient examined at bedside  Patient staff for 3 episodes of loose stools in the early morning of 03/05/2021  Patient denies abdominal pain, nausea, vomiting  Physical Exam   Vitals: Blood pressure 137/75, pulse 94, temperature 98 3 °F (36 8 °C), temperature source Temporal, resp  rate 18, height 5' 5" (1 651 m), weight 62 8 kg (138 lb 6 4 oz), SpO2 94 %, not currently breastfeeding  ,Body mass index is 23 03 kg/m²  Constitutional: He appears well-developed  HEENT: PERR, EOMI, MMM  Cardiovascular: Normal rate and regular rhythm  Pulmonary/Chest: Effort normal and breath sounds normal    Abdomen: Soft, + BS, NT    Assessment/Plan:     Anya Clayton is a(n) 62y o  year old female with psychotic disorder      1  Cardiac with history of hypertension  Patient will be continued on losartan  2  DJD/osteoarthritis  Patient may get Tylenol on as needed basis  3  Gait abnormality  I will consult PT and OT for further evaluation and treatment  4  Insomnia  Patient is on melatonin  5  Overactive urinary bladder  Patient is on oxybutynin  6  Hyperglycemia/weight loss  We will closely monitor patient's blood sugars by doing Accu-Cheks 2 times daily  Continue Januvia 50 mg daily along with carb controlled diet  Patient's hemoglobin A1c was 5 3  Nutrition is on consult  Patient has been put on Glucerna supplements  7  Nasal congestion/dryness  Continue saline nasal spray every 2 hours as needed  8  Vitamin D deficiency  Continue daily vitamin-D supplements  9  Overgrown toenails  Podiatry is on consult  10  Loose stools  Patient may receive Imodium as needed  The patient was discussed with Dr Caity Cox and he is in agreement with the above note

## 2021-03-05 NOTE — OCCUPATIONAL THERAPY NOTE
03/05/21 1430   OT Last Visit   OT Visit Date 03/05/21   Note Type   Note Type Treatment   Restrictions/Precautions   Weight Bearing Precautions Per Order No   Other Precautions Fall Risk;Pain   Pain Assessment   Pain Assessment Tool Pain Assessment not indicated - pt denies pain  (described as "numbness, bordering on pain")   Pain Location/Orientation Orientation: Right;Location: Hip  (starting in )   ADL   LB Dressing Comments patient demos  'd ability to reach for doff/don of both socks    Transfers   Sit to Stand 6  Modified independent   Additional items Armrests; Increased time required   Stand to Sit 5  Supervision   Additional items Assist x 1; Armrests; Increased time required;Verbal cues   Functional Mobility   Functional Mobility 5  Supervision   Additional Comments patient ambulated approx  200 feet  X 2 - on second attempt patient took one rest (sat very briefly)    Toilet Transfers   Toilet Transfers Comments declined bathroom use at this time    Therapeutic Excerise-Strength   UE Strength Yes   Right Upper Extremity- Strength   R Shoulder Flexion; Other (Comment)  (pro/re-traction )   R Elbow Elbow flexion;Elbow extension   R Weight/Reps/Sets 1 pound weight - 15 reps 2 sets shoulders - 5 reps elbow (secondary to patient's fatigue at end of session)    Left Upper Extremity-Strength   L Weights/Reps/Sets all exers  Same as RUE above With Addition of shoulder horizontal abd/adduction    Coordination   Gross Motor WFL   Dexterity appears WFL   Cognition   Overall Cognitive Status WFL   Arousal/Participation Alert; Cooperative   Attention Within functional limits   Following Commands Follows one step commands without difficulty   Activity Tolerance   Activity Tolerance Patient limited by fatigue   Assessment   Assessment Patient participated in Skilled OT session this date with interventions consisting of safety awareness and fall prevention techniques, therapeutic exercise to: increase functional use of BUEs, increase BUE muscle strength  and  therapeutic activities to: increase activity tolerance   Patient agreeable to OT treatment session, upon arrival patient was found seated OOB to Chair in dining area  Ambulated to (bed)room with RW ; was safe with utilization of same  Patient requiring ocassional safety reminders and verbal cues for correct technique of UE exercises  Patient continues to be functioning below baseline level, occupational performance remains limited secondary to factors listed above and increased risk for falls and injury  From OT standpoint, recommendation at time of d/c would be Home with family support and Home therapies  Patient to benefit from continued Occupational Therapy treatment while in the hospital to address deficits as defined above and maximize level of functional independence with ADLs and functional mobility  Plan   Treatment Interventions Functional transfer training;UE strengthening/ROM; Endurance training;Patient/family training; Activityengagement   Goal Expiration Date 03/11/21   OT Treatment Day 1   Recommendation   Additional Comments 2 The patient's raw score on the AM-PAC Daily Activity inpatient short form is 20, standardized score is 42 03, greater than 39 4  Patients at this level are likely to benefit from discharge to home  Please refer to the recommendation of the Occupational Therapist for safe discharge planning     AM-PAC Daily Activity Inpatient   Lower Body Dressing 3   Bathing 3   Toileting 3   Upper Body Dressing 3   Grooming 4   Eating 4   Daily Activity Raw Score 20   Daily Activity Standardized Score (Calc for Raw Score >=11) 42 03   THOMAS Villar/LAWRENCE

## 2021-03-05 NOTE — PLAN OF CARE
Problem: PHYSICAL THERAPY ADULT  Goal: Performs mobility at highest level of function for planned discharge setting  See evaluation for individualized goals  Description: Treatment/Interventions: Functional transfer training, LE strengthening/ROM, Therapeutic exercise, Endurance training, Patient/family training, Equipment eval/education, Gait training, Spoke to nursing  Equipment Recommended: Walker(pt  has own)       See flowsheet documentation for full assessment, interventions and recommendations  Outcome: Not Progressing  Note: Prognosis: Fair  Problem List: Decreased strength, Decreased endurance, Impaired balance, Decreased mobility, Decreased coordination, Decreased safety awareness, Impaired sensation  Assessment: Pt seen for PT treatment session this date with interventions consisting of gait training w/ emphasis on improving pt's ability to ambulate level surfaces x 125 feet with close S provided by therapist with RW  Pt agreeable to PT treatment session upon arrival, pt found seated OOB in chair, in no apparent distress  In comparison to previous session, pt with no improvements as evidenced by decreased amb distance due to fatigue post OT session  Post session: pt returned BTB and all needs in reach Continue to recommend Home PT at time of d/c in order to maximize pt's functional independence and safety w/ mobility  Pt continues to be functioning below baseline level, and remains limited 2* factors listed above and including decreased strength, endurance & safe functional mobility  PT will continue to see pt while here in order to address the deficits listed above and provide interventions consistent w/ POC in effort to achieve STGs  PT Discharge Recommendation: Home with skilled therapy, Return to previous environment with social support     PT - OK to Discharge: Yes(when med cleared)    See flowsheet documentation for full assessment

## 2021-03-05 NOTE — PLAN OF CARE
Problem: Alteration in Thoughts and Perception  Goal: Agree to be compliant with medication regime, as prescribed and report medication side effects  Description: Interventions:  - Offer appropriate PRN medication and supervise ingestion; conduct AIMS, as needed   Outcome: Progressing     Problem: Ineffective Coping  Goal: Understands least restrictive measures  Description: Interventions:  - Utilize least restrictive behavior  Outcome: Progressing  Goal: Free from restraint events  Description: - Utilize least restrictive measures   - Provide behavioral interventions   - Redirect inappropriate behaviors   Outcome: Progressing     Problem: Depression  Goal: Treatment Goal: Demonstrate behavioral control of depressive symptoms, verbalize feelings of improved mood/affect, and adopt new coping skills prior to discharge  Outcome: Progressing  Goal: Verbalize thoughts and feelings  Description: Interventions:  - Assess and re-assess patient's level of risk   - Engage patient in 1:1 interactions, daily, for a minimum of 15 minutes   - Encourage patient to express feelings, fears, frustrations, hopes   Outcome: Progressing  Goal: Refrain from harming self  Description: Interventions:  - Monitor patient closely, per order   - Supervise medication ingestion, monitor effects and side effects   Outcome: Progressing  Goal: Refrain from isolation  Description: Interventions:  - Develop a trusting relationship   - Encourage socialization   Outcome: Progressing  Goal: Refrain from self-neglect  Outcome: Progressing     Problem: Anxiety  Goal: Anxiety is at manageable level  Description: Interventions:  - Assess and monitor patient's anxiety level  - Monitor for signs and symptoms (heart palpitations, chest pain, shortness of breath, headaches, nausea, feeling jumpy, restlessness, irritable, apprehensive)  - Collaborate with interdisciplinary team and initiate plan and interventions as ordered    - Spencer patient to unit/surroundings  - Explain treatment plan  - Encourage participation in care  - Encourage verbalization of concerns/fears  - Identify coping mechanisms  - Assist in developing anxiety-reducing skills  - Administer/offer alternative therapies  - Limit or eliminate stimulants  Outcome: Progressing     Problem: Nutrition/Hydration-ADULT  Goal: Nutrient/Hydration intake appropriate for improving, restoring or maintaining nutritional needs  Description: Monitor and assess patient's nutrition/hydration status for malnutrition  Collaborate with interdisciplinary team and initiate plan and interventions as ordered  Monitor patient's weight and dietary intake as ordered or per policy  Utilize nutrition screening tool and intervene as necessary  Determine patient's food preferences and provide high-protein, high-caloric foods as appropriate       INTERVENTIONS:  - Monitor oral intake, urinary output, labs, and treatment plans  - Assess nutrition and hydration status and recommend course of action  - Evaluate amount of meals eaten  - Assist patient with eating if necessary   - Allow adequate time for meals  - Recommend/ encourage appropriate diets, oral nutritional supplements, and vitamin/mineral supplements  - Order, calculate, and assess calorie counts as needed  - Recommend, monitor, and adjust tube feedings and TPN/PPN based on assessed needs  - Assess need for intravenous fluids  - Provide specific nutrition/hydration education as appropriate  - Include patient/family/caregiver in decisions related to nutrition  Outcome: Progressing

## 2021-03-05 NOTE — PROGRESS NOTES
03/05/21    Team Meeting   Meeting Type Daily Rounds   Team Members Present   Team Members Present Physician;Nurse;;Occupational Therapist   Physician Team Member Dr Dali Maria MD; Nelson Choi, [address] [place] [address]   Nursing Team Member Hope Welsh, CARLOS   Care Management Team Member Ariel Bey MS, [facility]   OT Team Member [place], [place]   Patient/Family Present   Patient Present No   Patient's Family Present No     PT will return home next week with outpatient psych, PCP follow up and also home health care services  Continue to encourage D/C plan, Prn on evening shift  Continues to be flat and endorse anxiety and depression rates at 8, denies AH/VH  2 episodes of loose stool, medical made aware

## 2021-03-05 NOTE — PHYSICAL THERAPY NOTE
03/05/21 1500   PT Last Visit   PT Visit Date 03/05/21   Note Type   Note Type Treatment   Pain Assessment   Pain Assessment Tool Pain Assessment not indicated - pt denies pain   Restrictions/Precautions   Weight Bearing Precautions Per Order No   Other Precautions Fall Risk;Pain   General   Chart Reviewed Yes   Family/Caregiver Present No   Cognition   Overall Cognitive Status WFL   Arousal/Participation Alert; Cooperative   Attention Within functional limits   Orientation Level Oriented X4   Memory Within functional limits   Following Commands Follows one step commands without difficulty   Comments pt agreed to PT session   Subjective   Subjective "My R leg feels numb  I'm tired after OT session & would like to go back to bed & rest "   Bed Mobility   Sit to Supine 6  Modified independent   Additional Comments pt OOB in community room to begin session, returned to bed to end session   Transfers   Sit to Stand 5  Supervision   Additional items Assist x 1; Armrests; Increased time required   Stand to Sit 5  Supervision   Additional items Assist x 1; Increased time required;Verbal cues   Ambulation/Elevation   Gait pattern Improper Weight shift; Antalgic; Forward Flexion;Decreased foot clearance;Decreased R stance; Short stride; Excessively slow   Gait Assistance 5  Supervision  (close)   Additional items Assist x 1;Verbal cues   Assistive Device Rolling walker   Distance 125 feet   Stair Management Assistance Not tested   Balance   Static Sitting Good   Dynamic Sitting Fair +   Static Standing Fair   Dynamic Standing Fair -   Ambulatory Fair -   Endurance Deficit   Endurance Deficit Yes   Activity Tolerance   Activity Tolerance Patient limited by fatigue   Assessment   Prognosis Fair   Problem List Decreased strength;Decreased endurance; Impaired balance;Decreased mobility; Decreased coordination;Decreased safety awareness; Impaired sensation   Assessment Pt seen for PT treatment session this date with interventions consisting of gait training w/ emphasis on improving pt's ability to ambulate level surfaces x 125 feet with close S provided by therapist with RW  Pt agreeable to PT treatment session upon arrival, pt found seated OOB in chair, in no apparent distress  In comparison to previous session, pt with no improvements as evidenced by decreased amb distance due to fatigue post OT session  Post session: pt returned BTB and all needs in reach Continue to recommend Home PT at time of d/c in order to maximize pt's functional independence and safety w/ mobility  Pt continues to be functioning below baseline level, and remains limited 2* factors listed above and including decreased strength, endurance & safe functional mobility  PT will continue to see pt while here in order to address the deficits listed above and provide interventions consistent w/ POC in effort to achieve STGs  Goals   Patient Goals to rest   PT Treatment Day 2   Plan   Treatment/Interventions Functional transfer training;LE strengthening/ROM; Therapeutic exercise; Endurance training;Patient/family training;Equipment eval/education; Bed mobility;Gait training   Progress No functional improvements   PT Frequency 1-2x/wk   Recommendation   PT Discharge Recommendation Home with skilled therapy; Return to previous environment with social support   Equipment Recommended Walker   PT - OK to Discharge Yes  (when med cleared)   Romina 8 in Bed Without Bedrails 3   Lying on Back to Sitting on Edge of Flat Bed 3   Moving Bed to Chair 3   Standing Up From Chair 3   Walk in Room 3   Climb 3-5 Stairs 3   Basic Mobility Inpatient Raw Score 18   Basic Mobility Standardized Score 41 05   Travis Rodriguez, NAVJOT

## 2021-03-05 NOTE — PROGRESS NOTES
The patient noted to be visible on the unit, in the milieu and resting quietly in the patient's room throughout the shift  The patient noted to be flat, depressed, negative in thought content  The patient rates both anxiety and depression 8/10, denies si, hi, and hallucinations  The patient reported two loose stools overnight and one additional loose stool after breakfast, unwitnessed by staff  RN notified Kody Drake pa-c of the patient's complaints of loose stools  The patient denies complaints of pain or discomfort  Q 7 minute safety checks maintained

## 2021-03-05 NOTE — PROGRESS NOTES
The patient noted to be visible on unit  Denies SI/HI Continues to be flat and isolates herself in her room  compliant with   meals and medications  Q 7 min checks continue

## 2021-03-05 NOTE — PLAN OF CARE
Problem: Alteration in Thoughts and Perception  Goal: Agree to be compliant with medication regime, as prescribed and report medication side effects  Description: Interventions:  - Offer appropriate PRN medication and supervise ingestion; conduct AIMS, as needed   Outcome: Progressing     Problem: Ineffective Coping  Goal: Understands least restrictive measures  Description: Interventions:  - Utilize least restrictive behavior  Outcome: Progressing  Goal: Free from restraint events  Description: - Utilize least restrictive measures   - Provide behavioral interventions   - Redirect inappropriate behaviors   Outcome: Progressing     Problem: Depression  Goal: Treatment Goal: Demonstrate behavioral control of depressive symptoms, verbalize feelings of improved mood/affect, and adopt new coping skills prior to discharge  Outcome: Progressing  Goal: Verbalize thoughts and feelings  Description: Interventions:  - Assess and re-assess patient's level of risk   - Engage patient in 1:1 interactions, daily, for a minimum of 15 minutes   - Encourage patient to express feelings, fears, frustrations, hopes   Outcome: Progressing  Goal: Refrain from harming self  Description: Interventions:  - Monitor patient closely, per order   - Supervise medication ingestion, monitor effects and side effects   Outcome: Progressing  Goal: Refrain from isolation  Description: Interventions:  - Develop a trusting relationship   - Encourage socialization   Outcome: Progressing  Goal: Refrain from self-neglect  Outcome: Progressing     Problem: Anxiety  Goal: Anxiety is at manageable level  Description: Interventions:  - Assess and monitor patient's anxiety level  - Monitor for signs and symptoms (heart palpitations, chest pain, shortness of breath, headaches, nausea, feeling jumpy, restlessness, irritable, apprehensive)  - Collaborate with interdisciplinary team and initiate plan and interventions as ordered    - [place] patient to unit/surroundings  - Explain treatment plan  - Encourage participation in care  - Encourage verbalization of concerns/fears  - Identify coping mechanisms  - Assist in developing anxiety-reducing skills  - Administer/offer alternative therapies  - Limit or eliminate stimulants  Outcome: Progressing     Problem: Nutrition/Hydration-ADULT  Goal: Nutrient/Hydration intake appropriate for improving, restoring or maintaining nutritional needs  Description: Monitor and assess patient's nutrition/hydration status for malnutrition  Collaborate with interdisciplinary team and initiate plan and interventions as ordered  Monitor patient's weight and dietary intake as ordered or per policy  Utilize nutrition screening tool and intervene as necessary  Determine patient's food preferences and provide high-protein, high-caloric foods as appropriate       INTERVENTIONS:  - Monitor oral intake, urinary output, labs, and treatment plans  - Assess nutrition and hydration status and recommend course of action  - Evaluate amount of meals eaten  - Assist patient with eating if necessary   - Allow adequate time for meals  - Recommend/ encourage appropriate diets, oral nutritional supplements, and vitamin/mineral supplements  - Order, calculate, and assess calorie counts as needed  - Recommend, monitor, and adjust tube feedings and TPN/PPN based on assessed needs  - Assess need for intravenous fluids  - Provide specific nutrition/hydration education as appropriate  - Include patient/family/caregiver in decisions related to nutrition  Outcome: Progressing

## 2021-03-05 NOTE — PLAN OF CARE
Problem: Ineffective Coping  Goal: Participates in unit activities  Description: Interventions:  - Provide therapeutic environment   - Provide required programming   - Redirect inappropriate behaviors   Outcome: Progressing  used

## 2021-03-05 NOTE — PROGRESS NOTES
Progress Note - Behavioral Health   Gloria Plata 62 y o  female MRN: [id number]  Unit/Bed#: [facility] 200-02 Encounter: [id number]    Assessment/Plan   Principal Problem:    MDD (major depressive disorder), recurrent episode ([address] )  Active Problems:    Essential hypertension    Depression    Other constipation    Absence of bladder continence    History of Asperger's syndrome    Aftercare following right hip joint replacement surgery    Severe protein-calorie malnutrition (HCC)      Behavior over the last 24 hours:  unchanged  Sleep:  Intermittent  Appetite: normal  Medication side effects: No  ROS: diarrhea and All other systems negative for acute change     Lowell Bearden was seen today for follow-up and case discussed with treatment team this morning  She remains somatically preoccupied and states that she is unable to understand why she will be discharged home since "it's apparent I cannot take care of myself "  Attempted to redirect patient which was somewhat effective  Lowell Bearden went on to say that she did not sleep well last night; will make adjustments to Remeron  Appetite however remains adequate  She rates her anxiety and depression as a 8-10    Also continues to endorse intermittent auditory hallucinations that she is unable to describe it  AH are non commanding in nature  She denies VH/SI/HI  Patient requires much reassurance and encouragement  Lowell Bearden does attend and participate in groups appropriately  Mental Status Evaluation:  Appearance:  age appropriate and casually dressed   Behavior:  Bizarre, calm, cooperative   Speech:  normal pitch and normal volume   Mood:  anxious and depressed   Affect:  flat   Thought Process:  circumstantial and perserverative   Thought Content:  delusions  somatic   Perceptual Disturbances:  Auditory hallucinations without commands   Risk Potential: Suicidal Ideations none  Homicidal Ideations none  Potential for Aggression No   Sensorium:  person, place, time/date and situation Memory:  recent and remote memory grossly intact   Consciousness:  alert and awake    Attention: attention span and concentration were age appropriate   Insight:  limited   Judgment: limited   Gait/Station: slow and Uses walker   Motor Activity: no abnormal movements     Progress Toward Goals: Will make adjustments to Remeron to assist patient with sleep  Otherwise, continue current psychotropic regimen  Plan is to discharge home with outpatient psychiatry, home healthcare services, and PCP follow-up  Recommended Treatment: Continue with group therapy, milieu therapy and occupational therapy  Risks, benefits and possible side effects of Medications:   Risks, benefits, and possible side effects of medications explained to patient and patient verbalizes understanding  Medications: all current active meds have been reviewed and planned medication changes: Decrease Remeron to 7 5mg PO QHS  Labs: I have personally reviewed all pertinent laboratory/tests results  Counseling / Coordination of Care  Total floor / unit time spent today 20 minutes  Greater than 50% of total time was spent with the patient and / or family counseling and / or coordination of care

## 2021-03-05 NOTE — PLAN OF CARE
Problem: OCCUPATIONAL THERAPY ADULT  Goal: Performs self-care activities at highest level of function for planned discharge setting  See evaluation for individualized goals  Description: Treatment Interventions: ADL retraining, Functional transfer training, UE strengthening/ROM, Endurance training, Patient/family training, Equipment evaluation/education, Compensatory technique education, Activityengagement          See flowsheet documentation for full assessment, interventions and recommendations  Outcome: Progressing  Note: Limitation: Decreased ADL status, Decreased UE strength, Decreased endurance, Decreased self-care trans, Decreased high-level ADLs, Mood limitation  Prognosis: Good  Assessment: Patient participated in Skilled OT session this date with interventions consisting of safety awareness and fall prevention techniques, therapeutic exercise to: increase functional use of BUEs, increase BUE muscle strength  and  therapeutic activities to: increase activity tolerance   Patient agreeable to OT treatment session, upon arrival patient was found seated OOB to Chair in dining area  Ambulated to (bed)room with RW ; was safe with utilization of same  Patient requiring ocassional safety reminders and verbal cues for correct technique of UE exercises  Patient continues to be functioning below baseline level, occupational performance remains limited secondary to factors listed above and increased risk for falls and injury  From OT standpoint, recommendation at time of d/c would be Home with family support and Home therapies  Patient to benefit from continued Occupational Therapy treatment while in the hospital to address deficits as defined above and maximize level of functional independence with ADLs and functional mobility        OT Discharge Recommendation: Home with skilled therapy  OT - OK to Discharge: Yes(Once medically cleared )  LOULOU Nassar

## 2021-03-05 NOTE — PROGRESS NOTES
[id number]- Resting quietly  Eyes closed  Respirations even and unlabored  No distress noted  Safety precautions maintained  Q 7 min checks maintained  Will continue to monitor

## 2021-03-06 LAB
GLUCOSE SERPL-MCNC: 101 MG/DL (ref 65–140)
GLUCOSE SERPL-MCNC: 86 MG/DL (ref 65–140)

## 2021-03-06 PROCEDURE — 99232 SBSQ HOSP IP/OBS MODERATE 35: CPT | Performed by: NURSE PRACTITIONER

## 2021-03-06 PROCEDURE — 82948 REAGENT STRIP/BLOOD GLUCOSE: CPT

## 2021-03-06 RX ADMIN — TRAZODONE HYDROCHLORIDE 100 MG: 50 TABLET ORAL at 21:12

## 2021-03-06 RX ADMIN — SITAGLIPTIN 50 MG: 25 TABLET, FILM COATED ORAL at 08:39

## 2021-03-06 RX ADMIN — ACETAMINOPHEN 650 MG: 325 TABLET ORAL at 08:54

## 2021-03-06 RX ADMIN — LOSARTAN POTASSIUM 50 MG: 50 TABLET, FILM COATED ORAL at 08:39

## 2021-03-06 RX ADMIN — MELATONIN 3 MG: at 21:12

## 2021-03-06 RX ADMIN — RISPERIDONE 2 MG: 2 TABLET ORAL at 08:39

## 2021-03-06 RX ADMIN — MIRTAZAPINE 7.5 MG: 15 TABLET, FILM COATED ORAL at 21:12

## 2021-03-06 RX ADMIN — OXYBUTYNIN 5 MG: 5 TABLET, FILM COATED, EXTENDED RELEASE ORAL at 08:39

## 2021-03-06 RX ADMIN — RISPERIDONE 3 MG: 2 TABLET ORAL at 21:12

## 2021-03-06 RX ADMIN — Medication 1000 UNITS: at 08:39

## 2021-03-06 NOTE — PROGRESS NOTES
Patient remains visible on unit  Mostly stays in her room continues to isolate herself  States she has little depression and anxiety and she has little to no hallucinations, that they are getting better  Denies SI/HI Remains compliant with medications and meals  Pt continues to be flat  and negative  Q 7 min checks continue  C/B within reach

## 2021-03-06 NOTE — PROGRESS NOTES
Progress Note - George Ramires 62 y o  female MRN: [id number]    Unit/Bed#: [facility] Mayo 200-02 Encounter: [id number]        Subjective:   Patient seen and examined at bedside after reviewing the chart and discussing the case with the caring staff  Patient examined at bedside  Patient reports that her loose stools have resolved with Imodium  She has no other acute concerns  Physical Exam   Vitals: Blood pressure 128/71, pulse 87, temperature 97 7 °F (36 5 °C), temperature source Temporal, resp  rate 18, height 5' 5" (1 651 m), weight 63 3 kg (139 lb 8 8 oz), SpO2 96 %, not currently breastfeeding  ,Body mass index is 23 22 kg/m²  Constitutional: He appears well-developed  HEENT: PERR, EOMI, MMM  Cardiovascular: Normal rate and regular rhythm  Pulmonary/Chest: Effort normal and breath sounds normal    Abdomen: Soft, + BS, NT    Assessment/Plan:     George Ramires is a(n) 62y o  year old female with psychotic disorder      1  Cardiac with history of hypertension  Patient will be continued on losartan  2  DJD/osteoarthritis  Patient may get Tylenol on as needed basis  3  Gait abnormality  I will consult PT and OT for further evaluation and treatment  4  Insomnia  Patient is on melatonin  5  Overactive urinary bladder  Patient is on oxybutynin  6  Hyperglycemia/weight loss  We will closely monitor patient's blood sugars by doing Accu-Cheks 2 times daily  Continue Januvia 50 mg daily along with carb controlled diet  Patient's hemoglobin A1c was 5 3  Nutrition is on consult  Patient has been put on Glucerna supplements  7  Nasal congestion/dryness  Continue saline nasal spray every 2 hours as needed  8  Vitamin D deficiency  Continue daily vitamin-D supplements  9  Overgrown toenails  Podiatry is on consult  10  Loose stools  Patient may receive Imodium as needed  The patient was discussed with Dr Shawn Cabezas and he is in agreement with the above note

## 2021-03-06 NOTE — PROGRESS NOTES
Patient out for breakfast  She appears flat and rates her anxiety and depression both 8/10  Denies hallucinations  She denies any delusional thoughts at this time but states she does occasionally have thoughts about the devil that "come and go " Received 650mg Tylenol at 1779 for complaints of right hip pain and upon reassessment, denies pain  Will continue monitoring

## 2021-03-06 NOTE — NURSING NOTE
[phone]  Pt found to be resting quietly on most of authors rounds  No acute behavioral issues noted  Q 7 min checks maintained  Fall protocol in place

## 2021-03-06 NOTE — PROGRESS NOTES
Progress Note - Behavioral Health   Ceci Fletcher 62 y o  female MRN: [id number]  Unit/Bed#: Jason Caba 200-02 Encounter: [id number]    Patient seen for follow-up, chart reviewed and discussed with staff  Nursing staff notes no significant change over the past 24 hours  Patient is somewhat guarded on approach  Tells me she feels the same"  Reports anxiety same, depression "same  Reports that she has a decrease in appetite  States that she is staying hydrated and drinking adequately  Reports back pain that has been chronic  States that she utilizes a walker but has difficulty with ambulation  Denies suicidal or homicidal ideation  Denies auditory or visual hallucinations  Compliant with her medications and denies adverse effects      Behavior over the last 24 hours:  unchanged  Sleep: normal  Appetite:  Decreased  Medication side effects: No  ROS: As noted in HPI  /71 (BP Location: Right arm)   Pulse 87   Temp 97 7 °F (36 5 °C) (Temporal)   Resp 18   Ht 5' 5" (1 651 m)   Wt 63 3 kg (139 lb 8 8 oz)   LMP  (LMP Unknown)   SpO2 96%   BMI 23 22 kg/m²     Medications:   Current Facility-Administered Medications   Medication Dose Route Frequency    acetaminophen (TYLENOL) tablet 650 mg  650 mg Oral Q6H PRN    acetaminophen (TYLENOL) tablet 650 mg  650 mg Oral Q4H PRN    acetaminophen (TYLENOL) tablet 975 mg  975 mg Oral Q6H PRN    benztropine (COGENTIN) injection 1 mg  1 mg Intramuscular Q4H PRN Max 6/day    benztropine (COGENTIN) tablet 1 mg  1 mg Oral Q4H PRN Max 6/day    cholecalciferol (VITAMIN D3) tablet 1,000 Units  1,000 Units Oral Daily    hydrOXYzine HCL (ATARAX) tablet 25 mg  25 mg Oral Q6H PRN Max 4/day    hydrOXYzine HCL (ATARAX) tablet 50 mg  50 mg Oral Q4H PRN Max 4/day    Or    LORazepam (ATIVAN) injection 0 5 mg  0 5 mg Intramuscular Q4H PRN    loperamide (IMODIUM) capsule 2 mg  2 mg Oral TID PRN    LORazepam (ATIVAN) tablet 1 mg  1 mg Oral Q4H PRN Max 6/day    Or    LORazepam (ATIVAN) injection 1 mg  1 mg Intramuscular Q6H PRN Max 3/day    losartan (COZAAR) tablet 50 mg  50 mg Oral Daily    melatonin tablet 3 mg  3 mg Oral HS    mirtazapine (REMERON) tablet 7 5 mg  7 5 mg Oral HS    OLANZapine (ZyPREXA) tablet 10 mg  10 mg Oral Q3H PRN Max 3/day    Or    OLANZapine (ZyPREXA) IM injection 10 mg  10 mg Intramuscular Q3H PRN Max 3/day    OLANZapine (ZyPREXA) tablet 5 mg  5 mg Oral Q3H PRN Max 6/day    Or    OLANZapine (ZyPREXA) IM injection 5 mg  5 mg Intramuscular Q3H PRN Max 6/day    OLANZapine (ZyPREXA) tablet 2 5 mg  2 5 mg Oral Q3H PRN Max 8/day    oxybutynin (DITROPAN-XL) 24 hr tablet 5 mg  5 mg Oral Daily    polyethylene glycol (MIRALAX) packet 17 g  17 g Oral Daily PRN    risperiDONE (RisperDAL) tablet 2 mg  2 mg Oral Daily    risperiDONE (RisperDAL) tablet 3 mg  3 mg Oral HS    sitaGLIPtin (JANUVIA) tablet 50 mg  50 mg Oral Daily    sodium chloride (OCEAN) 0 65 % nasal spray 1 spray  1 spray Each Nare Q2H PRN    traZODone (DESYREL) tablet 100 mg  100 mg Oral HS    traZODone (DESYREL) tablet 50 mg  50 mg Oral HS PRN       Labs:   Admission on 02/20/2021   Component Date Value Ref Range Status    RPR 02/21/2021 Non-Reactive  Non-Reactive Final    Ventricular Rate 02/21/2021 85  BPM Final    Atrial Rate 02/21/2021 85  BPM Final    W[place] Interval 02/21/2021 156  ms Final    QRSD Interval 02/21/2021 86  ms Final    QT Interval 02/21/2021 356  ms Final    QTC Interval 02/21/2021 423  ms Final    P Axis 02/21/2021 70  degrees Final    QRS Axis 02/21/2021 -23  degrees Final    T Wave Axis 02/21/2021 69  degrees Final    Hemoglobin A1C 02/22/2021 5 3  Normal 3 8-5 6%; PreDiabetic 5 7-6 4%;  Diabetic >=6 5%; Glycemic control for adults with diabetes <7 0% % Final    EAG 02/22/2021 105  mg/dl Final    Vitamin B-12 02/22/2021 626  100 - 900 pg/mL Final    Vit D, 25-Hydroxy 02/22/2021 38 1  30 0 - 100 0 ng/mL Final    Folate 02/22/2021 14 9  3 1 - 17 5 ng/mL Final    POC Glucose 02/23/2021 102  65 - 140 mg/dl Final    POC Glucose 02/24/2021 96  65 - 140 mg/dl Final    Ventricular Rate 02/20/2021 91  BPM Final    Atrial Rate 02/20/2021 91  BPM Final    DC Interval 02/20/2021 154  ms Final    QRSD Interval 02/20/2021 88  ms Final    QT Interval 02/20/2021 356  ms Final    QTC Interval 02/20/2021 437  ms Final    P Axis 02/20/2021 72  degrees Final    QRS Axis 02/20/2021 -34  degrees Final    T Wave [place] 02/20/2021 76  degrees Final    POC Glucose 02/24/2021 108  65 - 140 mg/dl Final    POC Glucose 02/25/2021 88  65 - 140 mg/dl Final    POC Glucose 02/25/2021 95  65 - 140 mg/dl Final    POC Glucose 02/26/2021 100  65 - 140 mg/dl Final    POC Glucose 02/26/2021 103  65 - 140 mg/dl Final    POC Glucose 02/27/2021 94  65 - 140 mg/dl Final    POC Glucose 02/27/2021 98  65 - 140 mg/dl Final    POC Glucose 02/28/2021 91  65 - 140 mg/dl Final    POC Glucose 02/28/2021 98  65 - 140 mg/dl Final    POC Glucose 03/01/2021 91  65 - 140 mg/dl Final    Sodium 03/01/2021 137  134 - 143 mmol/L Final    Potassium 03/01/2021 4 0  3 5 - 5 5 mmol/L Final    Chloride 03/01/2021 104  98 - 107 mmol/L Final    CO2 03/01/2021 26  21 - 31 mmol/L Final    ANION GAP 03/01/2021 7  4 - 13 mmol/L Final    BUN 03/01/2021 18  7 - 25 mg/dL Final    Creatinine 03/01/2021 0 73  0 60 - 1 20 mg/dL Final    Glucose 03/01/2021 102* 65 - 99 mg/dL Final    Calcium 03/01/2021 9 3  8 6 - 10 5 mg/dL Final    AST 03/01/2021 9* 13 - 39 U/L Final    ALT 03/01/2021 11  7 - 52 U/L Final    Alkaline Phosphatase 03/01/2021 61  40 - 150 U/L Final    Total Protein 03/01/2021 6 5  6 4 - 8 9 g/dL Final    Albumin 03/01/2021 4 1  3 5 - 5 7 g/dL Final    Total Bilirubin 03/01/2021 0 40  0 20 - 1 00 mg/dL Final    eGFR 03/01/2021 90  ml/min/1 73sq m Final    POC Glucose 03/01/2021 106  65 - 140 mg/dl Final    POC Glucose 03/02/2021 87  65 - 140 mg/dl Final    POC Glucose 03/02/2021 91  65 - 140 mg/dl Final    POC Glucose 03/03/2021 83  65 - 140 mg/dl In process    POC Glucose 03/03/2021 102  65 - 140 mg/dl In process    POC Glucose 03/03/2021 83  65 - 140 mg/dl Final    POC Glucose 03/03/2021 102  65 - 140 mg/dl Final    POC Glucose 03/04/2021 91  65 - 140 mg/dl In process    POC Glucose 03/04/2021 90  65 - 140 mg/dl Final    POC Glucose 03/05/2021 90  65 - 140 mg/dl In process    POC Glucose 03/05/2021 91  65 - 140 mg/dl Final    POC Glucose 03/05/2021 90  65 - 140 mg/dl Final    POC Glucose 03/04/2021 91  65 - 140 mg/dl Final    POC Glucose 03/06/2021 86  65 - 140 mg/dl Final       Mental Status Evaluation:  Appearance:  age appropriate and casually dressed   Behavior:  Somewhat bizarre, guarded, calm   Speech:  normal pitch and normal volume   Mood:  anxious and depressed   Affect:  flat   Thought Process:  blocked   Thought Content:  delusions  somatic   Perceptual Disturbances: Denies auditory or visual hallucinations   Risk Potential: Suicidal Ideations none, Homicidal Ideations none and Potential for Aggression No   Sensorium:  person, place and time/date   Cognition:  recent and remote memory grossly intact   Consciousness:  alert and awake    Attention: attention span and concentration were age appropriate   Insight:  limited   Judgment: limited   Gait/Station: Ambulates with walker   Motor Activity: no abnormal movements     Progress Toward Goals:  Minimal change    Assessment/Plan   Principal Problem:    MDD (major depressive disorder), recurrent episode ([facility] )  Active Problems:    Essential hypertension    Depression    Other constipation    Absence of bladder continence    History of Asperger's syndrome    Aftercare following right hip joint replacement surgery    Severe protein-calorie malnutrition ([facility] )      Recommended Treatment:  Continue with medications and treatment plan as follows  Mirtazapine 7 5 mg HS- decreased from 15 mg yesterday to help with sleep/appetite  Risperdal 2 mg daily and 3 mg HS  Trazodone 100 mg HS  Melatonin 3 mg HS  Continue to encourage in milieu participation as tolerated     Continue with group therapy, milieu therapy and occupational therapy  Risks, benefits and possible side effects of Medications:   Risks, benefits, and possible side effects of medications explained to patient and patient verbalizes understanding  Counseling / Coordination of Care  Total floor / unit time spent today 25 minutes  Greater than 50% of total time was spent with the patient and / or family counseling and / or coordination of care   A description of the counseling / coordination of care:  Medication management, chart review, patient interview

## 2021-03-07 LAB
GLUCOSE SERPL-MCNC: 83 MG/DL (ref 65–140)
GLUCOSE SERPL-MCNC: 95 MG/DL (ref 65–140)

## 2021-03-07 PROCEDURE — 99232 SBSQ HOSP IP/OBS MODERATE 35: CPT | Performed by: PSYCHIATRY & NEUROLOGY

## 2021-03-07 PROCEDURE — 82948 REAGENT STRIP/BLOOD GLUCOSE: CPT

## 2021-03-07 RX ADMIN — OXYBUTYNIN 5 MG: 5 TABLET, FILM COATED, EXTENDED RELEASE ORAL at 08:48

## 2021-03-07 RX ADMIN — RISPERIDONE 3 MG: 2 TABLET ORAL at 21:50

## 2021-03-07 RX ADMIN — TRAZODONE HYDROCHLORIDE 50 MG: 50 TABLET ORAL at 23:55

## 2021-03-07 RX ADMIN — RISPERIDONE 2 MG: 2 TABLET ORAL at 08:48

## 2021-03-07 RX ADMIN — SITAGLIPTIN 50 MG: 25 TABLET, FILM COATED ORAL at 08:48

## 2021-03-07 RX ADMIN — TRAZODONE HYDROCHLORIDE 100 MG: 50 TABLET ORAL at 21:49

## 2021-03-07 RX ADMIN — HYDROXYZINE HYDROCHLORIDE 50 MG: 25 TABLET, FILM COATED ORAL at 23:55

## 2021-03-07 RX ADMIN — LOSARTAN POTASSIUM 50 MG: 50 TABLET, FILM COATED ORAL at 08:48

## 2021-03-07 RX ADMIN — MELATONIN 3 MG: at 21:50

## 2021-03-07 RX ADMIN — Medication 1000 UNITS: at 08:48

## 2021-03-07 RX ADMIN — MIRTAZAPINE 7.5 MG: 15 TABLET, FILM COATED ORAL at 21:50

## 2021-03-07 NOTE — NURSING NOTE
Patient out in the milieu social with peers and staff  Ate HS snack and attended group  Upon approach patient's mood and affect is flat and depressed  Patient endorses anxiety and rates high  Uses the walker for ambulation throughout the milieu  Denies SI/HI/AHVH/pain  Took HS medication without difficulty  Will continue to monitor safety and behaviors every 7 minutes

## 2021-03-07 NOTE — PROGRESS NOTES
Progress Note - Kevin Heart 62 y o  female MRN: [id number]    Unit/Bed#: Benjamin Reyes 200-02 Encounter: [id number]        Subjective:   Patient seen and examined at bedside after reviewing the chart and discussing the case with the caring staff  Patient examined at bedside  Patient has no acute concerns  Physical Exam   Vitals: Blood pressure 138/75, pulse 80, temperature 98 8 °F (37 1 °C), temperature source Temporal, resp  rate 18, height 5' 5" (1 651 m), weight 63 3 kg (139 lb 8 8 oz), SpO2 95 %, not currently breastfeeding  ,Body mass index is 23 22 kg/m²  Constitutional: He appears well-developed  HEENT: PERR, EOMI, MMM  Cardiovascular: Normal rate and regular rhythm  Pulmonary/Chest: Effort normal and breath sounds normal    Abdomen: Soft, + BS, NT    Assessment/Plan:     Kevin Heart is a(n) 62y o  year old female with psychotic disorder      1  Cardiac with history of hypertension  Patient will be continued on losartan  2  DJD/osteoarthritis  Patient may get Tylenol on as needed basis  3  Gait abnormality  I will consult PT and OT for further evaluation and treatment  4  Insomnia  Patient is on melatonin  5  Overactive urinary bladder  Patient is on oxybutynin  6  Hyperglycemia/weight loss  We will closely monitor patient's blood sugars by doing Accu-Cheks 2 times daily  Continue Januvia 50 mg daily along with carb controlled diet  Patient's hemoglobin A1c was 5 3  Nutrition is on consult  Patient has been put on Glucerna supplements  7  Nasal congestion/dryness  Continue saline nasal spray every 2 hours as needed  8  Vitamin D deficiency  Continue daily vitamin-D supplements  9  Overgrown toenails  Podiatry is on consult  10  Loose stools  Patient may receive Imodium as needed  The patient was discussed with Dr Toney Solorzano and he is in agreement with the above note

## 2021-03-07 NOTE — PROGRESS NOTES
Patient out for meals and then retreats to her room  She states her anxiety and depression are moderate today  Denies hallucinations  Did not verbalize any delusional thoughts  No complaints of pain  Will continue monitoring

## 2021-03-07 NOTE — PROGRESS NOTES
Progress Note - [address] [age] y o  female MRN: [id number]  Unit/Bed#: [facility] 200-02 Encounter: [id number]    Assessment/Plan   Principal Problem:    MDD (major depressive disorder), recurrent episode ([address] )  Active Problems:    Essential hypertension    Depression    Other constipation    Absence of bladder continence    History of Asperger's syndrome    Aftercare following right hip joint replacement surgery    Severe protein-calorie malnutrition (HCC)      Behavior over the last 24 hours:  unchanged  Sleep: improved  Appetite: normal  Medication side effects: No  ROS: no complaints, all other systems are negative for acute changes    Mental Status Evaluation:  Appearance:  age appropriate   Behavior:  guarded   Speech:  normal volume   Mood:  decreased range   Affect:  mood-congruent   Thought Process:  goal directed   Thought Content:  no overt delusions   Perceptual Disturbances: None   Risk Potential: Suicidal Ideations none  Homicidal Ideations none  Potential for Aggression No   Sensorium:  person, place and time/date   Memory:  recent and remote memory grossly intact   Consciousness:  alert and awake    Attention: attention span appeared shorter than expected for age   Insight:  limited   Judgment: limited   Gait/Station: uses walker   Motor Activity: no abnormal movements     Progress Toward Goals: Patient reports some improvement in her anxiety and depressed mood but remains isolated with limited interaction with peers in the unit  Calorie intake and medication compliant has been stable  Recommended Treatment: Continue with group therapy, milieu therapy and occupational therapy  Risks, benefits and possible side effects of Medications:   Risks, benefits, and possible side effects of medications explained to patient and patient verbalizes understanding  Medications: all current active meds have been reviewed  Labs:  I have personally reviewed all pertinent laboratory/tests results  Most Recent Labs:   Lab Results   Component Value Date    WBC 5 44 02/20/2021    RBC 5 80 (H) 02/20/2021    HGB 17 9 (H) 02/20/2021    HCT 53 5 (H) 02/20/2021     02/20/2021    RDW 12 7 02/20/2021    NEUTROABS 3 72 02/20/2021    SODIUM 137 03/01/2021    K 4 0 03/01/2021     03/01/2021    CO2 26 03/01/2021    BUN 18 03/01/2021    CREATININE 0 73 03/01/2021    GLUC 102 (H) 03/01/2021    CALCIUM 9 3 03/01/2021    AST 9 (L) 03/01/2021    ALT 11 03/01/2021    ALKPHOS 61 03/01/2021    TP 6 5 03/01/2021    ALB 4 1 03/01/2021    TBILI 0 40 03/01/2021    CHOLESTEROL 257 (H) 12/05/2019    HDL 71 12/05/2019    TRIG 112 12/05/2019    LDLCALC 164 (H) 12/05/2019    KQO4NAGUFEPL 0 506 02/20/2021    RPR Non-Reactive 02/21/2021    HGBA1C 5 3 02/22/2021     02/22/2021       Counseling / Coordination of Care  Total floor / unit time spent today 20 minutes  Greater than 50% of total time was spent with the patient and / or family counseling and / or coordination of care

## 2021-03-07 NOTE — PLAN OF CARE
Problem: Alteration in Thoughts and Perception  Goal: Treatment Goal: Gain control of psychotic behaviors/thinking, reduce/eliminate presenting symptoms and demonstrate improved reality functioning upon discharge  Outcome: Progressing  Goal: Verbalize thoughts and feelings  Description: Interventions:  - Promote a nonjudgmental and trusting relationship with the patient through active listening and therapeutic communication  - Assess patient's level of functioning, behavior and potential for risk  - Engage patient in 1 on 1 interactions  - Encourage patient to express fears, feelings, frustrations, and discuss symptoms    - [place] patient to reality, help patient recognize reality-based thinking   - Administer medications as ordered and assess for potential side effects  - Provide the patient education related to the signs and symptoms of the illness and desired effects of prescribed medications  Outcome: Progressing  Goal: Refrain from acting on delusional thinking/internal stimuli  Description: Interventions:  - Monitor patient closely, per order   - Utilize least restrictive measures   - Set reasonable limits, give positive feedback for acceptable   - Administer medications as ordered and monitor of potential side effects  Outcome: Progressing  Goal: Agree to be compliant with medication regime, as prescribed and report medication side effects  Description: Interventions:  - Offer appropriate PRN medication and supervise ingestion; conduct AIMS, as needed   Outcome: Progressing  Goal: Attend and participate in unit activities, including therapeutic, recreational, and educational groups  Description: Interventions:  -Encourage Visitation and family involvement in care  Outcome: Progressing  Goal: Recognize dysfunctional thoughts, communicate reality-based thoughts at the time of discharge  Description: Interventions:  - Provide medication and psycho-education to assist patient in compliance and developing insight into his/her illness   Outcome: Progressing  Goal: Complete daily ADLs, including personal hygiene independently, as able  Description: Interventions:  - Observe, teach, and assist patient with ADLS  - Monitor and promote a balance of rest/activity, with adequate nutrition and elimination   Outcome: Progressing

## 2021-03-08 LAB
GLUCOSE SERPL-MCNC: 84 MG/DL (ref 65–140)
GLUCOSE SERPL-MCNC: 99 MG/DL (ref 65–140)

## 2021-03-08 PROCEDURE — 82948 REAGENT STRIP/BLOOD GLUCOSE: CPT

## 2021-03-08 PROCEDURE — 99232 SBSQ HOSP IP/OBS MODERATE 35: CPT | Performed by: PSYCHIATRY & NEUROLOGY

## 2021-03-08 RX ORDER — LOSARTAN POTASSIUM 50 MG/1
50 TABLET ORAL DAILY
Status: DISCONTINUED | OUTPATIENT
Start: 2021-03-08 | End: 2021-03-31 | Stop reason: HOSPADM

## 2021-03-08 RX ADMIN — MIRTAZAPINE 7.5 MG: 15 TABLET, FILM COATED ORAL at 21:46

## 2021-03-08 RX ADMIN — SALINE NASAL SPRAY 1 SPRAY: 1.5 SOLUTION NASAL at 23:48

## 2021-03-08 RX ADMIN — HYDROXYZINE HYDROCHLORIDE 50 MG: 25 TABLET, FILM COATED ORAL at 23:45

## 2021-03-08 RX ADMIN — TRAZODONE HYDROCHLORIDE 50 MG: 50 TABLET ORAL at 23:46

## 2021-03-08 RX ADMIN — TRAZODONE HYDROCHLORIDE 100 MG: 50 TABLET ORAL at 21:47

## 2021-03-08 RX ADMIN — OXYBUTYNIN 5 MG: 5 TABLET, FILM COATED, EXTENDED RELEASE ORAL at 08:32

## 2021-03-08 RX ADMIN — RISPERIDONE 2 MG: 2 TABLET ORAL at 08:32

## 2021-03-08 RX ADMIN — MELATONIN 3 MG: at 21:46

## 2021-03-08 RX ADMIN — Medication 1000 UNITS: at 08:32

## 2021-03-08 RX ADMIN — RISPERIDONE 3 MG: 2 TABLET ORAL at 21:46

## 2021-03-08 RX ADMIN — LOSARTAN POTASSIUM 50 MG: 50 TABLET, FILM COATED ORAL at 12:15

## 2021-03-08 RX ADMIN — SITAGLIPTIN 50 MG: 25 TABLET, FILM COATED ORAL at 08:32

## 2021-03-08 NOTE — SOCIAL WORK
GAURAV spoke with [facility]  with [facility] indicated that should the other facility reviewing not work out CM to reach out to her with clinical for review  Call ended mutually

## 2021-03-08 NOTE — PROGRESS NOTES
Pt woke up and requested PRN trazodone and atarax for restlessness and insomnia  Continuous visual safety checks performed throughout the night  No sign of distress or labored breathing  Safety precautions maintained  Will continue to monitor

## 2021-03-08 NOTE — PROGRESS NOTES
Pt was present in her room at time of assessment  Pt reported feeling no different since she's been here and stated that her issue isn't a psychological problem and it couldn't be cured or fixed here, stating her problems are "spiritual " Pt reported feeling that she would probably have trouble sleeping and asked if ativan would be available for her to use as a sleep aid  Pt was informed that ativan was for severe anxiety and would not be available as a sleep aid and this was previously discussed with the patient  Pt denied ah, vh, si, hi  Pt reported no pain at this time  Continuous visual safety checks performed throughout the shift  Safety precautions maintained  Will continue to monitor

## 2021-03-08 NOTE — PROGRESS NOTES
The patient noted to be visible on the unit and in the milieu upon rounds throughout the shift  The patient noted to be flat, quiet and withdrawn  The patient states both anxiety and depression are "so-so " The patient denies si, hi, and hallucinations  The patient noted to be compliant with meals and medications  Upon review of ordered medications, noted order for patient's losartan completed 3/7/2021  RN placed written communication to Dr Goldie Pittman to obtain clarification of order  The patient denies complaints of pain  The patient ambulatory with rolling walker on the unit, gait noted to be slow and steady  Q 7 minute safety checks maintained

## 2021-03-08 NOTE — SOCIAL WORK
CM placed call to PT brother Tona Conley to update him on PT status and plan for D/C on Wednesday    PT brother indicated that PT can not return to his mothers home because she has dementia and PT causes conflict in the home setting  CM reviewed with PT brother PT rights in return need for 30 day eviction notice  Reviewed PT progress and after care supports  PT brother indicated again that he will not allow PT to return home due to complexities with PT mother  PT brother confirmed that PT makes 1036 90 a month in disability and he would be able to contribute 500 a month in addition to find her alternative housing such as assisted living  CM agreed to talk with PT and explore options with her  Call ended mutually  CM met with PT to review outcome of call with brother and options for D/C  PT feels it would be best to go to assisted living as she realizes going home would lead to potential further upset  PT signed ROCAEL  CM reviewed with PT what assisted living is in comparison to skilled nursing care  PT reflected she understood  Much encouragement and validation provided  CM placed call to [facility] with [facility] , left message requesting return call  CM placed call to [facility] with [facility], reviewed PT case and need for care  [facility] would be able to accommodate 1500 a month with the understanding the brother would sign the contract to pay in fill in each month  She would then want to assess PT in person  [facility] is going to reach out to PT brother Tona Conley and then follow up with CM  [facility] will then follow up with CM  Call ended mutually

## 2021-03-08 NOTE — SOCIAL WORK
CM met with PT for PT check in  PT reported that she is ok anxiety and depression 5/10, denies SI/HI/AH/VH, reviewed plan for D/C home on Wednesday, PT reflected needing skilled nursing care  CM reviewed with PT plan of care and follow up supports  Reviewed PT abilities and how supports will continue to help her to progress in the community setting  PT in agreement and reflected she understood  PT signed ROCAEL for home health care, PCP, and psych  Much reassurance provided  CM to reach out to PT brother to review D/C plan

## 2021-03-08 NOTE — PLAN OF CARE
Problem: Alteration in Thoughts and Perception  Goal: Verbalize thoughts and feelings  Description: Interventions:  - Promote a nonjudgmental and trusting relationship with the patient through active listening and therapeutic communication  - Assess patient's level of functioning, behavior and potential for risk  - Engage patient in 1 on 1 interactions  - Encourage patient to express fears, feelings, frustrations, and discuss symptoms    - [place] patient to reality, help patient recognize reality-based thinking   - Administer medications as ordered and assess for potential side effects  - Provide the patient education related to the signs and symptoms of the illness and desired effects of prescribed medications  Outcome: Progressing  Goal: Refrain from acting on delusional thinking/internal stimuli  Description: Interventions:  - Monitor patient closely, per order   - Utilize least restrictive measures   - Set reasonable limits, give positive feedback for acceptable   - Administer medications as ordered and monitor of potential side effects  Outcome: Progressing  Goal: Agree to be compliant with medication regime, as prescribed and report medication side effects  Description: Interventions:  - Offer appropriate PRN medication and supervise ingestion; conduct AIMS, as needed   Outcome: Progressing     Problem: Ineffective Coping  Goal: Understands least restrictive measures  Description: Interventions:  - Utilize least restrictive behavior  Outcome: Progressing  Goal: Free from restraint events  Description: - Utilize least restrictive measures   - Provide behavioral interventions   - Redirect inappropriate behaviors   Outcome: Progressing     Problem: Depression  Goal: Verbalize thoughts and feelings  Description: Interventions:  - Assess and re-assess patient's level of risk   - Engage patient in 1:1 interactions, daily, for a minimum of 15 minutes   - Encourage patient to express feelings, fears, frustrations, hopes   Outcome: Progressing  Goal: Refrain from harming self  Description: Interventions:  - Monitor patient closely, per order   - Supervise medication ingestion, monitor effects and side effects   Outcome: Progressing  Goal: Refrain from self-neglect  Outcome: Progressing

## 2021-03-08 NOTE — PROGRESS NOTES
Progress Note - Mariluz Parra 62 y o  female MRN: [id number]    Unit/Bed#: [facility] 200-02 Encounter: [id number]        Subjective:   Patient seen and examined at bedside after reviewing the chart and discussing the case with the caring staff  Patient examined at bedside  Patient has no acute concerns except low appetite  Physical Exam   Vitals: Blood pressure 144/81, pulse 95, temperature 98 4 °F (36 9 °C), temperature source Temporal, resp  rate 16, height 5' 5" (1 651 m), weight 63 3 kg (139 lb 8 8 oz), SpO2 96 %, not currently breastfeeding  ,Body mass index is 23 22 kg/m²  Constitutional: He appears well-developed  HEENT: PERR, EOMI, MMM  Cardiovascular: Normal rate and regular rhythm  Pulmonary/Chest: Effort normal and breath sounds normal    Abdomen: Soft, + BS, NT    Assessment/Plan:     Mariluz Parra is a(n) 62y o  year old female with psychotic disorder      1  Cardiac with history of hypertension  Patient will be continued on losartan  2  DJD/osteoarthritis  Patient may get Tylenol on as needed basis  3  Gait abnormality  I will consult PT and OT for further evaluation and treatment  4  Insomnia  Patient is on melatonin  5  Overactive urinary bladder  Patient is on oxybutynin  6  Hyperglycemia/weight loss  We will closely monitor patient's blood sugars by doing Accu-Cheks 2 times daily  Continue Januvia 50 mg daily along with carb controlled diet  Patient's hemoglobin A1c was 5 3  Nutrition is on consult  Patient has been put on Glucerna supplements  7  Nasal congestion/dryness  Continue saline nasal spray every 2 hours as needed  8  Vitamin D deficiency  Continue daily vitamin-D supplements  9  Overgrown toenails  Podiatry is on consult  10  Loose stools  Patient may receive Imodium as needed  11  Anorexia/low appetite    Patient is on Remeron 7 5 mg daily along with Glucerna

## 2021-03-08 NOTE — PROGRESS NOTES
Progress Note - Behavioral Health   Kevin Heart 62 y o  female MRN: [id number]  Unit/Bed#: [facility] 200-02 Encounter: [id number]    Assessment/Plan   Principal Problem:    MDD (major depressive disorder), recurrent episode ([address] )  Active Problems:    Essential hypertension    Depression    Other constipation    Absence of bladder continence    History of Asperger's syndrome    Aftercare following right hip joint replacement surgery    Severe protein-calorie malnutrition (HCC)      Behavior over the last 24 hours:  unchanged  Sleep: insomnia  Appetite: normal  Medication side effects: No  ROS: no complaints and All other systems negative for acute change     Ewa Azevedo was seen today for follow-up and case discussed with treatment team this morning  Patient was more engaged in interview today and appeared brighter  She reports her anxiety and depression are the same    She continues to endorse intermittent auditory hallucinations that are non commanding in nature but reports they are much less    She denies any VH/SI/HI  Ewa Azevedo expressed she is excited to learn that she will be going to an assisted living facility upon discharge  She also reports that she had some difficulty falling asleep last night but utilize p r n  medication which was effective  Appetite remains adequate  She still remains somatically preoccupied that she is unable to care for herself however patient appeared less disheveled today and well groomed  She even smiled at this provider a few times and stated I never smile so I guess that is a good that I am   She is visible in the milieu but keeps to herself  Mental Status Evaluation:  Appearance:  age appropriate and casually dressed, less disheveled   Behavior:  Calm and cooperative   Speech:  delayed   Mood:  constricted   Affect:  mood-congruent   Thought Process:  circumstantial   Thought Content:  delusions  somatic   Perceptual Disturbances:  Auditory hallucinations without commands   Risk Potential: Suicidal Ideations none  Homicidal Ideations none  Potential for Aggression No   Sensorium:  person, place, time/date and situation   Memory:  recent and remote memory grossly intact   Consciousness:  alert and awake    Attention: attention span appeared shorter than expected for age   Insight:  limited   Judgment: limited   Gait/Station: slow and Uses walker   Motor Activity: no abnormal movements     Progress Toward Goals:  Slight improvement  Continue with current psychotropic regimen  Patient is now on able to return home  Family and patient feel it would be best to seek out assisted living facility  Case management currently working on placement  No discharge date at this time  Recommended Treatment: Continue with group therapy, milieu therapy and occupational therapy  Risks, benefits and possible side effects of Medications:   Risks, benefits, and possible side effects of medications explained to patient and patient verbalizes understanding  Medications: all current active meds have been reviewed and continue current psychiatric medications  Labs: I have personally reviewed all pertinent laboratory/tests results  Counseling / Coordination of Care  Total floor / unit time spent today 20 minutes  Greater than 50% of total time was spent with the patient and / or family counseling and / or coordination of care

## 2021-03-08 NOTE — PLAN OF CARE
Problem: DISCHARGE PLANNING  Goal: Discharge to home or other facility with appropriate resources  Description: INTERVENTIONS:  - Identify barriers to discharge w/patient and caregiver  - Arrange for needed discharge resources and transportation as appropriate  - Identify discharge learning needs (meds, wound care, etc )  - Arrange for interpretive services to assist at discharge as needed  - Refer to Case Management Department for coordinating discharge planning if the patient needs post-hospital services based on physician/advanced practitioner order or complex needs related to functional status, cognitive ability, or social support system  Outcome: Progressing   PT scheduled for D/C Wednesday, looking at assisted living as PT is not able to return to her home

## 2021-03-08 NOTE — PROGRESS NOTES
Losartan reordered, administered at 1215 as per physician's orders  Q 7 minute safety checks maintained

## 2021-03-08 NOTE — PROGRESS NOTES
03/08/21    Team Meeting   Meeting Type Daily Rounds   Team Members Present   Team Members Present Physician;Nurse;;Occupational Therapist   Physician Team Member Dr Scout Kline MD; Curry Dick[address]   Nursing Team Member Mahsa Tate, CARLOS   Care Management Team Member MS [place], [facility]   OT Team Member Jake Curiel, [place]   Patient/Family Present   Patient Present No   Patient's Family Present No   PT will follow up with home health, outpatient psych, PCP  D/C sometime this week  Anxiety and depression 5/10, prn last night  Withdrawn to self, ambulatory with walker, independent  Knowledgeable of medications  D/C Wednesday

## 2021-03-09 LAB
GLUCOSE SERPL-MCNC: 82 MG/DL (ref 65–140)
GLUCOSE SERPL-MCNC: 89 MG/DL (ref 65–140)

## 2021-03-09 PROCEDURE — 82948 REAGENT STRIP/BLOOD GLUCOSE: CPT

## 2021-03-09 PROCEDURE — 99232 SBSQ HOSP IP/OBS MODERATE 35: CPT | Performed by: NURSE PRACTITIONER

## 2021-03-09 RX ORDER — LOPERAMIDE HYDROCHLORIDE 2 MG/1
2 CAPSULE ORAL 3 TIMES DAILY PRN
Qty: 30 CAPSULE | Refills: 0 | Status: SHIPPED | OUTPATIENT
Start: 2021-03-09

## 2021-03-09 RX ORDER — NYSTATIN 100000 [USP'U]/G
POWDER TOPICAL 3 TIMES DAILY
Status: DISCONTINUED | OUTPATIENT
Start: 2021-03-09 | End: 2021-03-31 | Stop reason: HOSPADM

## 2021-03-09 RX ORDER — LOSARTAN POTASSIUM 50 MG/1
50 TABLET ORAL DAILY
Qty: 90 TABLET | Refills: 0 | Status: SHIPPED | OUTPATIENT
Start: 2021-03-09

## 2021-03-09 RX ORDER — METHYLDOPA/HYDROCHLOROTHIAZIDE 250MG-15MG
100 TABLET ORAL DAILY
Qty: 30 TABLET | Refills: 0 | Status: SHIPPED | OUTPATIENT
Start: 2021-03-09

## 2021-03-09 RX ORDER — NYSTATIN 100000 [USP'U]/G
POWDER TOPICAL 3 TIMES DAILY
Qty: 15 G | Refills: 0 | Status: SHIPPED | OUTPATIENT
Start: 2021-03-09

## 2021-03-09 RX ORDER — MULTIVIT-MIN/IRON FUM/FOLIC AC 7.5 MG-4
1 TABLET ORAL DAILY
Qty: 30 TABLET | Refills: 0 | Status: SHIPPED | OUTPATIENT
Start: 2021-03-09

## 2021-03-09 RX ORDER — FLUCONAZOLE 150 MG/1
150 TABLET ORAL ONCE
Status: COMPLETED | OUTPATIENT
Start: 2021-03-09 | End: 2021-03-09

## 2021-03-09 RX ORDER — MELATONIN
1000 DAILY
Qty: 30 TABLET | Refills: 0 | Status: SHIPPED | OUTPATIENT
Start: 2021-03-10

## 2021-03-09 RX ORDER — LANOLIN ALCOHOL/MO/W.PET/CERES
3 CREAM (GRAM) TOPICAL
Qty: 30 TABLET | Refills: 0 | Status: SHIPPED | OUTPATIENT
Start: 2021-03-09

## 2021-03-09 RX ORDER — ACETAMINOPHEN 325 MG/1
650 TABLET ORAL EVERY 6 HOURS PRN
Qty: 100 TABLET | Refills: 0 | Status: SHIPPED | OUTPATIENT
Start: 2021-03-09

## 2021-03-09 RX ORDER — OXYBUTYNIN CHLORIDE 5 MG/1
5 TABLET, EXTENDED RELEASE ORAL DAILY
Qty: 30 TABLET | Refills: 0 | Status: SHIPPED | OUTPATIENT
Start: 2021-03-09

## 2021-03-09 RX ADMIN — MELATONIN 3 MG: at 21:10

## 2021-03-09 RX ADMIN — RISPERIDONE 3 MG: 2 TABLET ORAL at 21:10

## 2021-03-09 RX ADMIN — FLUCONAZOLE 150 MG: 150 TABLET ORAL at 09:28

## 2021-03-09 RX ADMIN — NYSTATIN: 100000 POWDER TOPICAL at 21:09

## 2021-03-09 RX ADMIN — SITAGLIPTIN 50 MG: 25 TABLET, FILM COATED ORAL at 08:14

## 2021-03-09 RX ADMIN — MIRTAZAPINE 7.5 MG: 15 TABLET, FILM COATED ORAL at 21:10

## 2021-03-09 RX ADMIN — RISPERIDONE 2 MG: 2 TABLET ORAL at 08:14

## 2021-03-09 RX ADMIN — Medication 1000 UNITS: at 08:14

## 2021-03-09 RX ADMIN — OXYBUTYNIN 5 MG: 5 TABLET, FILM COATED, EXTENDED RELEASE ORAL at 08:14

## 2021-03-09 RX ADMIN — LOSARTAN POTASSIUM 50 MG: 50 TABLET, FILM COATED ORAL at 08:14

## 2021-03-09 RX ADMIN — TRAZODONE HYDROCHLORIDE 100 MG: 50 TABLET ORAL at 21:10

## 2021-03-09 RX ADMIN — NYSTATIN 1 APPLICATION: 100000 POWDER TOPICAL at 09:28

## 2021-03-09 NOTE — PROGRESS NOTES
Progress Note - George Ramires 62 y o  female MRN: [id number]    Unit/Bed#: [facility] 200-02 Encounter: [id number]        Subjective:   Patient seen and examined at bedside after reviewing the chart and discussing the case with the caring staff  Patient examined at bedside  Patient reports that she feels she has yeast infection involving which gyn a as well as athlete foot for which she is looking for a treatment  Patient is scheduled for discharge tomorrow 03/10/2021  Patient is requesting prescriptions  I reviewed and reconciled patient's problem list and medications  Physical Exam   Vitals: Blood pressure 137/82, pulse 94, temperature 98 5 °F (36 9 °C), temperature source Temporal, resp  rate 16, height 5' 5" (1 651 m), weight 63 3 kg (139 lb 8 8 oz), SpO2 94 %, not currently breastfeeding  ,Body mass index is 23 22 kg/m²  Constitutional: He appears well-developed  HEENT: PERR, EOMI, MMM  Cardiovascular: Normal rate and regular rhythm  Pulmonary/Chest: Effort normal and breath sounds normal    Abdomen: Soft, + BS, NT    Assessment/Plan:     George Ramires is a(n) 62y o  year old female with psychotic disorder  MEDICAL CLEARANCE  Patient is medically cleared for discharge  All scripts have will be sent out for the patient      1  Cardiac with history of hypertension  Patient will be continued on losartan  2  DJD/osteoarthritis  Patient may get Tylenol on as needed basis  3  Gait abnormality  I will consult PT and OT for further evaluation and treatment  4  Insomnia  Patient is on melatonin  5  Overactive urinary bladder  Patient is on oxybutynin  6  Hyperglycemia/weight loss  We will closely monitor patient's blood sugars by doing Accu-Cheks 2 times daily  Continue Januvia 50 mg daily along with carb controlled diet  Patient's hemoglobin A1c was 5 3  Nutrition is on consult  Patient has been put on Glucerna supplements  7  Nasal congestion/dryness    Continue saline nasal spray every 2 hours as needed  8  Vitamin D deficiency  Continue daily vitamin-D supplements  9  Overgrown toenails  Podiatry is on consult  10  Loose stools  Patient may receive Imodium as needed  11  Anorexia/low appetite  Patient is on Remeron 7 5 mg daily along with Glucerna  12  Candidal vaginitis and athlete's foot  I will treat the patient with fluconazole 150 mg x 1 along with nystatin powder to the feet 3 times daily

## 2021-03-09 NOTE — PROGRESS NOTES
Progress Note - Behavioral Health   Gautam Narayan 62 y o  female MRN: [id number]  Unit/Bed#: [facility] 200-02 Encounter: [id number]    Assessment/Plan   Principal Problem:    MDD (major depressive disorder), recurrent episode ([facility] )  Active Problems:    Essential hypertension    Depression    Other constipation    Absence of bladder continence    History of Asperger's syndrome    Aftercare following right hip joint replacement surgery    Severe protein-calorie malnutrition ([facility] )      Behavior over the last 24 hours:  unchanged  Sleep:  Intermittent  Appetite: normal  Medication side effects: No  ROS: no complaints and All other systems negative for acute change     Zakia Guajardo was seen today for follow-up and case discussed with treatment team this morning  She reports she did not sleep too well last night and utilize PRN Medication to assist with sleep which was somewhat effective  Patient reports she does have some anxiety regarding disposition planning and where I will be going from here    She reports her depression is not too bad  Durga Mcguire denies any auditory or visual hallucinations at this time  She also denies any SI/HI  She remains withdrawn to self but is seen frequently ambulating in hallways with walker  Continues to be somatically preoccupied in that she is not able to care for herself  Support and encouragement provided to patient  Zakia Guajardo denies any side effects from medication and remains compliant with regimen      Mental Status Evaluation:  Appearance:  age appropriate and casually dressed   Behavior:  Calm and cooperative   Speech:  Slow, delayed at times   Mood:  constricted   Affect:  mood-congruent   Thought Process:  circumstantial   Thought Content:  delusions  somatic   Perceptual Disturbances: None   Risk Potential: Suicidal Ideations none  Homicidal Ideations none  Potential for Aggression No   Sensorium:  person, place, time/date and situation   Memory:  recent and remote memory grossly intact Consciousness:  alert and awake    Attention: attention span and concentration were age appropriate   Insight:  limited   Judgment: limited   Gait/Station: slow and Uses walker   Motor Activity: no abnormal movements     Progress Toward Goals:  Continue with current psychotropic regimen  Patient to be discharged to assisted living facility upon stabilization  Rep from [facility] will be in to assess patient tomorrow  No discharge date at this time  Recommended Treatment: Continue with group therapy, milieu therapy and occupational therapy  Risks, benefits and possible side effects of Medications:   Risks, benefits, and possible side effects of medications explained to patient and patient verbalizes understanding  Medications: all current active meds have been reviewed and continue current psychiatric medications  Labs: I have personally reviewed all pertinent laboratory/tests results  Counseling / Coordination of Care  Total floor / unit time spent today 20 minutes  Greater than 50% of total time was spent with the patient and / or family counseling and / or coordination of care

## 2021-03-09 NOTE — PROGRESS NOTES
Pt presents as flat, appears to be internally preoccupied at times  Pt is delayed with answers, pessimistic about treatment and appears apathetic and withdrawn  Pt denies si, hi, ah, vh  Pt denies anxiety and depression  Pt reports difficulty falling asleep and believes she has Athlete's Foot and a yeast infection to which she wants the Dr Catalan Learn aware  Pt requested atarax and additional trazodone if she cannot fall asleep on her own  Continuous visual safety checks performed throughout the shift  Safety precautions maintained  Will continue to monitor

## 2021-03-09 NOTE — SOCIAL WORK
CM spoke with Brayan Vega with with [facility]  [phone]  Brayan Vega will be out to assess PT at 1030am  CM to prep notes for her review  Call ended mutually

## 2021-03-09 NOTE — PROGRESS NOTES
The patient noted to be visible in the milieu with meals and resting quietly in room upon successive rounds throughout the shift  The patient noted to be flat, withdrawn, quiet and somatically preoccupied  The patient noted to be pessimistic in thought content,  reassurance, positive reinforcement and encouragement provided by clinical staff  The patient noted to be compliant with meals and medications  The patient denies complaints of pain  The patient noted to communicate to Dr Pamela Hoover complaints of athlete's foot to bilateral feet and a yeast infection  New orders noted and medications administered as per physician's orders  Q 7 minute safety checks maintained

## 2021-03-09 NOTE — PROGRESS NOTES
Pt had a difficult time falling asleep and required PRN atarax for restlessness and PRN trazodone 25mg at 2335  Pt slept through most of the night but was incontinent of urine at 0500 and needed assistance and a bed change  Pt was helped back to bed  Continuous visual safety checks performed throughout the night  No sign of distress or labored breathing  Safety precautions maintained  Will continue to monitor

## 2021-03-09 NOTE — PROGRESS NOTES
03/09/21   Team Meeting   Meeting Type Daily Rounds   Team Members Present   Team Members Present Physician;Nurse;;Occupational Therapist   Physician Team Member Dr Terri Mckeon MD; [place] [address]   Nursing Team Member Teresa Payton RN   Care Management Team Member [place], [facility] [facility]   OT Team Member [place], [place]   Patient/Family Present   Patient Present No   Patient's Family Present No   PT can not return home  Seeking assisted living  Will D/C once accepted to Assisted Living  Denies AH/VH  Anxiety and depression 8/10, no complaints of pain, medication complaints  [facility] rep coming to see PT tomorrow to assess

## 2021-03-09 NOTE — PLAN OF CARE
Problem: Alteration in Thoughts and Perception  Goal: Treatment Goal: Gain control of psychotic behaviors/thinking, reduce/eliminate presenting symptoms and demonstrate improved reality functioning upon discharge  Outcome: Progressing  Goal: Verbalize thoughts and feelings  Description: Interventions:  - Promote a nonjudgmental and trusting relationship with the patient through active listening and therapeutic communication  - Assess patient's level of functioning, behavior and potential for risk  - Engage patient in 1 on 1 interactions  - Encourage patient to express fears, feelings, frustrations, and discuss symptoms    - [place] patient to reality, help patient recognize reality-based thinking   - Administer medications as ordered and assess for potential side effects  - Provide the patient education related to the signs and symptoms of the illness and desired effects of prescribed medications  Outcome: Progressing  Goal: Refrain from acting on delusional thinking/internal stimuli  Description: Interventions:  - Monitor patient closely, per order   - Utilize least restrictive measures   - Set reasonable limits, give positive feedback for acceptable   - Administer medications as ordered and monitor of potential side effects  Outcome: Progressing  Goal: Agree to be compliant with medication regime, as prescribed and report medication side effects  Description: Interventions:  - Offer appropriate PRN medication and supervise ingestion; conduct AIMS, as needed   Outcome: Progressing     Problem: Ineffective Coping  Goal: Understands least restrictive measures  Description: Interventions:  - Utilize least restrictive behavior  Outcome: Progressing  Goal: Free from restraint events  Description: - Utilize least restrictive measures   - Provide behavioral interventions   - Redirect inappropriate behaviors   Outcome: Progressing     Problem: Depression  Goal: Treatment Goal: Demonstrate behavioral control of depressive symptoms, verbalize feelings of improved mood/affect, and adopt new coping skills prior to discharge  Outcome: Progressing  Goal: Verbalize thoughts and feelings  Description: Interventions:  - Assess and re-assess patient's level of risk   - Engage patient in 1:1 interactions, daily, for a minimum of 15 minutes   - Encourage patient to express feelings, fears, frustrations, hopes   Outcome: Progressing  Goal: Refrain from harming self  Description: Interventions:  - Monitor patient closely, per order   - Supervise medication ingestion, monitor effects and side effects   Outcome: Progressing  Goal: Refrain from isolation  Description: Interventions:  - Develop a trusting relationship   - Encourage socialization   Outcome: Progressing  Goal: Refrain from self-neglect  Outcome: Progressing     Problem: Anxiety  Goal: Anxiety is at manageable level  Description: Interventions:  - Assess and monitor patient's anxiety level  - Monitor for signs and symptoms (heart palpitations, chest pain, shortness of breath, headaches, nausea, feeling jumpy, restlessness, irritable, apprehensive)  - Collaborate with interdisciplinary team and initiate plan and interventions as ordered  - [place] patient to unit/surroundings  - Explain treatment plan  - Encourage participation in care  - Encourage verbalization of concerns/fears  - Identify coping mechanisms  - Assist in developing anxiety-reducing skills  - Administer/offer alternative therapies  - Limit or eliminate stimulants  Outcome: Progressing     Problem: Anxiety  Goal: Anxiety is at manageable level  Description: Interventions:  - Assess and monitor patient's anxiety level  - Monitor for signs and symptoms (heart palpitations, chest pain, shortness of breath, headaches, nausea, feeling jumpy, restlessness, irritable, apprehensive)  - Collaborate with interdisciplinary team and initiate plan and interventions as ordered    - [place] patient to unit/surroundings  - Explain treatment plan  - Encourage participation in care  - Encourage verbalization of concerns/fears  - Identify coping mechanisms  - Assist in developing anxiety-reducing skills  - Administer/offer alternative therapies  - Limit or eliminate stimulants  Outcome: Progressing     Problem: Nutrition/Hydration-ADULT  Goal: Nutrient/Hydration intake appropriate for improving, restoring or maintaining nutritional needs  Description: Monitor and assess patient's nutrition/hydration status for malnutrition  Collaborate with interdisciplinary team and initiate plan and interventions as ordered  Monitor patient's weight and dietary intake as ordered or per policy  Utilize nutrition screening tool and intervene as necessary  Determine patient's food preferences and provide high-protein, high-caloric foods as appropriate       INTERVENTIONS:  - Monitor oral intake, urinary output, labs, and treatment plans  - Assess nutrition and hydration status and recommend course of action  - Evaluate amount of meals eaten  - Assist patient with eating if necessary   - Allow adequate time for meals  - Recommend/ encourage appropriate diets, oral nutritional supplements, and vitamin/mineral supplements  - Order, calculate, and assess calorie counts as needed  - Recommend, monitor, and adjust tube feedings and TPN/PPN based on assessed needs  - Assess need for intravenous fluids  - Provide specific nutrition/hydration education as appropriate  - Include patient/family/caregiver in decisions related to nutrition  Outcome: Progressing

## 2021-03-09 NOTE — SOCIAL WORK
Cm received voicemail from [facility] with [facility], she indicated that she spoke with PT brother and he would be willing to sign the contract with PT to be responsible for monthly amount rent owed  She also indicated that she would like to come tomorrow to onsite PT  CM returned call and left message indicated she would be available before noon tomorrow for onsite to let CM know when [facility] wishes to plan to see PT  Requested return call  [phone]

## 2021-03-10 LAB
GLUCOSE SERPL-MCNC: 94 MG/DL (ref 65–140)
GLUCOSE SERPL-MCNC: 96 MG/DL (ref 65–140)

## 2021-03-10 PROCEDURE — 99232 SBSQ HOSP IP/OBS MODERATE 35: CPT | Performed by: PSYCHIATRY & NEUROLOGY

## 2021-03-10 PROCEDURE — 82948 REAGENT STRIP/BLOOD GLUCOSE: CPT

## 2021-03-10 RX ADMIN — RISPERIDONE 2 MG: 2 TABLET ORAL at 08:32

## 2021-03-10 RX ADMIN — MELATONIN 3 MG: at 21:23

## 2021-03-10 RX ADMIN — OXYBUTYNIN 5 MG: 5 TABLET, FILM COATED, EXTENDED RELEASE ORAL at 08:32

## 2021-03-10 RX ADMIN — MIRTAZAPINE 7.5 MG: 15 TABLET, FILM COATED ORAL at 21:23

## 2021-03-10 RX ADMIN — Medication 1000 UNITS: at 08:32

## 2021-03-10 RX ADMIN — NYSTATIN 1 APPLICATION: 100000 POWDER TOPICAL at 21:23

## 2021-03-10 RX ADMIN — LOSARTAN POTASSIUM 50 MG: 50 TABLET, FILM COATED ORAL at 08:32

## 2021-03-10 RX ADMIN — SITAGLIPTIN 50 MG: 25 TABLET, FILM COATED ORAL at 08:32

## 2021-03-10 RX ADMIN — TRAZODONE HYDROCHLORIDE 100 MG: 50 TABLET ORAL at 21:23

## 2021-03-10 RX ADMIN — NYSTATIN 1 APPLICATION: 100000 POWDER TOPICAL at 08:33

## 2021-03-10 RX ADMIN — RISPERIDONE 3 MG: 2 TABLET ORAL at 21:23

## 2021-03-10 NOTE — PLAN OF CARE
Problem: Alteration in Thoughts and Perception  Goal: Treatment Goal: Gain control of psychotic behaviors/thinking, reduce/eliminate presenting symptoms and demonstrate improved reality functioning upon discharge  Outcome: Progressing  Goal: Verbalize thoughts and feelings  Description: Interventions:  - Promote a nonjudgmental and trusting relationship with the patient through active listening and therapeutic communication  - Assess patient's level of functioning, behavior and potential for risk  - Engage patient in 1 on 1 interactions  - Encourage patient to express fears, feelings, frustrations, and discuss symptoms    - [place] patient to reality, help patient recognize reality-based thinking   - Administer medications as ordered and assess for potential side effects  - Provide the patient education related to the signs and symptoms of the illness and desired effects of prescribed medications  Outcome: Progressing  Goal: Refrain from acting on delusional thinking/internal stimuli  Description: Interventions:  - Monitor patient closely, per order   - Utilize least restrictive measures   - Set reasonable limits, give positive feedback for acceptable   - Administer medications as ordered and monitor of potential side effects  Outcome: Progressing  Goal: Agree to be compliant with medication regime, as prescribed and report medication side effects  Description: Interventions:  - Offer appropriate PRN medication and supervise ingestion; conduct AIMS, as needed   Outcome: Progressing     Problem: Ineffective Coping  Goal: Patient/Family verbalizes awareness of resources  Outcome: Progressing  Goal: Understands least restrictive measures  Description: Interventions:  - Utilize least restrictive behavior  Outcome: Progressing  Goal: Free from restraint events  Description: - Utilize least restrictive measures   - Provide behavioral interventions   - Redirect inappropriate behaviors   Outcome: Progressing     Problem: Depression  Goal: Treatment Goal: Demonstrate behavioral control of depressive symptoms, verbalize feelings of improved mood/affect, and adopt new coping skills prior to discharge  Outcome: Progressing  Goal: Verbalize thoughts and feelings  Description: Interventions:  - Assess and re-assess patient's level of risk   - Engage patient in 1:1 interactions, daily, for a minimum of 15 minutes   - Encourage patient to express feelings, fears, frustrations, hopes   Outcome: Progressing  Goal: Refrain from harming self  Description: Interventions:  - Monitor patient closely, per order   - Supervise medication ingestion, monitor effects and side effects   Outcome: Progressing  Goal: Refrain from isolation  Description: Interventions:  - Develop a trusting relationship   - Encourage socialization   Outcome: Progressing  Goal: Refrain from self-neglect  Outcome: Progressing  Goal: Attend and participate in unit activities, including therapeutic, recreational, and educational groups  Description: Interventions:  - Provide therapeutic and educational activities daily, encourage attendance and participation, and document same in the medical record   Outcome: Progressing  Goal: Complete daily ADLs, including personal hygiene independently, as able  Description: Interventions:  - Observe, teach, and assist patient with ADLS  -  Monitor and promote a balance of rest/activity, with adequate nutrition and elimination   Outcome: Progressing     Problem: Anxiety  Goal: Anxiety is at manageable level  Description: Interventions:  - Assess and monitor patient's anxiety level  - Monitor for signs and symptoms (heart palpitations, chest pain, shortness of breath, headaches, nausea, feeling jumpy, restlessness, irritable, apprehensive)  - Collaborate with interdisciplinary team and initiate plan and interventions as ordered    - [place] patient to unit/surroundings  - Explain treatment plan  - Encourage participation in care  - Encourage verbalization of concerns/fears  - Identify coping mechanisms  - Assist in developing anxiety-reducing skills  - Administer/offer alternative therapies  - Limit or eliminate stimulants  Outcome: Progressing     Problem: Nutrition/Hydration-ADULT  Goal: Nutrient/Hydration intake appropriate for improving, restoring or maintaining nutritional needs  Description: Monitor and assess patient's nutrition/hydration status for malnutrition  Collaborate with interdisciplinary team and initiate plan and interventions as ordered  Monitor patient's weight and dietary intake as ordered or per policy  Utilize nutrition screening tool and intervene as necessary  Determine patient's food preferences and provide high-protein, high-caloric foods as appropriate       INTERVENTIONS:  - Monitor oral intake, urinary output, labs, and treatment plans  - Assess nutrition and hydration status and recommend course of action  - Evaluate amount of meals eaten  - Assist patient with eating if necessary   - Allow adequate time for meals  - Recommend/ encourage appropriate diets, oral nutritional supplements, and vitamin/mineral supplements  - Order, calculate, and assess calorie counts as needed  - Recommend, monitor, and adjust tube feedings and TPN/PPN based on assessed needs  - Assess need for intravenous fluids  - Provide specific nutrition/hydration education as appropriate  - Include patient/family/caregiver in decisions related to nutrition  Outcome: Progressing

## 2021-03-10 NOTE — PROGRESS NOTES
Patient has been sleeping throughout the shift  No restless behaviors observed  No signs or symptoms of pain, discomfort or respiratory distress  Will continue to monitor for safety during 7 minute checks

## 2021-03-10 NOTE — SOCIAL WORK
CM met with PT for PT check in  PT was was flat, Reviewed that [address] from [facility] would be coming to see her this am for assessment  PT in agreement  PT denies SI/HI/AH/VH and rates depression and anxiety as ok

## 2021-03-10 NOTE — PROGRESS NOTES
The patient noted to be visible in the milieu for meals and on the unit working with  upon rounds  The patient noted to be flat, endorses anxiety and depression but states "they're ok " The patient denies si, hi, and hallucinations  The patient denies complaints of pain  The patient noted to be compliant with meals and medications  Q 7 minute safety checks maintained

## 2021-03-10 NOTE — PROGRESS NOTES
Patient visible on the unit  Appears flat and depressed  Out for meals and groups  Denies hallucinations, SI, and HI  Anxiety and depression 6/10  Q 7 minute checks maintained

## 2021-03-10 NOTE — PROGRESS NOTES
Progress Note - Behavioral Health   Angélica Burnette 62 y o  female MRN: [id number]  Unit/Bed#: Claudeen Cullens 200-02 Encounter: [id number]    Assessment/Plan   Principal Problem:    MDD (major depressive disorder), recurrent episode ([facility] [address] )  Active Problems:    Essential hypertension    Depression    Other constipation    Absence of bladder continence    History of Asperger's syndrome    Aftercare following right hip joint replacement surgery    Severe protein-calorie malnutrition ([facility] )      Behavior over the last 24 hours:  unchanged  Sleep: normal  Appetite: normal  Medication side effects: No  ROS: no complaints and All other systems negative for acute change     Alpa Crabtree was seen today for follow-up and case discussed with treatment team this morning  Alpa Crabtree reports her anxiety and depression are the same" and rates as a 6/10  She reports she slept well throughout the night and states she does not have an appetite, however, it is documented that patient completes 100% of meals  Alpa Crabtree no longer endorses auditory hallucinations and denies VH/SI/HI  She continues to be somatically preoccupied stating she is unable to take care of myself" although she is ambulatory on unit with walker ad lila, feed self, and dresses self  Alpa Crabtree requires much support and encouragement in recognizing she is able to care for herself independently  She is often withdrawn to self, but visible in the milieu  Alpa Crabtree is compliant with medication regimen and denies any side effects      Mental Status Evaluation:  Appearance:  age appropriate and casually dressed   Behavior:  Somewhat guarded, cooperative   Speech:  delayed, soft spoken   Mood:  decreased range   Affect:  blunted   Thought Process:  circumstantial and perserverative   Thought Content:  delusions  somatic   Perceptual Disturbances: None   Risk Potential: Suicidal Ideations none  Homicidal Ideations none  Potential for Aggression No   Sensorium:  person, place, time/date and situation Memory:  recent and remote memory grossly intact   Consciousness:  alert and awake    Attention: attention span and concentration were age appropriate   Insight:  limited   Judgment: limited   Gait/Station: slow and Uses walker   Motor Activity: no abnormal movements     Progress Toward Goals:  Continue with current psychotropic regimen  Patient meeting with rep from [facility] today for evaluation  No discharge date at this time  Recommended Treatment: Continue with group therapy, milieu therapy and occupational therapy  Risks, benefits and possible side effects of Medications:   Risks, benefits, and possible side effects of medications explained to patient and patient verbalizes understanding  Medications: all current active meds have been reviewed and continue current psychiatric medications  Labs: I have personally reviewed all pertinent laboratory/tests results  Counseling / Coordination of Care  Total floor / unit time spent today 20 minutes  Greater than 50% of total time was spent with the patient and / or family counseling and / or coordination of care

## 2021-03-10 NOTE — PROGRESS NOTES
Progress Note - Jose Luis Alcantara 62 y o  female MRN: [id number]    Unit/Bed#: Louis Hooper 200-02 Encounter: [id number]        Subjective:   Patient seen and examined at bedside after reviewing the chart and discussing the case with the caring staff  Patient examined at bedside  Patient has no acute concerns  Physical Exam   Vitals: Blood pressure 150/90, pulse 83, temperature 97 7 °F (36 5 °C), temperature source Temporal, resp  rate 18, height 5' 5" (1 651 m), weight 63 3 kg (139 lb 8 8 oz), SpO2 97 %, not currently breastfeeding  ,Body mass index is 23 22 kg/m²  Constitutional: He appears well-developed  HEENT: PERR, EOMI, MMM  Cardiovascular: Normal rate and regular rhythm  Pulmonary/Chest: Effort normal and breath sounds normal    Abdomen: Soft, + BS, NT    Assessment/Plan:     Jose Luis Alcantara is a(n) 62y o  year old female with psychotic disorder      1  Cardiac with history of hypertension  Patient will be continued on losartan  2  DJD/osteoarthritis  Patient may get Tylenol on as needed basis  3  Gait abnormality  I will consult PT and OT for further evaluation and treatment  4  Insomnia  Patient is on melatonin  5  Overactive urinary bladder  Patient is on oxybutynin  6  Hyperglycemia/weight loss  We will closely monitor patient's blood sugars by doing Accu-Cheks 2 times daily  Continue Januvia 50 mg daily along with carb controlled diet  Patient's hemoglobin A1c was 5 3  Nutrition is on consult  Patient has been put on Glucerna supplements  7  Nasal congestion/dryness  Continue saline nasal spray every 2 hours as needed  8  Vitamin D deficiency  Continue daily vitamin-D supplements  9  Overgrown toenails  Podiatry is on consult  10  Loose stools  Patient may receive Imodium as needed  11  Anorexia/low appetite  Patient is on Remeron 7 5 mg daily along with Glucerna  12  Athlete's foot  Continue nystatin powder to the feet 3 times daily        The patient was discussed with   Neftaly and he is in agreement with the above note

## 2021-03-10 NOTE — SOCIAL WORK
GAURAV placed call to [address] [employer] [address] with [facility] to update her on financial information obtained she will call  Back   Pending response

## 2021-03-10 NOTE — SOCIAL WORK
PT met with Reyna with [facility] for assessment for potential acceptance to the [address]  Bhakti Barrientos requested for PT to obtain financial information and Bhakti Barrientos will review with her partner, CM to follow up with Bhakti Barrientos once financial information has been obtained  PT and CM placed call to PT bank [employer]  and requested for them to fax her statements for her accounts  Spoke with Maddi All agreed to fax 5 month look back today  Pending response  PT and CM placed call to [address] [place] [address] office and the phone disconnected 2 times  Follow up with call to Jose Michaels in [facility] and it disconnected  Then placed call to [facility]  Spoke with representative Ms Edgar Coombs  She indicated that PT has been awarded and that a letter had been sent with this information on the 12th  Pt will receive back pay from October 2019  In addition she will receive first payment on March 17th the amount of 1067 00, PT will also receive payment thereafter the 3rd Wednesday of each month  PT also followed up with  [address] [phone], the amount on 2 CD is balance on 12 month is 4588 0 and the 24 month is [phone]  PT followed up with Florentino on CD [phone]  There are no funds or accounts here

## 2021-03-10 NOTE — PROGRESS NOTES
03/10/21 0934   Team Meeting   Meeting Type Daily Rounds   Team Members Present   Team Members Present Physician;Nurse;Occupational Therapist;   Physician Team Member Dr Murtaza Simmons MD; KAM Jaeger   Nursing Team Member Frederick Wall RN   Social Work Team Member Jacqueline Leach [facility]   OT Team Member Yokasta Dobbins [place]   Patient/Family Present   Patient Present No   Patient's Family Present No     Meeting with [facility] today; dep/anx 6/10; sahra SH/VH, med compliant; slept overnight

## 2021-03-11 LAB
GLUCOSE SERPL-MCNC: 108 MG/DL (ref 65–140)
GLUCOSE SERPL-MCNC: 108 MG/DL (ref 65–140)
GLUCOSE SERPL-MCNC: 86 MG/DL (ref 65–140)

## 2021-03-11 PROCEDURE — 97530 THERAPEUTIC ACTIVITIES: CPT

## 2021-03-11 PROCEDURE — 99232 SBSQ HOSP IP/OBS MODERATE 35: CPT | Performed by: PSYCHIATRY & NEUROLOGY

## 2021-03-11 PROCEDURE — 97535 SELF CARE MNGMENT TRAINING: CPT

## 2021-03-11 PROCEDURE — 97110 THERAPEUTIC EXERCISES: CPT

## 2021-03-11 PROCEDURE — 82948 REAGENT STRIP/BLOOD GLUCOSE: CPT

## 2021-03-11 PROCEDURE — 97116 GAIT TRAINING THERAPY: CPT

## 2021-03-11 RX ADMIN — SALINE NASAL SPRAY 1 SPRAY: 1.5 SOLUTION NASAL at 05:28

## 2021-03-11 RX ADMIN — SITAGLIPTIN 50 MG: 25 TABLET, FILM COATED ORAL at 08:11

## 2021-03-11 RX ADMIN — MIRTAZAPINE 7.5 MG: 15 TABLET, FILM COATED ORAL at 21:54

## 2021-03-11 RX ADMIN — OXYBUTYNIN 5 MG: 5 TABLET, FILM COATED, EXTENDED RELEASE ORAL at 08:11

## 2021-03-11 RX ADMIN — TRAZODONE HYDROCHLORIDE 100 MG: 50 TABLET ORAL at 21:55

## 2021-03-11 RX ADMIN — MELATONIN 3 MG: at 21:55

## 2021-03-11 RX ADMIN — HYDROXYZINE HYDROCHLORIDE 50 MG: 25 TABLET, FILM COATED ORAL at 05:27

## 2021-03-11 RX ADMIN — Medication 1000 UNITS: at 08:11

## 2021-03-11 RX ADMIN — LOSARTAN POTASSIUM 50 MG: 50 TABLET, FILM COATED ORAL at 08:11

## 2021-03-11 RX ADMIN — NYSTATIN: 100000 POWDER TOPICAL at 08:11

## 2021-03-11 RX ADMIN — RISPERIDONE 2 MG: 2 TABLET ORAL at 08:11

## 2021-03-11 RX ADMIN — RISPERIDONE 3 MG: 2 TABLET ORAL at 21:55

## 2021-03-11 RX ADMIN — NYSTATIN 1 APPLICATION: 100000 POWDER TOPICAL at 21:56

## 2021-03-11 NOTE — SOCIAL WORK
CM received voicemail from [facility] with [facility]   CM returned [facility] left message requesting return call

## 2021-03-11 NOTE — PROGRESS NOTES
Patient observed sleeping comfortably for majority of q7 minute monitoring throughout the night, without demonstrating any signs or symptoms of distress  Non-labored breathing noted overnight  Patient awake early this morning requesting PRN atarax for moderate anxiety and her nasal spray  50 mg atarax given at 0527  Will remain on safety precautions and continual q7 minute monitoring

## 2021-03-11 NOTE — PLAN OF CARE
Problem: PHYSICAL THERAPY ADULT  Goal: Performs mobility at highest level of function for planned discharge setting  See evaluation for individualized goals  Description: Treatment/Interventions: Functional transfer training, LE strengthening/ROM, Therapeutic exercise, Endurance training, Patient/family training, Equipment eval/education, Gait training, Spoke to nursing  Equipment Recommended: Walker(pt  has own)       See flowsheet documentation for full assessment, interventions and recommendations  Outcome: Progressing  Note: Prognosis: Fair  Problem List: Decreased strength, Decreased endurance, Impaired balance, Decreased mobility, Decreased coordination, Decreased safety awareness, Impaired sensation  Assessment: Pt seen for PT treatment session this date with interventions consisting of gait training w/ emphasis on improving pt's ability to ambulate level surfaces x 125 feet with close S provided by therapist with RW and Therapeutic exercise consisting of: AROM 20 reps B LE in sitting position  Pt agreeable to PT treatment session upon arrival, pt found seated at EOB, in no apparent distress  In comparison to previous session, pt with improvements in activity tolerance  Post session: pt returned BTB, all needs in reach and RN notified of session findings/recommendations Continue to recommend Home PT at time of d/c in order to maximize pt's functional independence and safety w/ mobility  Pt continues to be functioning below baseline level, and remains limited 2* factors listed above and including decreased strength, endurance & safe functional mobility  PT will continue to see pt while here in order to address the deficits listed above and provide interventions consistent w/ POC in effort to achieve STGs          PT Discharge Recommendation: Home with skilled therapy, Return to previous environment with social support     PT - OK to Discharge: Yes(when med cleared)    See flowsheet documentation for full assessment

## 2021-03-11 NOTE — PLAN OF CARE
Problem: OCCUPATIONAL THERAPY ADULT  Goal: Performs self-care activities at highest level of function for planned discharge setting  See evaluation for individualized goals  Description: Treatment Interventions: ADL retraining, Functional transfer training, UE strengthening/ROM, Endurance training, Patient/family training, Equipment evaluation/education, Compensatory technique education, Activityengagement          See flowsheet documentation for full assessment, interventions and recommendations  Outcome: Progressing  Note: Limitation: Decreased ADL status, Decreased UE strength, Decreased endurance, Decreased self-care trans, Decreased high-level ADLs, Mood limitation  Prognosis: Good  Assessment: Patient participated in Skilled OT session this date with interventions consisting of ADL re training with the use of correct body mechnaics, Energy Conservation techniques, safety awareness and fall prevention techniques, therapeutic exercise to: increase functional use of BUEs, increase BUE muscle strength ,  therapeutic activities to: increase activity tolerance and increase dynamic sit/ stand balance during functional activity    Patient agreeable to OT treatment session, upon arrival patient was found side-lying in bed (not asleep)  In comparison to previous session, patient with improvements in investment in all aspects of treatment  Patient requiring verbal cues for correct technique, frequent rest periods and ocassional safety reminders and encouragement toward positivity, including seeing own abilities    Patient continues to be functioning below baseline level, occupational performance remains limited secondary to factors listed above and increased risk for falls and injury  From OT standpoint, recommendation at time of d/c would be 'Home' to supportive environment and Home therapies      Patient to benefit from continued Occupational Therapy treatment while in the hospital to address deficits as defined above and maximize level of functional independence with ADLs and functional mobility        OT Discharge Recommendation: Home with skilled therapy  OT - OK to Discharge: Yes(Once medically cleared )     THOMAS Sotomayor/LAWRENCE

## 2021-03-11 NOTE — PROGRESS NOTES
03/11/21    Team Meeting   Meeting Type Daily Rounds   Team Members Present   Team Members Present Physician;Nurse;;Occupational Therapist   Physician Team Member Dr Magy Presley MD; KAM Kenney   Nursing Team Member Casi Holguin RN   Care Management Team Member MS [place], [facility]   OT Team Member Favian Clark [place]   Patient/Family Present   Patient Present No   Patient's Family Present No   Depression/anxiety 6/10, prn for anxiety this am  Assisted living pending acceptance  Will D/C once place is established

## 2021-03-11 NOTE — SOCIAL WORK
CM spoke with [address] with [facility], she indicated that she does not feel PT would be a good fit for the [facility] location and that she spoke with her partner and they would be able to accommodate her needs at alternative location [facility] , cost would be 1800 a month  CM to follow up with PT and PT brother if this would be something they could accommodate for and then CM to follow up with [address]  Call ended mutually  CM placed call to PT brother Damaris Overcast   CM reviewed with him the outcome of the assessment and that [address] with [facility] would be able to offer a room at 1800 a month at [facility] [place] location  PT brother is going to talk with his mother and PT mother to see if they can afford the difference and then follow up with CM tomorrow afternoon  Call ended mutually

## 2021-03-11 NOTE — PLAN OF CARE
Problem: Alteration in Thoughts and Perception  Goal: Treatment Goal: Gain control of psychotic behaviors/thinking, reduce/eliminate presenting symptoms and demonstrate improved reality functioning upon discharge  Outcome: Progressing  Goal: Verbalize thoughts and feelings  Description: Interventions:  - Promote a nonjudgmental and trusting relationship with the patient through active listening and therapeutic communication  - Assess patient's level of functioning, behavior and potential for risk  - Engage patient in 1 on 1 interactions  - Encourage patient to express fears, feelings, frustrations, and discuss symptoms    - [place] patient to reality, help patient recognize reality-based thinking   - Administer medications as ordered and assess for potential side effects  - Provide the patient education related to the signs and symptoms of the illness and desired effects of prescribed medications  Outcome: Progressing  Goal: Refrain from acting on delusional thinking/internal stimuli  Description: Interventions:  - Monitor patient closely, per order   - Utilize least restrictive measures   - Set reasonable limits, give positive feedback for acceptable   - Administer medications as ordered and monitor of potential side effects  Outcome: Progressing  Goal: Agree to be compliant with medication regime, as prescribed and report medication side effects  Description: Interventions:  - Offer appropriate PRN medication and supervise ingestion; conduct AIMS, as needed   Outcome: Progressing  Goal: Attend and participate in unit activities, including therapeutic, recreational, and educational groups  Description: Interventions:  -Encourage Visitation and family involvement in care  Outcome: Progressing  Goal: Recognize dysfunctional thoughts, communicate reality-based thoughts at the time of discharge  Description: Interventions:  - Provide medication and psycho-education to assist patient in compliance and developing insight into his/her illness   Outcome: Progressing  Goal: Complete daily ADLs, including personal hygiene independently, as able  Description: Interventions:  - Observe, teach, and assist patient with ADLS  - Monitor and promote a balance of rest/activity, with adequate nutrition and elimination   Outcome: Progressing     Problem: Ineffective Coping  Goal: Cooperates with admission process  Description: Interventions:   - Complete admission process  Outcome: Progressing  Goal: Identifies ineffective coping skills  Outcome: Progressing  Goal: Identifies healthy coping skills  Outcome: Progressing  Goal: Demonstrates healthy coping skills  Outcome: Progressing  Goal: Participates in unit activities  Description: Interventions:  - Provide therapeutic environment   - Provide required programming   - Redirect inappropriate behaviors   Outcome: Progressing  Goal: Patient/Family participate in treatment and DC plans  Description: Interventions:  - Provide therapeutic environment  Outcome: Progressing  Goal: Patient/Family verbalizes awareness of resources  Outcome: Progressing  Goal: Understands least restrictive measures  Description: Interventions:  - Utilize least restrictive behavior  Outcome: Progressing  Goal: Free from restraint events  Description: - Utilize least restrictive measures   - Provide behavioral interventions   - Redirect inappropriate behaviors   Outcome: Progressing     Problem: Depression  Goal: Treatment Goal: Demonstrate behavioral control of depressive symptoms, verbalize feelings of improved mood/affect, and adopt new coping skills prior to discharge  Outcome: Progressing  Goal: Verbalize thoughts and feelings  Description: Interventions:  - Assess and re-assess patient's level of risk   - Engage patient in 1:1 interactions, daily, for a minimum of 15 minutes   - Encourage patient to express feelings, fears, frustrations, hopes   Outcome: Progressing  Goal: Refrain from harming self  Description: Interventions:  - Monitor patient closely, per order   - Supervise medication ingestion, monitor effects and side effects   Outcome: Progressing  Goal: Refrain from isolation  Description: Interventions:  - Develop a trusting relationship   - Encourage socialization   Outcome: Progressing  Goal: Refrain from self-neglect  Outcome: Progressing  Goal: Attend and participate in unit activities, including therapeutic, recreational, and educational groups  Description: Interventions:  - Provide therapeutic and educational activities daily, encourage attendance and participation, and document same in the medical record   Outcome: Progressing  Goal: Complete daily ADLs, including personal hygiene independently, as able  Description: Interventions:  - Observe, teach, and assist patient with ADLS  -  Monitor and promote a balance of rest/activity, with adequate nutrition and elimination   Outcome: Progressing     Problem: Anxiety  Goal: Anxiety is at manageable level  Description: Interventions:  - Assess and monitor patient's anxiety level  - Monitor for signs and symptoms (heart palpitations, chest pain, shortness of breath, headaches, nausea, feeling jumpy, restlessness, irritable, apprehensive)  - Collaborate with interdisciplinary team and initiate plan and interventions as ordered  - [place] patient to unit/surroundings  - Explain treatment plan  - Encourage participation in care  - Encourage verbalization of concerns/fears  - Identify coping mechanisms  - Assist in developing anxiety-reducing skills  - Administer/offer alternative therapies  - Limit or eliminate stimulants  Outcome: Progressing     Problem: Nutrition/Hydration-ADULT  Goal: Nutrient/Hydration intake appropriate for improving, restoring or maintaining nutritional needs  Description: Monitor and assess patient's nutrition/hydration status for malnutrition   Collaborate with interdisciplinary team and initiate plan and interventions as ordered  Monitor patient's weight and dietary intake as ordered or per policy  Utilize nutrition screening tool and intervene as necessary  Determine patient's food preferences and provide high-protein, high-caloric foods as appropriate       INTERVENTIONS:  - Monitor oral intake, urinary output, labs, and treatment plans  - Assess nutrition and hydration status and recommend course of action  - Evaluate amount of meals eaten  - Assist patient with eating if necessary   - Allow adequate time for meals  - Recommend/ encourage appropriate diets, oral nutritional supplements, and vitamin/mineral supplements  - Order, calculate, and assess calorie counts as needed  - Recommend, monitor, and adjust tube feedings and TPN/PPN based on assessed needs  - Assess need for intravenous fluids  - Provide specific nutrition/hydration education as appropriate  - Include patient/family/caregiver in decisions related to nutrition  Outcome: Progressing

## 2021-03-11 NOTE — NURSING NOTE
Patient visible in the community for snack and meals  Pt appears flat  Endorses anxiety and depression rating them an 8/10- but states that is "normal" for her  States that she is not hearing voices at this time  Denies SI/HI and hallucinations  Denies pain  Compliant with medications  Will CTM  Q7 minute checks in progress

## 2021-03-11 NOTE — PROGRESS NOTES
Patient more visible on the unit today  Appears flat and depressed   Denies SI, HI, hallucinations, anxiety , and depression  Needs positive reinforcement with ADLs  Q 7 minute checks maintained

## 2021-03-11 NOTE — SOCIAL WORK
CM met with PT for PT check in, PT reports she is declining and not able to do things  Denies SI/HI/AH/VH but reports anxiety and depression are high  Much encouragement provided and empowerment  CM reviewed that she is awaiting response from [facility] with [facility], PT in agreement  CM reviewed with PT order form for walker, PT in agreement and signed  CM retrieved walker from supply and order placed on wall with facesheet, script, and order form  Labeled   [facility] provided walker to PT

## 2021-03-11 NOTE — PROGRESS NOTES
Progress Note - Attila Hercules 62 y o  female MRN: [id number]    Unit/Bed#: [facility] Current 200-02 Encounter: [id number]        Subjective:   Patient seen and examined at bedside after reviewing the chart and discussing the case with the caring staff  Patient examined at bedside  Patient has no acute concerns  Physical Exam   Vitals: Blood pressure 134/58, pulse 84, temperature 98 4 °F (36 9 °C), temperature source Temporal, resp  rate 20, height 5' 5" (1 651 m), weight 63 3 kg (139 lb 8 8 oz), SpO2 97 %, not currently breastfeeding  ,Body mass index is 23 22 kg/m²  Constitutional: He appears well-developed  HEENT: PERR, EOMI, MMM  Cardiovascular: Normal rate and regular rhythm  Pulmonary/Chest: Effort normal and breath sounds normal    Abdomen: Soft, + BS, NT    Assessment/Plan:     Attila Hercules is a(n) 62y o  year old female with psychotic disorder      1  Cardiac with history of hypertension  Patient will be continued on losartan  2  DJD/osteoarthritis  Patient may get Tylenol on as needed basis  3  Gait abnormality  I will consult PT and OT for further evaluation and treatment  4  Insomnia  Patient is on melatonin  5  Overactive urinary bladder  Patient is on oxybutynin  6  Hyperglycemia/weight loss  We will closely monitor patient's blood sugars by doing Accu-Cheks 2 times daily  Continue Januvia 50 mg daily along with carb controlled diet  Patient's hemoglobin A1c was 5 3  Nutrition is on consult  Patient has been put on Glucerna supplements  7  Nasal congestion/dryness  Continue saline nasal spray every 2 hours as needed  8  Vitamin D deficiency  Continue daily vitamin-D supplements  9  Overgrown toenails  Podiatry is on consult  10  Loose stools  Patient may receive Imodium as needed  11  Anorexia/low appetite  Patient is on Remeron 7 5 mg daily along with Glucerna  12  Athlete's foot  Continue nystatin powder to the feet 3 times daily        The patient was discussed with   Neftaly and he is in agreement with the above note

## 2021-03-11 NOTE — PROGRESS NOTES
Patient reports mild effectiveness of 50 mg atarax  No other concerns identified at this time  Will remain on safety precautions and continual monitoring

## 2021-03-11 NOTE — OCCUPATIONAL THERAPY NOTE
03/11/21 1029   OT Last Visit   OT Visit Date 03/11/21   Note Type   Note Type Treatment  (much safety education provided;continue in 'home' environmt )   Restrictions/Precautions   Weight Bearing Precautions Per Order No   Other Precautions Fall Risk   General   Response to Previous Treatment Patient with no complaints from previous session   Pain Assessment   Pain Assessment Tool Pain Assessment not indicated - pt denies pain   ADL   Where Assessed   (reviewed goals with patient(meeting 7 of 11 today) )   Equipment Provided   (suggest use of LH reacher and dressing stick as assist PRN)   LB Dressing Comments with some effort can bring foot to opposite knee for sock management / discussed easier ways to accomplish task    Toileting Assistance  6  Modified independent   Toileting Deficit Setup;Verbal cueing;Supervison/safety; Increased time to complete;Grab bar use   Bed Mobility   Supine to Sit 6  Modified independent   Additional items Increased time required   Sit to Supine 6  Modified independent   Additional items Increased time required   Additional Comments patient performs same slowly, and with some rigidity But is able to accomplish  (agreed to get out(her)L side of bed ONLY;No R side practice)   Transfers   Sit to Stand 5  Supervision   Additional items Bedrails; Increased time required   Stand to Sit 5  Supervision   Additional items Armrests; Increased time required   Toilet Transfers   Toilet Transfer From Bed  (with RW)   Toilet Transfer Type To and from   Toilet Transfer to Raised toilet seat without rails   Toilet Transfer Technique Ambulating   Toilet Transfers Supervision   Toilet Transfers Comments Supervision provided per patient's lack of confidence   Therapeutic Excerise-Strength   UE Strength Yes  (upgrades attempted)   Right Upper Extremity- Strength   R Shoulder Flexion; Horizontal ABduction; Other (Comment)  (pro/re-traction )   R Elbow Elbow flexion;Elbow extension  (in forward And reverse; Also supin/pron-ation )   R Weight/Reps/Sets 1 pound weight - 10-15 reps each (tends to be self-limiting) X 2 sets    Left Upper Extremity-Strength   L Weights/Reps/Sets all exers  Same as RUE above   Coordination   Gross Motor WFL   Dexterity WFL   Cognition   Overall Cognitive Status [facility]   Arousal/Participation Alert; Cooperative   Attention Within functional limits   Following Commands Follows one step commands without difficulty   Comments patient continues to downgrade her abilities  Activity Tolerance   Activity Tolerance Patient limited by fatigue   Assessment   Assessment Patient participated in Skilled OT session this date with interventions consisting of ADL re training with the use of correct body mechnaics, Energy Conservation techniques, safety awareness and fall prevention techniques, therapeutic exercise to: increase functional use of BUEs, increase BUE muscle strength ,  therapeutic activities to: increase activity tolerance and increase dynamic sit/ stand balance during functional activity    Patient agreeable to OT treatment session, upon arrival patient was found side-lying in bed (not asleep)  In comparison to previous session, patient with improvements in investment in all aspects of treatment  Patient requiring verbal cues for correct technique, frequent rest periods and ocassional safety reminders and encouragement toward positivity, including seeing own abilities    Patient continues to be functioning below baseline level, occupational performance remains limited secondary to factors listed above and increased risk for falls and injury  From OT standpoint, recommendation at time of d/c would be 'Home' to supportive environment and Home therapies  Patient to benefit from continued Occupational Therapy treatment while in the hospital to address deficits as defined above and maximize level of functional independence with ADLs and functional mobility      Plan   Treatment Interventions ADL retraining;Functional transfer training;UE strengthening/ROM; Patient/family training; Compensatory technique education; Energy conservation; Activityengagement   Goal Expiration Date 03/31/21   OT Treatment Day 2   OT Frequency 2-3x/wk   Recommendation   Additional Comments 2 The patient's raw score on the AM-PAC Daily Activity inpatient short form is 20, standardized score is 42 03, greater than 39 4  Patients at this level are likely to benefit from discharge to home   Please refer to the recommendation of the Occupational Therapist for safe discharge planning   (extra encouragement needed at times for ADLs)   [facility] Daily Activity Inpatient   Lower Body Dressing 3   Bathing 3   Toileting 3   Upper Body Dressing 3   Grooming 4   Eating 4   Daily Activity Raw Score 20   Daily Activity Standardized Score (Calc for Raw Score >=11) 42 03   THOMAS Wolf/LAWRENCE

## 2021-03-11 NOTE — OCCUPATIONAL THERAPY NOTE
OccupationalTherapy Progress Note     Patient Name: Gautam Narayan  Today's Date: 3/11/2021  Problem List  Principal Problem:    MDD (major depressive disorder), recurrent episode ([facility] [address] )  Active Problems:    Essential hypertension    Depression    Other constipation    Absence of bladder continence    History of Asperger's syndrome    Aftercare following right hip joint replacement surgery    Severe protein-calorie malnutrition ([facility] [address] )       Patient's POC and STGs noted to  3/11/21  Per discussion with GUZMAN and chart review, patient continues to warrant skilled OT services  At this time, POC and STG expiration date extended to 3/31/21  Please refer to OT evaluation and previous OT treatment notes for assessment findings       Carolina Mckeon MS, OTR/L

## 2021-03-11 NOTE — PROGRESS NOTES
Progress Note - Behavioral Health   Yakov Lorenzo 62 y o  female MRN: [id number]  Unit/Bed#: [facility] 200-02 Encounter: [id number]    Assessment/Plan   Principal Problem:    MDD (major depressive disorder), recurrent episode ([facility] [address] )  Active Problems:    Essential hypertension    Depression    Other constipation    Absence of bladder continence    History of Asperger's syndrome    Aftercare following right hip joint replacement surgery    Severe protein-calorie malnutrition ([facility] [address] )      Behavior over the last 24 hours:  unchanged  Sleep: normal  Appetite: normal  Medication side effects: No  ROS: no complaints and All other systems negative for acute change     Kalani Ritchie was seen today for follow-up and case discussed with treatment team this morning  Kalani Ritchie rates her anxiety and depression as a 6/10  Affect remains flat  Kalani Ritchie no longer endorses auditory hallucinations and denies VH/SI/HI  Patient was inquiring about restarting her temazepam since she was on that prior to her hospitalization  However, Kalani Ritchie has been reporting she has been sleeping through the night and nursing staff has been documenting the same  Medication education provided to patient and she appeared somewhat receptive  Arpita's appetite remains adequate  She continues to endorse somatic delusions of being unable to care for herself independently  Encouragement and support provided  Kalani Ritchie attends and participates in groups appropriately, but remains to self in the milieu      Mental Status Evaluation:  Appearance:  age appropriate and casually dressed   Behavior:  Calm and cooperative   Speech:  delayed   Mood:  anxious and depressed   Affect:  flat   Thought Process:  circumstantial   Thought Content:  delusions  somatic   Perceptual Disturbances: None   Risk Potential: Suicidal Ideations none  Homicidal Ideations none  Potential for Aggression No   Sensorium:  person, place, time/date and situation   Memory:  recent and remote memory grossly intact Consciousness:  alert and awake    Attention: attention span and concentration were age appropriate   Insight:  limited   Judgment: limited   Gait/Station: slow and Uses walker   Motor Activity: no abnormal movements     Progress Toward Goals:  Continue current psychotropic regimen  Patient met yesterday with representative from [facility] facility; awaiting decision on acceptance  No discharge date at this time  Recommended Treatment: Continue with group therapy, milieu therapy and occupational therapy  Risks, benefits and possible side effects of Medications:   Risks, benefits, and possible side effects of medications explained to patient and patient verbalizes understanding  Medications: all current active meds have been reviewed and continue current psychiatric medications  Labs: I have personally reviewed all pertinent laboratory/tests results  Counseling / Coordination of Care  Total floor / unit time spent today 20 minutes  Greater than 50% of total time was spent with the patient and / or family counseling and / or coordination of care

## 2021-03-12 LAB
GLUCOSE SERPL-MCNC: 91 MG/DL (ref 65–140)
GLUCOSE SERPL-MCNC: 95 MG/DL (ref 65–140)

## 2021-03-12 PROCEDURE — 82948 REAGENT STRIP/BLOOD GLUCOSE: CPT

## 2021-03-12 PROCEDURE — 99232 SBSQ HOSP IP/OBS MODERATE 35: CPT | Performed by: PSYCHIATRY & NEUROLOGY

## 2021-03-12 RX ADMIN — NYSTATIN: 100000 POWDER TOPICAL at 08:30

## 2021-03-12 RX ADMIN — MELATONIN 3 MG: at 21:01

## 2021-03-12 RX ADMIN — SITAGLIPTIN 50 MG: 25 TABLET, FILM COATED ORAL at 08:30

## 2021-03-12 RX ADMIN — RISPERIDONE 3 MG: 2 TABLET ORAL at 21:01

## 2021-03-12 RX ADMIN — TRAZODONE HYDROCHLORIDE 50 MG: 50 TABLET ORAL at 00:01

## 2021-03-12 RX ADMIN — LOSARTAN POTASSIUM 50 MG: 50 TABLET, FILM COATED ORAL at 08:30

## 2021-03-12 RX ADMIN — TRAZODONE HYDROCHLORIDE 50 MG: 50 TABLET ORAL at 23:53

## 2021-03-12 RX ADMIN — OXYBUTYNIN 5 MG: 5 TABLET, FILM COATED, EXTENDED RELEASE ORAL at 08:30

## 2021-03-12 RX ADMIN — TRAZODONE HYDROCHLORIDE 100 MG: 50 TABLET ORAL at 21:01

## 2021-03-12 RX ADMIN — RISPERIDONE 2 MG: 2 TABLET ORAL at 08:30

## 2021-03-12 RX ADMIN — MIRTAZAPINE 7.5 MG: 15 TABLET, FILM COATED ORAL at 21:02

## 2021-03-12 RX ADMIN — Medication 1000 UNITS: at 08:30

## 2021-03-12 NOTE — NURSING NOTE
Patient observed in the community  Denies pain  Compliant with medications  Pt states her anxiety and depression is an 8/10- to which she states is normal for her  Appears flat and depressed  Denies SI/HI  Denies hearing voices at this time but states she did hear them one time last night  No other concerns reported at this time  Will CTM  Q7 minute safety checks in progress

## 2021-03-12 NOTE — SOCIAL WORK
CM met with PT for PT check in  PT fixated on not being able to do things such as get out of bed or walk, but when prompted did so  Reviewed the outcome of call with Reyna with [facility] and the cost and that CM reached out to PT brother and that he is to follow up with CM today  Once we have determination of affordability and permission for funds we can proceed  PT in agreement with this  Much encouragement provided to PT and reinforcement  PT denies SI/HI/AH/VH; anxiety and depression or present

## 2021-03-12 NOTE — PROGRESS NOTES
Patient out in the community, compliant with HS snacks and HS medications  Patient stated she was anxious about her disposition and when is going to happen  Denies any SI/HI  Denies any voices at the moment  No other concerns or problems reported at this time  Q 7 mins checks in place for safety  Will continue to monitor for behaviors and changes

## 2021-03-12 NOTE — PROGRESS NOTES
Progress Note - Grace Aguirre 62 y o  female MRN: [id number]    Unit/Bed#: [facility] 200-02 Encounter: [id number]        Subjective:   Patient seen and examined at bedside after reviewing the chart and discussing the case with the caring staff  Patient examined at bedside  Patient has no acute concerns  Physical Exam   Vitals: Blood pressure 136/70, pulse 103, temperature 99 °F (37 2 °C), temperature source Temporal, resp  rate 18, height 5' 5" (1 651 m), weight 63 3 kg (139 lb 8 8 oz), SpO2 96 %, not currently breastfeeding  ,Body mass index is 23 22 kg/m²  Constitutional: He appears well-developed  HEENT: PERR, EOMI, MMM  Cardiovascular: Normal rate and regular rhythm  Pulmonary/Chest: Effort normal and breath sounds normal    Abdomen: Soft, + BS, NT    Assessment/Plan:     Grace Aguirre is a(n) 62y o  year old female with psychotic disorder      1  Cardiac with history of hypertension  Patient will be continued on losartan  2  DJD/osteoarthritis  Patient may get Tylenol on as needed basis  3  Gait abnormality  I will consult PT and OT for further evaluation and treatment  4  Insomnia  Patient is on melatonin  5  Overactive urinary bladder  Patient is on oxybutynin  6  Hyperglycemia/weight loss  We will closely monitor patient's blood sugars by doing Accu-Cheks 2 times daily  Continue Januvia 50 mg daily along with carb controlled diet  Patient's hemoglobin A1c was 5 3  Nutrition is on consult  Patient has been put on Glucerna supplements  7  Nasal congestion/dryness  Continue saline nasal spray every 2 hours as needed  8  Vitamin D deficiency  Continue daily vitamin-D supplements  9  Overgrown toenails  Podiatry is on consult  10  Loose stools  Patient may receive Imodium as needed  11  Anorexia/low appetite  Patient is on Remeron 7 5 mg daily along with Glucerna  12  Athlete's foot  Continue nystatin powder to the feet 3 times daily        The patient was discussed with   Neftaly and he is in agreement with the above note

## 2021-03-12 NOTE — PROGRESS NOTES
Pt up ad lila with walker  Pt c/o depression 8/10 & anxiety 8/10  Q 7 min checks maintained to monitor pt's behavior & safety  Pt is flat & withdrawn

## 2021-03-12 NOTE — PLAN OF CARE
Problem: Alteration in Thoughts and Perception  Goal: Treatment Goal: Gain control of psychotic behaviors/thinking, reduce/eliminate presenting symptoms and demonstrate improved reality functioning upon discharge  Outcome: Progressing  Goal: Verbalize thoughts and feelings  Description: Interventions:  - Promote a nonjudgmental and trusting relationship with the patient through active listening and therapeutic communication  - Assess patient's level of functioning, behavior and potential for risk  - Engage patient in 1 on 1 interactions  - Encourage patient to express fears, feelings, frustrations, and discuss symptoms    - [place] patient to reality, help patient recognize reality-based thinking   - Administer medications as ordered and assess for potential side effects  - Provide the patient education related to the signs and symptoms of the illness and desired effects of prescribed medications  Outcome: Progressing  Goal: Refrain from acting on delusional thinking/internal stimuli  Description: Interventions:  - Monitor patient closely, per order   - Utilize least restrictive measures   - Set reasonable limits, give positive feedback for acceptable   - Administer medications as ordered and monitor of potential side effects  Outcome: Progressing  Goal: Agree to be compliant with medication regime, as prescribed and report medication side effects  Description: Interventions:  - Offer appropriate PRN medication and supervise ingestion; conduct AIMS, as needed   Outcome: Progressing  Goal: Attend and participate in unit activities, including therapeutic, recreational, and educational groups  Description: Interventions:  -Encourage Visitation and family involvement in care  Outcome: Progressing  Goal: Recognize dysfunctional thoughts, communicate reality-based thoughts at the time of discharge  Description: Interventions:  - Provide medication and psycho-education to assist patient in compliance and developing insight into his/her illness   Outcome: Progressing  Goal: Complete daily ADLs, including personal hygiene independently, as able  Description: Interventions:  - Observe, teach, and assist patient with ADLS  - Monitor and promote a balance of rest/activity, with adequate nutrition and elimination   Outcome: Progressing     Problem: Ineffective Coping  Goal: Cooperates with admission process  Description: Interventions:   - Complete admission process  Outcome: Progressing  Goal: Identifies ineffective coping skills  Outcome: Progressing  Goal: Identifies healthy coping skills  Outcome: Progressing  Goal: Demonstrates healthy coping skills  Outcome: Progressing  Goal: Participates in unit activities  Description: Interventions:  - Provide therapeutic environment   - Provide required programming   - Redirect inappropriate behaviors   Outcome: Progressing  Goal: Patient/Family participate in treatment and DC plans  Description: Interventions:  - Provide therapeutic environment  Outcome: Progressing  Goal: Patient/Family verbalizes awareness of resources  Outcome: Progressing  Goal: Understands least restrictive measures  Description: Interventions:  - Utilize least restrictive behavior  Outcome: Progressing  Goal: Free from restraint events  Description: - Utilize least restrictive measures   - Provide behavioral interventions   - Redirect inappropriate behaviors   Outcome: Progressing     Problem: Depression  Goal: Treatment Goal: Demonstrate behavioral control of depressive symptoms, verbalize feelings of improved mood/affect, and adopt new coping skills prior to discharge  Outcome: Progressing  Goal: Verbalize thoughts and feelings  Description: Interventions:  - Assess and re-assess patient's level of risk   - Engage patient in 1:1 interactions, daily, for a minimum of 15 minutes   - Encourage patient to express feelings, fears, frustrations, hopes   Outcome: Progressing  Goal: Refrain from harming self  Description: Interventions:  - Monitor patient closely, per order   - Supervise medication ingestion, monitor effects and side effects   Outcome: Progressing  Goal: Refrain from isolation  Description: Interventions:  - Develop a trusting relationship   - Encourage socialization   Outcome: Progressing  Goal: Refrain from self-neglect  Outcome: Progressing  Goal: Attend and participate in unit activities, including therapeutic, recreational, and educational groups  Description: Interventions:  - Provide therapeutic and educational activities daily, encourage attendance and participation, and document same in the medical record   Outcome: Progressing  Goal: Complete daily ADLs, including personal hygiene independently, as able  Description: Interventions:  - Observe, teach, and assist patient with ADLS  -  Monitor and promote a balance of rest/activity, with adequate nutrition and elimination   Outcome: Progressing     Problem: Anxiety  Goal: Anxiety is at manageable level  Description: Interventions:  - Assess and monitor patient's anxiety level  - Monitor for signs and symptoms (heart palpitations, chest pain, shortness of breath, headaches, nausea, feeling jumpy, restlessness, irritable, apprehensive)  - Collaborate with interdisciplinary team and initiate plan and interventions as ordered  - [place] patient to unit/surroundings  - Explain treatment plan  - Encourage participation in care  - Encourage verbalization of concerns/fears  - Identify coping mechanisms  - Assist in developing anxiety-reducing skills  - Administer/offer alternative therapies  - Limit or eliminate stimulants  Outcome: Progressing     Problem: Nutrition/Hydration-ADULT  Goal: Nutrient/Hydration intake appropriate for improving, restoring or maintaining nutritional needs  Description: Monitor and assess patient's nutrition/hydration status for malnutrition   Collaborate with interdisciplinary team and initiate plan and interventions as ordered  Monitor patient's weight and dietary intake as ordered or per policy  Utilize nutrition screening tool and intervene as necessary  Determine patient's food preferences and provide high-protein, high-caloric foods as appropriate       INTERVENTIONS:  - Monitor oral intake, urinary output, labs, and treatment plans  - Assess nutrition and hydration status and recommend course of action  - Evaluate amount of meals eaten  - Assist patient with eating if necessary   - Allow adequate time for meals  - Recommend/ encourage appropriate diets, oral nutritional supplements, and vitamin/mineral supplements  - Order, calculate, and assess calorie counts as needed  - Recommend, monitor, and adjust tube feedings and TPN/PPN based on assessed needs  - Assess need for intravenous fluids  - Provide specific nutrition/hydration education as appropriate  - Include patient/family/caregiver in decisions related to nutrition  Outcome: Progressing

## 2021-03-12 NOTE — PROGRESS NOTES
Patient appeared to be sleeping in most rounds, was up X1 incontinent of urine  No distress or problems noted  Q 7 mins checks in place  Will continue to monitor for behaviors and changes

## 2021-03-12 NOTE — PLAN OF CARE
Problem: DISCHARGE PLANNING  Goal: Discharge to home or other facility with appropriate resources  Description: INTERVENTIONS:  - Identify barriers to discharge w/patient and caregiver  - Arrange for needed discharge resources and transportation as appropriate  - Identify discharge learning needs (meds, wound care, etc )  - Arrange for interpretive services to assist at discharge as needed  - Refer to Case Management Department for coordinating discharge planning if the patient needs post-hospital services based on physician/advanced practitioner order or complex needs related to functional status, cognitive ability, or social support system  Outcome: Progressing   PT awaiting assisted living placement for D/C  Follow up supports will be determined on location

## 2021-03-12 NOTE — PROGRESS NOTES
Progress Note - Behavioral Health   Rosario Leach 62 y o  female MRN: [id number]  Unit/Bed#: [facility] 200-02 Encounter: [id number]    Assessment/Plan   Principal Problem:    MDD (major depressive disorder), recurrent episode ([facility] [address] )  Active Problems:    Essential hypertension    Depression    Other constipation    Absence of bladder continence    History of Asperger's syndrome    Aftercare following right hip joint replacement surgery    Severe protein-calorie malnutrition ([facility] [address] )      Behavior over the last 24 hours:  unchanged  Sleep:  Much better    Appetite: normal  Medication side effects: No  ROS: no complaints and All other systems negative for acute change     Nelly Shetty was seen today for follow-up and case discussed with treatment team this morning  Patient was resting in bed upon encounter  She describes her mood as could be improved    Affect remains flat and continues to be perseverative on the fact that she is unable to care for myself    However, patient is able to dress and feed self independently as well as ambulate ad lila on unit with walker  She rates her anxiety and depression as constant and "8+ "  Nelly Shetty went on to say that her sleep was much better last night  Her appetite remains adequate  She is occasionally visible in the milieu, but remains withdrawn to self  She does attend and participate in group appropriately  Nelly Shetty no longer endorses auditory hallucinations and denies VH/SI/HI      Mental Status Evaluation:  Appearance:  age appropriate, casually dressed and Resting in bed   Behavior:  Somewhat guarded, but cooperative   Speech:  delayed and soft   Mood:  "could be improved "   Affect:  flat   Thought Process:  perserverative   Thought Content:  delusions  somatic   Perceptual Disturbances: None   Risk Potential: Suicidal Ideations none  Homicidal Ideations none  Potential for Aggression No   Sensorium:  person, place, time/date and situation   Memory:  recent and remote memory grossly intact   Consciousness:  alert and awake    Attention: attention span and concentration were age appropriate   Insight:  limited   Judgment: limited   Gait/Station: slow and uses walker   Motor Activity: no abnormal movements     Progress Toward Goals:  Continue with current psychotropic regimen  Patient accepted to [facility] however, brother not agreeable to [facility] offer  No discharge date at this time  Recommended Treatment: Continue with group therapy, milieu therapy and occupational therapy  Risks, benefits and possible side effects of Medications:   Risks, benefits, and possible side effects of medications explained to patient and patient verbalizes understanding  Medications: all current active meds have been reviewed and continue current psychiatric medications  Labs: I have personally reviewed all pertinent laboratory/tests results  Counseling / Coordination of Care  Total floor / unit time spent today 20 minutes  Greater than 50% of total time was spent with the patient and / or family counseling and / or coordination of care

## 2021-03-13 LAB
GLUCOSE SERPL-MCNC: 84 MG/DL (ref 65–140)
GLUCOSE SERPL-MCNC: 97 MG/DL (ref 65–140)

## 2021-03-13 PROCEDURE — 99232 SBSQ HOSP IP/OBS MODERATE 35: CPT | Performed by: PSYCHIATRY & NEUROLOGY

## 2021-03-13 PROCEDURE — 82948 REAGENT STRIP/BLOOD GLUCOSE: CPT

## 2021-03-13 RX ORDER — TRAZODONE HYDROCHLORIDE 150 MG/1
150 TABLET ORAL
Status: DISCONTINUED | OUTPATIENT
Start: 2021-03-13 | End: 2021-03-13

## 2021-03-13 RX ORDER — TRAZODONE HYDROCHLORIDE 50 MG/1
100 TABLET ORAL
Status: DISCONTINUED | OUTPATIENT
Start: 2021-03-13 | End: 2021-03-31 | Stop reason: HOSPADM

## 2021-03-13 RX ORDER — MIRTAZAPINE 15 MG/1
15 TABLET, FILM COATED ORAL
Status: DISCONTINUED | OUTPATIENT
Start: 2021-03-13 | End: 2021-03-31 | Stop reason: HOSPADM

## 2021-03-13 RX ADMIN — OXYBUTYNIN 5 MG: 5 TABLET, FILM COATED, EXTENDED RELEASE ORAL at 08:36

## 2021-03-13 RX ADMIN — RISPERIDONE 3 MG: 2 TABLET ORAL at 21:42

## 2021-03-13 RX ADMIN — RISPERIDONE 2 MG: 2 TABLET ORAL at 08:36

## 2021-03-13 RX ADMIN — TRAZODONE HYDROCHLORIDE 100 MG: 50 TABLET ORAL at 21:44

## 2021-03-13 RX ADMIN — MIRTAZAPINE 15 MG: 15 TABLET, FILM COATED ORAL at 21:42

## 2021-03-13 RX ADMIN — NYSTATIN: 100000 POWDER TOPICAL at 08:36

## 2021-03-13 RX ADMIN — LOSARTAN POTASSIUM 50 MG: 50 TABLET, FILM COATED ORAL at 08:36

## 2021-03-13 RX ADMIN — NYSTATIN 1 APPLICATION: 100000 POWDER TOPICAL at 16:45

## 2021-03-13 RX ADMIN — SITAGLIPTIN 50 MG: 25 TABLET, FILM COATED ORAL at 08:36

## 2021-03-13 RX ADMIN — Medication 1000 UNITS: at 08:36

## 2021-03-13 RX ADMIN — MELATONIN 3 MG: at 21:42

## 2021-03-13 NOTE — PROGRESS NOTES
Patient compliant with medications and meals  Stated her depression and anxiety "are the same, no changes which is never good" patient denies any SI/HI  Present for group this shift  Keeps to herself  Anxious about discharge, not being aware where is she going and c/o of poor sleep  Patient needs encouragement at times  Q 7 mins checks in place for safety  Will continue to monitor for behaviors and changes

## 2021-03-13 NOTE — PLAN OF CARE
Problem: Alteration in Thoughts and Perception  Goal: Treatment Goal: Gain control of psychotic behaviors/thinking, reduce/eliminate presenting symptoms and demonstrate improved reality functioning upon discharge  Outcome: Progressing  Goal: Verbalize thoughts and feelings  Description: Interventions:  - Promote a nonjudgmental and trusting relationship with the patient through active listening and therapeutic communication  - Assess patient's level of functioning, behavior and potential for risk  - Engage patient in 1 on 1 interactions  - Encourage patient to express fears, feelings, frustrations, and discuss symptoms    - [place] patient to reality, help patient recognize reality-based thinking   - Administer medications as ordered and assess for potential side effects  - Provide the patient education related to the signs and symptoms of the illness and desired effects of prescribed medications  Outcome: Progressing  Goal: Refrain from acting on delusional thinking/internal stimuli  Description: Interventions:  - Monitor patient closely, per order   - Utilize least restrictive measures   - Set reasonable limits, give positive feedback for acceptable   - Administer medications as ordered and monitor of potential side effects  Outcome: Progressing  Goal: Agree to be compliant with medication regime, as prescribed and report medication side effects  Description: Interventions:  - Offer appropriate PRN medication and supervise ingestion; conduct AIMS, as needed   Outcome: Progressing  Goal: Attend and participate in unit activities, including therapeutic, recreational, and educational groups  Description: Interventions:  -Encourage Visitation and family involvement in care  Outcome: Progressing  Goal: Recognize dysfunctional thoughts, communicate reality-based thoughts at the time of discharge  Description: Interventions:  - Provide medication and psycho-education to assist patient in compliance and developing insight into his/her illness   Outcome: Progressing  Goal: Complete daily ADLs, including personal hygiene independently, as able  Description: Interventions:  - Observe, teach, and assist patient with ADLS  - Monitor and promote a balance of rest/activity, with adequate nutrition and elimination   Outcome: Progressing

## 2021-03-13 NOTE — NURSING NOTE
This writer talk to patient about waking up her room mate to ring the call bell for her  The patient is not having any medical issues that warrants her room mate to utilize the call bell  Patient wants the call bell to alert staff that she needs help ambulating to and from the BR  Staff has observed the patient  not having any difficulty ambulating with her walker  Patient was in agreement not to wake up her room mate

## 2021-03-13 NOTE — NURSING NOTE
Patient out in the milieu keeps to herself  Ate HS snack and attended group  Upon approach patient's mood and affect is flat and depressed  Patient rates her anxiety and depression 8/10  Denies SI/HI/AHVH/pain  Patient states she needs help getting in and out of bed  Patient is observed walking to the DR and BR using her walker  This writer gave patient reassurance and encouragement, reinforcing the importance of maintaining independence and walking on her own as much as possible  Took HS medication without difficulty  Will continue to monitor safety and behaviors every 7 minutes

## 2021-03-13 NOTE — PROGRESS NOTES
Patient rang call bell  This tech answered call bell, patient was sitting on bed after returning from the bathroom     Patient stated "I need help getting in bed, litterally "  This tech gently helped patient into bed, and placed 2 upper side rails up for safety    Patient thanked tech for help, and tech replied with "You're welcome "

## 2021-03-13 NOTE — PROGRESS NOTES
Progress Note - Behavioral Health   Gautam Narayan 62 y o  female MRN: [id number]  Unit/Bed#: Naomi [facility]tan 205-02 Encounter: [id number]    Assessment/Plan   Principal Problem:    MDD (major depressive disorder), recurrent episode ([address] )  Active Problems:    Essential hypertension    Depression    Other constipation    Absence of bladder continence    History of Asperger's syndrome    Aftercare following right hip joint replacement surgery    Severe protein-calorie malnutrition (HCC)      Behavior over the last 24 hours:  unchanged  Sleep: insomnia  Appetite: poor  Medication side effects: No  ROS: no complaints    Mental Status Evaluation:  Appearance:  casually dressed   Behavior:  psychomotor retardation   Speech:  soft   Mood:  anxious and depressed   Affect:  constricted   Thought Process:  goal directed   Thought Content:  anxious ruminations   Perceptual Disturbances: Pt denies   Risk Potential: Suicidal Ideations none  Homicidal Ideations none  Potential for Aggression No   Sensorium:  person and place   Memory:  recent and remote memory grossly intact   Consciousness:  awake    Attention: attention span and concentration were age appropriate   Insight:  limited   Judgment: limited   Gait/Station: normal gait/station   Motor Activity: no abnormal movements     Progress Toward Goals: Pt cont too report profound depressive symptoms with low mood anhedonia and despair associated with insomnia and reduced appetite  Pt also endorses lots of anxiety related symptoms constant all day long  Pt keen on being prescribed temazepam for sleep stating that she had been on it for over 2 years  I did some psycho education on the concerns associated with long term benzodiazepine use  Recommended Treatment:   1-Increase Remeron 15 mg at bedtime,cont trazodone 100 mg at bedtime  2-Continue with group therapy, milieu therapy and occupational therapy        Risks, benefits and possible side effects of Medications:   Risks, benefits, and possible side effects of medications explained to patient and patient verbalizes understanding        Medications:   current meds:   Current Facility-Administered Medications   Medication Dose Route Frequency    acetaminophen (TYLENOL) tablet 650 mg  650 mg Oral Q6H PRN    acetaminophen (TYLENOL) tablet 650 mg  650 mg Oral Q4H PRN    acetaminophen (TYLENOL) tablet 975 mg  975 mg Oral Q6H PRN    benztropine (COGENTIN) injection 1 mg  1 mg Intramuscular Q4H PRN Max 6/day    benztropine (COGENTIN) tablet 1 mg  1 mg Oral Q4H PRN Max 6/day    cholecalciferol (VITAMIN D3) tablet 1,000 Units  1,000 Units Oral Daily    hydrOXYzine HCL (ATARAX) tablet 25 mg  25 mg Oral Q6H PRN Max 4/day    hydrOXYzine HCL (ATARAX) tablet 50 mg  50 mg Oral Q4H PRN Max 4/day    Or    LORazepam (ATIVAN) injection 0 5 mg  0 5 mg Intramuscular Q4H PRN    loperamide (IMODIUM) capsule 2 mg  2 mg Oral TID PRN    LORazepam (ATIVAN) tablet 1 mg  1 mg Oral Q4H PRN Max 6/day    Or    LORazepam (ATIVAN) injection 1 mg  1 mg Intramuscular Q6H PRN Max 3/day    losartan (COZAAR) tablet 50 mg  50 mg Oral Daily    melatonin tablet 3 mg  3 mg Oral HS    mirtazapine (REMERON) tablet 15 mg  15 mg Oral HS    nystatin (MYCOSTATIN) powder   Topical TID    OLANZapine (ZyPREXA) tablet 10 mg  10 mg Oral Q3H PRN Max 3/day    Or    OLANZapine (ZyPREXA) IM injection 10 mg  10 mg Intramuscular Q3H PRN Max 3/day    OLANZapine (ZyPREXA) tablet 5 mg  5 mg Oral Q3H PRN Max 6/day    Or    OLANZapine (ZyPREXA) IM injection 5 mg  5 mg Intramuscular Q3H PRN Max 6/day    OLANZapine (ZyPREXA) tablet 2 5 mg  2 5 mg Oral Q3H PRN Max 8/day    oxybutynin (DITROPAN-XL) 24 hr tablet 5 mg  5 mg Oral Daily    polyethylene glycol (MIRALAX) packet 17 g  17 g Oral Daily PRN    risperiDONE (RisperDAL) tablet 2 mg  2 mg Oral Daily    risperiDONE (RisperDAL) tablet 3 mg  3 mg Oral HS    sitaGLIPtin (JANUVIA) tablet 50 mg  50 mg Oral Daily    sodium chloride (OCEAN) 0 65 % nasal spray 1 spray  1 spray Each Nare Q2H PRN    traZODone (DESYREL) tablet 100 mg  100 mg Oral HS    traZODone (DESYREL) tablet 50 mg  50 mg Oral HS PRN     Labs: I have personally reviewed all pertinent laboratory/tests results  Most Recent Labs:   Lab Results   Component Value Date    WBC 5 44 02/20/2021    RBC 5 80 (H) 02/20/2021    HGB 17 9 (H) 02/20/2021    HCT 53 5 (H) 02/20/2021     02/20/2021    RDW 12 7 02/20/2021    NEUTROABS 3 72 02/20/2021    SODIUM 137 03/01/2021    K 4 0 03/01/2021     03/01/2021    CO2 26 03/01/2021    BUN 18 03/01/2021    CREATININE 0 73 03/01/2021    GLUC 102 (H) 03/01/2021    CALCIUM 9 3 03/01/2021    AST 9 (L) 03/01/2021    ALT 11 03/01/2021    ALKPHOS 61 03/01/2021    TP 6 5 03/01/2021    ALB 4 1 03/01/2021    TBILI 0 40 03/01/2021    CHOLESTEROL 257 (H) 12/05/2019    HDL 71 12/05/2019    TRIG 112 12/05/2019    LDLCALC 164 (H) 12/05/2019    CFR4EFXJNVSV 0 506 02/20/2021    RPR Non-Reactive 02/21/2021    HGBA1C 5 3 02/22/2021     02/22/2021       Counseling / Coordination of Care  Total floor / unit time spent today 25 minutes  Greater than 50% of total time was spent with the patient and / or family counseling and / or coordination of care   A description of the counseling / coordination of care:

## 2021-03-13 NOTE — NURSING NOTE
Patient complained of insomnia  Gave 50 mg PRN Trazodone as ordered for complaint of symptoms  Patient complaining she can not move in bed due to contraction  This writer gave patient encouragement and reassurance to move and reposition herself to keep herself as independent as possible  Paul Valdes continue to monitor safety and behaviors every 7 minutes

## 2021-03-14 LAB
GLUCOSE SERPL-MCNC: 92 MG/DL (ref 65–140)
GLUCOSE SERPL-MCNC: 93 MG/DL (ref 65–140)

## 2021-03-14 PROCEDURE — 82948 REAGENT STRIP/BLOOD GLUCOSE: CPT

## 2021-03-14 PROCEDURE — 99232 SBSQ HOSP IP/OBS MODERATE 35: CPT | Performed by: PSYCHIATRY & NEUROLOGY

## 2021-03-14 RX ADMIN — MELATONIN 3 MG: at 21:38

## 2021-03-14 RX ADMIN — RISPERIDONE 2 MG: 2 TABLET ORAL at 08:43

## 2021-03-14 RX ADMIN — RISPERIDONE 3 MG: 2 TABLET ORAL at 21:38

## 2021-03-14 RX ADMIN — MIRTAZAPINE 15 MG: 15 TABLET, FILM COATED ORAL at 21:38

## 2021-03-14 RX ADMIN — NYSTATIN 1 APPLICATION: 100000 POWDER TOPICAL at 08:42

## 2021-03-14 RX ADMIN — SITAGLIPTIN 50 MG: 25 TABLET, FILM COATED ORAL at 08:42

## 2021-03-14 RX ADMIN — NYSTATIN 1 APPLICATION: 100000 POWDER TOPICAL at 16:23

## 2021-03-14 RX ADMIN — SALINE NASAL SPRAY 1 SPRAY: 1.5 SOLUTION NASAL at 16:22

## 2021-03-14 RX ADMIN — LOSARTAN POTASSIUM 50 MG: 50 TABLET, FILM COATED ORAL at 08:42

## 2021-03-14 RX ADMIN — Medication 1000 UNITS: at 08:42

## 2021-03-14 RX ADMIN — OXYBUTYNIN 5 MG: 5 TABLET, FILM COATED, EXTENDED RELEASE ORAL at 08:42

## 2021-03-14 RX ADMIN — TRAZODONE HYDROCHLORIDE 100 MG: 50 TABLET ORAL at 21:39

## 2021-03-14 NOTE — PROGRESS NOTES
Progress Note - Luis A Ferrell 62 y o  female MRN: [id number]    Unit/Bed#: L[facility]respo 205-02 Encounter: [id number]      Assessment:  1  Cardiac with history of hypertension  Controlled on losartan  2  DJD/osteoarthritis  Simple analgesics  3  Gait abnormality   PT OT evaluation  4  Insomnia   Melatonin  5  Overactive urinary bladder   Adequately controlled with oxybutynin  6  Hyperglycemia/weight loss  Adequately controlled on Januvia with carb controlled diet  7  Nasal congestion/dryness  Saline nasal spray  8  Vitamin D deficiency  Supplemented    9  Overgrown toenails   Podiatry  10  Loose stools   p r n  Imodium    Plan:  As above    Subjective:   Multiple but unchanged particularly regarding her lower extremity weakness and ambulatory dysfunction  Likely manifestation of her psychiatric disorder    Objective:     Vitals: Blood pressure 125/75, pulse (!) 115, temperature 99 4 °F (37 4 °C), temperature source Temporal, resp  rate 18, height 5' 5" (1 651 m), weight 65 2 kg (143 lb 12 8 oz), SpO2 96 %, not currently breastfeeding  ,Body mass index is 23 93 kg/m²        Intake/Output Summary (Last 24 hours) at 3/14/2021 1718  Last data filed at 3/13/2021 2045  Gross per 24 hour   Intake 0 ml   Output --   Net 0 ml       Physical Exam: /75 (BP Location: Right arm)   Pulse (!) 115   Temp 99 4 °F (37 4 °C) (Temporal)   Resp 18   Ht 5' 5" (1 651 m)   Wt 65 2 kg (143 lb 12 8 oz)   LMP  (LMP Unknown)   SpO2 96%   BMI 23 93 kg/m²     General Appearance:    Alert, cooperative, no distress, appears stated age   Head:    Normocephalic, without obvious abnormality, atraumatic                   Neck:   Supple, symmetrical, trachea midline, no adenopathy;     thyroid:  no enlargement/tenderness/nodules; no carotid    bruit or JVD   Back:     Symmetric, no curvature, ROM normal, no CVA tenderness   Lungs:     Clear to auscultation bilaterally, respirations unlabored   Chest Wall:    No tenderness or deformity Heart:    Regular rate and rhythm, S1 and S2 normal, no murmur, rub   or gallop       Abdomen:     Soft, non-tender, bowel sounds active all four quadrants,     no masses, no organomegaly           Extremities:   Extremities normal, atraumatic, no cyanosis or edema   Pulses:   2+ and symmetric all extremities   Skin:   Skin color, texture, turgor normal, no rashes or lesions   Lymph nodes:   Cervical, supraclavicular, and axillary nodes normal            Invasive Devices     Peripheral Intravenous Line            Peripheral IV 02/20/21 Right Antecubital 22 days                Lab, Imaging and other studies: I have personally reviewed pertinent reports

## 2021-03-14 NOTE — NURSING NOTE
Patient appears to be sleeping without difficulty throughout the night  Awake x`1 to the BR  Ambulated with a lot of reassurance and encouragement  Will continue to monitor safety and behaviors every 7 minutes

## 2021-03-14 NOTE — PROGRESS NOTES
Patient compliant with medications and meals  Rated her depression and anxiety 8 out of 10  patient denies any SI/HI  Present for group this shift  Keeps to herself  c/o of poor sleep  Patient needs encouragement at times  Q 7 mins checks in place for safety  Will continue to monitor for behaviors and changes

## 2021-03-14 NOTE — PROGRESS NOTES
Progress Note - Joseph Nieves 62 y o  female MRN: [id number]    Unit/Bed#: Juhi Contreras 205-02 Encounter: [id number]      Assessment:  1  Cardiac with history of hypertension  Controlled on losartan  2  DJD/osteoarthritis  Simple analgesics  3  Gait abnormality   PT OT evaluation  4  Insomnia   Melatonin  5  Overactive urinary bladder   Adequately controlled with oxybutynin  6  Hyperglycemia/weight loss  Adequately controlled on Januvia with carb controlled diet  7  Nasal congestion/dryness  Saline nasal spray  8  Vitamin D deficiency  Supplemented    9  Overgrown toenails  Podiatry  10  Loose stools  p r n  Imodium    Plan:  See above    Subjective:   Multiple complaints regarding inability to walk on her own which he feels is progressive  Objective:     Vitals: Blood pressure 139/82, pulse 104, temperature 97 8 °F (36 6 °C), temperature source Temporal, resp  rate 19, height 5' 5" (1 651 m), weight 65 2 kg (143 lb 12 8 oz), SpO2 96 %, not currently breastfeeding  ,Body mass index is 23 93 kg/m²        Intake/Output Summary (Last 24 hours) at 3/13/2021 1906  Last data filed at 3/13/2021 1700  Gross per 24 hour   Intake 960 ml   Output --   Net 960 ml       Physical Exam: /82 (BP Location: Right arm)   Pulse 104   Temp 97 8 °F (36 6 °C) (Temporal)   Resp 19   Ht 5' 5" (1 651 m)   Wt 65 2 kg (143 lb 12 8 oz)   LMP  (LMP Unknown)   SpO2 96%   BMI 23 93 kg/m²     General Appearance:    Alert, cooperative, no distress, appears stated age   Head:    Normocephalic, without obvious abnormality, atraumatic                   Neck:   Supple, symmetrical, trachea midline, no adenopathy;     thyroid:  no enlargement/tenderness/nodules; no carotid    bruit or JVD   Back:     Symmetric, no curvature, ROM normal, no CVA tenderness   Lungs:     Clear to auscultation bilaterally, respirations unlabored   Chest Wall:    No tenderness or deformity    Heart:    Regular rate and rhythm, S1 and S2 normal, no murmur, rub or gallop       Abdomen:     Soft, non-tender, bowel sounds active all four quadrants,     no masses, no organomegaly           Extremities:   Extremities normal, atraumatic, no cyanosis or edema   Pulses:   2+ and symmetric all extremities   Skin:   Skin color, texture, turgor normal, no rashes or lesions   Lymph nodes:   Cervical, supraclavicular, and axillary nodes normal            Invasive Devices     Peripheral Intravenous Line            Peripheral IV 02/20/21 Right Antecubital 21 days                Lab, Imaging and other studies: I have personally reviewed pertinent reports

## 2021-03-14 NOTE — PROGRESS NOTES
Progress Note - Behavioral Health   Neomi Opitz 62 y o  female MRN: [id number]  Unit/Bed#: [facility] Eug[facility] 205-02 Encounter: [id number]    Assessment/Plan   Principal Problem:    MDD (major depressive disorder), recurrent episode ([address] )  Active Problems:    Essential hypertension    Depression    Other constipation    Absence of bladder continence    History of Asperger's syndrome    Aftercare following right hip joint replacement surgery    Severe protein-calorie malnutrition (HCC)      Behavior over the last 24 hours:  unchanged  Sleep: insomnia  Appetite: poor  Medication side effects: No  ROS: no complaints    Mental Status Evaluation:  Appearance:  casually dressed   Behavior:  psychomotor retardation   Speech:  soft   Mood:  anxious and depressed   Affect:  constricted   Thought Process:  goal directed   Thought Content:  anxious ruminations   Perceptual Disturbances: Pt denies   Risk Potential: Suicidal Ideations none  Homicidal Ideations none  Potential for Aggression No   Sensorium:  person and place   Memory:  recent and remote memory grossly intact   Consciousness:  awake    Attention: attention span and concentration were age appropriate   Insight:  limited   Judgment: limited   Gait/Station: normal gait/station   Motor Activity: no abnormal movements     Progress Toward Goals: Pt seen in her room,she cont to require reassurances,states she slept poorly last mostly due to her positioning but adds that there is nothing we can do about that  She cont too report profound depressive symptoms with low mood   Pt also endorses lots of anxiety related symptoms constant all day long  Pt remains  keen on being prescribed temazepam for sleep but is understands that this may not be a good idea  Staff tells me that she keen on SNIF,Nil behavioral issues as per staff and she is medication compliant tolerating the recent increase in Remeron      Recommended Treatment:   1- Remeron 15 mg at bedtime,cont trazodone 100 mg at bedtime  2-Continue with group therapy, milieu therapy and occupational therapy  Risks, benefits and possible side effects of Medications:   Risks, benefits, and possible side effects of medications explained to patient and patient verbalizes understanding        Medications:   current meds:   Current Facility-Administered Medications   Medication Dose Route Frequency    acetaminophen (TYLENOL) tablet 650 mg  650 mg Oral Q6H PRN    acetaminophen (TYLENOL) tablet 650 mg  650 mg Oral Q4H PRN    acetaminophen (TYLENOL) tablet 975 mg  975 mg Oral Q6H PRN    benztropine (COGENTIN) injection 1 mg  1 mg Intramuscular Q4H PRN Max 6/day    benztropine (COGENTIN) tablet 1 mg  1 mg Oral Q4H PRN Max 6/day    cholecalciferol (VITAMIN D3) tablet 1,000 Units  1,000 Units Oral Daily    hydrOXYzine HCL (ATARAX) tablet 25 mg  25 mg Oral Q6H PRN Max 4/day    hydrOXYzine HCL (ATARAX) tablet 50 mg  50 mg Oral Q4H PRN Max 4/day    Or    LORazepam (ATIVAN) injection 0 5 mg  0 5 mg Intramuscular Q4H PRN    loperamide (IMODIUM) capsule 2 mg  2 mg Oral TID PRN    LORazepam (ATIVAN) tablet 1 mg  1 mg Oral Q4H PRN Max 6/day    Or    LORazepam (ATIVAN) injection 1 mg  1 mg Intramuscular Q6H PRN Max 3/day    losartan (COZAAR) tablet 50 mg  50 mg Oral Daily    melatonin tablet 3 mg  3 mg Oral HS    mirtazapine (REMERON) tablet 15 mg  15 mg Oral HS    nystatin (MYCOSTATIN) powder   Topical TID    OLANZapine (ZyPREXA) tablet 10 mg  10 mg Oral Q3H PRN Max 3/day    Or    OLANZapine (ZyPREXA) IM injection 10 mg  10 mg Intramuscular Q3H PRN Max 3/day    OLANZapine (ZyPREXA) tablet 5 mg  5 mg Oral Q3H PRN Max 6/day    Or    OLANZapine (ZyPREXA) IM injection 5 mg  5 mg Intramuscular Q3H PRN Max 6/day    OLANZapine (ZyPREXA) tablet 2 5 mg  2 5 mg Oral Q3H PRN Max 8/day    oxybutynin (DITROPAN-XL) 24 hr tablet 5 mg  5 mg Oral Daily    polyethylene glycol (MIRALAX) packet 17 g  17 g Oral Daily PRN    risperiDONE (RisperDAL) tablet 2 mg  2 mg Oral Daily    risperiDONE (RisperDAL) tablet 3 mg  3 mg Oral HS    sitaGLIPtin (JANUVIA) tablet 50 mg  50 mg Oral Daily    sodium chloride (OCEAN) 0 65 % nasal spray 1 spray  1 spray Each Nare Q2H PRN    traZODone (DESYREL) tablet 100 mg  100 mg Oral HS    traZODone (DESYREL) tablet 50 mg  50 mg Oral HS PRN     Labs: I have personally reviewed all pertinent laboratory/tests results  Most Recent Labs:   Lab Results   Component Value Date    WBC 5 44 02/20/2021    RBC 5 80 (H) 02/20/2021    HGB 17 9 (H) 02/20/2021    HCT 53 5 (H) 02/20/2021     02/20/2021    RDW 12 7 02/20/2021    NEUTROABS 3 72 02/20/2021    SODIUM 137 03/01/2021    K 4 0 03/01/2021     03/01/2021    CO2 26 03/01/2021    BUN 18 03/01/2021    CREATININE 0 73 03/01/2021    GLUC 102 (H) 03/01/2021    CALCIUM 9 3 03/01/2021    AST 9 (L) 03/01/2021    ALT 11 03/01/2021    ALKPHOS 61 03/01/2021    TP 6 5 03/01/2021    ALB 4 1 03/01/2021    TBILI 0 40 03/01/2021    CHOLESTEROL 257 (H) 12/05/2019    HDL 71 12/05/2019    TRIG 112 12/05/2019    LDLCALC 164 (H) 12/05/2019    [id number] 0 506 02/20/2021    RPR Non-Reactive 02/21/2021    HGBA1C 5 3 02/22/2021     02/22/2021       Counseling / Coordination of Care  Total floor / unit time spent today 25 minutes  Greater than 50% of total time was spent with the patient and / or family counseling and / or coordination of care   A description of the counseling / coordination of care:

## 2021-03-14 NOTE — PLAN OF CARE
Problem: Alteration in Thoughts and Perception  Goal: Treatment Goal: Gain control of psychotic behaviors/thinking, reduce/eliminate presenting symptoms and demonstrate improved reality functioning upon discharge  Outcome: Progressing  Goal: Verbalize thoughts and feelings  Description: Interventions:  - Promote a nonjudgmental and trusting relationship with the patient through active listening and therapeutic communication  - Assess patient's level of functioning, behavior and potential for risk  - Engage patient in 1 on 1 interactions  - Encourage patient to express fears, feelings, frustrations, and discuss symptoms    - [place] patient to reality, help patient recognize reality-based thinking   - Administer medications as ordered and assess for potential side effects  - Provide the patient education related to the signs and symptoms of the illness and desired effects of prescribed medications  Outcome: Progressing  Goal: Refrain from acting on delusional thinking/internal stimuli  Description: Interventions:  - Monitor patient closely, per order   - Utilize least restrictive measures   - Set reasonable limits, give positive feedback for acceptable   - Administer medications as ordered and monitor of potential side effects  Outcome: Progressing  Goal: Agree to be compliant with medication regime, as prescribed and report medication side effects  Description: Interventions:  - Offer appropriate PRN medication and supervise ingestion; conduct AIMS, as needed   Outcome: Progressing  Goal: Attend and participate in unit activities, including therapeutic, recreational, and educational groups  Description: Interventions:  -Encourage Visitation and family involvement in care  Outcome: Progressing

## 2021-03-14 NOTE — NURSING NOTE
Patient withdrawn to her room  Ate HS snack  Upon approach patient's mood and affect is flat and depressed  Patient rates anxiety and depression high due to her continued rumination of declining independence  Staff continues to give positive reinforcement, encouragement, and reassurance to the patient to stay goal directed with independence  Denies SI/HI/AHVH/pain  Took HS medication without difficulty  Will continue to monitor safety and behaviors every 7 minutes

## 2021-03-15 LAB
GLUCOSE SERPL-MCNC: 116 MG/DL (ref 65–140)
GLUCOSE SERPL-MCNC: 94 MG/DL (ref 65–140)

## 2021-03-15 PROCEDURE — 99232 SBSQ HOSP IP/OBS MODERATE 35: CPT | Performed by: PSYCHIATRY & NEUROLOGY

## 2021-03-15 PROCEDURE — 82948 REAGENT STRIP/BLOOD GLUCOSE: CPT

## 2021-03-15 RX ADMIN — OXYBUTYNIN 5 MG: 5 TABLET, FILM COATED, EXTENDED RELEASE ORAL at 08:37

## 2021-03-15 RX ADMIN — RISPERIDONE 2 MG: 2 TABLET ORAL at 08:38

## 2021-03-15 RX ADMIN — NYSTATIN 1 APPLICATION: 100000 POWDER TOPICAL at 16:05

## 2021-03-15 RX ADMIN — RISPERIDONE 3 MG: 2 TABLET ORAL at 21:17

## 2021-03-15 RX ADMIN — SITAGLIPTIN 50 MG: 25 TABLET, FILM COATED ORAL at 08:38

## 2021-03-15 RX ADMIN — NYSTATIN: 100000 POWDER TOPICAL at 21:17

## 2021-03-15 RX ADMIN — Medication 1000 UNITS: at 08:37

## 2021-03-15 RX ADMIN — MELATONIN 3 MG: at 21:17

## 2021-03-15 RX ADMIN — TRAZODONE HYDROCHLORIDE 100 MG: 50 TABLET ORAL at 21:16

## 2021-03-15 RX ADMIN — LOSARTAN POTASSIUM 50 MG: 50 TABLET, FILM COATED ORAL at 08:37

## 2021-03-15 RX ADMIN — NYSTATIN: 100000 POWDER TOPICAL at 08:38

## 2021-03-15 RX ADMIN — MIRTAZAPINE 15 MG: 15 TABLET, FILM COATED ORAL at 21:16

## 2021-03-15 NOTE — PROGRESS NOTES
03/15/21    Team Meeting   Meeting Type Daily Rounds   Team Members Present   Team Members Present Physician;Nurse;;Occupational Therapist   Physician Team Member Dr Magy Presley MD; KAM Kenney   Nursing Team Member Casi Holguin RN   Care Management Team Member MS [place], [facility]   OT Team Member Favian Clark [place]   Patient/Family Present   Patient Present No   Patient's Family Present No     PT awaiting [facility]  Pending brother to review finances  Not incontinent over weekend

## 2021-03-15 NOTE — SOCIAL WORK
GAURAV spoke with Reyna   She indicated she spoke with PT brother  He declined to pay full amount and she explained to him the reasoning for costs and level of care  She reached out to [facility] to see if they would take her, declined  Brayan Vega is going to follow up with PT brother to see his decision and then follow up with CM  Call ended mutually

## 2021-03-15 NOTE — SOCIAL WORK
CM spoke with Scott Osuna with [facility] [address] , reviewed that CM is awaiting call back from PT brother on outcome of being able to pay financials  CM will follow up with Reyna on outcome  Call ended mutally

## 2021-03-15 NOTE — NURSING NOTE
[id number]     Pt withdrawn to room  Affect blunt; mood depressed  Denies SI or HI  Pt requesting significantly more assistance than previous interaction approx 3 weeks ago  Encouragement provided  Q 7 min checks ongoing

## 2021-03-15 NOTE — PROGRESS NOTES
Progress Note - Luis A Ferrell 62 y o  female MRN: [id number]    Unit/Bed#: Lydia Crespo 205-02 Encounter: [id number]        Subjective:  Patient seen and examined at bedside after reviewing the chart and discussing the case with the caring staff  Patient examined at bedside  Patient has multiple complaints of not being able to care for herself saying, "I can't dress myself or get in and out of bed, I'm incontinent  I need someone to do everything for me " Patient complains of generalized weakness and "numbness" in her left lower extremity  Exam WNL, most likely s/s psychiatric disorder  She has been working with physical and occupational therapy and will most likely discharge to [facility]  Physical Exam   Vitals: Blood pressure 149/77, pulse 102, temperature 98 4 °F (36 9 °C), temperature source Temporal, resp  rate 20, height 5' 5" (1 651 m), weight 65 2 kg (143 lb 12 8 oz), SpO2 97 %, not currently breastfeeding  ,Body mass index is 23 93 kg/m²  Constitutional: He appears well-developed  HEENT: PERR, EOMI, MMM  Cardiovascular: Normal rate and regular rhythm  Pulmonary/Chest: Effort normal and breath sounds normal    Abdomen: Soft, + BS, NT    Assessment/Plan:     Luis A Ferrell is a(n) 62y o  year old female with psychotic disorder      1  Cardiac with history of hypertension  Patient will be continued on losartan  2  DJD/osteoarthritis  Patient may get Tylenol on as needed basis  3  Gait abnormality  I will consult PT and OT for further evaluation and treatment  4  Insomnia  Patient is on melatonin  5  Overactive urinary bladder  Patient is on oxybutynin  6  Hyperglycemia/weight loss  We will closely monitor patient's blood sugars by doing Accu-Cheks 2 times daily  Continue Januvia 50 mg daily along with carb controlled diet  Patient's hemoglobin A1c was 5 3  Nutrition is on consult  Patient has been put on Glucerna supplements  7  Nasal congestion/dryness    Continue saline nasal spray every 2 hours as needed  8  Vitamin D deficiency  Continue daily vitamin-D supplements  9  Overgrown toenails  Podiatry is on consult  10  Loose stools  Patient may receive Imodium as needed  11  Anorexia/low appetite  Patient is on Remeron 7 5 mg daily along with Glucerna  12  Athlete's foot  Continue nystatin powder to the feet 3 times daily  13  Gait Dysfunction  Exam WNL, most likely s/s psychiatric disorder  She has been working with physical and occupational therapy and will most likely discharge to [place] [id number]  The patient was discussed with Dr Florecita Ceja and he is in agreement with the above note

## 2021-03-15 NOTE — SOCIAL WORK
CM spoke with [facility]  with [facility]  She spoke with Zacharysabrina Jarvis PT brother and she is going to email him the contract to sign, once she receives this back she will reach out to [place] and so she can process in D/C planning  Reyna needs script for face to nursing nursing, PT/OT ([address])  Scripts-no injectables/send via fax w/ [phone]  DME-mobile(yes)   PCP is MD Peter Sanchez, [place]  Call ended mutually

## 2021-03-15 NOTE — PROGRESS NOTES
Progress Note - Behavioral Health   Neomi Opitz 62 y o  female MRN: [id number]  Unit/Bed#: [facility] 205-02 Encounter: [id number]    Assessment/Plan   Principal Problem:    MDD (major depressive disorder), recurrent episode ([address] )  Active Problems:    Essential hypertension    Depression    Other constipation    Absence of bladder continence    History of Asperger's syndrome    Aftercare following right hip joint replacement surgery    Severe protein-calorie malnutrition (HCC)      Behavior over the last 24 hours:  unchanged  Sleep: normal  Appetite: normal  Medication side effects: No  ROS: "I can't move," otherwise, all other systems negative for acute change     Tenzin Briceno was seen today for follow-up and case discussed with treatment team this morning  Upon encounter, patient was laying in bed with her pants shelter down and stating I can't move  I can"t care for myself "  She went on to describe her mood as rotten   lousy " Much encouragement and support provided to patient in which she stated I know I can move if I put my mind to it, but I can't  Mattie Allen continued to be somatically preoccupied throughout interview  Redirection was minimally effective  However, she denies any AVH/SI/HI  She reports she has not heard the voices for a while" and cannot recall the last time she has heard them  She reports she sleeps well throughout the night but her appetite is no good    According to documentation, patient has been completing 100% of meals  Tenzin Briceno then went on to say that she is better mentally, but not physically " Rates her anxiety and depression as moderate  Tenzin Briceno remains withdrawn to self      Mental Status Evaluation:  Appearance:  disheveled   Behavior:  psychomotor retardation and Dependent, calm   Speech:  normal pitch and normal volume   Mood:  decreased range   Affect:  flat   Thought Process:  perserverative   Thought Content:  delusions  somatic   Perceptual Disturbances: None   Risk Potential: Suicidal Ideations none  Homicidal Ideations none  Potential for Aggression No   Sensorium:  person, place, time/date and situation   Memory:  recent and remote memory grossly intact   Consciousness:  alert and awake    Attention: attention span and concentration were age appropriate   Insight:  limited   Judgment: limited   Gait/Station: Laying in bed   Motor Activity: no abnormal movements     Progress Toward Goals:  No change  Continue with current psychotropic regimen  Patient accepted to [facility] facility  Awaiting discharge date  Recommended Treatment: Continue with group therapy, milieu therapy and occupational therapy  Risks, benefits and possible side effects of Medications:   Risks, benefits, and possible side effects of medications explained to patient and patient verbalizes understanding  Medications: all current active meds have been reviewed and continue current psychiatric medications  Labs: I have personally reviewed all pertinent laboratory/tests results  Counseling / Coordination of Care  Total floor / unit time spent today 20 minutes  Greater than 50% of total time was spent with the patient and / or family counseling and / or coordination of care

## 2021-03-15 NOTE — PLAN OF CARE
Problem: Alteration in Thoughts and Perception  Goal: Treatment Goal: Gain control of psychotic behaviors/thinking, reduce/eliminate presenting symptoms and demonstrate improved reality functioning upon discharge  Outcome: Progressing  Goal: Verbalize thoughts and feelings  Description: Interventions:  - Promote a nonjudgmental and trusting relationship with the patient through active listening and therapeutic communication  - Assess patient's level of functioning, behavior and potential for risk  - Engage patient in 1 on 1 interactions  - Encourage patient to express fears, feelings, frustrations, and discuss symptoms    - [place] patient to reality, help patient recognize reality-based thinking   - Administer medications as ordered and assess for potential side effects  - Provide the patient education related to the signs and symptoms of the illness and desired effects of prescribed medications  Outcome: Progressing  Goal: Refrain from acting on delusional thinking/internal stimuli  Description: Interventions:  - Monitor patient closely, per order   - Utilize least restrictive measures   - Set reasonable limits, give positive feedback for acceptable   - Administer medications as ordered and monitor of potential side effects  Outcome: Progressing  Goal: Agree to be compliant with medication regime, as prescribed and report medication side effects  Description: Interventions:  - Offer appropriate PRN medication and supervise ingestion; conduct AIMS, as needed   Outcome: Progressing  Goal: Attend and participate in unit activities, including therapeutic, recreational, and educational groups  Description: Interventions:  -Encourage Visitation and family involvement in care  Outcome: Progressing  Goal: Recognize dysfunctional thoughts, communicate reality-based thoughts at the time of discharge  Description: Interventions:  - Provide medication and psycho-education to assist patient in compliance and developing insight into his/her illness   Outcome: Progressing  Goal: Complete daily ADLs, including personal hygiene independently, as able  Description: Interventions:  - Observe, teach, and assist patient with ADLS  - Monitor and promote a balance of rest/activity, with adequate nutrition and elimination   Outcome: Progressing     Problem: Ineffective Coping  Goal: Cooperates with admission process  Description: Interventions:   - Complete admission process  Outcome: Progressing  Goal: Identifies ineffective coping skills  Outcome: Progressing  Goal: Identifies healthy coping skills  Outcome: Progressing  Goal: Demonstrates healthy coping skills  Outcome: Progressing  Goal: Participates in unit activities  Description: Interventions:  - Provide therapeutic environment   - Provide required programming   - Redirect inappropriate behaviors   Outcome: Progressing  Goal: Patient/Family participate in treatment and DC plans  Description: Interventions:  - Provide therapeutic environment  Outcome: Progressing  Goal: Patient/Family verbalizes awareness of resources  Outcome: Progressing  Goal: Understands least restrictive measures  Description: Interventions:  - Utilize least restrictive behavior  Outcome: Progressing  Goal: Free from restraint events  Description: - Utilize least restrictive measures   - Provide behavioral interventions   - Redirect inappropriate behaviors   Outcome: Progressing     Problem: Depression  Goal: Treatment Goal: Demonstrate behavioral control of depressive symptoms, verbalize feelings of improved mood/affect, and adopt new coping skills prior to discharge  Outcome: Progressing  Goal: Verbalize thoughts and feelings  Description: Interventions:  - Assess and re-assess patient's level of risk   - Engage patient in 1:1 interactions, daily, for a minimum of 15 minutes   - Encourage patient to express feelings, fears, frustrations, hopes   Outcome: Progressing  Goal: Refrain from harming self  Description: Interventions:  - Monitor patient closely, per order   - Supervise medication ingestion, monitor effects and side effects   Outcome: Progressing  Goal: Refrain from isolation  Description: Interventions:  - Develop a trusting relationship   - Encourage socialization   Outcome: Progressing  Goal: Refrain from self-neglect  Outcome: Progressing  Goal: Attend and participate in unit activities, including therapeutic, recreational, and educational groups  Description: Interventions:  - Provide therapeutic and educational activities daily, encourage attendance and participation, and document same in the medical record   Outcome: Progressing  Goal: Complete daily ADLs, including personal hygiene independently, as able  Description: Interventions:  - Observe, teach, and assist patient with ADLS  -  Monitor and promote a balance of rest/activity, with adequate nutrition and elimination   Outcome: Progressing     Problem: Anxiety  Goal: Anxiety is at manageable level  Description: Interventions:  - Assess and monitor patient's anxiety level  - Monitor for signs and symptoms (heart palpitations, chest pain, shortness of breath, headaches, nausea, feeling jumpy, restlessness, irritable, apprehensive)  - Collaborate with interdisciplinary team and initiate plan and interventions as ordered  - [place] patient to unit/surroundings  - Explain treatment plan  - Encourage participation in care  - Encourage verbalization of concerns/fears  - Identify coping mechanisms  - Assist in developing anxiety-reducing skills  - Administer/offer alternative therapies  - Limit or eliminate stimulants  Outcome: Progressing     Problem: Nutrition/Hydration-ADULT  Goal: Nutrient/Hydration intake appropriate for improving, restoring or maintaining nutritional needs  Description: Monitor and assess patient's nutrition/hydration status for malnutrition   Collaborate with interdisciplinary team and initiate plan and interventions as ordered  Monitor patient's weight and dietary intake as ordered or per policy  Utilize nutrition screening tool and intervene as necessary  Determine patient's food preferences and provide high-protein, high-caloric foods as appropriate       INTERVENTIONS:  - Monitor oral intake, urinary output, labs, and treatment plans  - Assess nutrition and hydration status and recommend course of action  - Evaluate amount of meals eaten  - Assist patient with eating if necessary   - Allow adequate time for meals  - Recommend/ encourage appropriate diets, oral nutritional supplements, and vitamin/mineral supplements  - Order, calculate, and assess calorie counts as needed  - Recommend, monitor, and adjust tube feedings and TPN/PPN based on assessed needs  - Assess need for intravenous fluids  - Provide specific nutrition/hydration education as appropriate  - Include patient/family/caregiver in decisions related to nutrition  Outcome: Progressing

## 2021-03-15 NOTE — PLAN OF CARE
Problem: DISCHARGE PLANNING  Goal: Discharge to home or other facility with appropriate resources  Description: INTERVENTIONS:  - Identify barriers to discharge w/patient and caregiver  - Arrange for needed discharge resources and transportation as appropriate  - Identify discharge learning needs (meds, wound care, etc )  - Arrange for interpretive services to assist at discharge as needed  - Refer to Case Management Department for coordinating discharge planning if the patient needs post-hospital services based on physician/advanced practitioner order or complex needs related to functional status, cognitive ability, or social support system  Outcome: Progressing   PT will D/C to Assisted Living, awaiting brother to sign contract with [facility]

## 2021-03-15 NOTE — NURSING NOTE
[phone]  Pt found to be resting quietly on most of authors rounds  No acute behavioral issues noted  Q 7 min checks maintained  Fall protocol in place

## 2021-03-15 NOTE — SOCIAL WORK
CM met with PT for PT check in  Flat, limited in discussion, CM provided much encouragement and redirection  Reviewed that we are awaiting PT brother review with Sarah Novak with [facility], PT in agreement  PT denies SI/HI/AH/VH anxiety and depression  CM inquired if there is anything PT needs at this time, PT declined

## 2021-03-16 ENCOUNTER — APPOINTMENT (INPATIENT)
Dept: MRI IMAGING | Facility: HOSPITAL | Age: 61
DRG: 751 | End: 2021-03-16
Payer: COMMERCIAL

## 2021-03-16 LAB
GLUCOSE SERPL-MCNC: 107 MG/DL (ref 65–140)
GLUCOSE SERPL-MCNC: 96 MG/DL (ref 65–140)

## 2021-03-16 PROCEDURE — 70551 MRI BRAIN STEM W/O DYE: CPT

## 2021-03-16 PROCEDURE — G1004 CDSM NDSC: HCPCS

## 2021-03-16 PROCEDURE — 97129 THER IVNTJ 1ST 15 MIN: CPT

## 2021-03-16 PROCEDURE — 99232 SBSQ HOSP IP/OBS MODERATE 35: CPT | Performed by: PSYCHIATRY & NEUROLOGY

## 2021-03-16 PROCEDURE — 97530 THERAPEUTIC ACTIVITIES: CPT

## 2021-03-16 PROCEDURE — 82948 REAGENT STRIP/BLOOD GLUCOSE: CPT

## 2021-03-16 RX ORDER — OLANZAPINE 5 MG/1
5 TABLET ORAL
Status: DISCONTINUED | OUTPATIENT
Start: 2021-03-17 | End: 2021-03-17

## 2021-03-16 RX ADMIN — Medication 1000 UNITS: at 08:44

## 2021-03-16 RX ADMIN — LOSARTAN POTASSIUM 50 MG: 50 TABLET, FILM COATED ORAL at 08:44

## 2021-03-16 RX ADMIN — MIRTAZAPINE 15 MG: 15 TABLET, FILM COATED ORAL at 21:39

## 2021-03-16 RX ADMIN — TRAZODONE HYDROCHLORIDE 100 MG: 50 TABLET ORAL at 21:39

## 2021-03-16 RX ADMIN — SITAGLIPTIN 50 MG: 25 TABLET, FILM COATED ORAL at 08:44

## 2021-03-16 RX ADMIN — ACETAMINOPHEN 650 MG: 325 TABLET ORAL at 08:44

## 2021-03-16 RX ADMIN — NYSTATIN: 100000 POWDER TOPICAL at 17:31

## 2021-03-16 RX ADMIN — MELATONIN 3 MG: at 21:39

## 2021-03-16 RX ADMIN — RISPERIDONE 2 MG: 2 TABLET ORAL at 08:44

## 2021-03-16 RX ADMIN — NYSTATIN: 100000 POWDER TOPICAL at 21:40

## 2021-03-16 RX ADMIN — OXYBUTYNIN 5 MG: 5 TABLET, FILM COATED, EXTENDED RELEASE ORAL at 08:44

## 2021-03-16 NOTE — PLAN OF CARE
Problem: Depression  Goal: Verbalize thoughts and feelings  Description: Interventions:  - Assess and re-assess patient's level of risk   - Engage patient in 1:1 interactions, daily, for a minimum of 15 minutes   - Encourage patient to express feelings, fears, frustrations, hopes   Outcome: Progressing  Goal: Refrain from harming self  Description: Interventions:  - Monitor patient closely, per order   - Supervise medication ingestion, monitor effects and side effects   Outcome: Progressing     Problem: Depression  Goal: Treatment Goal: Demonstrate behavioral control of depressive symptoms, verbalize feelings of improved mood/affect, and adopt new coping skills prior to discharge  Outcome: Not Progressing  Goal: Refrain from isolation  Description: Interventions:  - Develop a trusting relationship   - Encourage socialization   Outcome: Not Progressing  Goal: Refrain from self-neglect  Outcome: Not Progressing  Goal: Attend and participate in unit activities, including therapeutic, recreational, and educational groups  Description: Interventions:  - Provide therapeutic and educational activities daily, encourage attendance and participation, and document same in the medical record   Outcome: Not Progressing  Goal: Complete daily ADLs, including personal hygiene independently, as able  Description: Interventions:  - Observe, teach, and assist patient with ADLS  -  Monitor and promote a balance of rest/activity, with adequate nutrition and elimination   Outcome: Not Progressing     Problem: Anxiety  Goal: Anxiety is at manageable level  Description: Interventions:  - Assess and monitor patient's anxiety level  - Monitor for signs and symptoms (heart palpitations, chest pain, shortness of breath, headaches, nausea, feeling jumpy, restlessness, irritable, apprehensive)  - Collaborate with interdisciplinary team and initiate plan and interventions as ordered    - [place] patient to unit/surroundings  - Explain treatment plan  - Encourage participation in care  - Encourage verbalization of concerns/fears  - Identify coping mechanisms  - Assist in developing anxiety-reducing skills  - Administer/offer alternative therapies  - Limit or eliminate stimulants  Outcome: Not Progressing

## 2021-03-16 NOTE — PROGRESS NOTES
Progress Note - Nelson García 62 y o  female MRN: [id number]    Unit/Bed#: [facility]-01 Encounter: [id number]        Subjective:  Patient seen and examined at bedside after reviewing the chart and discussing the case with the caring staff  Patient examined at bedside  Patient has multiple complaints of not being able to care for herself saying, "I can't dress myself or get in and out of bed, I'm incontinent  I need someone to do everything for me " Patient complains of generalized weakness and "numbness" in her left lower extremity  Exam WNL, most likely s/s psychiatric disorder  She has been working with physical and occupational therapy and will most [facility] discharge to [facility]  Physical Exam   Vitals: Blood pressure 159/81, pulse (!) 107, temperature 99 8 °F (37 7 °C), temperature source Temporal, resp  rate 18, height 5' 5" (1 651 m), weight 65 2 kg (143 lb 12 8 oz), SpO2 96 %, not currently breastfeeding  ,Body mass index is 23 93 kg/m²  Constitutional: He appears well-developed  HEENT: PERR, EOMI, MMM  Cardiovascular: Normal rate and regular rhythm  Pulmonary/Chest: Effort normal and breath sounds normal    Abdomen: Soft, + BS, NT    Assessment/Plan:     Nelson García is a(n) 62y o  year old female with psychotic disorder      1  Cardiac with history of hypertension  Patient will be continued on losartan  2  DJD/osteoarthritis  Patient may get Tylenol on as needed basis  3  Gait abnormality  I will consult PT and OT for further evaluation and treatment  4  Insomnia  Patient is on melatonin  5  Overactive urinary bladder  Patient is on oxybutynin  6  Hyperglycemia/weight loss  We will closely monitor patient's blood sugars by doing Accu-Cheks 2 times daily  Continue Januvia 50 mg daily along with carb controlled diet  Patient's hemoglobin A1c was 5 3  Nutrition is on consult  Patient has been put on Glucerna supplements  7  Nasal congestion/dryness    Continue saline nasal spray every 2 hours as needed  8  Vitamin D deficiency  Continue daily vitamin-D supplements  9  Overgrown toenails  Podiatry is on consult  10  Loose stools  Patient may receive Imodium as needed  11  Anorexia/low appetite  Patient is on Remeron 7 5 mg daily along with Glucerna  12  Athlete's foot  Continue nystatin powder to the feet 3 times daily  13  Gait Dysfunction  Exam WNL, most likely s/s psychiatric disorder  I will order MRI of the brain due to patient's reported progressive muscle weakness  Continue to monitor  The patient was discussed with Dr Elly Sands and he is in agreement with the above note

## 2021-03-16 NOTE — PROGRESS NOTES
Patient was awake on and off throughout the night  Patient needed much encouragement throughout the night to be less independent  Patient would be telling staff she was unable to complete certain tasks without help  Naheed Ren lifting legs back in the bed after returning from bathroom  No signs or symptoms of distress  Will continue to monitor during 7 minute safety checks

## 2021-03-16 NOTE — PLAN OF CARE
Problem: OCCUPATIONAL THERAPY ADULT  Goal: Performs self-care activities at highest level of function for planned discharge setting  See evaluation for individualized goals  Description: Treatment Interventions: ADL retraining, Functional transfer training, UE strengthening/ROM, Endurance training, Patient/family training, Equipment evaluation/education, Compensatory technique education, Activityengagement          See flowsheet documentation for full assessment, interventions and recommendations  Note: Limitation: Decreased ADL status, Decreased UE strength, Decreased endurance, Decreased self-care trans, Decreased high-level ADLs, Mood limitation  Prognosis: Good  Assessment: Patient seen for OT treatment on 3/16/21 s/p admission for MDD (major depressive disorder), recurrent episode ([facility] [address] )  Patient presents with active orders for OT eval and treat and up and OOB as tolerated   Upon arrival, pt found transferring into bed following ambulation in hallway  Patient agreeable to OT session and assisted into bed with VC for initiation and encouragement throughout bed mobility  Patient participated in bed mobility and administration of MoCA  with intervention focus on increasing endurance, activity engagement, and cognitive reorientation  Pt scored 25/30 on the MoCA indicating presence of a mild neurological impairment  Pt presented A&Ox4  With intact short term and working memory, but decreased visuospatial and attention  Please see noted below for detailed outcomes  Sheldon Range is showing improvements in memory  and orientation  , but is continuing to perform below baseline due to the following deficits: endurance , muscular strength , balance , activity tolerance , attention  and initiation   From OT standpoint, patient would benefit from skilled intervention to maximize independence with ADLs and functional mobility  Goals remain appropriate, continue POC   At this time, recommending D/C to: return to previous environment with skilled services      OT Discharge Recommendation: Home with skilled therapy  OT - OK to Discharge: Yes(Once medically cleared)     Katarzyna Dill OTS

## 2021-03-16 NOTE — PLAN OF CARE
Problem: Ineffective Coping  Goal: Participates in unit activities  Description: Interventions:  - Provide therapeutic environment   - Provide required programming   - Redirect inappropriate behaviors   Outcome: Not Progressing   Pt remains in bed more and requesting more assistance, feels unable to do groups currently

## 2021-03-16 NOTE — NURSING NOTE
Pt is somatic and is able to ambulate to dining and back to room although pt begins to stiff body up and states, "You will need help catch me " Pt strongly encouraged to continues ambulating to chair to eat her breakfast, pt stood for 5 minutes and then proceeded to walk to chair and sat down ans ate her breakfast  Pt also began breathing heavily while eating her breakfast and began coughing for short time   Pulse ox WDL and pt able to answer questions  Pt denies SI/HI/AH/VH  Pt rates anxiety and depression 8-10/10  Pt received/given tylenol for 2/10 mild generalized pain  Q 7 min safety checks maintained

## 2021-03-16 NOTE — PLAN OF CARE
Problem: DISCHARGE PLANNING  Goal: Discharge to home or other facility with appropriate resources  Description: INTERVENTIONS:  - Identify barriers to discharge w/patient and caregiver  - Arrange for needed discharge resources and transportation as appropriate  - Identify discharge learning needs (meds, wound care, etc )  - Arrange for interpretive services to assist at discharge as needed  - Refer to Case Management Department for coordinating discharge planning if the patient needs post-hospital services based on physician/advanced practitioner order or complex needs related to functional status, cognitive ability, or social support system  Outcome: Progressing   PT to go to [facility], awaiting brother to sign contract, medication adjustments to be made, once completed PT will transition

## 2021-03-16 NOTE — SOCIAL WORK
CM placed call to [address] with [facility] to let her know that PT D/C on hold medication adjustments being made due to rigidity  Once this clears and once [address] gets signed contract from PT brother, we can look at transitioning once covid test is complete  [phone]

## 2021-03-16 NOTE — PROGRESS NOTES
03/16/21 0943   Team Meeting   Meeting Type Daily Rounds   Team Members Present   Team Members Present Physician;Nurse;; Occupational Therapist   Physician Team Member Dr Ping Cornelius MD; KAM Rosado   Nursing Team Member Mahsa Tate RN   [facility]   OT Team Member Jake Curiel, [place]   Patient/Family Present   Patient Present No   Patient's Family Present No     No DC [facility] placement; pt family being difficult with placement;  Pt somatic; pt acting helpless - refusing to do anything for herself

## 2021-03-16 NOTE — PROGRESS NOTES
Progress Note - Nilton [age] y o  female MRN: [id number]  Unit/Bed#: [facility] 204-01 Encounter: [id number]    Assessment/Plan   Principal Problem:    MDD (major depressive disorder), recurrent episode ([facility] [address] )  Active Problems:    Essential hypertension    Depression    Other constipation    Absence of bladder continence    History of Asperger's syndrome    Aftercare following right hip joint replacement surgery    Severe protein-calorie malnutrition ([facility] [address] )      Behavior over the last 24 hours:  unchanged  Sleep: normal  Appetite:  Fair  Medication side effects: No  ROS: weakness of lower extremeties     Horacio Ceballos was seen today for follow-up and case discussed with treatment team this morning  Patient continues to verbalize that she is unable to care for herself and seeks assistance with getting her legs into the bed and ADLs  Horacio Ceballos reports my upper half is fine, but my lower half is giving me the trouble " Horacio Ceballos appeared internally preoccupied during encounter and when asked if she was seeing or hearing anything she stated I am just looking at the ceiling and pondering my thoughts   Denies any AVH/SI/HI  She continues to state that her anxiety and depression are the same and my friends    She reports she slept throughout the night  Appetite fair  Patient noted to have minimal rigidity in right arm, plan is to discontinue HS Risperdal and start Zyprexa at [facility] tomorrow night  Medical was also in to evaluate patient and will obtain MRI of brain to assist in identifying cause of lower extremity weakness       Mental Status Evaluation:  Appearance:  disheveled   Behavior:  Bizarre, calm, cooperative   Speech:  soft   Mood:  decreased range   Affect:  flat   Thought Process:  perserverative   Thought Content:  delusions  somatic   Perceptual Disturbances: None   Risk Potential: Suicidal Ideations none  Homicidal Ideations none  Potential for Aggression No   Sensorium:  person, place, time/date and situation   Memory:  recent and remote memory grossly intact   Consciousness:  alert and awake    Attention: attention span and concentration were age appropriate   Insight:  limited   Judgment: limited   Gait/Station: slow and Uses walker   Motor Activity: no abnormal movements     Progress Toward Goals: Will discontinue HS dose of Risperdal and start Zyprexa at HS tomorrow night  Continue with a m  dosing of Risperdal as ordered  MRI of brain pending  Upon stabilization, patient likely to be discharged to [facility] facility  No discharge date at this time  Recommended Treatment: Continue with group therapy, milieu therapy and occupational therapy  Risks, benefits and possible side effects of Medications:   Risks, benefits, and possible side effects of medications explained to patient and patient verbalizes understanding  Medications: all current active meds have been reviewed and planned medication changes: Discontinue Risperdal 3mg PO QHS  Add Zyprexa 5mg PO QHS starting 3/17/21  Labs: I have personally reviewed all pertinent laboratory/tests results  Counseling / Coordination of Care  Total floor / unit time spent today 20 minutes  Greater than 50% of total time was spent with the patient and / or family counseling and / or coordination of care

## 2021-03-16 NOTE — PROGRESS NOTES
Patient compliant with HS medications  Continues to need a lot of encouragement with ADL's  Patient reports increased anxiety and depression  No other concerns or problems reported  Q 7 mins checks in place for safety  Will continue to monitor for behaviors and changes

## 2021-03-16 NOTE — OCCUPATIONAL THERAPY NOTE
[address]  Treatment Note     Patient Name: Anya Clayton  YDVSE'C Date: 3/16/2021  Problem List  Principal Problem:    MDD (major depressive disorder), recurrent episode ([facility] [address] )  Active Problems:    Essential hypertension    Depression    Other constipation    Absence of bladder continence    History of Asperger's syndrome    Aftercare following right hip joint replacement surgery    Severe protein-calorie malnutrition ([facility] [address] )            03/16/21 1343   OT Last Visit   OT Visit Date 03/16/21   Note Type   Note Type Treatment   Restrictions/Precautions   Weight Bearing Precautions Per Order No   Other Precautions Fall Risk   General   Response to Previous Treatment Patient with no complaints from previous session   Pain Assessment   Pain Assessment Tool Pain Assessment not indicated - pt denies pain   Pain Score No Pain   Bed Mobility   Sit to Supine 4  Minimal assistance   Additional items Assist x 1;HOB elevated; Increased time required;Verbal cues;LE management   Additional Comments Pt required increased time to complete bed mobility c verbal cues for inititation and assistance with leg management    Transfers   Additional Comments Per Rec therapist Kimo Matta, pt ambulated within hallway>bed and completed stand>sit transfer into bed with use of RW prior to our arrival      Cognition   Overall Cognitive Status [facility]   Arousal/Participation Alert; Responsive; Cooperative   Attention Within functional limits   Orientation Level Oriented X4   Memory Within functional limits  (able to recall 5/5 words per MoCA )   Following Commands Follows one step commands without difficulty   Comments Pt agreeable to OT session with encouragement   Pt cooperative throughout MoCA assessment with demonstration of intact working and short term memory, and orientation, but impaired visuospatial skills and attention    Cognition Assessment Tools MOCA   Score 25   Additional Activities   Additional Activities Comments     Activity Tolerance Activity Tolerance Patient tolerated treatment well   Medical Staff Made Aware Rec Therapist Lisy Harris made aware of outcomes    Assessment   Assessment Patient seen for OT treatment on 3/16/21 s/p admission for MDD (major depressive disorder), recurrent episode ([facility] [address] )  Patient presents with active orders for OT eval and treat and up and OOB as tolerated   Upon arrival, pt found transferring into bed following ambulation in hallway  Patient agreeable to OT session and assisted into bed with VC for initiation and encouragement throughout bed mobility  Patient participated in bed mobility and administration of MoCA  with intervention focus on increasing endurance, activity engagement, and cognitive reorientation  Pt scored 25/30 on the MoCA indicating presence of a mild neurological impairment  Pt presented A&Ox4  With intact short term and working memory, but decreased visuospatial and attention  Please see noted below for detailed outcomes  Tenzin Briceno is showing improvements in memory  and orientation  , but is continuing to perform below baseline due to the following deficits: endurance , muscular strength , balance , activity tolerance , attention  and initiation   From OT standpoint, patient would benefit from skilled intervention to maximize independence with ADLs and functional mobility  Goals remain appropriate, continue POC  At this time, recommending D/C to: return to previous environment with skilled services    Plan   Treatment Interventions Cognitive reorientation; Functional transfer training; Activityengagement   Goal Expiration Date 03/31/21   OT Treatment Day 3   OT Frequency 2-3x/wk   Recommendation   OT Discharge Recommendation Home with skilled therapy   OT - OK to Discharge Yes  (Once medically cleared)   Additional Comments  Pt in bed at end of session with all needs met    AM-PAC Daily Activity Inpatient   Lower Body Dressing 2   Bathing 2   Toileting 3   Upper Body Dressing 3   Grooming 4   Eating 4 Daily Activity Raw Score 18   Daily Activity Standardized Score (Calc for Raw Score >=11) 38 66   AM-PAC Applied Cognition Inpatient   Following a Speech/Presentation 4   Understanding Ordinary Conversation 4   Taking Medications 3   Remembering Where Things Are Placed or Put Away 4   Remembering List of 4-5 Errands 4   Taking Care of Complicated Tasks 3   Applied Cognition Raw Score 22   Applied Cognition Standardized Score 47 83   Pt engaged in [place] Cognitive Assessment (MoCA) version 1  Pt scored overall 25/30 indicative of mild neurocognitive impairments  Pt noted w/ areas of deficit in visuospatial/executive functioning skills in areas of cube copying and alternating trail making scoring 3/5 points  Pt able to ID 3/3 familiar animals scoring 3/3 points  Pt able to recall 5/5 words for STM/immediate recall, but unable to repeat 5 digits in forward order and 3 digits in reverse order scoring 0/2 points  Pt scored 1/1 points in area of attention/concentration and reaction w/ G ability to tap on each letter "F" amidst a long list of letters  Pt demonstrated G ability w/ serial seven subtraction scoring 3/3 points w/ ability to correclty complete 3 subtraction problem  Pt scored 2/2 points for sentence repetition  Pt successfully identified 9 words (<11) that begin with the letter "F",   Scoring 0/1 points in the area of word finding/language fluency  Pt able to identify similarities between two words, scoring 2/2 points in the area of abstract/ correlational  thinking  Pt able to recall 5 words post 2 minute delay in area of delayed recall scoring 5/5 points  Pt responded correctly to all orientation questions, identified date/ month/ year/ day/ place /city, scoring 6/6 points in area of orientation  Pt educated on functional implications of MoCA v1 score and acknowledged reception of such        TAWANNA Nolan

## 2021-03-17 LAB
ALBUMIN SERPL BCP-MCNC: 4.1 G/DL (ref 3.5–5.7)
ALP SERPL-CCNC: 50 U/L (ref 40–150)
ALT SERPL W P-5'-P-CCNC: 22 U/L (ref 7–52)
ANION GAP SERPL CALCULATED.3IONS-SCNC: 11 MMOL/L (ref 4–13)
AST SERPL W P-5'-P-CCNC: 28 U/L (ref 13–39)
BACTERIA UR QL AUTO: NORMAL /HPF
BASOPHILS # BLD AUTO: 0 THOUSANDS/ΜL (ref 0–0.1)
BASOPHILS NFR BLD AUTO: 1 % (ref 0–2)
BILIRUB SERPL-MCNC: 0.7 MG/DL (ref 0.2–1)
BILIRUB UR QL STRIP: NEGATIVE
BUN SERPL-MCNC: 10 MG/DL (ref 7–25)
CALCIUM SERPL-MCNC: 9.5 MG/DL (ref 8.6–10.5)
CHLORIDE SERPL-SCNC: 103 MMOL/L (ref 98–107)
CK MB SERPL-MCNC: 0.6 % (ref 0.6–6.3)
CK MB SERPL-MCNC: 5.5 NG/ML (ref 0.6–6.3)
CK SERPL-CCNC: 955 U/L (ref 30–192)
CLARITY UR: CLEAR
CO2 SERPL-SCNC: 27 MMOL/L (ref 21–31)
COLOR UR: YELLOW
CREAT SERPL-MCNC: 0.7 MG/DL (ref 0.6–1.2)
EOSINOPHIL # BLD AUTO: 0 THOUSAND/ΜL (ref 0–0.61)
EOSINOPHIL NFR BLD AUTO: 1 % (ref 0–5)
ERYTHROCYTE [DISTWIDTH] IN BLOOD BY AUTOMATED COUNT: 14.2 % (ref 11.5–14.5)
GFR SERPL CREATININE-BSD FRML MDRD: 95 ML/MIN/1.73SQ M
GLUCOSE P FAST SERPL-MCNC: 106 MG/DL (ref 65–99)
GLUCOSE SERPL-MCNC: 103 MG/DL (ref 65–140)
GLUCOSE SERPL-MCNC: 106 MG/DL (ref 65–99)
GLUCOSE SERPL-MCNC: 92 MG/DL (ref 65–140)
GLUCOSE SERPL-MCNC: 92 MG/DL (ref 65–140)
GLUCOSE UR STRIP-MCNC: NEGATIVE MG/DL
HCT VFR BLD AUTO: 45 % (ref 42–47)
HGB BLD-MCNC: 14.8 G/DL (ref 12–16)
HGB UR QL STRIP.AUTO: NEGATIVE
KETONES UR STRIP-MCNC: NEGATIVE MG/DL
LEUKOCYTE ESTERASE UR QL STRIP: ABNORMAL
LYMPHOCYTES # BLD AUTO: 0.9 THOUSANDS/ΜL (ref 0.6–4.47)
LYMPHOCYTES NFR BLD AUTO: 18 % (ref 21–51)
MCH RBC QN AUTO: 31.4 PG (ref 26–34)
MCHC RBC AUTO-ENTMCNC: 32.9 G/DL (ref 31–37)
MCV RBC AUTO: 96 FL (ref 81–99)
MONOCYTES # BLD AUTO: 0.5 THOUSAND/ΜL (ref 0.17–1.22)
MONOCYTES NFR BLD AUTO: 10 % (ref 2–12)
NEUTROPHILS # BLD AUTO: 3.5 THOUSANDS/ΜL (ref 1.4–6.5)
NEUTS SEG NFR BLD AUTO: 71 % (ref 42–75)
NITRITE UR QL STRIP: NEGATIVE
NON-SQ EPI CELLS URNS QL MICRO: NORMAL /HPF
PH UR STRIP.AUTO: 7 [PH]
PLATELET # BLD AUTO: 273 THOUSANDS/UL (ref 149–390)
PMV BLD AUTO: 8.2 FL (ref 8.6–11.7)
POTASSIUM SERPL-SCNC: 3.9 MMOL/L (ref 3.5–5.5)
PROT SERPL-MCNC: 6.7 G/DL (ref 6.4–8.9)
PROT UR STRIP-MCNC: NEGATIVE MG/DL
RBC # BLD AUTO: 4.71 MILLION/UL (ref 3.9–5.2)
RBC #/AREA URNS AUTO: NORMAL /HPF
SODIUM SERPL-SCNC: 141 MMOL/L (ref 134–143)
SP GR UR STRIP.AUTO: 1.01 (ref 1–1.03)
UROBILINOGEN UR QL STRIP.AUTO: 0.2 E.U./DL
WBC # BLD AUTO: 4.9 THOUSAND/UL (ref 4.8–10.8)
WBC #/AREA URNS AUTO: NORMAL /HPF

## 2021-03-17 PROCEDURE — 85025 COMPLETE CBC W/AUTO DIFF WBC: CPT | Performed by: PHYSICIAN ASSISTANT

## 2021-03-17 PROCEDURE — 82553 CREATINE MB FRACTION: CPT | Performed by: NURSE PRACTITIONER

## 2021-03-17 PROCEDURE — 82948 REAGENT STRIP/BLOOD GLUCOSE: CPT

## 2021-03-17 PROCEDURE — 81003 URINALYSIS AUTO W/O SCOPE: CPT | Performed by: PHYSICIAN ASSISTANT

## 2021-03-17 PROCEDURE — 80053 COMPREHEN METABOLIC PANEL: CPT | Performed by: NURSE PRACTITIONER

## 2021-03-17 PROCEDURE — 97110 THERAPEUTIC EXERCISES: CPT

## 2021-03-17 PROCEDURE — 82550 ASSAY OF CK (CPK): CPT | Performed by: NURSE PRACTITIONER

## 2021-03-17 PROCEDURE — 81001 URINALYSIS AUTO W/SCOPE: CPT | Performed by: PHYSICIAN ASSISTANT

## 2021-03-17 PROCEDURE — 97530 THERAPEUTIC ACTIVITIES: CPT

## 2021-03-17 PROCEDURE — 99232 SBSQ HOSP IP/OBS MODERATE 35: CPT | Performed by: PSYCHIATRY & NEUROLOGY

## 2021-03-17 RX ORDER — RISPERIDONE 1 MG/1
1 TABLET, FILM COATED ORAL DAILY
Status: DISCONTINUED | OUTPATIENT
Start: 2021-03-18 | End: 2021-03-17

## 2021-03-17 RX ORDER — OLANZAPINE 2.5 MG/1
2.5 TABLET ORAL
Status: DISCONTINUED | OUTPATIENT
Start: 2021-03-17 | End: 2021-03-18

## 2021-03-17 RX ADMIN — SITAGLIPTIN 50 MG: 25 TABLET, FILM COATED ORAL at 08:52

## 2021-03-17 RX ADMIN — NYSTATIN 1 APPLICATION: 100000 POWDER TOPICAL at 21:48

## 2021-03-17 RX ADMIN — MIRTAZAPINE 15 MG: 15 TABLET, FILM COATED ORAL at 21:47

## 2021-03-17 RX ADMIN — TRAZODONE HYDROCHLORIDE 100 MG: 50 TABLET ORAL at 21:48

## 2021-03-17 RX ADMIN — NYSTATIN 1 APPLICATION: 100000 POWDER TOPICAL at 16:37

## 2021-03-17 RX ADMIN — MELATONIN 3 MG: at 21:47

## 2021-03-17 RX ADMIN — OLANZAPINE 2.5 MG: 2.5 TABLET, FILM COATED ORAL at 21:48

## 2021-03-17 RX ADMIN — RISPERIDONE 2 MG: 2 TABLET ORAL at 08:52

## 2021-03-17 RX ADMIN — LOSARTAN POTASSIUM 50 MG: 50 TABLET, FILM COATED ORAL at 08:52

## 2021-03-17 RX ADMIN — Medication 1000 UNITS: at 08:52

## 2021-03-17 RX ADMIN — NYSTATIN 1 APPLICATION: 100000 POWDER TOPICAL at 08:52

## 2021-03-17 RX ADMIN — OXYBUTYNIN 5 MG: 5 TABLET, FILM COATED, EXTENDED RELEASE ORAL at 08:52

## 2021-03-17 NOTE — PROGRESS NOTES
Progress Note - Behavioral Health   Gautam Narayan 62 y o  female MRN: [id number]  Unit/Bed#: [facility]tan 204-01 Encounter: [id number]    Assessment/Plan   Principal Problem:    MDD (major depressive disorder), recurrent episode ([facility] [address] )  Active Problems:    Essential hypertension    Depression    Other constipation    Absence of bladder continence    History of Asperger's syndrome    Aftercare following right hip joint replacement surgery    Severe protein-calorie malnutrition ([facility] [address] )      Behavior over the last 24 hours:  unchanged  Sleep: normal  Appetite: normal  Medication side effects: No  ROS: reports weakness and stiffness of lower extremities     Zakia Guajardo was seen today for follow-up and case discussed with treatment team this morning  Upon encounter, Zakia Guajardo was laying in bed and continues to verbalize complaints of not being able to walk or move her legs  She describes herself as feeling stiff    Patient continued to have mild rigidity in right arm; will discontinue Risperdal   Discussed with Zakia Guajardo that her MRI was negative and will obtain blood work and attempts to find cause of progressive weakness and stiffness in legs  Zakia Guajardo was in agreement with plan  She no longer endorses auditory hallucinations and denies VH/SI/HI  She continues to state she has moderate anxiety and depression, but that's' my normal   She describes her mood as could be improved " Throughout encounter, Zakia Guajardo was pleasant, cooperative, and occasionally smiled  She has been sleeping throughout the night and her appetite remains fair  Zakia Guajardo is seen ambulating with walker at times when unit however, her gait is very slow      Mental Status Evaluation:  Appearance:  disheveled   Behavior:  psychomotor retardation, cooperative   Speech:  normal pitch and normal volume   Mood:  decreased range   Affect:  constricted   Thought Process:  circumstantial   Thought Content:  delusions  somatic   Perceptual Disturbances: None   Risk Potential: Suicidal Ideations none  Homicidal Ideations none  Potential for Aggression No   Sensorium:  person, place and time/date   Memory:  recent and remote memory grossly intact   Consciousness:  alert and awake    Attention: attention span and concentration were age appropriate   Insight:  limited   Judgment: limited   Gait/Station: slow and uses walker   Motor Activity: no abnormal movements     Progress Toward Goals:  MRI of brain from 03/16/2021 resulted, Impression: Normal   Will discontinue Risperdal due to patient's complaints of "stiffness" in lower extremities  Will also order CK and CMP to rule out NMS  Zyprexa 5mg PO QHS to start tonight  No discharge date at this time  Recommended Treatment: Continue with group therapy, milieu therapy and occupational therapy  Risks, benefits and possible side effects of Medications:   Risks, benefits, and possible side effects of medications explained to patient and patient verbalizes understanding  Medications: all current active meds have been reviewed and planned medication changes: Discontinue Rispersdal       Labs: I have personally reviewed all pertinent laboratory/tests results  Will obtain CK and CMP today 3/17/21  Counseling / Coordination of Care  Total floor / unit time spent today 30 minutes  Greater than 50% of total time was spent with the patient and / or family counseling and / or coordination of care   A description of the counseling / coordination of care:  Medication education, treatment plan, supportive therapy

## 2021-03-17 NOTE — PROGRESS NOTES
Patient observed ambulating in hallway to and from snack with walker  Complaining she can not get into bed unassisted, feels she is declining in strength  Encouraged to be more independent, very somatic  Denies SI, HI,AVH  Anxiety and depression 6/10  Will continue to monitor and access  Q 7 minute checks maintained

## 2021-03-17 NOTE — PROGRESS NOTES
Dr Tai & Patricia Rosa notified of [facility]  Orders noted  Q 7 min checks maintained to monitor pt's behavior & safety

## 2021-03-17 NOTE — PLAN OF CARE
Problem: Depression  Goal: Verbalize thoughts and feelings  Description: Interventions:  - Assess and re-assess patient's level of risk   - Engage patient in 1:1 interactions, daily, for a minimum of 15 minutes   - Encourage patient to express feelings, fears, frustrations, hopes   Outcome: Progressing  Goal: Refrain from harming self  Description: Interventions:  - Monitor patient closely, per order   - Supervise medication ingestion, monitor effects and side effects   Outcome: Progressing  Goal: Attend and participate in unit activities, including therapeutic, recreational, and educational groups  Description: Interventions:  - Provide therapeutic and educational activities daily, encourage attendance and participation, and document same in the medical record   Outcome: Progressing     Problem: Depression  Goal: Treatment Goal: Demonstrate behavioral control of depressive symptoms, verbalize feelings of improved mood/affect, and adopt new coping skills prior to discharge  Outcome: Not Progressing  Goal: Refrain from isolation  Description: Interventions:  - Develop a trusting relationship   - Encourage socialization   Outcome: Not Progressing  Goal: Refrain from self-neglect  Outcome: Not Progressing  Goal: Complete daily ADLs, including personal hygiene independently, as able  Description: Interventions:  - Observe, teach, and assist patient with ADLS  -  Monitor and promote a balance of rest/activity, with adequate nutrition and elimination   Outcome: Not Progressing     Problem: Anxiety  Goal: Anxiety is at manageable level  Description: Interventions:  - Assess and monitor patient's anxiety level  - Monitor for signs and symptoms (heart palpitations, chest pain, shortness of breath, headaches, nausea, feeling jumpy, restlessness, irritable, apprehensive)  - Collaborate with interdisciplinary team and initiate plan and interventions as ordered    - [place] patient to unit/surroundings  - Explain treatment plan  - Encourage participation in care  - Encourage verbalization of concerns/fears  - Identify coping mechanisms  - Assist in developing anxiety-reducing skills  - Administer/offer alternative therapies  - Limit or eliminate stimulants  Outcome: Not Progressing

## 2021-03-17 NOTE — NURSING NOTE
Pt currently attending group  Pt bhanu to walk to group  Temp retaken due to feeling warm 99 9*F  Pt is stiff and complains of being unable to walk and needs help to move in and out of bed  Pt encouraged to reposition on her own when possible so heels and buttocks don't become sore while in bed  Pt reports her anxiety and depression is high  Pt denies SI/HI/AH/VH  Q 7 min safety checks maintained

## 2021-03-17 NOTE — PROGRESS NOTES
Patient has been observed sleeping on the majority of Q7 minute rounds  Patient shows no s/s of distress  Non-labored breathing  Monitoring continues

## 2021-03-17 NOTE — PLAN OF CARE
Problem: PHYSICAL THERAPY ADULT  Goal: Performs mobility at highest level of function for planned discharge setting  See evaluation for individualized goals  Description: Treatment/Interventions: Functional transfer training, LE strengthening/ROM, Therapeutic exercise, Endurance training, Patient/family training, Equipment eval/education, Gait training, Spoke to nursing  Equipment Recommended: Walker(pt  has own)       See flowsheet documentation for full assessment, interventions and recommendations  Outcome: Not Progressing  Note: Prognosis: Fair  Problem List: Decreased strength, Decreased endurance, Impaired balance, Decreased mobility, Decreased coordination, Decreased safety awareness, Impaired judgement  Assessment: Pt seen for PT treatment session this date with interventions consisting of Therapeutic exercise consisting of: AAROM 20 reps B LE in sitting position and therapeutic activity consisting of training: bed mobility, supine<>sit transfers, sit<>stand transfers, static sitting tolerance at EOB for 23 minutes w/ mod UE support and static standing tolerance for 20 secondsx3 minutes w/ mod UE support  Pt agreeable to PT treatment session upon arrival, pt found supine in bed w/ HOB elevated, in no apparent distress and responsive  In comparison to previous session, pt with no improvements as evidenced by pt unable to ambulate  Post session: pt returned BTB, all needs in reach and RN notified of session findings/recommendations Continue to recommend Home PT pending progress at time of d/c in order to maximize pt's functional independence and safety w/ mobility  Pt continues to be functioning below baseline level, and remains limited 2* factors listed above and including decreased functional mobility and impaired balance  PT will continue to see pt while here in order to address the deficits listed above and provide interventions consistent w/ POC in effort to achieve STGs          PT Discharge Recommendation: Home with skilled therapy     PT - OK to Discharge: Yes(when med cleared)    See flowsheet documentation for full assessment

## 2021-03-17 NOTE — PROGRESS NOTES
03/17/21    Team Meeting   Meeting Type Daily Rounds   Team Members Present   Team Members Present Physician;Nurse;;Occupational Therapist   Physician Team Member Dr Rajwinder Tsang; Max Rodriguez, [address] [place] [address]   Nursing Team Member Alla Gonzalez, CARLOS   Care Management Team Member MS London, [facility]   OT Team Member Daryle Bride, [place]   Patient/Family Present   Patient Present No   Patient's Family Present No   D/C on hold due to rigidity, medication adjustments  Darlys Feeling with [facility] pending return of contract from PT brother  Medication adjustment, MRI complete yesterday-negative, PT did not get out of bed this am, reports she can not walk  Yesterday PT walked from dinning room to her room

## 2021-03-17 NOTE — PROGRESS NOTES
Progress Note - Angélica Burnette 62 y o  female MRN: [id number]    Unit/Bed#: [facility] 204-01 Encounter: [id number]        Subjective:  Patient seen and examined at bedside after reviewing the chart and discussing the case with the caring staff  Patient examined at bedside  MRI of brain from 03/16/2021 is within normal limits  Patient continues to states she cannot perform her ADLs  Neurological exam within normal limits  CBC and CMP are also within normal limits  Total CK is in elevated at 955  I will order urinalysis with reflex to microscopic with reflex to culture  Physical Exam   Vitals: Blood pressure 146/86, pulse (!) 107, temperature 99 9 °F (37 7 °C), temperature source Temporal, resp  rate 20, height 5' 5" (1 651 m), weight 65 2 kg (143 lb 12 8 oz), SpO2 95 %, not currently breastfeeding  ,Body mass index is 23 93 kg/m²  Constitutional: He appears well-developed  HEENT: PERR, EOMI, MMM  Cardiovascular: Normal rate and regular rhythm  Pulmonary/Chest: Effort normal and breath sounds normal    Abdomen: Soft, + BS, NT    Assessment/Plan:     Angélica Burnette is a(n) 62y o  year old female with psychotic disorder      1  Cardiac with history of hypertension  Patient will be continued on losartan  2  DJD/osteoarthritis  Patient may get Tylenol on as needed basis  3  Gait abnormality  I will consult PT and OT for further evaluation and treatment  4  Insomnia  Patient is on melatonin  5  Overactive urinary bladder  Patient is on oxybutynin  6  Hyperglycemia/weight loss  We will closely monitor patient's blood sugars by doing Accu-Cheks 2 times daily  Continue Januvia 50 mg daily along with carb controlled diet  Patient's hemoglobin A1c was 5 3  Nutrition is on consult  Patient has been put on Glucerna supplements  7  Nasal congestion/dryness  Continue saline nasal spray every 2 hours as needed  8  Vitamin D deficiency  Continue daily vitamin-D supplements  9  Overgrown toenails  Podiatry is on consult  10  Loose stools  Patient may receive Imodium as needed  11  Anorexia/low appetite  Patient is on Remeron 7 5 mg daily along with Glucerna  12  Athlete's foot  Continue nystatin powder to the feet 3 times daily  13  Gait Dysfunction  Exam WNL, most likely s/s psychiatric disorder  MRI of brain from 03/16/2021 is within normal limits  Patient continues to states she cannot perform her ADLs  Neurological exam within normal limits  CBC and CMP are also within normal limits  Total CK is in elevated at 955  The patient was discussed with Dr Chuck Murray and he is in agreement with the above note

## 2021-03-17 NOTE — PHYSICAL THERAPY NOTE
03/17/21 1410   PT Last Visit   PT Visit Date 03/17/21   Note Type   Note Type Treatment   Pain Assessment   Pain Assessment Tool 0-10   Pain Score No Pain   Restrictions/Precautions   Weight Bearing Precautions Per Order No   Other Precautions Cognitive; Fall Risk   General   Chart Reviewed Yes   Response to Previous Treatment Patient with no complaints from previous session  Family/Caregiver Present No   Cognition   Overall Cognitive Status WFL   Arousal/Participation Alert; Responsive; Cooperative   Attention Within functional limits   Orientation Level Oriented X4   Memory Within functional limits   Following Commands Follows one step commands without difficulty   Comments pt agreeable to PT session   Bed Mobility   Supine to Sit 4  Minimal assistance   Additional items Assist x 1;HOB elevated; Bedrails; Increased time required;Verbal cues   Sit to Supine 4  Minimal assistance   Additional items Assist x 1;Bedrails; Increased time required;Verbal cues;LE management   Additional Comments pt sat at EOBx21 minutes   Transfers   Sit to Stand 4  Minimal assistance   Additional items Assist x 1;Bedrails; Increased time required;Verbal cues   Stand to Sit 4  Minimal assistance   Additional items Assist x 1;Bedrails; Increased time required;Verbal cues   Additional Comments pt stood 20 secondsx3 with Oswaldo to maintain postural support   Ambulation/Elevation   Gait pattern Not appropriate  (attempted, pt unable to advance BLE)   Balance   Static Sitting Fair +   Dynamic Sitting Fair   Static Standing Fair -   Dynamic Standing Fair -   Endurance Deficit   Endurance Deficit Yes   Activity Tolerance   Activity Tolerance Patient limited by fatigue   Nurse Made Aware RN made aware of outcomes   Exercises   Heelslides Sitting;20 reps;AAROM; Bilateral   Hip Abduction Sitting;20 reps;AAROM; Bilateral   Hip Adduction Sitting;20 reps;AAROM; Bilateral   Knee AROM Long Arc Quad Sitting;20 reps;AAROM; Bilateral   Ankle Pumps Sitting;20 reps;AROM; Bilateral   Marching Sitting;20 reps;AAROM; Bilateral   Assessment   Prognosis Fair   Problem List Decreased strength;Decreased endurance; Impaired balance;Decreased mobility; Decreased coordination;Decreased safety awareness; Impaired judgement   Assessment Pt seen for PT treatment session this date with interventions consisting of Therapeutic exercise consisting of: AAROM 20 reps B LE in sitting position and therapeutic activity consisting of training: bed mobility, supine<>sit transfers, sit<>stand transfers, static sitting tolerance at EOB for 23 minutes w/ mod UE support and static standing tolerance for 20 secondsx3 minutes w/ mod UE support  Pt agreeable to PT treatment session upon arrival, pt found supine in bed w/ HOB elevated, in no apparent distress and responsive  In comparison to previous session, pt with no improvements as evidenced by pt unable to ambulate  Post session: pt returned BTB, all needs in reach and RN notified of session findings/recommendations Continue to recommend Home PT pending progress at time of d/c in order to maximize pt's functional independence and safety w/ mobility  Pt continues to be functioning below baseline level, and remains limited 2* factors listed above and including decreased functional mobility and impaired balance  PT will continue to see pt while here in order to address the deficits listed above and provide interventions consistent w/ POC in effort to achieve STGs  Plan   Treatment/Interventions Functional transfer training;LE strengthening/ROM; Therapeutic exercise; Endurance training;Bed mobility;Gait training   Progress Slow progress, cognitive deficits   PT Frequency 1-2x/wk   Recommendation   PT Discharge Recommendation Home with skilled therapy   Additional Comments upon conclusion, pt resting in bed with all needs in reach   Additional Comments 2 Pt's raw score on the AM-PAC Basic Mobility inpatient short form is 14, standardized score is 35 55  Patients at this level are likely to benefit from DC to SNF, however, please refer to therapist recommendation for safe DC planning     AM-PAC Basic Mobility Inpatient   Turning in Bed Without Bedrails 3   Lying on Back to Sitting on Edge of Flat Bed 3   Moving Bed to Chair 2   Standing Up From Chair 2   Walk in Room 2   Climb 3-5 Stairs 2   Basic Mobility Inpatient Raw Score 14   Basic Mobility Standardized Score 35 55

## 2021-03-18 LAB
GLUCOSE SERPL-MCNC: 107 MG/DL (ref 65–140)
GLUCOSE SERPL-MCNC: 132 MG/DL (ref 65–140)

## 2021-03-18 PROCEDURE — 82948 REAGENT STRIP/BLOOD GLUCOSE: CPT

## 2021-03-18 PROCEDURE — 99232 SBSQ HOSP IP/OBS MODERATE 35: CPT | Performed by: PSYCHIATRY & NEUROLOGY

## 2021-03-18 RX ORDER — OLANZAPINE 5 MG/1
5 TABLET ORAL
Status: DISCONTINUED | OUTPATIENT
Start: 2021-03-18 | End: 2021-03-26

## 2021-03-18 RX ADMIN — TRAZODONE HYDROCHLORIDE 100 MG: 50 TABLET ORAL at 21:42

## 2021-03-18 RX ADMIN — OXYBUTYNIN 5 MG: 5 TABLET, FILM COATED, EXTENDED RELEASE ORAL at 09:01

## 2021-03-18 RX ADMIN — NYSTATIN: 100000 POWDER TOPICAL at 16:59

## 2021-03-18 RX ADMIN — Medication 1000 UNITS: at 09:02

## 2021-03-18 RX ADMIN — HYDROXYZINE HYDROCHLORIDE 50 MG: 25 TABLET, FILM COATED ORAL at 09:22

## 2021-03-18 RX ADMIN — MELATONIN 3 MG: at 21:42

## 2021-03-18 RX ADMIN — OLANZAPINE 5 MG: 5 TABLET, FILM COATED ORAL at 21:42

## 2021-03-18 RX ADMIN — SITAGLIPTIN 50 MG: 25 TABLET, FILM COATED ORAL at 09:01

## 2021-03-18 RX ADMIN — LOSARTAN POTASSIUM 50 MG: 50 TABLET, FILM COATED ORAL at 09:02

## 2021-03-18 RX ADMIN — NYSTATIN: 100000 POWDER TOPICAL at 21:41

## 2021-03-18 RX ADMIN — MIRTAZAPINE 15 MG: 15 TABLET, FILM COATED ORAL at 21:42

## 2021-03-18 NOTE — PLAN OF CARE
Problem: Anxiety  Goal: Anxiety is at manageable level  Description: Interventions:  - Assess and monitor patient's anxiety level  - Monitor for signs and symptoms (heart palpitations, chest pain, shortness of breath, headaches, nausea, feeling jumpy, restlessness, irritable, apprehensive)  - Collaborate with interdisciplinary team and initiate plan and interventions as ordered    - [place] patient to unit/surroundings  - Explain treatment plan  - Encourage participation in care  - Encourage verbalization of concerns/fears  - Identify coping mechanisms  - Assist in developing anxiety-reducing skills  - Administer/offer alternative therapies  - Limit or eliminate stimulants  Outcome: Not Progressing

## 2021-03-18 NOTE — NURSING NOTE
Pt present for meals then retreats back to room  Present for partial groups  Pt rates her anxiety and depression high, prn atarax 50 mg given earlier this morning, pt states it helped a little bit  Pt denies SI/HI/AH/VH  Pt complains of inability move fast enough to get to the bathroom  Pt incontinent prior to lunch  Pt encouraged to help make her bed as much as possible  Pt agreed  Q 7 min safety checks maintained

## 2021-03-18 NOTE — PROGRESS NOTES
Progress Note - Jose Luis Alcantara 62 y o  female MRN: [id number]    Unit/Bed#: [facility] 204-01 Encounter: [id number]        Subjective:  Patient seen and examined at bedside after reviewing the chart and discussing the case with the caring staff  Patient examined at bedside  Patient continues to states she cannot perform her ADLs  Neurological exam within normal limits  Urinalysis is negative for acute infection or hematuria  Physical Exam   Vitals: Blood pressure 169/91, pulse 96, temperature 98 6 °F (37 °C), temperature source Temporal, resp  rate 18, height 5' 5" (1 651 m), weight 64 6 kg (142 lb 6 4 oz), SpO2 97 %, not currently breastfeeding  ,Body mass index is 23 7 kg/m²  Constitutional: He appears well-developed  HEENT: PERR, EOMI, MMM  Cardiovascular: Normal rate and regular rhythm  Pulmonary/Chest: Effort normal and breath sounds normal    Abdomen: Soft, + BS, NT    Assessment/Plan:     Jose Luis Alcantara is a(n) 62y o  year old female with psychotic disorder      1  Cardiac with history of hypertension  Patient will be continued on losartan  2  DJD/osteoarthritis  Patient may get Tylenol on as needed basis  3  Gait abnormality  I will consult PT and OT for further evaluation and treatment  4  Insomnia  Patient is on melatonin  5  Overactive urinary bladder  Patient is on oxybutynin  6  Hyperglycemia/weight loss  We will closely monitor patient's blood sugars by doing Accu-Cheks 2 times daily  Continue Januvia 50 mg daily along with carb controlled diet  Patient's hemoglobin A1c was 5 3  Nutrition is on consult  Patient has been put on Glucerna supplements  7  Nasal congestion/dryness  Continue saline nasal spray every 2 hours as needed  8  Vitamin D deficiency  Continue daily vitamin-D supplements  9  Overgrown toenails  Podiatry is on consult  10  Loose stools  Patient may receive Imodium as needed  11  Anorexia/low appetite    Patient is on Remeron 7 5 mg daily along with Glucerna  12  Athlete's foot  Continue nystatin powder to the feet 3 times daily  13  Gait Dysfunction  Exam WNL, most likely s/s psychiatric disorder  MRI of brain from 03/16/2021 is within normal limits  Patient continues to states she cannot perform her ADLs  Neurological exam within normal limits  CBC and CMP are also within normal limits  Total CK is in elevated at 955  Urinalysis within normal limits  Continue to monitor  The patient was discussed with Dr Zina Lemon and he is in agreement with the above note

## 2021-03-18 NOTE — SOCIAL WORK
GAURAV spoke with Jose Weiner with [facility] [phone] updated her on PT status, reviewed PT status and concerns, adjustments being made and plan of care  Reviewed we would like to monitor her over the weekend and her response to the medication adjustments  Agreed to touch base early next week  Reviewed that we will look at D/C early next week pending PT status  Jose Weiner indicated that she received the signed contract from PT brother  Call ended mutually

## 2021-03-18 NOTE — PROGRESS NOTES
Patient withdrawn to room  Appears flat and depressed, brightens with approach  Needs much encouragement with ADL's  Cooperative and pleasant, complaint with medications  Denies hallucinations, SI, and HI  Depression and anxiety 6/10  Will continue to monitor and access  Q 7 minute checks maintained

## 2021-03-18 NOTE — PLAN OF CARE
Problem: DISCHARGE PLANNING  Goal: Discharge to home or other facility with appropriate resources  Description: INTERVENTIONS:  - Identify barriers to discharge w/patient and caregiver  - Arrange for needed discharge resources and transportation as appropriate  - Identify discharge learning needs (meds, wound care, etc )  - Arrange for interpretive services to assist at discharge as needed  - Refer to Case Management Department for coordinating discharge planning if the patient needs post-hospital services based on physician/advanced practitioner order or complex needs related to functional status, cognitive ability, or social support system  Outcome: Progressing   PT accepted and contract signed by brother to go to [facility], awaiting PT stabilization to transfer

## 2021-03-18 NOTE — PROGRESS NOTES
Progress Note - Behavioral Health   Naomi Chow 62 y o  female MRN: [id number]  Unit/Bed#: [facility] 204-01 Encounter: [id number]    Assessment/Plan   Principal Problem:    MDD (major depressive disorder), recurrent episode ([address] )  Active Problems:    Essential hypertension    Depression    Other constipation    Absence of bladder continence    History of Asperger's syndrome    Aftercare following right hip joint replacement surgery    Severe protein-calorie malnutrition (HCC)      Behavior over the last 24 hours:  unchanged  Sleep: normal  Appetite: normal  Medication side effects: No  ROS: problems with ambulation, generalized weakness, incontinence, all other system are negative      Mental Status Evaluation:  Appearance:  disheveled, older than stated age and dressed in hospital scrubs   Behavior:  psychomotor retardation mild   Speech:  soft, underproductive   Mood:  constricted and depressed   Affect:  blunted and mood-congruent   Thought Process:  perserverative   Thought Content:  somatically preoccupied   Perceptual Disturbances: None   Risk Potential: Suicidal Ideations none  Homicidal Ideations none  Potential for Aggression No   Sensorium:  person, place and situation   Memory:  recent and remote memory grossly intact   Consciousness:  alert and awake    Attention: attention span and concentration were age appropriate   Insight:  poor   Judgment: limited   Gait/Station: ataxic and slow   Motor Activity: abnormal movement noted  abnormal posture decorticate posturing     Progress Toward Goals: Patient seen in her room this morning and was in bed in hospital scrubs  Patient reports her depression and anxiety continue, rates both as 8/10  and she is still feeling "weak, stiff" in her lower extremities bilaterally  She has been seeing PT for this but feels this is making "it worse"   States she is able to ambulate with assistance and supervision and is focused on needing additional care upon discharge, stating she needs nursing home versus [facility]  Notes episodes of incontinence and relates feelings of urgency and problems with ambulation to the bathroom  Is requesting assistance repositioning in bed  Nursing staff note patient has been able to reposition in her bed and ambulate to her restroom without assistance but does request staff support frequently  Is being followed by hospital medicine consult team and CK will be rechecked  Recommended Treatment: Continue with group therapy, milieu therapy and occupational therapy  Risks, benefits and possible side effects of Medications:   Risks, benefits, and possible side effects of medications explained to patient and patient verbalizes understanding  Medications: all current active meds have been reviewed  Increase Zyprexa to 5 mg po HS  Labs: I have personally reviewed all pertinent laboratory/tests results  Most Recent Labs:   Lab Results   Component Value Date    WBC 4 90 03/17/2021    RBC 4 71 03/17/2021    HGB 14 8 03/17/2021    HCT 45 0 03/17/2021     03/17/2021    RDW 14 2 03/17/2021    NEUTROABS 3 50 03/17/2021    SODIUM 141 03/17/2021    K 3 9 03/17/2021     03/17/2021    CO2 27 03/17/2021    BUN 10 03/17/2021    CREATININE 0 70 03/17/2021    GLUC 106 (H) 03/17/2021    GLUF 106 (H) 03/17/2021    CALCIUM 9 5 03/17/2021    AST 28 03/17/2021    ALT 22 03/17/2021    ALKPHOS 50 03/17/2021    TP 6 7 03/17/2021    ALB 4 1 03/17/2021    TBILI 0 70 03/17/2021    CHOLESTEROL 257 (H) 12/05/2019    HDL 71 12/05/2019    TRIG 112 12/05/2019    LDLCALC 164 (H) 12/05/2019    PCR4SOPTPLEP 0 506 02/20/2021    RPR Non-Reactive 02/21/2021    HGBA1C 5 3 02/22/2021     02/22/2021       Counseling / Coordination of Care  Total floor / unit time spent today 20 minutes  Greater than 50% of total time was spent with the patient and / or family counseling and / or coordination of care

## 2021-03-18 NOTE — SOCIAL WORK
CM met with PT for PT check in  PT reported that she is not able to walk or do things for herself and that she has been incontinent  Reviewed plan of care and acknowledged PT concerns  Reviewed with her staff observations and PT abilities, empowerment provided  Reviewed that her brother has signed contract and that [facility] is ready for her to come when she is ready  PT reflected she understood and reflected she does not feel she is ready to go  Much reassurance and validation provided  PT denies SI/HI/AH/VH rates depression and anxiety a 6/10

## 2021-03-19 LAB
GLUCOSE SERPL-MCNC: 105 MG/DL (ref 65–140)
GLUCOSE SERPL-MCNC: 90 MG/DL (ref 65–140)

## 2021-03-19 PROCEDURE — 99232 SBSQ HOSP IP/OBS MODERATE 35: CPT | Performed by: PSYCHIATRY & NEUROLOGY

## 2021-03-19 PROCEDURE — 82948 REAGENT STRIP/BLOOD GLUCOSE: CPT

## 2021-03-19 RX ORDER — AMANTADINE HYDROCHLORIDE 100 MG/1
100 CAPSULE, GELATIN COATED ORAL 2 TIMES DAILY
Status: DISCONTINUED | OUTPATIENT
Start: 2021-03-19 | End: 2021-03-31 | Stop reason: HOSPADM

## 2021-03-19 RX ADMIN — MIRTAZAPINE 15 MG: 15 TABLET, FILM COATED ORAL at 21:17

## 2021-03-19 RX ADMIN — OLANZAPINE 5 MG: 5 TABLET, FILM COATED ORAL at 21:17

## 2021-03-19 RX ADMIN — Medication 1000 UNITS: at 09:14

## 2021-03-19 RX ADMIN — MELATONIN 3 MG: at 21:17

## 2021-03-19 RX ADMIN — AMANTADINE 100 MG: 100 CAPSULE ORAL at 17:13

## 2021-03-19 RX ADMIN — LOSARTAN POTASSIUM 50 MG: 50 TABLET, FILM COATED ORAL at 09:14

## 2021-03-19 RX ADMIN — OXYBUTYNIN 5 MG: 5 TABLET, FILM COATED, EXTENDED RELEASE ORAL at 09:14

## 2021-03-19 RX ADMIN — TRAZODONE HYDROCHLORIDE 100 MG: 50 TABLET ORAL at 21:17

## 2021-03-19 RX ADMIN — SITAGLIPTIN 50 MG: 25 TABLET, FILM COATED ORAL at 09:14

## 2021-03-19 RX ADMIN — AMANTADINE 100 MG: 100 CAPSULE ORAL at 12:16

## 2021-03-19 NOTE — PROGRESS NOTES
Patient lacks motivation and needs much encouragement from staff to complete ADLs  Ambulates with walker  She is frequently incontinent of urine  She appears flat and depressed  Rates her anxiety and depression both 10/10  Denies suicidal thoughts  Denies hallucinations  When asked about her Sabianist delusions, patient did state she continues to have thoughts of death and the devil, but the thoughts come and go  No complaints of pain  Will continue monitoring

## 2021-03-19 NOTE — PROGRESS NOTES
Progress Note - [address] [age] y o  female MRN: [id number]  Unit/Bed#: [facility] 204-01 Encounter: [id number]    Assessment/Plan   Principal Problem:    MDD (major depressive disorder), recurrent episode ([address] )  Active Problems:    Essential hypertension    Depression    Other constipation    Absence of bladder continence    History of Asperger's syndrome    Aftercare following right hip joint replacement surgery    Severe protein-calorie malnutrition (HCC)      Behavior over the last 24 hours:  unchanged  Sleep: slept better  Appetite: fair  Medication side effects: No  ROS: low energy, mild rigidity, all other system are negative     Mental Status Evaluation:  Appearance:  age appropriate   Behavior:  cooperative   Speech:  normal volume   Mood:  decreased range   Affect:  mood-congruent   Thought Process:  concrete and goal directed   Thought Content:  mild paranoia   Perceptual Disturbances: None   Risk Potential: Suicidal Ideations none  Homicidal Ideations none  Potential for Aggression No   Sensorium:  person, place and time/date   Memory:  recent memory grossly intact   Consciousness:  alert and awake    Attention: attention span and concentration were age appropriate   Insight:  limited   Judgment: limited   Gait/Station: uses walker   Motor Activity: no abnormal movements     Progress Toward Goals: Patient reports low energy and lack of motivation and still voices concern about her inability to move her lower extremities well  She also admits ongoing intermittent and intrusive thoughts of death and devils bu denies any intent to hurt herself this time  She is agreement to trial of amantadine for her mild parkinsonism symptoms  Recommended Treatment: Continue with group therapy, milieu therapy and occupational therapy        Risks, benefits and possible side effects of Medications:   Risks, benefits, and possible side effects of medications explained to patient and patient verbalizes understanding  Medications: all current active meds have been reviewed  Begin Amantidine to 100 mg po bid    Labs: I have personally reviewed all pertinent laboratory/tests results  Most Recent Labs:   Lab Results   Component Value Date    WBC 4 90 03/17/2021    RBC 4 71 03/17/2021    HGB 14 8 03/17/2021    HCT 45 0 03/17/2021     03/17/2021    RDW 14 2 03/17/2021    NEUTROABS 3 50 03/17/2021    SODIUM 141 03/17/2021    K 3 9 03/17/2021     03/17/2021    CO2 27 03/17/2021    BUN 10 03/17/2021    CREATININE 0 70 03/17/2021    GLUC 106 (H) 03/17/2021    GLUF 106 (H) 03/17/2021    CALCIUM 9 5 03/17/2021    AST 28 03/17/2021    ALT 22 03/17/2021    ALKPHOS 50 03/17/2021    TP 6 7 03/17/2021    ALB 4 1 03/17/2021    TBILI 0 70 03/17/2021    CHOLESTEROL 257 (H) 12/05/2019    HDL 71 12/05/2019    TRIG 112 12/05/2019    LDLCALC 164 (H) 12/05/2019    [id number] 0 506 02/20/2021    RPR Non-Reactive 02/21/2021    HGBA1C 5 3 02/22/2021     02/22/2021       Counseling / Coordination of Care  Total floor / unit time spent today 20 minutes  Greater than 50% of total time was spent with the patient and / or family counseling and / or coordination of care

## 2021-03-19 NOTE — PROGRESS NOTES
Progress Note - Amrik Coleman 62 y o  female MRN: [id number]    Unit/Bed#: [facility] 204-01 Encounter: [id number]        Subjective:  Patient seen and examined at bedside after reviewing the chart and discussing the case with the caring staff  Patient examined at bedside  Patient continues to states she cannot perform her ADLs and that her legs are turning to stone    Neurological exam within normal limits  Patient is being started on amantadine for mild parkinsonian symptoms  Physical Exam   Vitals: Blood pressure 131/86, pulse 63, temperature 98 7 °F (37 1 °C), temperature source Temporal, resp  rate 18, height 5' 5" (1 651 m), weight 64 6 kg (142 lb 6 4 oz), SpO2 96 %, not currently breastfeeding  ,Body mass index is 23 7 kg/m²  Constitutional: He appears well-developed  HEENT: PERR, EOMI, MMM  Cardiovascular: Normal rate and regular rhythm  Pulmonary/Chest: Effort normal and breath sounds normal    Abdomen: Soft, + BS, NT    Assessment/Plan:     Amrik Coleman is a(n) 62y o  year old female with psychotic disorder      1  Cardiac with history of hypertension  Patient will be continued on losartan  2  DJD/osteoarthritis  Patient may get Tylenol on as needed basis  3  Gait abnormality  I will consult PT and OT for further evaluation and treatment  4  Insomnia  Patient is on melatonin  5  Overactive urinary bladder  Patient is on oxybutynin  6  Hyperglycemia/weight loss  We will closely monitor patient's blood sugars by doing Accu-Cheks 2 times daily  Continue Januvia 50 mg daily along with carb controlled diet  Patient's hemoglobin A1c was 5 3  Nutrition is on consult  Patient has been put on Glucerna supplements  7  Nasal congestion/dryness  Continue saline nasal spray every 2 hours as needed  8  Vitamin D deficiency  Continue daily vitamin-D supplements  9  Overgrown toenails  Podiatry is on consult  10  Loose stools  Patient may receive Imodium as needed    11  Anorexia/low appetite  Patient is on Remeron 7 5 mg daily along with Glucerna  12  Athlete's foot  Continue nystatin powder to the feet 3 times daily  13  Gait Dysfunction  MRI of brain from 03/16/2021 is within normal limits  Patient continues to states she cannot perform her ADLs  Psych is trying amantadine for mild parkinsonian symptoms  Will continue to monitor  The patient was discussed with Dr Haven Ott and he is in agreement with the above note

## 2021-03-19 NOTE — PROGRESS NOTES
03/19/21    Team Meeting   Meeting Type Daily Rounds   Team Members Present   Team Members Present Physician;Nurse;;Occupational Therapist   Physician Team Member Dr Evangelina Rodriguez MD   Nursing Team Member Sanjay Phipps RN   Care Management Team Member MS [place]   OT Team Member [facility]   Patient/Family Present   Patient Present No   Patient's Family Present No   PT accepted to [facility], pending stabilization to transfer  PT presents with rigidity and slight tremor in jaw  Medication adjustment  Continues to not do things for self, grossly incontinent throughout day  Full assist  Considering skilled nursing care

## 2021-03-19 NOTE — PLAN OF CARE
Problem: Alteration in Thoughts and Perception  Goal: Treatment Goal: Gain control of psychotic behaviors/thinking, reduce/eliminate presenting symptoms and demonstrate improved reality functioning upon discharge  Outcome: Progressing  Goal: Verbalize thoughts and feelings  Description: Interventions:  - Promote a nonjudgmental and trusting relationship with the patient through active listening and therapeutic communication  - Assess patient's level of functioning, behavior and potential for risk  - Engage patient in 1 on 1 interactions  - Encourage patient to express fears, feelings, frustrations, and discuss symptoms    - [place] patient to reality, help patient recognize reality-based thinking   - Administer medications as ordered and assess for potential side effects  - Provide the patient education related to the signs and symptoms of the illness and desired effects of prescribed medications  Outcome: Progressing  Goal: Refrain from acting on delusional thinking/internal stimuli  Description: Interventions:  - Monitor patient closely, per order   - Utilize least restrictive measures   - Set reasonable limits, give positive feedback for acceptable   - Administer medications as ordered and monitor of potential side effects  Outcome: Progressing  Goal: Agree to be compliant with medication regime, as prescribed and report medication side effects  Description: Interventions:  - Offer appropriate PRN medication and supervise ingestion; conduct AIMS, as needed   Outcome: Progressing  Goal: Attend and participate in unit activities, including therapeutic, recreational, and educational groups  Description: Interventions:  -Encourage Visitation and family involvement in care  Outcome: Progressing  Goal: Recognize dysfunctional thoughts, communicate reality-based thoughts at the time of discharge  Description: Interventions:  - Provide medication and psycho-education to assist patient in compliance and developing insight into his/her illness   Outcome: Progressing  Goal: Complete daily ADLs, including personal hygiene independently, as able  Description: Interventions:  - Observe, teach, and assist patient with ADLS  - Monitor and promote a balance of rest/activity, with adequate nutrition and elimination   Outcome: Progressing

## 2021-03-19 NOTE — PROGRESS NOTES
Patient visible on the unit  Out for snack and group  Denies hallucination, SI, and HI  Depression and anxiety lower this evening  Complaint with medications  Able to make needs known  Will continue to monitor and access

## 2021-03-20 LAB
GLUCOSE SERPL-MCNC: 95 MG/DL (ref 65–140)
GLUCOSE SERPL-MCNC: 97 MG/DL (ref 65–140)

## 2021-03-20 PROCEDURE — 99232 SBSQ HOSP IP/OBS MODERATE 35: CPT | Performed by: PSYCHIATRY & NEUROLOGY

## 2021-03-20 PROCEDURE — 82948 REAGENT STRIP/BLOOD GLUCOSE: CPT

## 2021-03-20 RX ADMIN — TRAZODONE HYDROCHLORIDE 100 MG: 50 TABLET ORAL at 21:08

## 2021-03-20 RX ADMIN — SITAGLIPTIN 50 MG: 25 TABLET, FILM COATED ORAL at 08:37

## 2021-03-20 RX ADMIN — OLANZAPINE 5 MG: 5 TABLET, FILM COATED ORAL at 21:08

## 2021-03-20 RX ADMIN — Medication 1000 UNITS: at 08:36

## 2021-03-20 RX ADMIN — MELATONIN 3 MG: at 21:08

## 2021-03-20 RX ADMIN — LOSARTAN POTASSIUM 50 MG: 50 TABLET, FILM COATED ORAL at 08:36

## 2021-03-20 RX ADMIN — AMANTADINE 100 MG: 100 CAPSULE ORAL at 17:09

## 2021-03-20 RX ADMIN — MIRTAZAPINE 15 MG: 15 TABLET, FILM COATED ORAL at 21:08

## 2021-03-20 RX ADMIN — OXYBUTYNIN 5 MG: 5 TABLET, FILM COATED, EXTENDED RELEASE ORAL at 08:36

## 2021-03-20 RX ADMIN — AMANTADINE 100 MG: 100 CAPSULE ORAL at 08:37

## 2021-03-20 NOTE — NURSING NOTE
E [id number]     Withdrawn to room affect bland  Mood depressed  Denies SI or HJI  Reports intermittent AH denies VH  No acute behavioral changes  Q 7 min checks ongoing

## 2021-03-20 NOTE — PROGRESS NOTES
Patient visible in the community and attending groups  Continues to need a lot of encouragement from staff with ADLs  She states her anxiety and depression are "up there" today but did not give a number  She states she is still having intermittent thoughts of death and the devil but the thoughts are controlled  She denies hallucinations and suicidal thoughts  No complaints of pain  Will continue monitoring

## 2021-03-20 NOTE — NURSING NOTE
[id number]  Pt found to be resting quietly on most of authors rounds  No acute behavioral issues noted  Q 7 min checks maintained  Fall protocol in place

## 2021-03-20 NOTE — PROGRESS NOTES
C/O" I was depressed when I came in'    Report from staff regarding this patient received and record reviewed  prior to seeing this patient   Behavior over the last 24 hours:  Patient reports she is doing better she was depressed when she came and taking medication and feels things are getting better depression is getting better, patient is taking mirtazapine and Zyprexa, denied side effect of medication  Sleep:  Okay  Appetite:  Good  Medication side effects:  None  ROS:  Improved  Mental Status Evaluation:  Appearance:  Dressed appropraitely   Behavior:  cooperative   Speech:  normal   Mood:  sad   Affect:  appropriate     Thought Process:  Goal directed   Thought Content:  normal   Perceptual Disturbances: Denied AV hallucination   Risk Potential: NO NILAM    Sensorium:  normal   Cognition:  intact   Consciousness:  Alert, OX3   Attention: Fair   Insight:  Limited   Judgment: Limited   Gait/Station: With in normal range   Motor Activity: With in normal range     Progress Toward Goals: working on current treatment goals, no changes  Made in treatment plan   Recommended Treatment: Continue with group therapy, milieu therapy and occupational therapy  Risks, benefits and possible side effects of Medications:   Risks, benefits, and possible side effects of medications explained to patient and patient verbalizes understanding        Medications:   current meds:   Current Facility-Administered Medications   Medication Dose Route Frequency    acetaminophen (TYLENOL) tablet 650 mg  650 mg Oral Q6H PRN    acetaminophen (TYLENOL) tablet 650 mg  650 mg Oral Q4H PRN    acetaminophen (TYLENOL) tablet 975 mg  975 mg Oral Q6H PRN    amantadine (SYMMETREL) capsule 100 mg  100 mg Oral BID    benztropine (COGENTIN) injection 1 mg  1 mg Intramuscular Q4H PRN Max 6/day    benztropine (COGENTIN) tablet 1 mg  1 mg Oral Q4H PRN Max 6/day    cholecalciferol (VITAMIN D3) tablet 1,000 Units  1,000 Units Oral Daily    hydrOXYzine HCL (ATARAX) tablet 25 mg  25 mg Oral Q6H PRN Max 4/day    hydrOXYzine HCL (ATARAX) tablet 50 mg  50 mg Oral Q4H PRN Max 4/day    Or    LORazepam (ATIVAN) injection 0 5 mg  0 5 mg Intramuscular Q4H PRN    loperamide (IMODIUM) capsule 2 mg  2 mg Oral TID PRN    LORazepam (ATIVAN) tablet 1 mg  1 mg Oral Q4H PRN Max 6/day    Or    LORazepam (ATIVAN) injection 1 mg  1 mg Intramuscular Q6H PRN Max 3/day    losartan (COZAAR) tablet 50 mg  50 mg Oral Daily    melatonin tablet 3 mg  3 mg Oral HS    mirtazapine (REMERON) tablet 15 mg  15 mg Oral HS    nystatin (MYCOSTATIN) powder   Topical TID    OLANZapine (ZyPREXA) tablet 10 mg  10 mg Oral Q3H PRN Max 3/day    Or    OLANZapine (ZyPREXA) IM injection 10 mg  10 mg Intramuscular Q3H PRN Max 3/day    OLANZapine (ZyPREXA) tablet 5 mg  5 mg Oral Q3H PRN Max 6/day    Or    OLANZapine (ZyPREXA) IM injection 5 mg  5 mg Intramuscular Q3H PRN Max 6/day    OLANZapine (ZyPREXA) tablet 2 5 mg  2 5 mg Oral Q3H PRN Max 8/day    OLANZapine (ZyPREXA) tablet 5 mg  5 mg Oral HS    oxybutynin (DITROPAN-XL) 24 hr tablet 5 mg  5 mg Oral Daily    polyethylene glycol (MIRALAX) packet 17 g  17 g Oral Daily PRN    sitaGLIPtin (JANUVIA) tablet 50 mg  50 mg Oral Daily    sodium chloride (OCEAN) 0 65 % nasal spray 1 spray  1 spray Each Nare Q2H PRN    traZODone (DESYREL) tablet 100 mg  100 mg Oral HS    traZODone (DESYREL) tablet 50 mg  50 mg Oral HS PRN     Labs: NA    Assessment, Diagnosis  and Plan: continue with current meds and goals, F/U tomorrow    Counseling / Coordination of Care  Total floor / unit time spent today20 minutes  minutes  Greater than 50% of total time was spent with the patient and / or family counseling and / or coordination of care  A description of the counseling / coordination of care:      Flor Meneses MD

## 2021-03-20 NOTE — PROGRESS NOTES
Progress Note - Ceci Fletcher 62 y o  female MRN: [id number]    Unit/Bed#: [facility] 204-01 Encounter: [id number]        Subjective:  Patient seen and examined at bedside after reviewing the chart and discussing the case with the caring staff  Patient examined at bedside  Patient continues to states she cannot perform her ADLs and that her legs are turning to stone    Neurological exam within normal limits  Patient has been started on amantadine for mild parkinsonian symptoms  She continues to complain of decreased appetite, although it is reported that she eats 100% of her meals  Physical Exam   Vitals: Blood pressure 132/75, pulse 90, temperature 98 6 °F (37 °C), temperature source Temporal, resp  rate 20, height 5' 5" (1 651 m), weight 64 6 kg (142 lb 6 4 oz), SpO2 98 %, not currently breastfeeding  ,Body mass index is 23 7 kg/m²  Constitutional: He appears well-developed  HEENT: PERR, EOMI, MMM  Cardiovascular: Normal rate and regular rhythm  Pulmonary/Chest: Effort normal and breath sounds normal    Abdomen: Soft, + BS, NT    Assessment/Plan:     Ceci Fletcher is a(n) 62y o  year old female with psychotic disorder      1  Cardiac with history of hypertension  Patient will be continued on losartan  2  DJD/osteoarthritis  Patient may get Tylenol on as needed basis  3  Gait abnormality  I will consult PT and OT for further evaluation and treatment  4  Insomnia  Patient is on melatonin  5  Overactive urinary bladder  Patient is on oxybutynin  6  Hyperglycemia/weight loss  We will closely monitor patient's blood sugars by doing Accu-Cheks 2 times daily  Continue Januvia 50 mg daily along with carb controlled diet  Patient's hemoglobin A1c was 5 3  Nutrition is on consult  Patient has been put on Glucerna supplements  7  Nasal congestion/dryness  Continue saline nasal spray every 2 hours as needed  8  Vitamin D deficiency  Continue daily vitamin-D supplements  9  Overgrown toenails  Podiatry is on consult  10  Loose stools  Patient may receive Imodium as needed  11  Anorexia/low appetite  Patient is on Remeron 7 5 mg daily along with Glucerna  12  Athlete's foot  Continue nystatin powder to the feet 3 times daily  13  Gait Dysfunction  MRI of brain from 03/16/2021 is within normal limits  Patient continues to states she cannot perform her ADLs  Psych is trying amantadine for mild parkinsonian symptoms  Will continue to monitor  The patient was discussed with Dr Eli Delvalle and he is in agreement with the above note

## 2021-03-21 LAB
CK MB SERPL-MCNC: 1.1 % (ref 0.6–6.3)
CK MB SERPL-MCNC: 3.4 NG/ML (ref 0.6–6.3)
CK SERPL-CCNC: 297 U/L (ref 30–192)
GLUCOSE SERPL-MCNC: 106 MG/DL (ref 65–140)
GLUCOSE SERPL-MCNC: 94 MG/DL (ref 65–140)

## 2021-03-21 PROCEDURE — 99232 SBSQ HOSP IP/OBS MODERATE 35: CPT | Performed by: NURSE PRACTITIONER

## 2021-03-21 PROCEDURE — 82948 REAGENT STRIP/BLOOD GLUCOSE: CPT

## 2021-03-21 PROCEDURE — 82550 ASSAY OF CK (CPK): CPT | Performed by: PHYSICIAN ASSISTANT

## 2021-03-21 PROCEDURE — 82553 CREATINE MB FRACTION: CPT | Performed by: PHYSICIAN ASSISTANT

## 2021-03-21 RX ADMIN — NYSTATIN: 100000 POWDER TOPICAL at 08:21

## 2021-03-21 RX ADMIN — OXYBUTYNIN 5 MG: 5 TABLET, FILM COATED, EXTENDED RELEASE ORAL at 08:19

## 2021-03-21 RX ADMIN — SITAGLIPTIN 50 MG: 25 TABLET, FILM COATED ORAL at 08:19

## 2021-03-21 RX ADMIN — TRAZODONE HYDROCHLORIDE 100 MG: 50 TABLET ORAL at 21:29

## 2021-03-21 RX ADMIN — LOSARTAN POTASSIUM 50 MG: 50 TABLET, FILM COATED ORAL at 08:19

## 2021-03-21 RX ADMIN — AMANTADINE 100 MG: 100 CAPSULE ORAL at 08:19

## 2021-03-21 RX ADMIN — MELATONIN 3 MG: at 21:29

## 2021-03-21 RX ADMIN — OLANZAPINE 5 MG: 5 TABLET, FILM COATED ORAL at 21:29

## 2021-03-21 RX ADMIN — MIRTAZAPINE 15 MG: 15 TABLET, FILM COATED ORAL at 21:29

## 2021-03-21 RX ADMIN — AMANTADINE 100 MG: 100 CAPSULE ORAL at 17:05

## 2021-03-21 RX ADMIN — Medication 1000 UNITS: at 08:19

## 2021-03-21 NOTE — PLAN OF CARE
Problem: Alteration in Thoughts and Perception  Goal: Treatment Goal: Gain control of psychotic behaviors/thinking, reduce/eliminate presenting symptoms and demonstrate improved reality functioning upon discharge  Outcome: Progressing  Goal: Verbalize thoughts and feelings  Description: Interventions:  - Promote a nonjudgmental and trusting relationship with the patient through active listening and therapeutic communication  - Assess patient's level of functioning, behavior and potential for risk  - Engage patient in 1 on 1 interactions  - Encourage patient to express fears, feelings, frustrations, and discuss symptoms    - Lucas patient to reality, help patient recognize reality-based thinking   - Administer medications as ordered and assess for potential side effects  - Provide the patient education related to the signs and symptoms of the illness and desired effects of prescribed medications  Outcome: Progressing  Goal: Refrain from acting on delusional thinking/internal stimuli  Description: Interventions:  - Monitor patient closely, per order   - Utilize least restrictive measures   - Set reasonable limits, give positive feedback for acceptable   - Administer medications as ordered and monitor of potential side effects  Outcome: Progressing  Goal: Agree to be compliant with medication regime, as prescribed and report medication side effects  Description: Interventions:  - Offer appropriate PRN medication and supervise ingestion; conduct AIMS, as needed   Outcome: Progressing  Goal: Attend and participate in unit activities, including therapeutic, recreational, and educational groups  Description: Interventions:  -Encourage Visitation and family involvement in care  Outcome: Progressing  Goal: Recognize dysfunctional thoughts, communicate reality-based thoughts at the time of discharge  Description: Interventions:  - Provide medication and psycho-education to assist patient in compliance and developing insight into his/her illness   Outcome: Progressing  Goal: Complete daily ADLs, including personal hygiene independently, as able  Description: Interventions:  - Observe, teach, and assist patient with ADLS  - Monitor and promote a balance of rest/activity, with adequate nutrition and elimination   Outcome: Progressing

## 2021-03-21 NOTE — NURSING NOTE
[id number]    Pt withdrawn to room affect flat  Mood depressed  Denies SI or HI  Does reports indifference with living and dying  Pt states, "I wouldn't mind if I didn't wake up" Reports feelings of defeat related to her perceived inability to improve her mobility; pt stated, "im constantly asked to do things that I can't, it may look like I'm not trying but I 'am, now they want to place me at a facility that I know wont be able help me with my mobility issues " Pt continues to display pessimistic view on situation  Encouragement and stating the observed met with resistance and negativity  When it was presented to Terri Romero that she has been and demonstrated ability to ambulate the unit today patient responded, "well its not like I can really walk I shuffle more than anything" Encouraged to focus on positive aspects  Pt denies any intent or plan to cause self harm Q 7 min check ongoing

## 2021-03-21 NOTE — PROGRESS NOTES
Progress Note - Mariluz Parra 62 y o  female MRN: [id number]    Unit/Bed#: [facility] 204-01 Encounter: [id number]        Subjective:  Patient seen and examined at bedside after reviewing the chart and discussing the case with the caring staff  Patient examined at bedside  Patient continues to states she cannot perform her ADLs and that her legs are turning to stone    Neurological exam within normal limits  Patient has been started on amantadine for mild parkinsonian symptoms  She continues to complain of decreased appetite, although it is reported that she eats 100% of her meals  When discussing with the patient, she states, That is a lie    Patient has been noted by staff to complete all of her meals  She denies nausea, vomiting, abdominal pain, diarrhea, constipation  Patient's total CK is trending down and is 297 on 03/21/2021  Total CK was 995 on 03/17/2021  Physical Exam   Vitals: Blood pressure 150/76, pulse 82, temperature 98 9 °F (37 2 °C), temperature source Temporal, resp  rate 18, height 5' 5" (1 651 m), weight 64 6 kg (142 lb 6 4 oz), SpO2 98 %, not currently breastfeeding  ,Body mass index is 23 7 kg/m²  Constitutional: He appears well-developed  HEENT: PERR, EOMI, MMM  Cardiovascular: Normal rate and regular rhythm  Pulmonary/Chest: Effort normal and breath sounds normal    Abdomen: Soft, + BS, NT    Assessment/Plan:     Mariluz Parra is a(n) 62y o  year old female with psychotic disorder      1  Cardiac with history of hypertension  Patient will be continued on losartan  2  DJD/osteoarthritis  Patient may get Tylenol on as needed basis  3  Gait abnormality  I will consult PT and OT for further evaluation and treatment  4  Insomnia  Patient is on melatonin  5  Overactive urinary bladder  Patient is on oxybutynin  6  Hyperglycemia/weight loss  We will closely monitor patient's blood sugars by doing Accu-Cheks 2 times daily   Continue Januvia 50 mg daily along with carb controlled diet  Patient's hemoglobin A1c was 5 3  Nutrition is on consult  Patient has been put on Glucerna supplements  7  Nasal congestion/dryness  Continue saline nasal spray every 2 hours as needed  8  Vitamin D deficiency  Continue daily vitamin-D supplements  9  Overgrown toenails  Podiatry is on consult  10  Loose stools  Patient may receive Imodium as needed  11  Anorexia/low appetite  Patient is on Remeron 7 5 mg daily along with Glucerna  12  Athlete's foot  Continue nystatin powder to the feet 3 times daily  13  Gait Dysfunction  MRI of brain from 03/16/2021 is within normal limits  Patient continues to states she cannot perform her ADLs  Psych is trying amantadine for mild parkinsonian symptoms  Will continue to monitor  The patient was discussed with Dr Marcos Goodrich and he is in agreement with the above note

## 2021-03-21 NOTE — NURSING NOTE
[id number]  Pt found to be resting quietly on most of authors rounds  No acute behavioral issues noted  Q 7 min checks maintained  Fall protocol in place

## 2021-03-21 NOTE — PROGRESS NOTES
Progress Note - Behavioral Health   Fadia Das 62 y o  female MRN: [id number]  Unit/Bed#: [facility] 204-01 Encounter: [id number]    Assessment/Plan   Principal Problem:    MDD (major depressive disorder), recurrent episode ([facility] )  Active Problems:    Essential hypertension    Depression    Other constipation    Absence of bladder continence    History of Asperger's syndrome    Aftercare following right hip joint replacement surgery    Severe protein-calorie malnutrition ([facility] )    Patient was seen for continuing care and treatment  Case was discussed with the nursing staff  On examination today, the patient is seen lying in her bed  According to the staff, she does ambulate independently but needs a lot of reassurance and encouragement  She still believes that thoughts of the devil are always with her  She is slow moving, more isolative to room  But continues to be encouraged come out and participate in groups  Discharge planning and disposition is ongoing  Medications will continue, but taking olanzapine 5 mg HS and Remeron 15 mg HS  Behavior over the last 24 hours:  Behavior has been unchanged  Sleep: hypersomnia  Appetite: normal  Medication side effects: No  ROS: no complaints    Mental Status Evaluation:  Appearance:  disheveled   Behavior:  evasive and guarded   Speech:  soft   Mood:  anxious and depressed   Affect:  constricted and flat   Thought Process:  circumstantial   Thought Content:  obsessions   Perceptual Disturbances: Visual and auditory hallucinations     Risk Potential: Suicidal Ideations none  Homicidal Ideations none  Potential for Aggression No   Sensorium:  person, place and time/date   Memory:  recent and remote memory grossly intact   Consciousness:  alert and awake    Attention: attention span appeared shorter than expected for age   Insight:  limited   Judgment: limited   Gait/Station: normal gait/station   Motor Activity: no abnormal movements     Progress Toward Goals:  Still requires inpatient care and treatment  Patient remains delusional and psychotic  Recommended Treatment: Continue with group therapy, milieu therapy and occupational therapy  Risks, benefits and possible side effects of Medications:   Risks, benefits, and possible side effects of medications explained to patient and patient verbalizes understanding  Medications: continue current psychiatric medications  Labs: I have personally reviewed all pertinent laboratory/tests results  Most Recent Labs:   Lab Results   Component Value Date    WBC 4 90 03/17/2021    RBC 4 71 03/17/2021    HGB 14 8 03/17/2021    HCT 45 0 03/17/2021     03/17/2021    RDW 14 2 03/17/2021    NEUTROABS 3 50 03/17/2021    SODIUM 141 03/17/2021    K 3 9 03/17/2021     03/17/2021    CO2 27 03/17/2021    BUN 10 03/17/2021    CREATININE 0 70 03/17/2021    GLUC 106 (H) 03/17/2021    GLUF 106 (H) 03/17/2021    CALCIUM 9 5 03/17/2021    AST 28 03/17/2021    ALT 22 03/17/2021    ALKPHOS 50 03/17/2021    TP 6 7 03/17/2021    ALB 4 1 03/17/2021    TBILI 0 70 03/17/2021    CHOLESTEROL 257 (H) 12/05/2019    HDL 71 12/05/2019    TRIG 112 12/05/2019    LDLCALC 164 (H) 12/05/2019    [id number] 0 506 02/20/2021    RPR Non-Reactive 02/21/2021    HGBA1C 5 3 02/22/2021     02/22/2021       Counseling / Coordination of Care  Total floor / unit time spent today 25 minutes  Greater than 50% of total time was spent with the patient and / or family counseling and / or coordination of care   A description of the counseling / coordination of care:  Medication management, treatment and chart reviewed

## 2021-03-21 NOTE — PROGRESS NOTES
Patient out for breakfast  She is ambulating independently but continues to need a lot of reassurance and encouragement from staff  States her depression and anxiety are high  Denies hallucinations  States the thoughts of the devil are "always there " No complaints of pain  Will continue monitoring

## 2021-03-22 LAB
GLUCOSE SERPL-MCNC: 91 MG/DL (ref 65–140)
GLUCOSE SERPL-MCNC: 96 MG/DL (ref 65–140)

## 2021-03-22 PROCEDURE — 82948 REAGENT STRIP/BLOOD GLUCOSE: CPT

## 2021-03-22 PROCEDURE — 97110 THERAPEUTIC EXERCISES: CPT

## 2021-03-22 PROCEDURE — 99232 SBSQ HOSP IP/OBS MODERATE 35: CPT | Performed by: PSYCHIATRY & NEUROLOGY

## 2021-03-22 RX ORDER — LANOLIN ALCOHOL/MO/W.PET/CERES
6 CREAM (GRAM) TOPICAL
Status: DISCONTINUED | OUTPATIENT
Start: 2021-03-22 | End: 2021-03-31 | Stop reason: HOSPADM

## 2021-03-22 RX ADMIN — AMANTADINE 100 MG: 100 CAPSULE ORAL at 08:34

## 2021-03-22 RX ADMIN — Medication 1000 UNITS: at 08:33

## 2021-03-22 RX ADMIN — AMANTADINE 100 MG: 100 CAPSULE ORAL at 17:00

## 2021-03-22 RX ADMIN — LOSARTAN POTASSIUM 50 MG: 50 TABLET, FILM COATED ORAL at 08:33

## 2021-03-22 RX ADMIN — SITAGLIPTIN 50 MG: 25 TABLET, FILM COATED ORAL at 08:33

## 2021-03-22 RX ADMIN — OLANZAPINE 5 MG: 5 TABLET, FILM COATED ORAL at 21:33

## 2021-03-22 RX ADMIN — MIRTAZAPINE 15 MG: 15 TABLET, FILM COATED ORAL at 21:34

## 2021-03-22 RX ADMIN — OXYBUTYNIN 5 MG: 5 TABLET, FILM COATED, EXTENDED RELEASE ORAL at 08:33

## 2021-03-22 RX ADMIN — NYSTATIN 1 APPLICATION: 100000 POWDER TOPICAL at 08:35

## 2021-03-22 RX ADMIN — TRAZODONE HYDROCHLORIDE 100 MG: 50 TABLET ORAL at 21:33

## 2021-03-22 RX ADMIN — MELATONIN 6 MG: at 21:33

## 2021-03-22 NOTE — PROGRESS NOTES
03/22/21 0952   Team Meeting   Meeting Type Daily Rounds   Team Members Present   Team Members Present Physician;Occupational Therapist;;Nurse   Physician Team Member Dr Terrea Holstein, MD; Lor [address]   Nursing Team Member Lester Landon RN   Social Work Team Member Ysabel Cruz [facility]   OT Team Member Rochelle Campa [place]   Patient/Family Present   Patient Present No   Patient's Family Present No     No DC date - accepted at [facility]; LOC to be completed today for possible SNF placement; ambulatory with walker; med changes; somatic - several complaints;

## 2021-03-22 NOTE — PLAN OF CARE
Problem: DISCHARGE PLANNING  Goal: Discharge to home or other facility with appropriate resources  Description: INTERVENTIONS:  - Identify barriers to discharge w/patient and caregiver  - Arrange for needed discharge resources and transportation as appropriate  - Identify discharge learning needs (meds, wound care, etc )  - Arrange for interpretive services to assist at discharge as needed  - Refer to Case Management Department for coordinating discharge planning if the patient needs post-hospital services based on physician/advanced practitioner order or complex needs related to functional status, cognitive ability, or social support system  Outcome: Progressing   Will be sending completed LOD today for consideration for SNF care

## 2021-03-22 NOTE — OCCUPATIONAL THERAPY NOTE
03/22/21 1102   OT Last Visit   OT Visit Date 03/22/21   Note Type   Note Type Treatment   General   Response to Previous Treatment Patient with no complaints from previous session   Lifestyle   Intrinsic Gratification possibly would be interested in puzzle-building    Pain Assessment   Pain Assessment Tool Pain Assessment not indicated - pt denies pain   Therapeutic Excerise-Strength   UE Strength Yes   Right Upper Extremity- Strength   R Shoulder Flexion; Horizontal ABduction; Other (Comment)  (pro/re-traction )   R Elbow Elbow flexion;Elbow extension  (in forward And reverse; Also: supin/pron-ation )   R Weight/Reps/Sets 1 pound weight - 20 reps shoulders X 2 sets, And 25 reps each elbows    RUE Strength Comment Also engaged patient in a multi-step exercise X 5 reps - she was receptive to and successful with same   Radhika Umana provided)   Left Upper Extremity-Strength   L Weights/Reps/Sets all exers  same as RUE above   Coordination   Gross Motor WFL   Dexterity WFL   Cognition   Overall Cognitive Status WFL   Arousal/Participation Alert; Cooperative   Attention Within functional limits   Following Commands Follows one step commands without difficulty   Comments patient continues to negate most positive feedback, though today accepted (with "thank you") that she did well with the exercises    Activity Tolerance   Activity Tolerance Patient tolerated treatment well   Assessment   Assessment Patient participated in Skilled OT session this date with interventions consisting of therapeutic exercise to: increase functional use of BUEs, increase BUE muscle strength  and  therapeutic activities to: increase activity tolerance   Patient agreeable to OT treatment session, upon arrival patient was found supine in bed - she got in EOB without prompting or difficulty    In comparison to previous session, patient with improvements in level of participation and activity tolerance; she also was less disheveled looking and gave increased eye contact during session-some changes in face expression were exhibited, though no smiling  Patient requiring verbal cues for correct technique and occasional rest periods  Patient continues to be functioning below baseline level, occupational performance remains limited secondary to factors listed above and increased risk for falls and injury  From OT standpoint, recommendation at time of d/c would be Home with Supervision and Skilled therapy  Patient to benefit from continued Occupational Therapy treatment while in the hospital to address deficits as defined above and maximize level of functional independence with ADLs and functional mobility  Plan   Treatment Interventions UE strengthening/ROM; Endurance training;Patient/family training; Activityengagement   Goal Expiration Date 03/31/21   OT Treatment Day 4   Recommendation   Additional Comments 2 The patient's raw score on the AM-PAC Daily Activity inpatient short form is 19, standardized score is 40 22, greater than 39 4  Patients at this level are likely to benefit from discharge to home  Please refer to the recommendation of the Occupational Therapist for safe discharge planning     AM-PAC Daily Activity Inpatient   Lower Body Dressing 2   Bathing 3   Toileting 3   Upper Body Dressing 3   Grooming 4   Eating 4   Daily Activity Raw Score 19   Daily Activity Standardized Score (Calc for Raw Score >=11) 40 22     THOMAS Pierre/LAWRENCE

## 2021-03-22 NOTE — NURSING NOTE
[id number]     Pt present in the milieu for snack ambulated to and from dining zapata without assistance  Pt reports she feels mood is slightly improved  No acute behavioral outburst noted  Q 7 min checks ongoing      [id number]  Pt found to be resting quietly on most of authors rounds  No acute behavioral issues noted  Q 7 min checks maintained  Fall protocol in place

## 2021-03-22 NOTE — PLAN OF CARE
Problem: Alteration in Thoughts and Perception  Goal: Treatment Goal: Gain control of psychotic behaviors/thinking, reduce/eliminate presenting symptoms and demonstrate improved reality functioning upon discharge  Outcome: Progressing  Goal: Verbalize thoughts and feelings  Description: Interventions:  - Promote a nonjudgmental and trusting relationship with the patient through active listening and therapeutic communication  - Assess patient's level of functioning, behavior and potential for risk  - Engage patient in 1 on 1 interactions  - Encourage patient to express fears, feelings, frustrations, and discuss symptoms    - Mark patient to reality, help patient recognize reality-based thinking   - Administer medications as ordered and assess for potential side effects  - Provide the patient education related to the signs and symptoms of the illness and desired effects of prescribed medications  Outcome: Progressing  Goal: Refrain from acting on delusional thinking/internal stimuli  Description: Interventions:  - Monitor patient closely, per order   - Utilize least restrictive measures   - Set reasonable limits, give positive feedback for acceptable   - Administer medications as ordered and monitor of potential side effects  Outcome: Progressing  Goal: Agree to be compliant with medication regime, as prescribed and report medication side effects  Description: Interventions:  - Offer appropriate PRN medication and supervise ingestion; conduct AIMS, as needed   Outcome: Progressing  Goal: Attend and participate in unit activities, including therapeutic, recreational, and educational groups  Description: Interventions:  -Encourage Visitation and family involvement in care  Outcome: Progressing  Goal: Recognize dysfunctional thoughts, communicate reality-based thoughts at the time of discharge  Description: Interventions:  - Provide medication and psycho-education to assist patient in compliance and developing insight into his/her illness   Outcome: Progressing  Goal: Complete daily ADLs, including personal hygiene independently, as able  Description: Interventions:  - Observe, teach, and assist patient with ADLS  - Monitor and promote a balance of rest/activity, with adequate nutrition and elimination   Outcome: Progressing

## 2021-03-22 NOTE — PROGRESS NOTES
Progress Note - Angélica Burnette 62 y o  female MRN: [id number]    Unit/Bed#: [facility] 204-01 Encounter: [id number]        Subjective:  Patient seen and examined at bedside after reviewing the chart and discussing the case with the caring staff  Patient examined at bedside  Patient continues to states she cannot perform her ADLs and that her legs are turning to stone    Neurological exam within normal limits  Patient has been started on amantadine for mild parkinsonian symptoms  Patient was witnessed working with physical therapy today with full ROM of bilateral upper extremities  Patient continues to struggle with her gait, but is improving  She continues to complain of decreased appetite, although it is reported that she eats 100% of her meals  When discussing with the patient, she states, That is a lie    Patient has been noted by staff to complete all of her meals  She denies nausea, vomiting, abdominal pain, diarrhea, constipation  Patient's total CK is trending down and is 297 on 03/21/2021  Total CK was 995 on 03/17/2021  Physical Exam   Vitals: Blood pressure 147/73, pulse 69, temperature 98 1 °F (36 7 °C), temperature source Temporal, resp  rate 20, height 5' 5" (1 651 m), weight 64 6 kg (142 lb 6 4 oz), SpO2 97 %, not currently breastfeeding  ,Body mass index is 23 7 kg/m²  Constitutional: He appears well-developed  HEENT: PERR, EOMI, MMM  Cardiovascular: Normal rate and regular rhythm  Pulmonary/Chest: Effort normal and breath sounds normal    Abdomen: Soft, + BS, NT    Assessment/Plan:     Angélica Burnette is a(n) 62y o  year old female with psychotic disorder      1  Cardiac with history of hypertension  Patient will be continued on losartan  2  DJD/osteoarthritis  Patient may get Tylenol on as needed basis  3  Gait abnormality  I will consult PT and OT for further evaluation and treatment  4  Insomnia  Patient is on melatonin  5  Overactive urinary bladder    Patient is on oxybutynin  6  Hyperglycemia/weight loss  We will closely monitor patient's blood sugars by doing Accu-Cheks 2 times daily  Continue Januvia 50 mg daily along with carb controlled diet  Patient's hemoglobin A1c was 5 3  Nutrition is on consult  Patient has been put on Glucerna supplements  7  Nasal congestion/dryness  Continue saline nasal spray every 2 hours as needed  8  Vitamin D deficiency  Continue daily vitamin-D supplements  9  Overgrown toenails  Podiatry is on consult  10  Loose stools  Patient may receive Imodium as needed  11  Anorexia/low appetite  Patient is on Remeron 7 5 mg daily along with Glucerna  12  Athlete's foot  Continue nystatin powder to the feet 3 times daily  13  Gait Dysfunction  MRI of brain from 03/16/2021 is within normal limits  Patient continues to states she cannot perform her ADLs  Psych is trying amantadine for mild parkinsonian symptoms  Will continue to monitor  The patient was discussed with Dr Florecita Ceja and he is in agreement with the above note

## 2021-03-22 NOTE — PROGRESS NOTES
The patient noted to be visible in the milieu with breakfast meal then returned to room and noted to be resting quietly and without difficulty at this time  The patient ambulatory on the unit with assist of roller walker  The patient flat and quiet  The patient endorses anxiety and depression, but would not provide ratings  The patient noted to be somatically preoccupied throughout conversation with RN  The patient denies si, hi and hallucinations  The patient's appetite good, meal completion for breakfast 100% The patient denies complaints of pain  Q 7 minute safety checks maintained

## 2021-03-22 NOTE — SOCIAL WORK
CM placed call to PT brother Radha Jericho   Updated him on PT status and plan of care  Reviewed that LOD/PASRR was sent today and assessment will take place next for potential SNF placement  Reviewed if PT is deemed eligible then what the referral process for placement will entail  PT brother in agreement  Call ended mutually

## 2021-03-22 NOTE — SOCIAL WORK
CM met with PT for PT check in  PT presented with negative outlook reports she can do nothing for self and decompensated physically  CM provided empowerment to PT  PT did however seem more verbal in her engagements  GAURAV reviewed with PT that we are open to exploring SNF placement as in her current presentation would not be appropriate for assisted living  PT in agreement  Reviewed the LOD and PASRR, and PT signed  Reviewed that depending on outcome of assessment and PT progress here on unit will determine disposition  Reviewed the referral process  PT in agreement  GAURAV faxed LOD and PASRR to [facility] , confirmation received

## 2021-03-22 NOTE — PLAN OF CARE
Problem: OCCUPATIONAL THERAPY ADULT  Goal: Performs self-care activities at highest level of function for planned discharge setting  See evaluation for individualized goals  Description: Treatment Interventions: ADL retraining, Functional transfer training, UE strengthening/ROM, Endurance training, Patient/family training, Equipment evaluation/education, Compensatory technique education, Activityengagement          See flowsheet documentation for full assessment, interventions and recommendations  Outcome: Progressing  Note: Limitation: Decreased ADL status, Decreased UE strength, Decreased endurance, Decreased self-care trans, Decreased high-level ADLs, Mood limitation  Prognosis: Good  Assessment: Patient participated in Skilled OT session this date with interventions consisting of therapeutic exercise to: increase functional use of BUEs, increase BUE muscle strength  and  therapeutic activities to: increase activity tolerance   Patient agreeable to OT treatment session, upon arrival patient was found supine in bed - she got in EOB without prompting or difficulty  In comparison to previous session, patient with improvements in level of participation and activity tolerance; she also was less disheveled looking and gave increased eye contact during session-some changes in face expression were exhibited, though no smiling  Patient requiring verbal cues for correct technique and occasional rest periods  Patient continues to be functioning below baseline level, occupational performance remains limited secondary to factors listed above and increased risk for falls and injury  From OT standpoint, recommendation at time of d/c would be Home with Supervision and Skilled therapy  Patient to benefit from continued Occupational Therapy treatment while in the hospital to address deficits as defined above and maximize level of functional independence with ADLs and functional mobility        OT Discharge Recommendation: Home with skilled therapy  OT - OK to Discharge: Yes(Once medically cleared)    THOMAS Villar/LAWRENCE

## 2021-03-22 NOTE — PROGRESS NOTES
Progress Note - Nilton [age] y o  female MRN: [id number]  Unit/Bed#: Jerod Lozano 204-01 Encounter: [id number]    Assessment/Plan   Principal Problem:    MDD (major depressive disorder), recurrent episode ([facility] [address] )  Active Problems:    Essential hypertension    Depression    Other constipation    Absence of bladder continence    History of Asperger's syndrome    Aftercare following right hip joint replacement surgery    Severe protein-calorie malnutrition ([facility] [address] )      Behavior over the last 24 hours:  unchanged  Sleep: normal  Appetite: normal  Medication side effects: No  ROS: no complaints and All other systems negative for acute change     Lizet Davey was seen today for follow-up and case discussed with treatment team this morning  Patient was laying in bed upon encounter and appeared brighter  She rates her anxiety and depression as 8-10    However, she denies any AVH/SI/HI  She continues to verbalize that her appetite is not there and believes somehow the devil is controlling my appetite and he does not want me to be hungry    Patient was able to be redirected  Per nursing documentation, Lizet Davey has been completing 100% of her meals  She stated her sleep could be improved a mutual decision made to increase melatonin to 6 mg at HS  Arpita's mobility also appears to be improving since discontinuation of Risperdal and initiating Zyprexa  However, patient continues to endorse that she is unable to care for herself, or ambulate, and insists that she needs SNF placement  Redirection and supportive therapy was given to patient in which she appeared somewhat receptive  Lizet Davey continues to be compliant with medication regimen and denies any side effects      Mental Status Evaluation:  Appearance:  casually dressed and older than stated age   Behavior:  Calm and cooperative   Speech:  Slow, normal volume   Mood:  anxious and depressed   Affect:  blunted   Thought Process:  circumstantial   Thought Content: delusions  somatic   Perceptual Disturbances: None   Risk Potential: Suicidal Ideations none  Homicidal Ideations none  Potential for Aggression No   Sensorium:  person, place and time/date   Memory:  recent and remote memory grossly intact   Consciousness:  alert and awake    Attention: attention span and concentration were age appropriate   Insight:  limited   Judgment: limited   Gait/Station: slow and Uses walker   Motor Activity: no abnormal movements     Progress Toward Goals:  Some improvement  Continue current psychotropic regimen  Case management working with patient to complete LOC for possible SNF placement  Patient also accepted at [facility], but she is unwilling to go at this time  Disposition planning ongoing  No discharge date at this time  Recommended Treatment: Continue with group therapy, milieu therapy and occupational therapy  Risks, benefits and possible side effects of Medications:   Risks, benefits, and possible side effects of medications explained to patient and patient verbalizes understanding  Medications: all current active meds have been reviewed and planned medication changes: Increase Melatonin 6mg PO QHS  Labs: I have personally reviewed all pertinent laboratory/tests results  CK improved, 297 on 3/21/21  Counseling / Coordination of Care  Total floor / unit time spent today 20 minutes  Greater than 50% of total time was spent with the patient and / or family counseling and / or coordination of care

## 2021-03-22 NOTE — PROGRESS NOTES
03/22/21 1430   Team Meeting   Meeting Type Tx Team Meeting   Team Members Present   Team Members Present Physician;;Nurse   Physician Team Member Dr Terri Mckeon MD   Nursing Team Member Stephon Walton, RN   Care Management Team Member Radames Jerez MS, Josette [facility]   Patient/Family Present   Patient Present Yes   Patient's Family Present No   Reviewed [place] plan, goals, strengths, in agreement and signed  Reviewed barriers to D/C plan and potential solutions for safe D/C  LOD/PASRR completed today and assessment to be completed by [facility] within the next few days

## 2021-03-23 LAB
GLUCOSE SERPL-MCNC: 104 MG/DL (ref 65–140)
GLUCOSE SERPL-MCNC: 89 MG/DL (ref 65–140)

## 2021-03-23 PROCEDURE — 99232 SBSQ HOSP IP/OBS MODERATE 35: CPT | Performed by: PSYCHIATRY & NEUROLOGY

## 2021-03-23 PROCEDURE — 82948 REAGENT STRIP/BLOOD GLUCOSE: CPT

## 2021-03-23 RX ADMIN — LOSARTAN POTASSIUM 50 MG: 50 TABLET, FILM COATED ORAL at 08:18

## 2021-03-23 RX ADMIN — OXYBUTYNIN 5 MG: 5 TABLET, FILM COATED, EXTENDED RELEASE ORAL at 08:18

## 2021-03-23 RX ADMIN — Medication 1000 UNITS: at 08:18

## 2021-03-23 RX ADMIN — MIRTAZAPINE 15 MG: 15 TABLET, FILM COATED ORAL at 21:45

## 2021-03-23 RX ADMIN — OLANZAPINE 5 MG: 5 TABLET, FILM COATED ORAL at 21:45

## 2021-03-23 RX ADMIN — AMANTADINE 100 MG: 100 CAPSULE ORAL at 08:18

## 2021-03-23 RX ADMIN — TRAZODONE HYDROCHLORIDE 100 MG: 50 TABLET ORAL at 21:45

## 2021-03-23 RX ADMIN — MELATONIN 6 MG: at 21:45

## 2021-03-23 RX ADMIN — SITAGLIPTIN 50 MG: 25 TABLET, FILM COATED ORAL at 08:18

## 2021-03-23 RX ADMIN — AMANTADINE 100 MG: 100 CAPSULE ORAL at 17:36

## 2021-03-23 NOTE — PLAN OF CARE
Problem: DISCHARGE PLANNING  Goal: Discharge to home or other facility with appropriate resources  Description: INTERVENTIONS:  - Identify barriers to discharge w/patient and caregiver  - Arrange for needed discharge resources and transportation as appropriate  - Identify discharge learning needs (meds, wound care, etc )  - Arrange for interpretive services to assist at discharge as needed  - Refer to Case Management Department for coordinating discharge planning if the patient needs post-hospital services based on physician/advanced practitioner order or complex needs related to functional status, cognitive ability, or social support system  3/23/2021 1053 by Dillon Jiang MS  Outcome: Progressing  3/23/2021 0926 by Dillon Jiang MS  Outcome: Progressing   PT assessed by [facility] for LOD/PASRR  Exploring SNF placement

## 2021-03-23 NOTE — NURSING NOTE
Pt received on the unit asleep in bed yet easy to arouse,she reports some depression and  anxiety  However denies needing any interventions and feels her feelings are at [facility] [address], able to make needs her known, pleasant and cooperative, compliant with medications and positive for pm snack  Q7 minute checks continued , will continue to monitor

## 2021-03-23 NOTE — PLAN OF CARE
Problem: Alteration in Thoughts and Perception  Goal: Treatment Goal: Gain control of psychotic behaviors/thinking, reduce/eliminate presenting symptoms and demonstrate improved reality functioning upon discharge  Outcome: Progressing  Goal: Verbalize thoughts and feelings  Description: Interventions:  - Promote a nonjudgmental and trusting relationship with the patient through active listening and therapeutic communication  - Assess patient's level of functioning, behavior and potential for risk  - Engage patient in 1 on 1 interactions  - Encourage patient to express fears, feelings, frustrations, and discuss symptoms    - [place] patient to reality, help patient recognize reality-based thinking   - Administer medications as ordered and assess for potential side effects  - Provide the patient education related to the signs and symptoms of the illness and desired effects of prescribed medications  Outcome: Progressing  Goal: Refrain from acting on delusional thinking/internal stimuli  Description: Interventions:  - Monitor patient closely, per order   - Utilize least restrictive measures   - Set reasonable limits, give positive feedback for acceptable   - Administer medications as ordered and monitor of potential side effects  Outcome: Progressing  Goal: Agree to be compliant with medication regime, as prescribed and report medication side effects  Description: Interventions:  - Offer appropriate PRN medication and supervise ingestion; conduct AIMS, as needed   Outcome: Progressing  Goal: Attend and participate in unit activities, including therapeutic, recreational, and educational groups  Description: Interventions:  -Encourage Visitation and family involvement in care  Outcome: Progressing  Goal: Recognize dysfunctional thoughts, communicate reality-based thoughts at the time of discharge  Description: Interventions:  - Provide medication and psycho-education to assist patient in compliance and developing insight into his/her illness   Outcome: Progressing  Goal: Complete daily ADLs, including personal hygiene independently, as able  Description: Interventions:  - Observe, teach, and assist patient with ADLS  - Monitor and promote a balance of rest/activity, with adequate nutrition and elimination   Outcome: Progressing     Problem: Ineffective Coping  Goal: Identifies ineffective coping skills  Outcome: Progressing  Goal: Identifies healthy coping skills  Outcome: Progressing  Goal: Demonstrates healthy coping skills  Outcome: Progressing  Goal: Patient/Family participate in treatment and DC plans  Description: Interventions:  - Provide therapeutic environment  Outcome: Progressing  Goal: Patient/Family verbalizes awareness of resources  Outcome: Progressing  Goal: Understands least restrictive measures  Description: Interventions:  - Utilize least restrictive behavior  Outcome: Progressing  Goal: Free from restraint events  Description: - Utilize least restrictive measures   - Provide behavioral interventions   - Redirect inappropriate behaviors   Outcome: Progressing     Problem: Depression  Goal: Treatment Goal: Demonstrate behavioral control of depressive symptoms, verbalize feelings of improved mood/affect, and adopt new coping skills prior to discharge  Outcome: Progressing  Goal: Verbalize thoughts and feelings  Description: Interventions:  - Assess and re-assess patient's level of risk   - Engage patient in 1:1 interactions, daily, for a minimum of 15 minutes   - Encourage patient to express feelings, fears, frustrations, hopes   Outcome: Progressing  Goal: Refrain from harming self  Description: Interventions:  - Monitor patient closely, per order   - Supervise medication ingestion, monitor effects and side effects   Outcome: Progressing  Goal: Refrain from isolation  Description: Interventions:  - Develop a trusting relationship   - Encourage socialization   Outcome: Progressing  Goal: Refrain from self-neglect  Outcome: Progressing  Goal: Attend and participate in unit activities, including therapeutic, recreational, and educational groups  Description: Interventions:  - Provide therapeutic and educational activities daily, encourage attendance and participation, and document same in the medical record   Outcome: Progressing  Goal: Complete daily ADLs, including personal hygiene independently, as able  Description: Interventions:  - Observe, teach, and assist patient with ADLS  -  Monitor and promote a balance of rest/activity, with adequate nutrition and elimination   Outcome: Progressing     Problem: Anxiety  Goal: Anxiety is at manageable level  Description: Interventions:  - Assess and monitor patient's anxiety level  - Monitor for signs and symptoms (heart palpitations, chest pain, shortness of breath, headaches, nausea, feeling jumpy, restlessness, irritable, apprehensive)  - Collaborate with interdisciplinary team and initiate plan and interventions as ordered  - [place] patient to unit/surroundings  - Explain treatment plan  - Encourage participation in care  - Encourage verbalization of concerns/fears  - Identify coping mechanisms  - Assist in developing anxiety-reducing skills  - Administer/offer alternative therapies  - Limit or eliminate stimulants  Outcome: Progressing     Problem: Nutrition/Hydration-ADULT  Goal: Nutrient/Hydration intake appropriate for improving, restoring or maintaining nutritional needs  Description: Monitor and assess patient's nutrition/hydration status for malnutrition  Collaborate with interdisciplinary team and initiate plan and interventions as ordered  Monitor patient's weight and dietary intake as ordered or per policy  Utilize nutrition screening tool and intervene as necessary  Determine patient's food preferences and provide high-protein, high-caloric foods as appropriate       INTERVENTIONS:  - Monitor oral intake, urinary output, labs, and treatment plans  - Assess nutrition and hydration status and recommend course of action  - Evaluate amount of meals eaten  - Assist patient with eating if necessary   - Allow adequate time for meals  - Recommend/ encourage appropriate diets, oral nutritional supplements, and vitamin/mineral supplements  - Order, calculate, and assess calorie counts as needed  - Recommend, monitor, and adjust tube feedings and TPN/PPN based on assessed needs  - Assess need for intravenous fluids  - Provide specific nutrition/hydration education as appropriate  - Include patient/family/caregiver in decisions related to nutrition  Outcome: Progressing

## 2021-03-23 NOTE — PROGRESS NOTES
03/23/21    Team Meeting   Meeting Type Daily Rounds   Team Members Present   Team Members Present Physician;Nurse;;Occupational Therapist   Physician Team Member Dr Elton Whitney MD; [address]   Nursing Team Member Alexis Torres, CARLOS   Care Management Team Member MS [place], Josette [facility]   OT Team Member Marianne Mortensen, [place]   Patient/Family Present   Patient Present No   Patient's Family Present No   High Risk  LOD/PASRR assessment for potential SNF placement completed this am  Slept over night  PT complained that she could not urinate yesterday, but medical following she is voiding   Excessively drinking this am

## 2021-03-23 NOTE — PROGRESS NOTES
Progress Note - Mackenziemihirva [age] y o  female MRN: [id number]  Unit/Bed#: [facility] 204-01 Encounter: [id number]    Assessment/Plan   Principal Problem:    MDD (major depressive disorder), recurrent episode ([facility] )  Active Problems:    Essential hypertension    Depression    Other constipation    Absence of bladder continence    History of Asperger's syndrome    Aftercare following right hip joint replacement surgery    Severe protein-calorie malnutrition ([facility] )      Behavior over the last 24 hours:  unchanged  Sleep: normal  Appetite:  Fair  Medication side effects: No  ROS: no complaints and All other systems negative for acute change     Carter Coronado was seen today for follow-up and case discussed with treatment team this morning  She continues to report her anxiety is up there and her depression is equal to my anxiety    She continues to be somatically preoccupied in that she is unable to walk or care for herself  She believes she is "too far gone" for rehab and feels she requires nursing home placement due to her immobility    She denies any AVH/SI/HI, but states she believes the devil is playing a part in her having no appetite and proceeded to say something is not right in that realm    However, her completions have been documented to be between the 50-75% range  Carter Coronado reports she is sleeping good at night  She is withdrawn to herself and is occasionally seen ambulating with walker in the milieu  She has been attending and participating in groups  Carter Coronado remains compliant with medication regimen and denies any side effects        Mental Status Evaluation:  Appearance:  age appropriate, casually dressed and Laying in bed   Behavior:  Calm and cooperative   Speech:  normal pitch, normal volume and Pauses at times   Mood:  anxious and depressed   Affect:  flat   Thought Process:  circumstantial   Thought Content:  delusions  somatic and Somewhat religiously preoccupied   Perceptual Disturbances: None   Risk Potential: Suicidal Ideations none  Homicidal Ideations none  Potential for Aggression No   Sensorium:  person, place, time/date and situation   Memory:  recent and remote memory grossly intact   Consciousness:  alert and awake    Attention: attention span and concentration were age appropriate   Insight:  limited   Judgment: limited   Gait/Station: slow and Uses walker   Motor Activity: no abnormal movements     Progress Toward Goals:  Continue with current psychotropic regimen  Patient completed LOD today with case management to see if she is eligible for SNF placement  No discharge date at this time  Recommended Treatment: Continue with group therapy, milieu therapy and occupational therapy  Risks, benefits and possible side effects of Medications:   Risks, benefits, and possible side effects of medications explained to patient and patient verbalizes understanding  Medications: all current active meds have been reviewed and continue current psychiatric medications  Labs: I have personally reviewed all pertinent laboratory/tests results  Counseling / Coordination of Care  Total floor / unit time spent today 20 minutes  Greater than 50% of total time was spent with the patient and / or family counseling and / or coordination of care

## 2021-03-23 NOTE — SOCIAL WORK
CM spoke with Carla with [facility]  Reviewed PT scenerio  Carla will complete assessment with PT via phone at 0900, CM to call with PT [phone]  ext  [phone]

## 2021-03-23 NOTE — SOCIAL WORK
CM met with PT and reviewed that she has an assessment today with Carla with [facility] for the LOD and PASRR that was submitted, appointment by phone today at [facility]  PT in agreement with this  PT reported having low appetite, mc encouraged PT to eat breakfast, PT reports no SI/HI/AH/VH, and that anxiety and depression are an 8  Much reassurance provided  PT will meet with CM at nursing station for call

## 2021-03-23 NOTE — SOCIAL WORK
PT and CM placed call to [facility] with [facility] [phone], ext  [phone]  PT completing assessment with Carla via phone for LOD/PASRR

## 2021-03-23 NOTE — SOCIAL WORK
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

## 2021-03-23 NOTE — PROGRESS NOTES
Progress Note - Anya Clayton 62 y o  female MRN: [id number]    Unit/Bed#: [facility] 204-01 Encounter: [id number]        Subjective:  Patient seen and examined at bedside after reviewing the chart and discussing the case with the caring staff  Patient examined at bedside  Patient reports she is Arely Beckford is lousy   Continues to state she cannot perform her ADLs  States that she has no desire to eat although patient has been witnessed to eat 100% of her meals  Patient also reports decreased fluid intake and inability to void  Patient has been witnessed drinking fluids and urinating  She denies nausea, vomiting, abdominal pain, diarrhea, constipation, dysuria, hematuria  Physical Exam   Vitals: Blood pressure 132/76, pulse 90, temperature 99 2 °F (37 3 °C), temperature source Temporal, resp  rate 18, height 5' 5" (1 651 m), weight 64 6 kg (142 lb 6 4 oz), SpO2 96 %, not currently breastfeeding  ,Body mass index is 23 7 kg/m²  Constitutional: He appears well-developed  HEENT: PERR, EOMI, MMM  Cardiovascular: Normal rate and regular rhythm  Pulmonary/Chest: Effort normal and breath sounds normal    Abdomen: Soft, + BS, NT    Assessment/Plan:     Anya Clayton is a(n) 62y o  year old female with psychotic disorder      1  Cardiac with history of hypertension  Patient will be continued on losartan  2  DJD/osteoarthritis  Patient may get Tylenol on as needed basis  3  Gait abnormality  I will consult PT and OT for further evaluation and treatment  4  Insomnia  Patient is on melatonin  5  Overactive urinary bladder  Patient is on oxybutynin  6  Hyperglycemia/weight loss  We will closely monitor patient's blood sugars by doing Accu-Cheks 2 times daily  Continue Januvia 50 mg daily along with carb controlled diet  Patient's hemoglobin A1c was 5 3  Nutrition is on consult  Patient has been put on Glucerna supplements  7  Nasal congestion/dryness    Continue saline nasal spray every 2 hours as needed  8  Vitamin D deficiency  Continue daily vitamin-D supplements  9  Overgrown toenails  Podiatry is on consult  10  Loose stools  Patient may receive Imodium as needed  11  Anorexia/low appetite  Patient is on Remeron 7 5 mg daily along with Glucerna 3 times daily  12  Athlete's foot  Continue nystatin powder to the feet 3 times daily  13  Gait Dysfunction  MRI of brain from 03/16/2021 is within normal limits  Patient continues to states she cannot perform her ADLs  Physical and occupational therapy are on consult  The patient was discussed with Dr Stephanie Jay and he is in agreement with the above note

## 2021-03-23 NOTE — NURSING NOTE
Pt slept through the night without awakening  No signs of distress or labored breathing  Continuous visual checks performed Q7 minutes throughout the night  Safety precautions maintained  Will continue to monitor

## 2021-03-23 NOTE — SOCIAL WORK
GAURAV placed call to [facility] with [employer]  [phone]  Left message updating her on PT status and plan of care  Reviewed that we will update her with any changes, requested return call wiht any questions or concerns

## 2021-03-23 NOTE — PROGRESS NOTES
The patient noted to be visible on the unit, in the milieu with meals, and resting quietly in room throughout the shift  The patient noted to be flat, states anxiety and depression 9/10, denies si, hi, and hallucinations  The patient compliant with meals and medications  No signs and symptoms of hypoglycemia noted  Q 7 minute safety checks maintained

## 2021-03-23 NOTE — DISCHARGE INSTR - OTHER ORDERS
You are being discharged to [facility] located at [facility]  [address], [address] (# [phone])  Triggers you have identified during your hospitalization that led to your admission suicidal ideation,  distressed mood, include regression in physical and mental health  Coping skills you have identified during your hospitalization include listening to classical music, handwork, and puzzels  If you are unable to deal with your distressed mood alone please contact your brother Morgan Mane at   If that is not effective and you continue to have suicidal ideations and/or distressed mood please contact [facility] the [facility] at [phone] or [phone], dial [phone] or go to the nearest emergency center  Crisis Information:  *[address]: [address]: [address]  Suicide Prevention Lifeline:  [address] [place] [address] Rd , [address] on [address] [facility] ([facility]) HELPLINE: 621.103.4694/Website: www [facility] org  *Substance Abuse and [address] [employer], which is a confidential, free, 24-hour-a-day, 365-day-a-year, information service for individuals and family members facing mental health and/or substance use disorders  This service provides referrals to local treatment facilities, support groups, and community-based organizations  Callers can also order free publications and other information  Call 9-811.885.6600/Website: www [facility] gov  *[employer] 2-1-1: This is a toll free, confidential, 24-hour-a-day service which connects you to a community  in your area who can help you find services and resources that are available to you locally and provide critical services that can improve and save lives  Call: 80  /Website: https://[employer] net/    What you need to know aboutcoronavirus disease 2019 (COVID-19)     What is coronavirus disease 2019 (COVID-19)?    Coronavirus disease 2019 (COVID-19) is a respiratory illness that can spread from person to person  The virus that causes COVID-19 is a novel coronavirus that was first identified during an investigation into an outbreak in [place], [place]  Can people in [place]  get COVID-19? Yes  COVID-19 is spreading from person to person in parts of the [place]  Risk of infection with COVID-19 is higher for people who are close contacts of someone known to have COVID-19, for example healthcare workers, or household members  Other people at higher risk for infection are those who live in or have recently been in an area with ongoing spread of COVID-19  Learn more about places with ongoing spread at   [facility]  html#geographic  Have there been cases of COVID-19 in the [place] ?   Yes  The first case of COVID-19 in the [place] was reported on January 21, 2020  The current count of cases of COVID-19 in the [place] is available on [employer] at [facility]  How does COVID-19 spread? The virus that causes COVID-19 probably emerged from an animal source, but is now spreading from person to person  The virus is thought to spread mainly between people who are in close contact with one another (within about 6 feet) through respiratory droplets produced when an infected person coughs or sneezes  It also may be possible that a person can get COVID-19 by touching a surface or object that has the virus on it and then touching their own mouth, nose, or possibly their eyes, but this is not thought to be the main way the virus spreads  Learn what is known about the spread of newly emerged coronaviruses at [facility]  What are the symptoms of COVID-19?   Patients with COVID-19 have had mild to severe respiratory illness with symptoms of   fever   cough   shortness of breath  What are severe complications from this virus? Some patients have pneumonia in both lungs, multi-organ failure and in some cases death  How can I help protect myself? People can help protect themselves from respiratory illness with everyday preventive actions  Avoid close contact with people who are sick  Avoid touching your eyes, nose, and mouth withunwashed hands  Wash your hands often with soap and water for at least 20 seconds  Use an alcohol-based hand  that contains at least 60% alcohol if soap and water are not available  If you are sick, to keep from spreading respiratory illness to others, you should   Stay home when you are sick  Cover your cough or sneeze with a tissue, then throw the tissue in the trash  Clean and disinfect frequently touched objectsand surfaces  What should I do if I recently traveled from an area with ongoing spread of COVID-19? If you have traveled from an affected area, there may be restrictions on your movements for up to 2 weeks  If you develop symptoms during that period (fever, cough, trouble breathing), seek medical advice  Call the office of your health care provider before you go, and tell them about your travel and your symptoms  They will give you instructions on how to get care without exposing other people to your illness  While sick, avoid contact with people, don't go out and delay any travel to reduce the possibility of spreading illness to others  Is there a treatment? There is no specific antiviral treatment for COVID-19  People with COVID-19 can seek medical care to helprelieve symptoms  For more information: www cdc gov/OBVDU46CS 418492-C 03/03/2020       What to do if you are sick withcoronavirus disease 2019 (COVID-19)     If you are sick with COVID-19 or suspect you are infected with the virus that causes COVID-19, follow the steps below to help prevent the disease from spreading to people in your home and community     Stay home except to get medical care   You should restrict activities outside your home, except for getting medical care  Do not go to work, school, or public areas  Avoid using public transportation, ride-sharing, or taxis  Separate yourself from other people and animals inyour home  People: As much as possible, you should stay in a specific room and away from other people in your home  Also, you should use a separate bathroom, if available  Animals: Do not handle pets or other animals while sick  See COVID-19 and Animals for more information  Call ahead before visiting your doctor   If you have a medical appointment, call the healthcare provider and tell them that you have or may have COVID-19  This will help the healthcare provider's office take steps to keep other people from getting infected or exposed  Wear a facemask  You should wear a facemask when you are around other people (e g , sharing a room or vehicle) or pets and before you enter a healthcare provider's office  If you are not able to wear a facemask (for example, because it causes trouble breathing), then people who live with you should not stay in the same room with you, or they should wear a facemask if they enteryour room  Cover your coughs and sneezes   Cover your mouth and nose with a tissue when you cough or sneeze  Throw used tissues in a lined trash can; immediately wash your hands with soap and water for at least 20 seconds or clean your hands with an alcohol-based hand  that contains at least 60 to 95% alcohol, covering all surfaces of your hands and rubbing them together until they feel dry  Soap and water should be used preferentially if hands are visibly dirty  Avoid sharing personal household items   You should not share dishes, drinking glasses, cups, eating utensils, towels, or bedding with other people or pets in your home  After using these items, they should be washed thoroughly with soap and water     Clean your hands often  Wash your hands often with soap and water for at least 20 seconds  If soap and water are not available, clean your hands with an alcohol-based hand  that contains at least 60% alcohol, covering all surfaces of your hands and rubbing them together until they feel dry  Soap and water should be used preferentially if hands are visibly dirty  Avoid touching your eyes, nose, and mouth with unwashed hands  Clean all "high-touch" surfaces every day  High touch surfaces include counters, tabletops, doorknobs, bathroom fixtures, toilets, phones, keyboards, tablets, and bedside tables  Also, clean any surfaces that may have blood, stool, or body fluids on them  Use a household cleaning spray or wipe, according to the label instructions  Labels contain instructions for safe and effective use of the cleaning product including precautions you should take when applying the product, such as wearing gloves and making sure you have good ventilation during use of the product  Monitor your symptoms  Seek prompt medical attention if your illness is worsening (e g , difficulty breathing)  Before seeking care, call your healthcare provider and tell them that you have, or are being evaluated for, COVID-19  Put on a facemask before you enter the facility  These steps will help the healthcare provider's office to keep other people in the office or waiting room from getting infectedor exposed  Ask your healthcare provider to call the local or state health department  Persons who are placed under active monitoring or facilitated self-monitoring should follow instructions provided by their local health department or occupational health professionals, as appropriate  If you have a medical emergency and need to call 911, notify the dispatch personnel that you have, or are being evaluated for COVID-19  If possible, put on a facemask before emergency medical services arrive    Discontinuing home isolation  Patients with confirmed COVID-19 should remain under home isolation precautions until the risk of secondary transmission to others is thought to be low  The decision to discontinue home isolation precautions should be made on a case-by-case basis, in consultation with healthcare providers and state and local health departments  For more information: www cdc gov/MHRIF92AY 405543-E 02/24/2020       Stay home when you are sick,except to get medical care  Wash your hands often with soap and water for at least 20 seconds  Cover your cough or sneeze with a tissue, then throw the tissue in the trash  Clean and disinfect frequently touched objects and surfaces  Avoid touching your eyes, nose, and mouth  STOP THE SPREAD OF GERMS  For more information: www cdc gov/COVID19 Avoid close contact with people who are sick  Help prevent the spread of respiratory diseases like COVID-19

## 2021-03-24 LAB
GLUCOSE SERPL-MCNC: 101 MG/DL (ref 65–140)
GLUCOSE SERPL-MCNC: 86 MG/DL (ref 65–140)

## 2021-03-24 PROCEDURE — 82948 REAGENT STRIP/BLOOD GLUCOSE: CPT

## 2021-03-24 PROCEDURE — 99232 SBSQ HOSP IP/OBS MODERATE 35: CPT | Performed by: NURSE PRACTITIONER

## 2021-03-24 RX ADMIN — NYSTATIN 1 APPLICATION: 100000 POWDER TOPICAL at 21:40

## 2021-03-24 RX ADMIN — NYSTATIN 1 APPLICATION: 100000 POWDER TOPICAL at 09:12

## 2021-03-24 RX ADMIN — LOSARTAN POTASSIUM 50 MG: 50 TABLET, FILM COATED ORAL at 08:31

## 2021-03-24 RX ADMIN — OXYBUTYNIN 5 MG: 5 TABLET, FILM COATED, EXTENDED RELEASE ORAL at 08:32

## 2021-03-24 RX ADMIN — MELATONIN 6 MG: at 21:37

## 2021-03-24 RX ADMIN — MIRTAZAPINE 15 MG: 15 TABLET, FILM COATED ORAL at 21:37

## 2021-03-24 RX ADMIN — AMANTADINE 100 MG: 100 CAPSULE ORAL at 08:31

## 2021-03-24 RX ADMIN — OLANZAPINE 5 MG: 5 TABLET, FILM COATED ORAL at 21:37

## 2021-03-24 RX ADMIN — TRAZODONE HYDROCHLORIDE 100 MG: 50 TABLET ORAL at 21:37

## 2021-03-24 RX ADMIN — AMANTADINE 100 MG: 100 CAPSULE ORAL at 17:35

## 2021-03-24 RX ADMIN — Medication 1000 UNITS: at 08:31

## 2021-03-24 RX ADMIN — SITAGLIPTIN 50 MG: 25 TABLET, FILM COATED ORAL at 08:31

## 2021-03-24 NOTE — PLAN OF CARE
Problem: DISCHARGE PLANNING  Goal: Discharge to home or other facility with appropriate resources  Description: INTERVENTIONS:  - Identify barriers to discharge w/patient and caregiver  - Arrange for needed discharge resources and transportation as appropriate  - Identify discharge learning needs (meds, wound care, etc )  - Arrange for interpretive services to assist at discharge as needed  - Refer to Case Management Department for coordinating discharge planning if the patient needs post-hospital services based on physician/advanced practitioner order or complex needs related to functional status, cognitive ability, or social support system  Outcome: Progressing  Awaiting letter for LOD  Will be for SNF placement

## 2021-03-24 NOTE — PLAN OF CARE
Problem: Alteration in Thoughts and Perception  Goal: Treatment Goal: Gain control of psychotic behaviors/thinking, reduce/eliminate presenting symptoms and demonstrate improved reality functioning upon discharge  Outcome: Progressing  Goal: Verbalize thoughts and feelings  Description: Interventions:  - Promote a nonjudgmental and trusting relationship with the patient through active listening and therapeutic communication  - Assess patient's level of functioning, behavior and potential for risk  - Engage patient in 1 on 1 interactions  - Encourage patient to express fears, feelings, frustrations, and discuss symptoms    - [place] patient to reality, help patient recognize reality-based thinking   - Administer medications as ordered and assess for potential side effects  - Provide the patient education related to the signs and symptoms of the illness and desired effects of prescribed medications  Outcome: Progressing  Goal: Refrain from acting on delusional thinking/internal stimuli  Description: Interventions:  - Monitor patient closely, per order   - Utilize least restrictive measures   - Set reasonable limits, give positive feedback for acceptable   - Administer medications as ordered and monitor of potential side effects  Outcome: Progressing  Goal: Agree to be compliant with medication regime, as prescribed and report medication side effects  Description: Interventions:  - Offer appropriate PRN medication and supervise ingestion; conduct AIMS, as needed   Outcome: Progressing  Goal: Attend and participate in unit activities, including therapeutic, recreational, and educational groups  Description: Interventions:  -Encourage Visitation and family involvement in care  Outcome: Progressing  Goal: Recognize dysfunctional thoughts, communicate reality-based thoughts at the time of discharge  Description: Interventions:  - Provide medication and psycho-education to assist patient in compliance and developing insight into his/her illness   Outcome: Progressing  Goal: Complete daily ADLs, including personal hygiene independently, as able  Description: Interventions:  - Observe, teach, and assist patient with ADLS  - Monitor and promote a balance of rest/activity, with adequate nutrition and elimination   Outcome: Progressing     Problem: Ineffective Coping  Goal: Cooperates with admission process  Description: Interventions:   - Complete admission process  Outcome: Progressing  Goal: Identifies ineffective coping skills  Outcome: Progressing  Goal: Identifies healthy coping skills  Outcome: Progressing  Goal: Demonstrates healthy coping skills  Outcome: Progressing  Goal: Participates in unit activities  Description: Interventions:  - Provide therapeutic environment   - Provide required programming   - Redirect inappropriate behaviors   Outcome: Progressing  Goal: Patient/Family participate in treatment and DC plans  Description: Interventions:  - Provide therapeutic environment  Outcome: Progressing  Goal: Patient/Family verbalizes awareness of resources  Outcome: Progressing  Goal: Understands least restrictive measures  Description: Interventions:  - Utilize least restrictive behavior  Outcome: Progressing  Goal: Free from restraint events  Description: - Utilize least restrictive measures   - Provide behavioral interventions   - Redirect inappropriate behaviors   Outcome: Progressing     Problem: Depression  Goal: Treatment Goal: Demonstrate behavioral control of depressive symptoms, verbalize feelings of improved mood/affect, and adopt new coping skills prior to discharge  Outcome: Progressing  Goal: Verbalize thoughts and feelings  Description: Interventions:  - Assess and re-assess patient's level of risk   - Engage patient in 1:1 interactions, daily, for a minimum of 15 minutes   - Encourage patient to express feelings, fears, frustrations, hopes   Outcome: Progressing  Goal: Refrain from harming self  Description: Interventions:  - Monitor patient closely, per order   - Supervise medication ingestion, monitor effects and side effects   Outcome: Progressing  Goal: Refrain from isolation  Description: Interventions:  - Develop a trusting relationship   - Encourage socialization   Outcome: Progressing  Goal: Refrain from self-neglect  Outcome: Progressing  Goal: Attend and participate in unit activities, including therapeutic, recreational, and educational groups  Description: Interventions:  - Provide therapeutic and educational activities daily, encourage attendance and participation, and document same in the medical record   Outcome: Progressing  Goal: Complete daily ADLs, including personal hygiene independently, as able  Description: Interventions:  - Observe, teach, and assist patient with ADLS  -  Monitor and promote a balance of rest/activity, with adequate nutrition and elimination   Outcome: Progressing     Problem: Anxiety  Goal: Anxiety is at manageable level  Description: Interventions:  - Assess and monitor patient's anxiety level  - Monitor for signs and symptoms (heart palpitations, chest pain, shortness of breath, headaches, nausea, feeling jumpy, restlessness, irritable, apprehensive)  - Collaborate with interdisciplinary team and initiate plan and interventions as ordered  - [place] patient to unit/surroundings  - Explain treatment plan  - Encourage participation in care  - Encourage verbalization of concerns/fears  - Identify coping mechanisms  - Assist in developing anxiety-reducing skills  - Administer/offer alternative therapies  - Limit or eliminate stimulants  Outcome: Progressing     Problem: Nutrition/Hydration-ADULT  Goal: Nutrient/Hydration intake appropriate for improving, restoring or maintaining nutritional needs  Description: Monitor and assess patient's nutrition/hydration status for malnutrition   Collaborate with interdisciplinary team and initiate plan and interventions as ordered  Monitor patient's weight and dietary intake as ordered or per policy  Utilize nutrition screening tool and intervene as necessary  Determine patient's food preferences and provide high-protein, high-caloric foods as appropriate       INTERVENTIONS:  - Monitor oral intake, urinary output, labs, and treatment plans  - Assess nutrition and hydration status and recommend course of action  - Evaluate amount of meals eaten  - Assist patient with eating if necessary   - Allow adequate time for meals  - Recommend/ encourage appropriate diets, oral nutritional supplements, and vitamin/mineral supplements  - Order, calculate, and assess calorie counts as needed  - Recommend, monitor, and adjust tube feedings and TPN/PPN based on assessed needs  - Assess need for intravenous fluids  - Provide specific nutrition/hydration education as appropriate  - Include patient/family/caregiver in decisions related to nutrition  Outcome: Progressing

## 2021-03-24 NOTE — PROGRESS NOTES
Pt was present in the dining room but only ate a bite or two of her food, but drank her ensure  Pt reported she is barely urinating and has no appetite  Pt states that she has been deteriorating for the past year and she doesn't feel she can care for herself without a nursing home  Pt was able to ambulate with the assistance of a walker to the dining room and back with no additional assistance  Pt affect was flat  Pt depressed and sad  Pt went right back to room after breakfast  Pt reports anxiety and depression  Denies hallucinations  Continuous visual safety checks performed throughout the shift  Safety precautions maintained  Will continue to monitor

## 2021-03-24 NOTE — PROGRESS NOTES
Progress Note - Fahad Mckinley 62 y o  female MRN: [id number]    Unit/Bed#: [facility]01 Encounter: [id number]        Subjective:  Patient seen and examined at bedside after reviewing the chart and discussing the case with the caring staff  Patient examined at bedside  Upon initial encounter, patient was using the restroom independently with her walker  Patient reports she is the same   Continues to state she cannot perform her ADLs  States that she has no desire to eat although patient has been witnessed to eat % of her meals  Patient also reports decreased fluid intake and inability to void  Patient has been witnessed drinking fluids and urinating  She denies nausea, vomiting, abdominal pain, diarrhea, constipation, dysuria, hematuria  Physical Exam   Vitals: Blood pressure 131/63, pulse 74, temperature 98 8 °F (37 1 °C), temperature source Temporal, resp  rate 18, height 5' 5" (1 651 m), weight 64 6 kg (142 lb 6 4 oz), SpO2 96 %, not currently breastfeeding  ,Body mass index is 23 7 kg/m²  Constitutional: He appears well-developed  HEENT: PERR, EOMI, MMM  Cardiovascular: Normal rate and regular rhythm  Pulmonary/Chest: Effort normal and breath sounds normal    Abdomen: Soft, + BS, NT    Assessment/Plan:     Fahad Mckinley is a(n) 62y o  year old female with psychotic disorder      1  Cardiac with history of hypertension  Patient will be continued on losartan  2  DJD/osteoarthritis  Patient may get Tylenol on as needed basis  3  Gait abnormality  I will consult PT and OT for further evaluation and treatment  4  Insomnia  Patient is on melatonin  5  Overactive urinary bladder  Patient is on oxybutynin  6  Hyperglycemia/weight loss  We will closely monitor patient's blood sugars by doing Accu-Cheks 2 times daily  Continue Januvia 50 mg daily along with carb controlled diet  Patient's hemoglobin A1c was 5 3  Nutrition is on consult    Patient has been put on Glucerna supplements  7  Nasal congestion/dryness  Continue saline nasal spray every 2 hours as needed  8  Vitamin D deficiency  Continue daily vitamin-D supplements  9  Overgrown toenails  Podiatry is on consult  10  Loose stools  Patient may receive Imodium as needed  11  Anorexia/low appetite  Patient is on Remeron 7 5 mg daily along with Glucerna 3 times daily  12  Athlete's foot  Continue nystatin powder to the feet 3 times daily  13  Gait Dysfunction  MRI of brain from 03/16/2021 is within normal limits  Patient continues to states she cannot perform her ADLs  Physical and occupational therapy are on consult  The patient was discussed with Dr Gertrude Mcbride and he is in agreement with the above note

## 2021-03-24 NOTE — SOCIAL WORK
CM met with PT for PT check in  PT reports her depression is 9/10 and anxiety 8/10  Denies SI/HI/AH/VH  PT feels as though she is not able to do things for herself  Reassurance provided  Much encouragement for self empowerment with PT  PT inquired about outcome of results of assessment, CM reviewed that we should have them within the week  PT reflected she understood

## 2021-03-24 NOTE — PROGRESS NOTES
In bed, low energy  Continues with elevate levels of depression and anxiety  No suicidal ideations or complaints of hallucination  Using walker to ambulate  Affect flat  Complaint with medication  No issues presently  Maintained on q 7 minute checks

## 2021-03-24 NOTE — PROGRESS NOTES
Progress Note - Nilton [age] y o  female MRN: [id number]  Unit/Bed#: [facility] 204-01 Encounter: [id number]    Assessment/Plan   Principal Problem:    MDD (major depressive disorder), recurrent episode ([facility] [address] )  Active Problems:    Essential hypertension    Depression    Other constipation    Absence of bladder continence    History of Asperger's syndrome    Aftercare following right hip joint replacement surgery    Severe protein-calorie malnutrition ([facility] [address] )      Behavior over the last 24 hours:  unchanged  Sleep: normal  Appetite:  Decreased  Medication side effects: No  ROS: no complaints and All other systems negative for acute change     Danny Nye was seen today for follow-up and case discussed with treatment team this morning  Patient was laying in bed upon encounter and states I feel like I am slipping into my own little world  I'm not in good shape  Markus Blackmon continues to be somatically preoccupied in that she is unable to care for herself  However, after encounter, patient was observed getting up out of bed independently with walker and using the bathroom without difficulty  Danny Nye went on to state that she has "no energy or desire to do anything " She believes her depression is getting worse and her anxiety is the same as yesterday    Discussed with patient the possibility of adding an SSRI to her medication regimen in hopes to alleviate depressive symptoms; unsure if she wants medication at this time requested to think about it overnight  She also continues to endorse decreased appetite however, meal completions are between 50 and 75%  Despite increased anxiety and depression, Danny Nye is sleeping rather well and denies any sleep disturbances      Mental Status Evaluation:  Appearance:  disheveled and older than stated age   Behavior:  Calm and cooperative   Speech:  Slow, pauses at times   Mood:  anxious and depressed   Affect:  flat   Thought Process:  circumstantial   Thought Content: delusions  somatic   Perceptual Disturbances: None   Risk Potential: Suicidal Ideations none  Homicidal Ideations none  Potential for Aggression No   Sensorium:  person, place, time/date and situation   Memory:  recent and remote memory grossly intact   Consciousness:  alert and awake    Attention: attention span and concentration were age appropriate   Insight:  limited   Judgment: limited   Gait/Station: slow and Uses walker, ambulation is improving   Motor Activity: no abnormal movements     Progress Toward Goals: Discussed with patient possibility of adding Zoloft medication regimen for increased depression  Patient considering addition of SSRI  At this time, will continue current psychotropic regimen  Awaiting SNF placement  No discharge date at this time  Recommended Treatment: Continue with group therapy, milieu therapy and occupational therapy  Risks, benefits and possible side effects of Medications:   Risks, benefits, and possible side effects of medications explained to patient and patient verbalizes understanding  Medications: all current active meds have been reviewed and continue current psychiatric medications  Labs: I have personally reviewed all pertinent laboratory/tests results  Counseling / Coordination of Care  Total floor / unit time spent today 20 minutes  Greater than 50% of total time was spent with the patient and / or family counseling and / or coordination of care   A description of the counseling / coordination of care:  Medication education, supportive therapy

## 2021-03-24 NOTE — PROGRESS NOTES
03/24/21    Team Meeting   Meeting Type Daily Rounds   Team Members Present   Team Members Present Physician;Nurse;;Occupational Therapist   Physician Team Member Dr Aryan Gay MD; KAM Willoughby   Nursing Team Member Suanne Riedel, RN   Care Management Team Member MS [place], [facility]   OT Team Member [place], [place]   Patient/Family Present   Patient Present No   Patient's Family Present No   High Risk, LOD/PASRR assessment completed yesterday, awaiting letter  Will be for SNF placement  Poor appetite  Excessive thirst yesterday, flat, depression 9 and anxiety 8  Requires much encouragement

## 2021-03-25 LAB
GLUCOSE SERPL-MCNC: 106 MG/DL (ref 65–140)
GLUCOSE SERPL-MCNC: 87 MG/DL (ref 65–140)

## 2021-03-25 PROCEDURE — 82948 REAGENT STRIP/BLOOD GLUCOSE: CPT

## 2021-03-25 PROCEDURE — 99232 SBSQ HOSP IP/OBS MODERATE 35: CPT | Performed by: NURSE PRACTITIONER

## 2021-03-25 RX ORDER — SERTRALINE HYDROCHLORIDE 25 MG/1
25 TABLET, FILM COATED ORAL DAILY
Status: DISCONTINUED | OUTPATIENT
Start: 2021-03-26 | End: 2021-03-28

## 2021-03-25 RX ADMIN — SITAGLIPTIN 50 MG: 25 TABLET, FILM COATED ORAL at 08:18

## 2021-03-25 RX ADMIN — OLANZAPINE 5 MG: 5 TABLET, FILM COATED ORAL at 21:55

## 2021-03-25 RX ADMIN — NYSTATIN: 100000 POWDER TOPICAL at 08:21

## 2021-03-25 RX ADMIN — OXYBUTYNIN 5 MG: 5 TABLET, FILM COATED, EXTENDED RELEASE ORAL at 08:17

## 2021-03-25 RX ADMIN — MIRTAZAPINE 15 MG: 15 TABLET, FILM COATED ORAL at 21:55

## 2021-03-25 RX ADMIN — LOSARTAN POTASSIUM 50 MG: 50 TABLET, FILM COATED ORAL at 08:18

## 2021-03-25 RX ADMIN — TRAZODONE HYDROCHLORIDE 100 MG: 50 TABLET ORAL at 21:55

## 2021-03-25 RX ADMIN — MELATONIN 6 MG: at 21:55

## 2021-03-25 RX ADMIN — Medication 1000 UNITS: at 08:18

## 2021-03-25 RX ADMIN — AMANTADINE 100 MG: 100 CAPSULE ORAL at 18:03

## 2021-03-25 RX ADMIN — AMANTADINE 100 MG: 100 CAPSULE ORAL at 08:18

## 2021-03-25 RX ADMIN — NYSTATIN: 100000 POWDER TOPICAL at 16:02

## 2021-03-25 NOTE — PROGRESS NOTES
Patient visible on the unit  Withdrawn to self  Complaint with meals and medications  Flat and depressed  Denies SI, HI, and hallucinations  Anxiety and depression 7/10   Doing more for herself today with little prompting  Will continue to monitor and access  Q 7 minute checks maintained

## 2021-03-25 NOTE — PROGRESS NOTES
03/25/21 0941   Team Meeting   Meeting Type Daily Rounds   Team Members Present   Team Members Present Physician;Nurse;Occupational Therapist;   Physician Team Member Dr Waylon Hyde MD; KAM Abbott   Nursing Team Member Kaleigh Vences RN   Social Work Team Member Heena Luna [facility]   OT Team Member Beatrice Olvera [place]   Patient/Family Present   Patient Present No   Patient's Family Present No     Being picked up by CM [place]; LOC completed Tuesday via telephone - awaiting letters for SNF placement; flat, negative, somatic; anx/dep "there"; c/o decreased appetite - "can't swallow"; selectively social; slept overnight

## 2021-03-25 NOTE — PROGRESS NOTES
Progress Note - Nilton [age] y o  female MRN: [id number]  Unit/Bed#: [facility] Encounter: [id number]    Assessment/Plan   Principal Problem:    MDD (major depressive disorder), recurrent episode ([facility] [address] )  Active Problems:    Essential hypertension    Depression    Other constipation    Absence of bladder continence    History of Asperger's syndrome    Aftercare following right hip joint replacement surgery    Severe protein-calorie malnutrition ([facility] [address] )      Behavior over the last 24 hours:  unchanged  Sleep: normal  Appetite:  Improving  Medication side effects: No  ROS: no complaints and All other systems negative for acute change     Lowell Bearden was seen today for follow-up and case discussed with treatment team this morning  She describes her anxiety and depression as getting worse and rates both as an 8/10  She continues to be somatically preoccupied and states that she can not swallow and is unable to care for herself  Her appetite has somewhat improved with meal completions ranging from %  Lowell Bearden denies any AVH, but states well, I think I have fleeting auditory hallucinations, but I cannot tell if it is my own voice or something demonic    Denies any SI/HI  She describes her sleep as okay and remains compliant with medication regimen  Lowell Bearden is in agreement to initiate Zoloft for depression and anxiety  It can't hurt, I would like to try it      Mental Status Evaluation:  Appearance:  disheveled and Resting in bed   Behavior:  Calm and cooperative   Speech:  normal pitch and normal volume   Mood:  anxious and depressed   Affect:  flat   Thought Process:  circumstantial   Thought Content:  delusions  somatic   Perceptual Disturbances: Intermittent AH   Risk Potential: Suicidal Ideations none  Homicidal Ideations none  Potential for Aggression No   Sensorium:  person, place, time/date and situation   Memory:  recent and remote memory grossly intact   Consciousness:  alert and awake    Attention: attention span and concentration were age appropriate   Insight:  limited   Judgment: limited   Gait/Station: slow, uses walker   Motor Activity: no abnormal movements     Progress Toward Goals:  Patient in agreement to start SSRI, Zoloft, for anxiety and depression  Otherwise, continue current psychotropic regimen  Awaiting letters for SNF placement  No discharge date at this time  Recommended Treatment: Continue with group therapy, milieu therapy and occupational therapy  Risks, benefits and possible side effects of Medications:   Risks, benefits, and possible side effects of medications explained to patient and patient verbalizes understanding  Medications: all current active meds have been reviewed and Add Zoloft 25mg PO QD  Labs: I have personally reviewed all pertinent laboratory/tests results  Counseling / Coordination of Care  Total floor / unit time spent today 20 minutes  Greater than 50% of total time was spent with the patient and / or family counseling and / or coordination of care   A description of the counseling / coordination of care:  Medication education

## 2021-03-25 NOTE — PROGRESS NOTES
Patient visible in milieu, pleasant and cooperative in interaction  Social with staff and select peers  Endorses anxiety and depression, unable to provide number of severity, only stated "It's there"  Affect flat  Denied SI/HI, hallucinations  Attends group as scheduled  No glycemic reactions  Remains medication compliant and on 7" checks for safety and behaviors

## 2021-03-25 NOTE — PLAN OF CARE
Problem: Alteration in Thoughts and Perception  Goal: Treatment Goal: Gain control of psychotic behaviors/thinking, reduce/eliminate presenting symptoms and demonstrate improved reality functioning upon discharge  Outcome: Progressing  Goal: Verbalize thoughts and feelings  Description: Interventions:  - Promote a nonjudgmental and trusting relationship with the patient through active listening and therapeutic communication  - Assess patient's level of functioning, behavior and potential for risk  - Engage patient in 1 on 1 interactions  - Encourage patient to express fears, feelings, frustrations, and discuss symptoms    - [place] patient to reality, help patient recognize reality-based thinking   - Administer medications as ordered and assess for potential side effects  - Provide the patient education related to the signs and symptoms of the illness and desired effects of prescribed medications  Outcome: Progressing  Goal: Refrain from acting on delusional thinking/internal stimuli  Description: Interventions:  - Monitor patient closely, per order   - Utilize least restrictive measures   - Set reasonable limits, give positive feedback for acceptable   - Administer medications as ordered and monitor of potential side effects  Outcome: Progressing  Goal: Agree to be compliant with medication regime, as prescribed and report medication side effects  Description: Interventions:  - Offer appropriate PRN medication and supervise ingestion; conduct AIMS, as needed   Outcome: Progressing  Goal: Attend and participate in unit activities, including therapeutic, recreational, and educational groups  Description: Interventions:  -Encourage Visitation and family involvement in care  Outcome: Progressing  Goal: Recognize dysfunctional thoughts, communicate reality-based thoughts at the time of discharge  Description: Interventions:  - Provide medication and psycho-education to assist patient in compliance and developing insight into his/her illness   Outcome: Progressing  Goal: Complete daily ADLs, including personal hygiene independently, as able  Description: Interventions:  - Observe, teach, and assist patient with ADLS  - Monitor and promote a balance of rest/activity, with adequate nutrition and elimination   Outcome: Progressing     Problem: Ineffective Coping  Goal: Cooperates with admission process  Description: Interventions:   - Complete admission process  Outcome: Progressing  Goal: Identifies ineffective coping skills  Outcome: Progressing  Goal: Identifies healthy coping skills  Outcome: Progressing  Goal: Demonstrates healthy coping skills  Outcome: Progressing  Goal: Participates in unit activities  Description: Interventions:  - Provide therapeutic environment   - Provide required programming   - Redirect inappropriate behaviors   Outcome: Progressing  Goal: Patient/Family participate in treatment and DC plans  Description: Interventions:  - Provide therapeutic environment  Outcome: Progressing  Goal: Patient/Family verbalizes awareness of resources  Outcome: Progressing  Goal: Understands least restrictive measures  Description: Interventions:  - Utilize least restrictive behavior  Outcome: Progressing  Goal: Free from restraint events  Description: - Utilize least restrictive measures   - Provide behavioral interventions   - Redirect inappropriate behaviors   Outcome: Progressing     Problem: Depression  Goal: Treatment Goal: Demonstrate behavioral control of depressive symptoms, verbalize feelings of improved mood/affect, and adopt new coping skills prior to discharge  Outcome: Progressing  Goal: Verbalize thoughts and feelings  Description: Interventions:  - Assess and re-assess patient's level of risk   - Engage patient in 1:1 interactions, daily, for a minimum of 15 minutes   - Encourage patient to express feelings, fears, frustrations, hopes   Outcome: Progressing  Goal: Refrain from harming self  Description: Interventions:  - Monitor patient closely, per order   - Supervise medication ingestion, monitor effects and side effects   Outcome: Progressing  Goal: Refrain from isolation  Description: Interventions:  - Develop a trusting relationship   - Encourage socialization   Outcome: Progressing  Goal: Refrain from self-neglect  Outcome: Progressing  Goal: Attend and participate in unit activities, including therapeutic, recreational, and educational groups  Description: Interventions:  - Provide therapeutic and educational activities daily, encourage attendance and participation, and document same in the medical record   Outcome: Progressing  Goal: Complete daily ADLs, including personal hygiene independently, as able  Description: Interventions:  - Observe, teach, and assist patient with ADLS  -  Monitor and promote a balance of rest/activity, with adequate nutrition and elimination   Outcome: Progressing     Problem: Anxiety  Goal: Anxiety is at manageable level  Description: Interventions:  - Assess and monitor patient's anxiety level  - Monitor for signs and symptoms (heart palpitations, chest pain, shortness of breath, headaches, nausea, feeling jumpy, restlessness, irritable, apprehensive)  - Collaborate with interdisciplinary team and initiate plan and interventions as ordered  - [place] patient to unit/surroundings  - Explain treatment plan  - Encourage participation in care  - Encourage verbalization of concerns/fears  - Identify coping mechanisms  - Assist in developing anxiety-reducing skills  - Administer/offer alternative therapies  - Limit or eliminate stimulants  Outcome: Progressing     Problem: Nutrition/Hydration-ADULT  Goal: Nutrient/Hydration intake appropriate for improving, restoring or maintaining nutritional needs  Description: Monitor and assess patient's nutrition/hydration status for malnutrition   Collaborate with interdisciplinary team and initiate plan and interventions as ordered  Monitor patient's weight and dietary intake as ordered or per policy  Utilize nutrition screening tool and intervene as necessary  Determine patient's food preferences and provide high-protein, high-caloric foods as appropriate       INTERVENTIONS:  - Monitor oral intake, urinary output, labs, and treatment plans  - Assess nutrition and hydration status and recommend course of action  - Evaluate amount of meals eaten  - Assist patient with eating if necessary   - Allow adequate time for meals  - Recommend/ encourage appropriate diets, oral nutritional supplements, and vitamin/mineral supplements  - Order, calculate, and assess calorie counts as needed  - Recommend, monitor, and adjust tube feedings and TPN/PPN based on assessed needs  - Assess need for intravenous fluids  - Provide specific nutrition/hydration education as appropriate  - Include patient/family/caregiver in decisions related to nutrition  Outcome: Progressing

## 2021-03-25 NOTE — PROGRESS NOTES
Patient in room for majority of shift  Ambulates OOB with walker for meals and snacks  Compliant with meds  Does report anxiety and depression, does not rate their severity  Affect remains flat  Denies SI and HI  No s/s of hyper/hypo glycemia  Q 7 minute checks maintained  Will continue to monitor

## 2021-03-25 NOTE — PROGRESS NOTES
Progress Note - Elizabeth Sen 62 y o  female MRN: [id number]    Unit/Bed#: [facility] 204-01 Encounter: [id number]        Subjective:  Patient seen and examined at bedside after reviewing the chart and discussing the case with the caring staff  Patient examined at bedside  Continues to state she cannot perform her ADLs  States that she has no desire to eat although patient has been witnessed to eat % of her meals  Patient also reports decreased fluid intake and inability to void  Patient has been witnessed drinking fluids and urinating  She denies nausea, vomiting, abdominal pain, diarrhea, constipation, dysuria, hematuria  Physical Exam   Vitals: Blood pressure 141/73, pulse 76, temperature 97 6 °F (36 4 °C), temperature source Temporal, resp  rate 18, height 5' 5" (1 651 m), weight 64 6 kg (142 lb 6 4 oz), SpO2 97 %, not currently breastfeeding  ,Body mass index is 23 7 kg/m²  Constitutional: He appears well-developed  HEENT: PERR, EOMI, MMM  Cardiovascular: Normal rate and regular rhythm  Pulmonary/Chest: Effort normal and breath sounds normal    Abdomen: Soft, + BS, NT    Assessment/Plan:     Elizabeth Sen is a(n) 62y o  year old female with psychotic disorder      1  Cardiac with history of hypertension  Patient will be continued on losartan  2  DJD/osteoarthritis  Patient may get Tylenol on as needed basis  3  Gait abnormality  I will consult PT and OT for further evaluation and treatment  4  Insomnia  Patient is on melatonin  5  Overactive urinary bladder  Patient is on oxybutynin  6  Hyperglycemia/weight loss  We will closely monitor patient's blood sugars by doing Accu-Cheks 2 times daily  Continue Januvia 50 mg daily along with carb controlled diet  Patient's hemoglobin A1c was 5 3  Nutrition is on consult  Patient has been put on Glucerna supplements  7  Nasal congestion/dryness  Continue saline nasal spray every 2 hours as needed  8  Vitamin D deficiency    Continue daily vitamin-D supplements  9  Overgrown toenails  Podiatry is on consult  10  Loose stools  Patient may receive Imodium as needed  11  Anorexia/low appetite  Patient is on Remeron 7 5 mg daily along with Glucerna 3 times daily  I will order PVR for today due to patient's reported decreased voiding  12  Athlete's foot  Continue nystatin powder to the feet 3 times daily  13  Gait Dysfunction  MRI of brain from 03/16/2021 is within normal limits  Patient continues to states she cannot perform her ADLs  Physical and occupational therapy are on consult  The patient was discussed with Dr Florecita Ceja and he is in agreement with the above note

## 2021-03-26 LAB
GLUCOSE SERPL-MCNC: 104 MG/DL (ref 65–140)
GLUCOSE SERPL-MCNC: 72 MG/DL (ref 65–140)

## 2021-03-26 PROCEDURE — 82948 REAGENT STRIP/BLOOD GLUCOSE: CPT

## 2021-03-26 PROCEDURE — 99232 SBSQ HOSP IP/OBS MODERATE 35: CPT | Performed by: NURSE PRACTITIONER

## 2021-03-26 RX ADMIN — SITAGLIPTIN 50 MG: 25 TABLET, FILM COATED ORAL at 08:35

## 2021-03-26 RX ADMIN — LOSARTAN POTASSIUM 50 MG: 50 TABLET, FILM COATED ORAL at 08:35

## 2021-03-26 RX ADMIN — NYSTATIN: 100000 POWDER TOPICAL at 08:35

## 2021-03-26 RX ADMIN — AMANTADINE 100 MG: 100 CAPSULE ORAL at 18:25

## 2021-03-26 RX ADMIN — TRAZODONE HYDROCHLORIDE 100 MG: 50 TABLET ORAL at 21:11

## 2021-03-26 RX ADMIN — SERTRALINE HYDROCHLORIDE 25 MG: 25 TABLET ORAL at 08:35

## 2021-03-26 RX ADMIN — Medication 1000 UNITS: at 08:35

## 2021-03-26 RX ADMIN — OXYBUTYNIN 5 MG: 5 TABLET, FILM COATED, EXTENDED RELEASE ORAL at 08:35

## 2021-03-26 RX ADMIN — MIRTAZAPINE 15 MG: 15 TABLET, FILM COATED ORAL at 21:12

## 2021-03-26 RX ADMIN — NYSTATIN: 100000 POWDER TOPICAL at 16:19

## 2021-03-26 RX ADMIN — MELATONIN 6 MG: at 21:12

## 2021-03-26 RX ADMIN — OLANZAPINE 7.5 MG: 2.5 TABLET, FILM COATED ORAL at 21:12

## 2021-03-26 RX ADMIN — AMANTADINE 100 MG: 100 CAPSULE ORAL at 08:35

## 2021-03-26 NOTE — PROGRESS NOTES
Progress Note - Yakov Lorenzo 62 y o  female MRN: [id number]    Unit/Bed#: [facility]-01 Encounter: [id number]        Subjective:  Patient seen and examined at bedside after reviewing the chart and discussing the case with the caring staff  Patient examined at bedside  Continues to state she cannot perform her ADLs  States that she has no desire to eat although patient has been witnessed to eat % of her meals  Patient also reports decreased fluid intake and inability to void  PVR from 03/25/2021 was 154 mL  Patient has been witnessed drinking fluids and urinating  She denies nausea, vomiting, abdominal pain, diarrhea, constipation, dysuria, hematuria  Physical Exam   Vitals: Blood pressure 131/69, pulse 75, temperature 99 °F (37 2 °C), temperature source Temporal, resp  rate 18, height 5' 5" (1 651 m), weight 63 5 kg (139 lb 15 9 oz), SpO2 97 %, not currently breastfeeding  ,Body mass index is 23 3 kg/m²  Constitutional: He appears well-developed  HEENT: PERR, EOMI, MMM  Cardiovascular: Normal rate and regular rhythm  Pulmonary/Chest: Effort normal and breath sounds normal    Abdomen: Soft, + BS, NT    Assessment/Plan:     Yakov Lorenzo is a(n) 62y o  year old female with psychotic disorder      1  Cardiac with history of hypertension  Patient will be continued on losartan  2  DJD/osteoarthritis  Patient may get Tylenol on as needed basis  3  Gait abnormality  I will consult PT and OT for further evaluation and treatment  4  Insomnia  Patient is on melatonin  5  Overactive urinary bladder  Patient is on oxybutynin  6  Hyperglycemia/weight loss  We will closely monitor patient's blood sugars by doing Accu-Cheks 2 times daily  Continue Januvia 50 mg daily along with carb controlled diet  Patient's hemoglobin A1c was 5 3  Nutrition is on consult  Patient has been put on Glucerna supplements  7  Nasal congestion/dryness  Continue saline nasal spray every 2 hours as needed    8  Vitamin D deficiency  Continue daily vitamin-D supplements  9  Overgrown toenails  Podiatry is on consult  10  Loose stools  Patient may receive Imodium as needed  11  Anorexia/low appetite  Patient is on Remeron 7 5 mg daily along with Glucerna 3 times daily  Encouraged patient to continue eating and drinking as much as she can  12  Athlete's foot  Continue nystatin powder to the feet 3 times daily  13  Gait Dysfunction  MRI of brain from 03/16/2021 is within normal limits  Patient continues to states she cannot perform her ADLs  Physical and occupational therapy are on consult  The patient was discussed with Dr Shawn Cabezas and he is in agreement with the above note

## 2021-03-26 NOTE — PLAN OF CARE
Problem: DISCHARGE PLANNING  Goal: Discharge to home or other facility with appropriate resources  Description: INTERVENTIONS:  - Identify barriers to discharge w/patient and caregiver  - Arrange for needed discharge resources and transportation as appropriate  - Identify discharge learning needs (meds, wound care, etc )  - Arrange for interpretive services to assist at discharge as needed  - Refer to Case Management Department for coordinating discharge planning if the patient needs post-hospital services based on physician/advanced practitioner order or complex needs related to functional status, cognitive ability, or social support system  Outcome: Progressing     SNF referrals made via [employer]

## 2021-03-26 NOTE — PROGRESS NOTES
Progress Note - Nilton [age] y o  female MRN: [id number]  Unit/Bed#: [facility] 204-01 Encounter: [id number]    Assessment/Plan   Principal Problem:    MDD (major depressive disorder), recurrent episode ([facility] [address] )  Active Problems:    Essential hypertension    Depression    Other constipation    Absence of bladder continence    History of Asperger's syndrome    Aftercare following right hip joint replacement surgery    Severe protein-calorie malnutrition ([facility] [address] )      Behavior over the last 24 hours:  unchanged  Sleep: normal  Appetite:  Fair  Medication side effects: No  ROS: no complaints and All other systems negative for acute change    Orlando Davis was seen today for follow-up and case discussed with treatment team this morning  She continues to report I have no appetite and I think something is telling me not to eat    She denies any AVH, but was religiously and somatically preoccupied throughout encounter  She went on to state when I die I know I won't go to heaven   She further elaborated that she spoke to [facility] and he said "I don't want you " She continues to verbalize that she is unable to take care of herself and that she is "too far gone" for nursing home placement  She denied any SI/HI but then stated I don't see any reason to live because God doesn't want me and I can't eat or take care of myself    Support provided to patient and redirection was effective  Orlando Davis does report that she is sleeping well throughout the night and her appetite remains poor  However, staff documents that she is eating approximately 75% of her meals  She continues to rate her anxiety and depression as 8-10; started Zoloft today  Mental Status Evaluation:  Appearance:  disheveled and older than stated age   Behavior:  Calm and cooperative   Speech:  normal pitch, normal volume and Pauses at times   Mood:  "Could be better     Affect:  flat   Thought Process:  circumstantial   Thought Content:  delusions  somatic, Voodoo preoccupation   Perceptual Disturbances: None   Risk Potential: Suicidal Ideations none  Homicidal Ideations none  Potential for Aggression No   Sensorium:  person, place, time/date and situation   Memory:  recent and remote memory grossly intact   Consciousness:  alert and awake    Attention: attention span and concentration were age appropriate   Insight:  limited   Judgment: limited   Gait/Station: Slow, uses walker   Motor Activity: no abnormal movements     Progress Toward Goals: Will increase HS dose of Zyprexa to 7 5 mg for delusional content  Zoloft added to medication regimen this morning; patient tolerating well  Patient will be for SNF placement  No discharge date at this time    Recommended Treatment: Continue with group therapy, milieu therapy and occupational therapy  Risks, benefits and possible side effects of Medications:   Risks, benefits, and possible side effects of medications explained to patient and patient verbalizes understanding  Medications: all current active meds have been reviewed and planned medication changes: Increase Zyprexa 7 5mg PO QHS  Labs: I have personally reviewed all pertinent laboratory/tests results  Counseling / Coordination of Care  Total floor / unit time spent today 20 minutes  Greater than 50% of total time was spent with the patient and / or family counseling and / or coordination of care

## 2021-03-26 NOTE — SOCIAL WORK
CM met and spoke with patient to inform her that SNF referrals have been made for her to seek out subacute rehab  Patient reported agreement with this and stated she'd prefer to stay close to home if possible  CM informed her it would be attempted but cannot confirm she'll be as close to home as she may prefer  Patient expressed understanding  CM will continue to follow patient's progress and assist with discharge planning needs

## 2021-03-26 NOTE — PROGRESS NOTES
03/26/21 0933   Team Meeting   Meeting Type Daily Rounds   Team Members Present   Team Members Present Nurse;Physician;; Occupational Therapist   [facility]   Nursing Team Member Ino Davis RN   Social Work Team Member Noemi Rivero [facility]   OT Team Member Favian Clark [place]   Patient/Family Present   Patient Present No   Patient's Family Present No     DC ASAP - awaiting SNF placement - referrals to be sent - letter received 3/25; Pleasant, cooperative; flat, depressed; social with select peers - attends groups; ambulates with walker; less somatic; med compliant; slept

## 2021-03-26 NOTE — NURSING NOTE
Patient observed in the community for meals  Pt ambulates by self with walker  Pt is pleasant and cooperative  Medication compliant  Pt ate 75% of meal  Requested excessive beverages  Patient needs much encouragement for ADL's  States she has constant anxiety and depression  Denies SI/HI and hallucinations  Will CTM  Q7 minute checks in progress

## 2021-03-26 NOTE — MEDICAL HIGH UTILIZER
HIGH RISK PATIENT    PURPOSE FOR MEETING:    Patient recently has been added to high risk after being on unit for a month  Patient is flat and blunted; when interacting patient has hard time maintaining eye contact and shows little to no emotion  She feels she is unable to care for herself anymore and is wanting LTC  CM in process of finding patient LTC facility that will accept  Patient completed DERS with RT as well as shared that she does not feel emotionally stable and at times feels she is limited in her decision making  Patient has coping skills but claims she does not get much enjoyment or help from them anymore do to physical limitations  Patient's coping skills are/were puzzles, music (classical), knitting, crocheting, needle point, latch hook and she was very close with her family  She lives with her mother who now has dementia which is a trigger for her; the loss of her father was very hard on her and the thought of losing her mother is something she doesn't want to even think about  She is guarded and will say that she doesn't really know and answer verses giving one  Patient is given a lot of encouragement to continue with doing things for herself and RT will praise her for things she is accomplishing independently  At times with peers and staff she will laugh and smile

## 2021-03-26 NOTE — PLAN OF CARE
Problem: Alteration in Thoughts and Perception  Goal: Treatment Goal: Gain control of psychotic behaviors/thinking, reduce/eliminate presenting symptoms and demonstrate improved reality functioning upon discharge  Outcome: Progressing  Goal: Verbalize thoughts and feelings  Description: Interventions:  - Promote a nonjudgmental and trusting relationship with the patient through active listening and therapeutic communication  - Assess patient's level of functioning, behavior and potential for risk  - Engage patient in 1 on 1 interactions  - Encourage patient to express fears, feelings, frustrations, and discuss symptoms    - [place] patient to reality, help patient recognize reality-based thinking   - Administer medications as ordered and assess for potential side effects  - Provide the patient education related to the signs and symptoms of the illness and desired effects of prescribed medications  Outcome: Progressing  Goal: Refrain from acting on delusional thinking/internal stimuli  Description: Interventions:  - Monitor patient closely, per order   - Utilize least restrictive measures   - Set reasonable limits, give positive feedback for acceptable   - Administer medications as ordered and monitor of potential side effects  Outcome: Progressing  Goal: Agree to be compliant with medication regime, as prescribed and report medication side effects  Description: Interventions:  - Offer appropriate PRN medication and supervise ingestion; conduct AIMS, as needed   Outcome: Progressing  Goal: Attend and participate in unit activities, including therapeutic, recreational, and educational groups  Description: Interventions:  -Encourage Visitation and family involvement in care  Outcome: Progressing  Goal: Recognize dysfunctional thoughts, communicate reality-based thoughts at the time of discharge  Description: Interventions:  - Provide medication and psycho-education to assist patient in compliance and developing insight into his/her illness   Outcome: Progressing  Goal: Complete daily ADLs, including personal hygiene independently, as able  Description: Interventions:  - Observe, teach, and assist patient with ADLS  - Monitor and promote a balance of rest/activity, with adequate nutrition and elimination   Outcome: Progressing     Problem: Ineffective Coping  Goal: Cooperates with admission process  Description: Interventions:   - Complete admission process  Outcome: Progressing  Goal: Identifies ineffective coping skills  Outcome: Progressing  Goal: Identifies healthy coping skills  Outcome: Progressing  Goal: Demonstrates healthy coping skills  Outcome: Progressing  Goal: Participates in unit activities  Description: Interventions:  - Provide therapeutic environment   - Provide required programming   - Redirect inappropriate behaviors   Outcome: Progressing  Goal: Patient/Family participate in treatment and DC plans  Description: Interventions:  - Provide therapeutic environment  Outcome: Progressing  Goal: Patient/Family verbalizes awareness of resources  Outcome: Progressing  Goal: Understands least restrictive measures  Description: Interventions:  - Utilize least restrictive behavior  Outcome: Progressing  Goal: Free from restraint events  Description: - Utilize least restrictive measures   - Provide behavioral interventions   - Redirect inappropriate behaviors   Outcome: Progressing     Problem: Depression  Goal: Treatment Goal: Demonstrate behavioral control of depressive symptoms, verbalize feelings of improved mood/affect, and adopt new coping skills prior to discharge  Outcome: Progressing  Goal: Verbalize thoughts and feelings  Description: Interventions:  - Assess and re-assess patient's level of risk   - Engage patient in 1:1 interactions, daily, for a minimum of 15 minutes   - Encourage patient to express feelings, fears, frustrations, hopes   Outcome: Progressing  Goal: Refrain from harming self  Description: Interventions:  - Monitor patient closely, per order   - Supervise medication ingestion, monitor effects and side effects   Outcome: Progressing  Goal: Refrain from isolation  Description: Interventions:  - Develop a trusting relationship   - Encourage socialization   Outcome: Progressing  Goal: Refrain from self-neglect  Outcome: Progressing  Goal: Attend and participate in unit activities, including therapeutic, recreational, and educational groups  Description: Interventions:  - Provide therapeutic and educational activities daily, encourage attendance and participation, and document same in the medical record   Outcome: Progressing  Goal: Complete daily ADLs, including personal hygiene independently, as able  Description: Interventions:  - Observe, teach, and assist patient with ADLS  -  Monitor and promote a balance of rest/activity, with adequate nutrition and elimination   Outcome: Progressing     Problem: Anxiety  Goal: Anxiety is at manageable level  Description: Interventions:  - Assess and monitor patient's anxiety level  - Monitor for signs and symptoms (heart palpitations, chest pain, shortness of breath, headaches, nausea, feeling jumpy, restlessness, irritable, apprehensive)  - Collaborate with interdisciplinary team and initiate plan and interventions as ordered  - [place] patient to unit/surroundings  - Explain treatment plan  - Encourage participation in care  - Encourage verbalization of concerns/fears  - Identify coping mechanisms  - Assist in developing anxiety-reducing skills  - Administer/offer alternative therapies  - Limit or eliminate stimulants  Outcome: Progressing     Problem: Nutrition/Hydration-ADULT  Goal: Nutrient/Hydration intake appropriate for improving, restoring or maintaining nutritional needs  Description: Monitor and assess patient's nutrition/hydration status for malnutrition   Collaborate with interdisciplinary team and initiate plan and interventions as ordered  Monitor patient's weight and dietary intake as ordered or per policy  Utilize nutrition screening tool and intervene as necessary  Determine patient's food preferences and provide high-protein, high-caloric foods as appropriate       INTERVENTIONS:  - Monitor oral intake, urinary output, labs, and treatment plans  - Assess nutrition and hydration status and recommend course of action  - Evaluate amount of meals eaten  - Assist patient with eating if necessary   - Allow adequate time for meals  - Recommend/ encourage appropriate diets, oral nutritional supplements, and vitamin/mineral supplements  - Order, calculate, and assess calorie counts as needed  - Recommend, monitor, and adjust tube feedings and TPN/PPN based on assessed needs  - Assess need for intravenous fluids  - Provide specific nutrition/hydration education as appropriate  - Include patient/family/caregiver in decisions related to nutrition  Outcome: Progressing     Problem: Prexisting or High Potential for Compromised Skin Integrity  Goal: Skin integrity is maintained or improved  Description: INTERVENTIONS:  - Identify patients at risk for skin breakdown  - Assess and monitor skin integrity  - Assess and monitor nutrition and hydration status  - Monitor labs   - Assess for incontinence   - Turn and reposition patient  - Assist with mobility/ambulation  - Relieve pressure over bony prominences  - Avoid friction and shearing  - Provide appropriate hygiene as needed including keeping skin clean and dry  - Evaluate need for skin moisturizer/barrier cream  - Collaborate with interdisciplinary team   - Patient/family teaching  - Consider wound care consult   Outcome: Progressing

## 2021-03-26 NOTE — PROGRESS NOTES
Quiet and resting well overnight  No noted suicidal ideations or homicidal behaviors  Fluids at bedside to promote hydration  No aspiration risks noted  Observed sleeping during q 7 minute safety checks  No observable distress or changes in medical condition  No complaints of pain  Will continue to monitor

## 2021-03-26 NOTE — PROGRESS NOTES
Patient in room for majority of shift,  OOB for dinner and quick return back to room  Compliant with meds and meals  Flat affect remains  Denies SI and HI  States anxious and depressed but will not rate on scale  Q 7 minute checks maintained  Will continue to monitor

## 2021-03-26 NOTE — SOCIAL WORK
CM received LOD determination letter and MA-51 stating patient is NFCE  CM uploaded the determination letter, MA-51 and PASRR to [facility] and sent SNF referrals to 23 local SNFs within 15 miles of patient's home  CM informed the facilities in the referral that patient is medically stable for discharge as soon as a bed has become available to her  CM will continue to follow patient's progress and assist with discharge planning needs

## 2021-03-27 LAB
GLUCOSE SERPL-MCNC: 102 MG/DL (ref 65–140)
GLUCOSE SERPL-MCNC: 88 MG/DL (ref 65–140)

## 2021-03-27 PROCEDURE — 82948 REAGENT STRIP/BLOOD GLUCOSE: CPT

## 2021-03-27 PROCEDURE — 99232 SBSQ HOSP IP/OBS MODERATE 35: CPT | Performed by: NURSE PRACTITIONER

## 2021-03-27 RX ADMIN — OLANZAPINE 7.5 MG: 2.5 TABLET, FILM COATED ORAL at 21:41

## 2021-03-27 RX ADMIN — SITAGLIPTIN 50 MG: 25 TABLET, FILM COATED ORAL at 08:39

## 2021-03-27 RX ADMIN — MELATONIN 6 MG: at 21:41

## 2021-03-27 RX ADMIN — LOSARTAN POTASSIUM 50 MG: 50 TABLET, FILM COATED ORAL at 08:39

## 2021-03-27 RX ADMIN — OXYBUTYNIN 5 MG: 5 TABLET, FILM COATED, EXTENDED RELEASE ORAL at 08:39

## 2021-03-27 RX ADMIN — Medication 1000 UNITS: at 08:39

## 2021-03-27 RX ADMIN — NYSTATIN: 100000 POWDER TOPICAL at 08:39

## 2021-03-27 RX ADMIN — SERTRALINE HYDROCHLORIDE 25 MG: 25 TABLET ORAL at 08:39

## 2021-03-27 RX ADMIN — AMANTADINE 100 MG: 100 CAPSULE ORAL at 17:29

## 2021-03-27 RX ADMIN — AMANTADINE 100 MG: 100 CAPSULE ORAL at 08:39

## 2021-03-27 RX ADMIN — NYSTATIN 1 APPLICATION: 100000 POWDER TOPICAL at 21:48

## 2021-03-27 RX ADMIN — TRAZODONE HYDROCHLORIDE 100 MG: 50 TABLET ORAL at 21:40

## 2021-03-27 RX ADMIN — MIRTAZAPINE 15 MG: 15 TABLET, FILM COATED ORAL at 21:41

## 2021-03-27 RX ADMIN — NYSTATIN: 100000 POWDER TOPICAL at 17:29

## 2021-03-27 NOTE — PROGRESS NOTES
Progress Note - [address] 62 y o  female MRN: [id number]   Unit/Bed#: [facility] 204-01 Encounter: [id number]    Behavior over the last 24 hours: isidro Trevizo continues to present very negative, hopeless, helpless  Continues religiously preoccupied with excessive guilt  Initially states she feels no better than she did on day of admission  However, does admit hallucinations are almost completely gone  She was started on Zoloft 25 mg this morning for depression  Denies any suicidal thoughts but passive death wish is noted  Isolative to her room, minimal participation, poorly motivated  Jack Borrow to the room  Sleep: slept off and on  Appetite: fair  Medication side effects: No   ROS: all other systems are negative    Mental Status Evaluation:    Appearance:  disheveled   Behavior:  guarded   Speech:  slow, soft   Mood:  depressed   Affect:  flat   Thought Process:  negative thinking   Associations: perseverative   Thought Content:  ruminating thoughts   Perceptual Disturbances: auditory hallucinations still present, but less frequent   Risk Potential: Suicidal ideation - None  Homicidal ideation - None  Potential for aggression - No   Sensorium:  oriented to person, place and time/date   Memory:  recent memory mildly impaired   Consciousness:  alert and awake   Attention: decreased concentration and decreased attention span   Insight:  limited   Judgment: poor   Gait/Station: uses walker   Motor Activity: no abnormal movements     Vital signs in last 24 hours:    Temp:  [98 5 °F (36 9 °C)-99 2 °F (37 3 °C)] 98 5 °F (36 9 °C)  HR:  [75-88] 75  Resp:  [16-18] 16  BP: (121-142)/(58-81) 142/81    Laboratory results: I have personally reviewed all pertinent laboratory/tests results      Suicide/Homicide Risk Assessment:    Risk of Harm to Self:   Current Specific Risk Factors include: current depressive symptoms  Protective Factors: no current suicidal plan or intent, ability to communicate with staff on the unit, able to contract for safety on the unit, taking medications as ordered on the unit  Based on today's assessment, Jelena Gr presents the following risk of harm to self: low    Risk of Harm to Others:  Current Specific Risk Factors include: none  Based on today's assessment, Jelena Gr presents the following risk of harm to others: none    The following interventions are recommended: behavioral checks every 7 minutes, continued hospitalization on locked unit     Progress Toward Goals: no significant improvement    Assessment/Plan   Principal Problem:    MDD (major depressive disorder), recurrent episode ([facility] )  Active Problems:    Essential hypertension    Depression    Other constipation    Absence of bladder continence    History of Asperger's syndrome    Aftercare following right hip joint replacement surgery    Severe protein-calorie malnutrition ([facility] )    Recommended Treatment:     Planned medication and treatment changes:     All current active medications have been reviewed  Encourage group therapy, milieu therapy and occupational therapy  Behavioral Health checks every 7 minutes  Continue current medications:  Current Facility-Administered Medications   Medication Dose Route Frequency Provider Last Rate    acetaminophen  650 mg Oral Q6H PRN Estela N Lodics, CRNP      acetaminophen  650 mg Oral Q4H PRN Jono Ready Lodics, CRNP      acetaminophen  975 mg Oral Q6H PRN Estela N Lodics, CRNP      amantadine  100 mg Oral BID Andi Galvan MD      benztropine  1 mg Intramuscular Q4H PRN Max 6/day Estela N Lodics, CRNP      benztropine  1 mg Oral Q4H PRN Max 6/day Jono Ready Lodics, CRNP      cholecalciferol  1,000 Units Oral Daily [place], [place]      hydrOXYzine HCL  25 mg Oral Q6H PRN Max 4/day Estela N Lodics, CRNP      hydrOXYzine HCL  50 mg Oral Q4H PRN Max 4/day Estela N Lodics, CRNP      Or    LORazepam  0 5 mg Intramuscular Q4H PRN KAM Williamson  loperamide  2 mg Oral TID PRN April Lin, PA-C      LORazepam  1 mg Oral Q4H PRN Max 6/day KAM Aceves      Or    LORazepam  1 mg Intramuscular Q6H PRN Max 3/day [place] Dustin, KAM      losartan  50 mg Oral Daily Ann Marie Thomason MD      melatonin  6 mg Oral HS Gladys Portern, CRNP      mirtazapine  15 mg Oral HS Ovi Merida MD      nystatin   Topical TID Ann Marie Thomason MD      OLANZapine  10 mg Oral Q3H PRN Max 3/day KAM Aceves      Or    OLANZapine  10 mg Intramuscular Q3H PRN Max 3/day Estela Chan, KAM      OLANZapine  5 mg Oral Q3H PRN Max 6/day KAM Aceves      Or    OLANZapine  5 mg Intramuscular Q3H PRN Max 6/day Estela Chan, KAM      OLANZapine  2 5 mg Oral Q3H PRN Max 8/day KAM Aceves      OLANZapine  7 5 mg Oral HS Gladys Gin, CRGILDA      oxybutynin  5 mg Oral Daily Almira Duverney, MD      polyethylene glycol  17 g Oral Daily PRN April Lin PA-C      sertraline  25 mg Oral Daily Gladys Portern, CASSANDRANP      sitaGLIPtin  50 mg Oral Daily Ann Marie Thomason MD      sodium chloride  1 spray Each Nare Q2H PRN April Lin PA-C      traZODone  100 mg Oral HS Ovi Merida MD      traZODone  50 mg Oral HS PRN KAM Aceves         Risks / Benefits of Treatment:    Risks, benefits, and possible side effects of medications explained to patient and patient verbalizes understanding and agreement for treatment  Counseling / Coordination of Care:    Patient's progress discussed with staff in treatment team meeting  Medications, treatment progress and treatment plan reviewed with patient      KAM Kaiser 03/27/21

## 2021-03-27 NOTE — NURSING NOTE
Pt present in milieu for meals and group  Pt affect is flat and mood is depressed  Pt rates anxiety and depression high  Pt denies SI/HI/AH/VH  Pt states she is dehydrated even though she drinks  Compliant with medications  Q 7 min safety checks maintained

## 2021-03-27 NOTE — NURSING NOTE
Patient appears to be sleeping without difficulty throughout the night during 7 minute safety checks  No behavioral issues  Will continue to monitor safety and behaviors

## 2021-03-27 NOTE — PROGRESS NOTES
Progress Note - Harris Angle 62 y o  female MRN: [id number]    Unit/Bed#: [facility]-01 Encounter: [id number]      Assessment:     1  Cardiac with history of hypertension   on losartan  2  DJD/osteoarthritis   Simple analgesics  3  Gait abnormality   PT OT    4  Insomnia   melatonin  5  Overactive urinary bladder    oxybutynin  6  Hyperglycemia/weight loss  Continue Januvia 50 mg daily /diet  7  Nasal congestion/dryness  Saline nasal spray  8  Vitamin D deficiency  Supplemented    9  Overgrown toenails  Podiatry   10  Loose stools  p r n  Imodium  11  Anorexia/low appetite  Remeron 7 5 mg daily and Glucerna 3 times daily  12  Athlete's foot  Nystatin powder  13  Gait Dysfunction  Workup negative to date including MRI  Continue PT OT    Plan:  As above    Subjective:   As above    Objective:     Vitals: Blood pressure 131/84, pulse 78, temperature 99 1 °F (37 3 °C), temperature source Temporal, resp  rate 16, height 5' 5" (1 651 m), weight 63 5 kg (139 lb 15 9 oz), SpO2 95 %, not currently breastfeeding  ,Body mass index is 23 3 kg/m²        Intake/Output Summary (Last 24 hours) at 3/27/2021 1920  Last data filed at 3/27/2021 1700  Gross per 24 hour   Intake 840 ml   Output --   Net 840 ml       Physical Exam: /84 (BP Location: Right arm)   Pulse 78   Temp 99 1 °F (37 3 °C) (Temporal)   Resp 16   Ht 5' 5" (1 651 m)   Wt 63 5 kg (139 lb 15 9 oz)   LMP  (LMP Unknown)   SpO2 95%   BMI 23 30 kg/m²     General Appearance:    Alert, cooperative, no distress, appears stated age   Head:    Normocephalic, without obvious abnormality, atraumatic   Eyes:    PERRL, conjunctiva/corneas clear, EOM's intact, fundi     benign, both eyes   Ears:    Normal TM's and external ear canals, both ears   Nose:   Nares normal, septum midline, mucosa normal, no drainage    or sinus tenderness   Throat:   Lips, mucosa, and tongue normal; teeth and gums normal   Neck:   Supple, symmetrical, trachea midline, no adenopathy; thyroid:  no enlargement/tenderness/nodules; no carotid    bruit or JVD   Back:     Symmetric, no curvature, ROM normal, no CVA tenderness   Lungs:     Clear to auscultation bilaterally, respirations unlabored   Chest Wall:    No tenderness or deformity    Heart:    Regular rate and rhythm, S1 and S2 normal, no murmur, rub   or gallop   Breast Exam:    No tenderness, masses, or nipple abnormality   Abdomen:     Soft, non-tender, bowel sounds active all four quadrants,     no masses, no organomegaly   Genitalia:    Normal female without lesion, discharge or tenderness   Rectal:    Normal tone, no masses or tenderness; guaiac negative stool   Extremities:   Extremities normal, atraumatic, no cyanosis or edema   Pulses:   2+ and symmetric all extremities   Skin:   Skin color, texture, turgor normal, no rashes or lesions   Lymph nodes:   Cervical, supraclavicular, and axillary nodes normal   Neurologic:   CNII-XII intact, normal strength, sensation and reflexes     throughout        Invasive Devices     Peripheral Intravenous Line            Peripheral IV 02/20/21 Right Antecubital 35 days                Lab, Imaging and other studies: I have personally reviewed pertinent reports

## 2021-03-27 NOTE — NURSING NOTE
Patient spent most of her time in her room  Patient did come out for snack and group  Upon approach patient's mood and affect is flat and depressed  Patient stated her anxiety and depression are high due to her circumstances  Patient is somatic  No Anabaptism preoccupation this shift  Patient is sad about going to a nursing home  Gave patient reassurance  Denies SI/HI/AHVH/pain  Took HS medications without difficulty  Will continue to monitor safety and behaviors every 7 minutes

## 2021-03-28 LAB
GLUCOSE SERPL-MCNC: 103 MG/DL (ref 65–140)
GLUCOSE SERPL-MCNC: 86 MG/DL (ref 65–140)

## 2021-03-28 PROCEDURE — 82948 REAGENT STRIP/BLOOD GLUCOSE: CPT

## 2021-03-28 PROCEDURE — 99232 SBSQ HOSP IP/OBS MODERATE 35: CPT | Performed by: NURSE PRACTITIONER

## 2021-03-28 RX ADMIN — Medication 1000 UNITS: at 08:55

## 2021-03-28 RX ADMIN — AMANTADINE 100 MG: 100 CAPSULE ORAL at 08:55

## 2021-03-28 RX ADMIN — SERTRALINE HYDROCHLORIDE 25 MG: 25 TABLET ORAL at 08:55

## 2021-03-28 RX ADMIN — SITAGLIPTIN 50 MG: 25 TABLET, FILM COATED ORAL at 08:55

## 2021-03-28 RX ADMIN — TRAZODONE HYDROCHLORIDE 100 MG: 50 TABLET ORAL at 21:31

## 2021-03-28 RX ADMIN — OXYBUTYNIN 5 MG: 5 TABLET, FILM COATED, EXTENDED RELEASE ORAL at 08:55

## 2021-03-28 RX ADMIN — OLANZAPINE 7.5 MG: 2.5 TABLET, FILM COATED ORAL at 21:31

## 2021-03-28 RX ADMIN — AMANTADINE 100 MG: 100 CAPSULE ORAL at 17:06

## 2021-03-28 RX ADMIN — MIRTAZAPINE 15 MG: 15 TABLET, FILM COATED ORAL at 21:31

## 2021-03-28 RX ADMIN — MELATONIN 6 MG: at 21:31

## 2021-03-28 RX ADMIN — LOSARTAN POTASSIUM 50 MG: 50 TABLET, FILM COATED ORAL at 08:55

## 2021-03-28 NOTE — NURSING NOTE
Patient withdrawn to her room only came out for HS snack  Upon approach patient's mood and affect is flat and depressed  Patient rates her anxiety and depression high  Patient is negative and remains hopeless and helpless  Patient needs a lot of encouragement  Denies SI/HI/AHVH  Took HS medication without difficulty  Will continue to monitor safety and behaviors every 7 minutes

## 2021-03-28 NOTE — PROGRESS NOTES
Progress Note - Mariluz Parra 62 y o  female MRN: [id number]    Unit/Bed#: [facility]01 Encounter: [id number]      Assessment:  1  Cardiac with history of hypertension   on losartan  2  DJD/osteoarthritis   Simple analgesics  3  Gait abnormality   PT OT    4  Insomnia   melatonin  5  Overactive urinary bladder    oxybutynin  6  Hyperglycemia/weight loss  Continue Januvia 50 mg daily /diet  7  Nasal congestion/dryness  Saline nasal spray  8  Vitamin D deficiency  Supplemented    9  Overgrown toenails   Podiatry   10  Loose stools   p r n  Imodium  11  Anorexia/low appetite   Remeron 7 5 mg daily and Glucerna 3 times daily  12  Athlete's foot  Nystatin powder  13  Gait Dysfunction  Workup negative to date including MRI  Continue PT OT    Plan:  See above    Subjective:   On changing particularly regarding her gait dysfunction    Objective:     Vitals: Blood pressure 139/65, pulse 85, temperature 99 °F (37 2 °C), temperature source Temporal, resp  rate 18, height 5' 5" (1 651 m), weight 63 5 kg (139 lb 15 9 oz), SpO2 96 %, not currently breastfeeding  ,Body mass index is 23 3 kg/m²        Intake/Output Summary (Last 24 hours) at 3/28/2021 1730  Last data filed at 3/27/2021 2100  Gross per 24 hour   Intake 240 ml   Output --   Net 240 ml       Physical Exam: /65 (BP Location: Right arm)   Pulse 85   Temp 99 °F (37 2 °C) (Temporal)   Resp 18   Ht 5' 5" (1 651 m)   Wt 63 5 kg (139 lb 15 9 oz)   LMP  (LMP Unknown)   SpO2 96%   BMI 23 30 kg/m²     General Appearance:    Alert, cooperative, no distress, appears stated age   Head:    Normocephalic, without obvious abnormality, atraumatic                   Neck:   Supple, symmetrical, trachea midline, no adenopathy;     thyroid:  no enlargement/tenderness/nodules; no carotid    bruit or JVD   Back:     Symmetric, no curvature, ROM normal, no CVA tenderness   Lungs:     Clear to auscultation bilaterally, respirations unlabored   Chest Wall:    No tenderness or deformity    Heart:    Regular rate and rhythm, S1 and S2 normal, no murmur, rub   or gallop       Abdomen:     Soft, non-tender, bowel sounds active all four quadrants,     no masses, no organomegaly           Extremities:   Extremities normal, atraumatic, no cyanosis or edema   Pulses:   2+ and symmetric all extremities   Skin:   Skin color, texture, turgor normal, no rashes or lesions   Lymph nodes:   Cervical, supraclavicular, and axillary nodes normal            Invasive Devices     Peripheral Intravenous Line            Peripheral IV 02/20/21 Right Antecubital 36 days                Lab, Imaging and other studies: I have personally reviewed pertinent reports

## 2021-03-28 NOTE — PLAN OF CARE
Problem: Alteration in Thoughts and Perception  Goal: Treatment Goal: Gain control of psychotic behaviors/thinking, reduce/eliminate presenting symptoms and demonstrate improved reality functioning upon discharge  Outcome: Progressing  Goal: Verbalize thoughts and feelings  Description: Interventions:  - Promote a nonjudgmental and trusting relationship with the patient through active listening and therapeutic communication  - Assess patient's level of functioning, behavior and potential for risk  - Engage patient in 1 on 1 interactions  - Encourage patient to express fears, feelings, frustrations, and discuss symptoms    - [place] patient to reality, help patient recognize reality-based thinking   - Administer medications as ordered and assess for potential side effects  - Provide the patient education related to the signs and symptoms of the illness and desired effects of prescribed medications  Outcome: Progressing  Goal: Refrain from acting on delusional thinking/internal stimuli  Description: Interventions:  - Monitor patient closely, per order   - Utilize least restrictive measures   - Set reasonable limits, give positive feedback for acceptable   - Administer medications as ordered and monitor of potential side effects  Outcome: Progressing  Goal: Agree to be compliant with medication regime, as prescribed and report medication side effects  Description: Interventions:  - Offer appropriate PRN medication and supervise ingestion; conduct AIMS, as needed   Outcome: Progressing  Goal: Attend and participate in unit activities, including therapeutic, recreational, and educational groups  Description: Interventions:  -Encourage Visitation and family involvement in care  Outcome: Progressing  Goal: Recognize dysfunctional thoughts, communicate reality-based thoughts at the time of discharge  Description: Interventions:  - Provide medication and psycho-education to assist patient in compliance and developing insight into his/her illness   Outcome: Progressing  Goal: Complete daily ADLs, including personal hygiene independently, as able  Description: Interventions:  - Observe, teach, and assist patient with ADLS  - Monitor and promote a balance of rest/activity, with adequate nutrition and elimination   Outcome: Progressing

## 2021-03-28 NOTE — PROGRESS NOTES
Progress Note - [address] 62 y o  female MRN: [id number]   Unit/Bed#: [facility] 204-01 Encounter: [id number]    Behavior over the last 24 hours: unchanged  Terri Romero continues flat depressed, very negative  States her depression is no better in fact may be worse  I did review with her Zoloft started on Friday due to her worsening depression  Will increase to 50 mg to start  She is worried about getting her clothing to go to a nursing home also depressed by the prospect of living in a nursing home for the rest of her life  Limited participation in milieu  Sleep: difficulty falling asleep  Appetite: fair  Medication side effects: No   ROS: all other systems are negative    Mental Status Evaluation:    Appearance:  marginal hygiene   Behavior:  guarded   Speech:  monotone   Mood:  depressed   Affect:  blunted   Thought Process:  perseverative   Associations: intact associations   Thought Content:  negative thinking, ruminating thoughts   Perceptual Disturbances: none   Risk Potential: Suicidal ideation - None  Homicidal ideation - None  Potential for aggression - No   Sensorium:  oriented to person, place and time/date   Memory:  recent and remote memory grossly intact   Consciousness:  alert and awake   Attention: poor concentration and poor attention span   Insight:  limited   Judgment: limited   Gait/Station: uses walker   Motor Activity: no abnormal movements     Vital signs in last 24 hours:    Temp:  [98 3 °F (36 8 °C)-99 1 °F (37 3 °C)] 98 4 °F (36 9 °C)  HR:  [77-79] 79  Resp:  [16-18] 18  BP: (124-131)/(62-84) 131/75    Laboratory results: I have personally reviewed all pertinent laboratory/tests results      Suicide/Homicide Risk Assessment:    Risk of Harm to Self:   Current Specific Risk Factors include: current depressive symptoms  Protective Factors: no current suicidal plan or intent, ability to communicate with staff on the unit, able to contract for safety on the unit, taking medications as ordered on the unit  Based on today's assessment, Horacio Ceballos presents the following risk of harm to self: low    Risk of Harm to Others:  Current Specific Risk Factors include: none  Based on today's assessment, Horacio Ceballos presents the following risk of harm to others: none    The following interventions are recommended: behavioral checks every 7 minutes, continued hospitalization on locked unit     Progress Toward Goals: minimal improvement    Assessment/Plan   Principal Problem:    MDD (major depressive disorder), recurrent episode ([facility] )  Active Problems:    Essential hypertension    Depression    Other constipation    Absence of bladder continence    History of Asperger's syndrome    Aftercare following right hip joint replacement surgery    Severe protein-calorie malnutrition ([facility] )    Recommended Treatment:     Planned medication and treatment changes:     All current active medications have been reviewed  Encourage group therapy, milieu therapy and occupational therapy  Behavioral Health checks every 7 minutes  Increase Zoloft 50 mg    Current Facility-Administered Medications   Medication Dose Route Frequency Provider Last Rate    acetaminophen  650 mg Oral Q6H PRN Estela N Lodics, CRNP      acetaminophen  650 mg Oral Q4H PRN Damon Fitch Lodics, CRNP      acetaminophen  975 mg Oral Q6H PRN Estela Marieics, CRNP      amantadine  100 mg Oral BID Tia Wong MD      benztropine  1 mg Intramuscular Q4H PRN Max 6/day Estela N Cynthiaics, CRNP      benztropine  1 mg Oral Q4H PRN Max 6/day Damon Fitch Cynthiaics, CRNP      cholecalciferol  1,000 Units Oral Daily Kody Drake [place]      hydrOXYzine HCL  25 mg Oral Q6H PRN Max 4/day Estela N Lodics, CRNP      hydrOXYzine HCL  50 mg Oral Q4H PRN Max 4/day Estela Marieics, CRNP      Or    LORazepam  0 5 mg Intramuscular Q4H PRN Damon Fitch Lodics, CRNP      loperamide  2 mg Oral TID PRN Kody Drake PA-C      LORazepam  1 mg Oral Q4H PRN Max 6/day De DiosKAM Vazquez      Or    LORazepam  1 mg Intramuscular Q6H PRN Max 3/day De Dios Panda Chan, KAM      losartan  50 mg Oral Daily Cheryle Smoker, MD      melatonin  6 mg Oral HS KAM Grider      mirtazapine  15 mg Oral HS Jasen Fragoso MD      nystatin   Topical TID Cheryle Smoker, MD      OLANZapine  10 mg Oral Q3H PRN Max 3/day KAM Aceves      Or    OLANZapine  10 mg Intramuscular Q3H PRN Max 3/day KAM Aceves      OLANZapine  5 mg Oral Q3H PRN Max 6/day KAM Aceves      Or    OLANZapine  5 mg Intramuscular Q3H PRN Max 6/day KAM Aceves      OLANZapine  2 5 mg Oral Q3H PRN Max 8/day KAM Aceves      OLANZapine  7 5 mg Oral HS Max Rodriguez, KAM      oxybutynin  5 mg Oral Daily Salvatore Mendenhall MD      polyethylene glycol  17 g Oral Daily PRN Leroy Bernheim, PA-C      sertraline  25 mg Oral Daily KAM Grider      sitaGLIPtin  50 mg Oral Daily Cheryle Smoker, MD      sodium chloride  1 spray Each Nare Q2H PRN Leroy Bernheim, PA-C      traZODone  100 mg Oral HS Jasen Fragoso MD      traZODone  50 mg Oral HS PRN KAM Aceves         Risks / Benefits of Treatment:    Risks, benefits, and possible side effects of medications explained to patient and patient verbalizes understanding and agreement for treatment  Counseling / Coordination of Care:    Patient's progress discussed with staff in treatment team meeting  Medications, treatment progress and treatment plan reviewed with patient      KAM Collins 03/28/21

## 2021-03-28 NOTE — NURSING NOTE
Pt present in milieu for meals  Pt affect is flat and mood is depressed  Pt rates anxiety and depression high  Pt denies SI/HI/AH/VH  Pt states she is unable to eat although is chewing her food and swallowing without difficulty  Compliant with medications  No acute behaviors noted  Q 7 min safety checks maintained

## 2021-03-29 LAB
GLUCOSE SERPL-MCNC: 83 MG/DL (ref 65–140)
GLUCOSE SERPL-MCNC: 87 MG/DL (ref 65–140)

## 2021-03-29 PROCEDURE — 99232 SBSQ HOSP IP/OBS MODERATE 35: CPT | Performed by: NURSE PRACTITIONER

## 2021-03-29 PROCEDURE — 82948 REAGENT STRIP/BLOOD GLUCOSE: CPT

## 2021-03-29 RX ADMIN — MIRTAZAPINE 15 MG: 15 TABLET, FILM COATED ORAL at 21:41

## 2021-03-29 RX ADMIN — AMANTADINE 100 MG: 100 CAPSULE ORAL at 08:53

## 2021-03-29 RX ADMIN — TRAZODONE HYDROCHLORIDE 50 MG: 50 TABLET ORAL at 00:43

## 2021-03-29 RX ADMIN — SERTRALINE HYDROCHLORIDE 50 MG: 50 TABLET ORAL at 08:53

## 2021-03-29 RX ADMIN — SITAGLIPTIN 50 MG: 25 TABLET, FILM COATED ORAL at 08:53

## 2021-03-29 RX ADMIN — TRAZODONE HYDROCHLORIDE 100 MG: 50 TABLET ORAL at 21:41

## 2021-03-29 RX ADMIN — AMANTADINE 100 MG: 100 CAPSULE ORAL at 16:51

## 2021-03-29 RX ADMIN — OXYBUTYNIN 5 MG: 5 TABLET, FILM COATED, EXTENDED RELEASE ORAL at 08:53

## 2021-03-29 RX ADMIN — OLANZAPINE 7.5 MG: 2.5 TABLET, FILM COATED ORAL at 21:41

## 2021-03-29 RX ADMIN — Medication 1000 UNITS: at 08:53

## 2021-03-29 RX ADMIN — MELATONIN 6 MG: at 21:41

## 2021-03-29 RX ADMIN — LOSARTAN POTASSIUM 50 MG: 50 TABLET, FILM COATED ORAL at 08:53

## 2021-03-29 NOTE — PROGRESS NOTES
Progress Note - [address] 62 y o  female MRN: [id number]  Unit/Bed#: [facility] 204-01 Encounter: [id number]    Assessment/Plan   Principal Problem:    MDD (major depressive disorder), recurrent episode ([facility] )  Active Problems:    Essential hypertension    Depression    Other constipation    Absence of bladder continence    History of Asperger's syndrome    Aftercare following right hip joint replacement surgery    Severe protein-calorie malnutrition ([facility] )      Behavior over the last 24 hours:  unchanged  Sleep: normal  Appetite: normal  Medication side effects: No  ROS: no complaints and All other systems negative for acute change     Nelly Shetty was seen today for follow-up and case discussed with treatment team this morning  Patient was laying in bed upon encounter and reports that she is not well," continues to endorse worsening anxiety and depression  Medication adjustments made to Zoloft yesterday  Remains somatically preoccupied in that she is unable to care for herself; specifically regarding ambulation  However, patient is frequently observed ambulating ad lila on unit with walker, although gait is slow  Nelly Shetty verbalized that she slept well throughout the night after she took a PRN Trazodone  She continues to state that her appetite is not good despite the meal completions being documented as 100%  Nelly Shetty requires ongoing support and encouragement from staff  She attends and participates in groups on unit appropriately      Mental Status Evaluation:  Appearance:  age appropriate, casually dressed and Laying in bed   Behavior:  psychomotor retardation and Calm and cooperative   Speech:  Slow, pauses at times   Mood:  anxious and depressed   Affect:  flat   Thought Process:  circumstantial   Thought Content:  delusions  somatic   Perceptual Disturbances: None   Risk Potential: Suicidal Ideations none  Homicidal Ideations none  Potential for Aggression No   Sensorium:  person, place, time/date and situation   Memory:  recent and remote memory grossly intact   Consciousness:  alert and awake    Attention: attention span and concentration were age appropriate   Insight:  limited   Judgment: limited   Gait/Station: slow and Uses walker   Motor Activity: no abnormal movements     Progress Toward Goals:  Medication adjustments made yesterday to Zoloft  Will continue current psychotropic regimen  Patient will be for SNF placement; no discharge date at this time  Recommended Treatment: Continue with group therapy, milieu therapy and occupational therapy  Risks, benefits and possible side effects of Medications:   Risks, benefits, and possible side effects of medications explained to patient and patient verbalizes understanding  Medications: all current active meds have been reviewed and continue current psychiatric medications  Labs: I have personally reviewed all pertinent laboratory/tests results  Counseling / Coordination of Care  Total floor / unit time spent today 20 minutes  Greater than 50% of total time was spent with the patient and / or family counseling and / or coordination of care

## 2021-03-29 NOTE — NURSING NOTE
[id number]  Pt found to be resting quietly on most of authors rounds  No acute behavioral issues noted  Q 7 min checks maintained  Fall protocol in place

## 2021-03-29 NOTE — PROGRESS NOTES
03/29/21 0944   Team Meeting   Meeting Type Daily Rounds   Team Members Present   Team Members Present Physician;Nurse;Occupational Therapist;   Physician Team Member Dr Edgardo Hamilton MD; KAM Willoughby   Nursing Team Member [facility], RN   Social Work Team Member Derian Nguyễn [facility]   OT Team Member [place], [place]   Patient/Family Present   Patient Present No   Patient's Family Present No     DC ASAP - awaiting SNF placement; slept; up x 1 - prn utilized; no behaviors

## 2021-03-29 NOTE — PROGRESS NOTES
Progress Note - Elizabeth Sen 62 y o  female MRN: [id number]    Unit/Bed#: [facility] 204-01 Encounter: [id number]        Subjective:  Patient seen and examined at bedside after reviewing the chart and discussing the case with the caring staff  Patient examined at bedside  Continues to state she cannot perform her ADLs  States that she has no desire to eat although patient has been witnessed to eat 100% of her meals over the past few days  Patient also reports decreased fluid intake and inability to void  PVR from 03/25/2021 was 154 mL  Patient has been witnessed drinking fluids and urinating  She denies nausea, vomiting, abdominal pain, diarrhea, constipation, dysuria, hematuria  Physical Exam   Vitals: Blood pressure 130/71, pulse 80, temperature 99 2 °F (37 3 °C), temperature source Temporal, resp  rate 16, height 5' 5" (1 651 m), weight 63 5 kg (139 lb 15 9 oz), SpO2 97 %, not currently breastfeeding  ,Body mass index is 23 3 kg/m²  Constitutional: He appears well-developed  HEENT: PERR, EOMI, MMM  Cardiovascular: Normal rate and regular rhythm  Pulmonary/Chest: Effort normal and breath sounds normal    Abdomen: Soft, + BS, NT    Assessment/Plan:     Elizabeth Sen is a(n) 62y o  year old female with psychotic disorder      1  Cardiac with history of hypertension  Patient will be continued on losartan  2  DJD/osteoarthritis  Patient may get Tylenol on as needed basis  3  Gait abnormality  I will consult PT and OT for further evaluation and treatment  4  Insomnia  Patient is on melatonin  5  Overactive urinary bladder  Patient is on oxybutynin  6  Hyperglycemia/weight loss  We will closely monitor patient's blood sugars by doing Accu-Cheks 2 times daily  Continue Januvia 50 mg daily along with carb controlled diet  Patient's hemoglobin A1c was 5 3  Nutrition is on consult  Patient has been put on Glucerna supplements  7  Nasal congestion/dryness    Continue saline nasal spray every 2 hours as needed  8  Vitamin D deficiency  Continue daily vitamin-D supplements  9  Overgrown toenails  Podiatry is on consult  10  Loose stools  Patient may receive Imodium as needed  11  Anorexia/low appetite  Patient is on Remeron 7 5 mg daily along with Glucerna 3 times daily  Encouraged patient to continue eating and drinking as much as she can  12  Athlete's foot  Continue nystatin powder to the feet 3 times daily  13  Gait Dysfunction  MRI of brain from 03/16/2021 is within normal limits  Patient continues to states she cannot perform her ADLs  Physical and occupational therapy are on consult  The patient was discussed with Dr Elly Sands and he is in agreement with the above note

## 2021-03-29 NOTE — NURSING NOTE
[id number]     Pt present in the milieu; affect bland  Mood depressed  Thought process remains pessimistic  Positive reenforcement  provided  Q 7 min checks ongoing

## 2021-03-29 NOTE — NURSING NOTE
Pt present in milieu for meals  Pt affect is flat and mood is neutral  Pt rates anxiety and depression high  Pt denies SI/HI/AH/VH  Pt states she is unable to eat although is chewing her food and swallowing without difficulty  Compliant with medications  No acute behaviors noted  Q 7 min safety checks maintained  Patient

## 2021-03-30 LAB
GLUCOSE SERPL-MCNC: 102 MG/DL (ref 65–140)
GLUCOSE SERPL-MCNC: 85 MG/DL (ref 65–140)

## 2021-03-30 PROCEDURE — 99232 SBSQ HOSP IP/OBS MODERATE 35: CPT | Performed by: NURSE PRACTITIONER

## 2021-03-30 PROCEDURE — 82948 REAGENT STRIP/BLOOD GLUCOSE: CPT

## 2021-03-30 PROCEDURE — 97116 GAIT TRAINING THERAPY: CPT

## 2021-03-30 RX ADMIN — MIRTAZAPINE 15 MG: 15 TABLET, FILM COATED ORAL at 21:39

## 2021-03-30 RX ADMIN — SITAGLIPTIN 50 MG: 25 TABLET, FILM COATED ORAL at 08:27

## 2021-03-30 RX ADMIN — AMANTADINE 100 MG: 100 CAPSULE ORAL at 16:57

## 2021-03-30 RX ADMIN — TRAZODONE HYDROCHLORIDE 100 MG: 50 TABLET ORAL at 21:38

## 2021-03-30 RX ADMIN — AMANTADINE 100 MG: 100 CAPSULE ORAL at 08:27

## 2021-03-30 RX ADMIN — NYSTATIN: 100000 POWDER TOPICAL at 08:29

## 2021-03-30 RX ADMIN — OLANZAPINE 7.5 MG: 2.5 TABLET, FILM COATED ORAL at 21:38

## 2021-03-30 RX ADMIN — MELATONIN 6 MG: at 21:38

## 2021-03-30 RX ADMIN — LOSARTAN POTASSIUM 50 MG: 50 TABLET, FILM COATED ORAL at 08:28

## 2021-03-30 RX ADMIN — OXYBUTYNIN 5 MG: 5 TABLET, FILM COATED, EXTENDED RELEASE ORAL at 08:27

## 2021-03-30 RX ADMIN — Medication 1000 UNITS: at 08:27

## 2021-03-30 RX ADMIN — SERTRALINE HYDROCHLORIDE 50 MG: 50 TABLET ORAL at 08:27

## 2021-03-30 NOTE — PLAN OF CARE
Problem: PHYSICAL THERAPY ADULT  Goal: Performs mobility at highest level of function for planned discharge setting  See evaluation for individualized goals  Description: Treatment/Interventions: Functional transfer training, LE strengthening/ROM, Therapeutic exercise, Endurance training, Patient/family training, Equipment eval/education, Gait training, Spoke to nursing  Equipment Recommended: Walker(pt  has own)       See flowsheet documentation for full assessment, interventions and recommendations  Outcome: Progressing  Note: Prognosis: Fair  Problem List: Decreased strength, Decreased endurance, Impaired balance, Decreased mobility, Decreased coordination, Decreased safety awareness, Impaired judgement  Assessment: Pt seen for PT treatment session this date with interventions consisting of gait training w/ emphasis on improving pt's ability to ambulate level surfaces x 150 ft with modified independent and close S provided by therapist with RW and therapeutic activity consisting of training: bed mobility and sit<>stand transfers  Pt agreeable to PT treatment session upon arrival, pt found seated OOB in chair, in no apparent distress, A&O x 4 and responsive  In comparison to previous session, pt with improvements in ambulation distance and activity tolerance  Post session: pt returned BTB, all needs in reach and RN notified of session findings/recommendations Continue to recommend STR at time of d/c in order to maximize pt's functional independence and safety w/ mobility  Pt continues to be functioning below baseline level, and remains limited 2* factors listed above and including overall functional mobility strength, endurance, and gait speed  PT will continue to see pt while here in order to address the deficits listed above and provide interventions consistent w/ POC in effort to achieve STGs       Barriers to Discharge Comments: cognition  PT Discharge Recommendation: Post-Acute Rehabilitation Services PT - OK to Discharge: Yes(when medically cleared and stable)    See flowsheet documentation for full assessment

## 2021-03-30 NOTE — PROGRESS NOTES
Progress Note - George Ramires 62 y o  female MRN: [id number]    Unit/Bed#: [facility] 204-01 Encounter: [id number]        Subjective:  Patient seen and examined at bedside after reviewing the chart and discussing the case with the caring staff  Patient examined at bedside  Continues to state she cannot perform her ADLs  Patient reports, I can barely shuffle, I'm going backwards    Patient has been seen ambulating on her own with walker on the unit and going to the bathroom on her own in her bedroom  States that she has no desire to eat although patient has been witnessed to eat 100% of her meals  Patient also reports decreased fluid intake and inability to void  PVR from 03/25/2021 was 154 mL  Patient has been witnessed drinking fluids and urinating  She denies nausea, vomiting, abdominal pain, diarrhea, constipation, dysuria, hematuria  Physical Exam   Vitals: Blood pressure 134/67, pulse 75, temperature 98 5 °F (36 9 °C), temperature source Temporal, resp  rate 18, height 5' 5" (1 651 m), weight 63 5 kg (139 lb 15 9 oz), SpO2 96 %, not currently breastfeeding  ,Body mass index is 23 3 kg/m²  Constitutional: He appears well-developed  HEENT: PERR, EOMI, MMM  Cardiovascular: Normal rate and regular rhythm  Pulmonary/Chest: Effort normal and breath sounds normal    Abdomen: Soft, + BS, NT    Assessment/Plan:     George Ramires is a(n) 62y o  year old female with psychotic disorder      1  Cardiac with history of hypertension  Patient will be continued on losartan  2  DJD/osteoarthritis  Patient may get Tylenol on as needed basis  3  Gait abnormality  I will consult PT and OT for further evaluation and treatment  4  Insomnia  Patient is on melatonin  5  Overactive urinary bladder  Patient is on oxybutynin  6  Hyperglycemia/weight loss  We will closely monitor patient's blood sugars by doing Accu-Cheks 2 times daily  Continue Januvia 50 mg daily along with carb controlled diet    Patient's hemoglobin A1c was 5 3  Nutrition is on consult  Patient has been put on Glucerna supplements  7  Nasal congestion/dryness  Continue saline nasal spray every 2 hours as needed  8  Vitamin D deficiency  Continue daily vitamin-D supplements  9  Overgrown toenails  Podiatry is on consult  10  Loose stools  Patient may receive Imodium as needed  11  Anorexia/low appetite  Patient is on Remeron 7 5 mg daily along with Glucerna 3 times daily  Encouraged patient to continue eating and drinking as much as she can  12  Athlete's foot  Continue nystatin powder to the feet 3 times daily  13  Gait Dysfunction  MRI of brain from 03/16/2021 is within normal limits  Patient continues to states she cannot perform her ADLs  Physical and occupational therapy are on consult  The patient was discussed with Dr Monika Perez and he is in agreement with the above note

## 2021-03-30 NOTE — PROGRESS NOTES
Progress Note - [address] 62 y o  female MRN: [id number]  Unit/Bed#: [facility] 204-01 Encounter: [id number]    Assessment/Plan   Principal Problem:    MDD (major depressive disorder), recurrent episode ([facility] )  Active Problems:    Essential hypertension    Depression    Other constipation    Absence of bladder continence    History of Asperger's syndrome    Aftercare following right hip joint replacement surgery    Severe protein-calorie malnutrition ([facility] )      Behavior over the last 24 hours:  unchanged  Sleep: normal  Appetite: normal  Medication side effects: No  ROS: no complaints and All other systems negative for acute change     Ewa Azevedo was seen today for follow-up and case discussed with treatment team this morning  Ewa Azevedo continues to be somatically preoccupied in that she is feeling weak and unable to care for herself  She also states that she is unable to ambulate independently, however much support and encouragement provided to patient in which she responded I know I could do it if I want to    She reports she has been sleeping well throughout the night but continues to rate her anxiety and depression as up there    She denies any AVH but voiced delusion a content stating I believe my weakness is being inspired by the devil    She also voiced that she has been having dark thoughts from this entity and also believes that the devil is controlling her appetite  I believe he is restricting me from eating or drinking, and we all know what happens when you don't eat or drink    However, Ewa Azevedo has been excessively consuming water, asking this provider to refill her cup and completing 100% of each meal   Ewa Azevedo denies any SI/HI  She is occasionally seen ambulating on unit ad-lila with walker; gait slow  Ewa Azevedo requires much encouragement from staff to complete ADLs      Mental Status Evaluation:  Appearance:  disheveled   Behavior:  Calm and cooperative, bizarre   Speech:  normal pitch, normal volume and Pauses at times   Mood:  anxious and depressed   Affect:  flat   Thought Process:  circumstantial   Thought Content:  delusions  somatic, religiously preoccupied   Perceptual Disturbances: None   Risk Potential: Suicidal Ideations none  Homicidal Ideations none  Potential for Aggression No   Sensorium:  person, place, time/date and situation   Memory:  recent and remote memory grossly intact   Consciousness:  alert and awake    Attention: attention span and concentration were age appropriate   Insight:  limited   Judgment: limited   Gait/Station: slow and Uses walker   Motor Activity: no abnormal movements     Progress Toward Goals:  Adjustments made to Zoloft recently  Continue with current psychotropic regimen  Will consider increasing Zyprexa to 10 mg should delusional content and Religion preoccupation persists  Referrals sent by case management for SNF placement-awaiting responses  Recommended Treatment: Continue with group therapy, milieu therapy and occupational therapy  Risks, benefits and possible side effects of Medications:   Risks, benefits, and possible side effects of medications explained to patient and patient verbalizes understanding  Medications: all current active meds have been reviewed and continue current psychiatric medications  Labs: I have personally reviewed all pertinent laboratory/tests results  Counseling / Coordination of Care  Total floor / unit time spent today 20 minutes  Greater than 50% of total time was spent with the patient and / or family counseling and / or coordination of care

## 2021-03-30 NOTE — NURSING NOTE
Pt present in milieu for meals  Pt affect is flat and mood is neutral  Pt rates anxiety and depression high  Pt denies SI/HI/AH/VH  Pt states, "ok I guess, I'm anxious because I'm worried about transferring and losing my mobility " Positively reassured pt she is doing well and her ambulation is steady   Compliant with medications  No acute behaviors noted  Q 7 min safety checks maintained

## 2021-03-30 NOTE — PLAN OF CARE
Problem: Alteration in Thoughts and Perception  Goal: Treatment Goal: Gain control of psychotic behaviors/thinking, reduce/eliminate presenting symptoms and demonstrate improved reality functioning upon discharge  Outcome: Progressing  Goal: Verbalize thoughts and feelings  Description: Interventions:  - Promote a nonjudgmental and trusting relationship with the patient through active listening and therapeutic communication  - Assess patient's level of functioning, behavior and potential for risk  - Engage patient in 1 on 1 interactions  - Encourage patient to express fears, feelings, frustrations, and discuss symptoms    - [place] patient to reality, help patient recognize reality-based thinking   - Administer medications as ordered and assess for potential side effects  - Provide the patient education related to the signs and symptoms of the illness and desired effects of prescribed medications  Outcome: Progressing  Goal: Refrain from acting on delusional thinking/internal stimuli  Description: Interventions:  - Monitor patient closely, per order   - Utilize least restrictive measures   - Set reasonable limits, give positive feedback for acceptable   - Administer medications as ordered and monitor of potential side effects  Outcome: Progressing  Goal: Agree to be compliant with medication regime, as prescribed and report medication side effects  Description: Interventions:  - Offer appropriate PRN medication and supervise ingestion; conduct AIMS, as needed   Outcome: Progressing  Goal: Attend and participate in unit activities, including therapeutic, recreational, and educational groups  Description: Interventions:  -Encourage Visitation and family involvement in care  Outcome: Progressing  Goal: Recognize dysfunctional thoughts, communicate reality-based thoughts at the time of discharge  Description: Interventions:  - Provide medication and psycho-education to assist patient in compliance and developing insight into his/her illness   Outcome: Progressing  Goal: Complete daily ADLs, including personal hygiene independently, as able  Description: Interventions:  - Observe, teach, and assist patient with ADLS  - Monitor and promote a balance of rest/activity, with adequate nutrition and elimination   Outcome: Progressing

## 2021-03-30 NOTE — PHYSICAL THERAPY NOTE
Physical Therapy Treatment Note       03/30/21 1255   PT Last Visit   PT Visit Date 03/30/21   Note Type   Note Type Treatment   Pain Assessment   Pain Assessment Tool Pain Assessment not indicated - pt denies pain   Pain Score No Pain   Restrictions/Precautions   Weight Bearing Precautions Per Order No   Other Precautions Cognitive; Fall Risk;Pain   General   Chart Reviewed Yes   Response to Previous Treatment Patient with no complaints from previous session  Family/Caregiver Present No   Cognition   Overall Cognitive Status WFL   Arousal/Participation Alert; Cooperative   Attention Within functional limits   Orientation Level Oriented X4   Memory Within functional limits   Following Commands Follows one step commands without difficulty   Comments pt agreeable to PT session   Subjective   Subjective "I will be up to walk  that would be fine"   Bed Mobility   Sit to Supine 6  Modified independent   Additional items Increased time required   Transfers   Sit to Stand 6  Modified independent   Additional items Armrests; Increased time required   Stand to Sit 6  Modified independent   Additional items Increased time required   Ambulation/Elevation   Gait pattern Decreased R stance; Step to; Improper Weight shift;Decreased foot clearance; Excessively slow   Gait Assistance 5  Supervision   Additional items   (distant supervision)   Assistive Device Rolling walker   Distance 150 ft    Balance   Static Sitting Good   Dynamic Sitting Good   Static Standing Fair   Dynamic Standing Fair -   Ambulatory Fair -   Endurance Deficit   Endurance Deficit Yes   Activity Tolerance   Activity Tolerance Patient limited by fatigue   Medical Staff Made Aware PCA Rene made aware of findings   Assessment   Prognosis Fair   Problem List Decreased strength;Decreased endurance; Impaired balance;Decreased mobility; Decreased coordination;Decreased safety awareness; Impaired judgement   Assessment Pt seen for PT treatment session this date with interventions consisting of gait training w/ emphasis on improving pt's ability to ambulate level surfaces x 150 ft with modified independent and close S provided by therapist with RW and therapeutic activity consisting of training: bed mobility and sit<>stand transfers  Pt agreeable to PT treatment session upon arrival, pt found seated OOB in chair, in no apparent distress, A&O x 4 and responsive  In comparison to previous session, pt with improvements in ambulation distance and activity tolerance  Post session: pt returned BTB, all needs in reach and RN notified of session findings/recommendations Continue to recommend STR at time of d/c in order to maximize pt's functional independence and safety w/ mobility  Pt continues to be functioning below baseline level, and remains limited 2* factors listed above and including overall functional mobility strength, endurance, and gait speed  PT will continue to see pt while here in order to address the deficits listed above and provide interventions consistent w/ POC in effort to achieve STGs  Barriers to Discharge Comments cognition   Goals   Patient Goals to get out of here   STG Expiration Date 21   Short Term Goal #1 Goals date , extended following tx session: In 8-10 days: Perform all bed mobility tasks independently to decrease caregiver burden, Perform all transfers independently to improve independence, Ambulate > 300 ft ft  with RW independently w/o LOB and w/ normalized gait pattern 100% of the time, Increase all balance 1/2 grade to decrease risk for falls and Tolerate seated at EOB 10 minutes to facilitate functional task performance  PT Treatment Day 4   Plan   Treatment/Interventions Functional transfer training; Therapeutic exercise; Endurance training;Bed mobility;Gait training;Patient/family training;Equipment eval/education;LE strengthening/ROM   Progress Progressing toward goals   PT Frequency 1-2x/wk   Recommendation   PT Discharge Recommendation Post-Acute Rehabilitation Services   Equipment Recommended [facility] Recommended Wheeled walker   PT - OK to Discharge Yes  (when medically cleared and stable)   Additional Comments Pt's raw score on the [address] inpatient short form is 19, standardized score is 42 48  Patients at this level are likely to benefit from DC to home , however, please refer to therapist recommendation for safe DC planning     AM-PAC Basic Mobility Inpatient   Turning in Bed Without Bedrails 4   Lying on Back to Sitting on Edge of Flat Bed 3   Moving Bed to Chair 3   Standing Up From Chair 3   Walk in Room 3   Climb 3-5 Stairs 3   Basic Mobility Inpatient Raw Score 19   Basic Mobility Standardized Score 42 48       Time of PT treatment session: 4353-4129  Bree Peace, SPT

## 2021-03-30 NOTE — NURSING NOTE
Patient out in the milieu for snack only an group only  Upon approach patient's mood and affect is flat and depressed  Patient endorses high anxiety and depression  Thoughts are negative and helpless  Patient needs encouragement  Denies SI/HI/AHVH/pain  Patient uses the walker to ambulate  Took HS medication without difficulty  Will continue to monitor safety and behaviors every 7 minutes

## 2021-03-30 NOTE — SOCIAL WORK
[address]  [facility] currently working on obtaining auth for patient to get STR at their facility  Colman Pallas with admissions will notify this CM once they have approval  CM will continue to follow patient's progress and assist with discharge planning needs

## 2021-03-30 NOTE — SOCIAL WORK
SW received updated note from SNF as follows:     [address] - We CAN accept - sorry for the confusion  We have bed avail       SW advised covering CM to follow up

## 2021-03-30 NOTE — PROGRESS NOTES
03/30/21 0944   Team Meeting   Meeting Type Daily Rounds   Team Members Present   Team Members Present Physician;Nurse;; Occupational Therapist   Physician Team Member Dr Liam Choe MD; KAM Mitchell   Nursing Team Member Bhavna Betancur RN   Social Work Team Member Mey Robledo, [facility]   OT Team Member Minor Yung, [place]   Patient/Family Present   Patient Present No   Patient's Family Present No     DC ASAP - awaiting SNF placement - CM to follow up on referrals; remains negative - needs encouragement; slept; med compliant

## 2021-03-30 NOTE — SOCIAL WORK
CM alerted of the acceptance offer via [facility] from [facility]  CM called and left VM for Navid Paulino ([phone]) in admissions as well as sent a message via [facility] offering assistance is anything they may need to arrange for her transfer  CM also inquired if they could accept her tomorrow  CM will continue to follow patient's progress and assist with discharge planning needs

## 2021-03-30 NOTE — SOCIAL WORK
DELLA followed up on referrals sent 3/26; Responses as follows:     Interested:   [facility] - will review and advise; SW responded requesting update - awaiting response     [facility] - "is she optioned?" - SW responded that patient has been optioned and letter is attached to referral under "Forms" - SW requested update on possible admission - awaiting response     [facility] - "could you elaborate on psychotic symptoms/any behaviors?" - SW responded as such: "Psychiatrically she is cleared  She continues to be somatic at times, requiring reassurance but no behaviors, outbursts or uncontrolled symptoms  " - awaiting response     [facility] - "is she optioned?" - SW responded that patient has been optioned and letter is attached to referral under "Forms" - SW requested update on possible admission - awaiting response     [facility] - "Will Review  What is her insurance plan for SNF? Which managed Medicaid?" - SW responded with patient MA plan "[facility]" - awaiting response     Denials:   [facility] we are unable to accommodate this patient  We do not take this insurance  [facility]  - Unable to accept  We cannot provide appropriate care for her  [facility], [facility]  - [address] Needs Exceed Current Capacity; I'm sorry we cannot accept  [address] [facility] you for the referral-we are not able to accept patient  Jessenia Avendano clinical liaison [phone] ext [phone] [facility], we are unable to accept patient  Thank you  [phone] - No Available Bed   [facility] we do not have a MA license   [address] not in network with [employer]     [facility] - we do not accept MA for str   [facility] [address]  - Not a Covered [address] [facility] - No Available Bed   [facility] at [address] I do not have a appropriate bed     No Response:   [address] [address]  [phone]  [facility] at [facility] [phone]

## 2021-03-30 NOTE — SOCIAL WORK
CM sent tiger text request to the provider requesting a new PT eval/note be completed on patient today to evaluate for STR at [facility] and David Jiang  CM will continue to follow patient's progress and assist with discharge planning needs

## 2021-03-31 VITALS
OXYGEN SATURATION: 96 % | HEART RATE: 88 BPM | RESPIRATION RATE: 18 BRPM | WEIGHT: 139.99 LBS | DIASTOLIC BLOOD PRESSURE: 83 MMHG | TEMPERATURE: 99 F | BODY MASS INDEX: 23.32 KG/M2 | HEIGHT: 65 IN | SYSTOLIC BLOOD PRESSURE: 154 MMHG

## 2021-03-31 PROBLEM — F33.9 MDD (MAJOR DEPRESSIVE DISORDER), RECURRENT EPISODE (HCC): Status: RESOLVED | Noted: 2021-02-22 | Resolved: 2021-03-31

## 2021-03-31 PROBLEM — R32 ABSENCE OF BLADDER CONTINENCE: Status: RESOLVED | Noted: 2019-12-09 | Resolved: 2021-03-31

## 2021-03-31 PROBLEM — Z47.1 AFTERCARE FOLLOWING RIGHT HIP JOINT REPLACEMENT SURGERY: Status: RESOLVED | Noted: 2020-12-11 | Resolved: 2021-03-31

## 2021-03-31 PROBLEM — Z96.641 AFTERCARE FOLLOWING RIGHT HIP JOINT REPLACEMENT SURGERY: Status: RESOLVED | Noted: 2020-12-11 | Resolved: 2021-03-31

## 2021-03-31 PROBLEM — Z86.59 HISTORY OF ASPERGER'S SYNDROME: Status: RESOLVED | Noted: 2020-06-09 | Resolved: 2021-03-31

## 2021-03-31 PROBLEM — E43 SEVERE PROTEIN-CALORIE MALNUTRITION (HCC): Status: RESOLVED | Noted: 2021-02-23 | Resolved: 2021-03-31

## 2021-03-31 LAB
FLUAV RNA RESP QL NAA+PROBE: NEGATIVE
FLUBV RNA RESP QL NAA+PROBE: NEGATIVE
GLUCOSE SERPL-MCNC: 89 MG/DL (ref 65–140)
GLUCOSE SERPL-MCNC: 97 MG/DL (ref 65–140)
RSV RNA RESP QL NAA+PROBE: NEGATIVE
SARS-COV-2 RNA RESP QL NAA+PROBE: NEGATIVE

## 2021-03-31 PROCEDURE — 0241U HB NFCT DS VIR RESP RNA 4 TRGT: CPT | Performed by: PSYCHIATRY & NEUROLOGY

## 2021-03-31 PROCEDURE — 99238 HOSP IP/OBS DSCHRG MGMT 30/<: CPT | Performed by: PSYCHIATRY & NEUROLOGY

## 2021-03-31 PROCEDURE — 82948 REAGENT STRIP/BLOOD GLUCOSE: CPT

## 2021-03-31 RX ORDER — MIRTAZAPINE 15 MG/1
15 TABLET, FILM COATED ORAL
Refills: 0
Start: 2021-03-31

## 2021-03-31 RX ORDER — LANOLIN ALCOHOL/MO/W.PET/CERES
6 CREAM (GRAM) TOPICAL
Refills: 0
Start: 2021-03-31

## 2021-03-31 RX ORDER — TRAZODONE HYDROCHLORIDE 100 MG/1
100 TABLET ORAL
Refills: 0
Start: 2021-03-31

## 2021-03-31 RX ORDER — OLANZAPINE 7.5 MG/1
7.5 TABLET ORAL
Refills: 0
Start: 2021-03-31

## 2021-03-31 RX ORDER — AMANTADINE HYDROCHLORIDE 100 MG/1
100 CAPSULE, GELATIN COATED ORAL 2 TIMES DAILY
Refills: 0
Start: 2021-03-31

## 2021-03-31 RX ADMIN — SERTRALINE HYDROCHLORIDE 50 MG: 50 TABLET ORAL at 08:14

## 2021-03-31 RX ADMIN — OXYBUTYNIN 5 MG: 5 TABLET, FILM COATED, EXTENDED RELEASE ORAL at 08:14

## 2021-03-31 RX ADMIN — AMANTADINE 100 MG: 100 CAPSULE ORAL at 08:14

## 2021-03-31 RX ADMIN — LOSARTAN POTASSIUM 50 MG: 50 TABLET, FILM COATED ORAL at 08:14

## 2021-03-31 RX ADMIN — Medication 1000 UNITS: at 08:14

## 2021-03-31 RX ADMIN — SITAGLIPTIN 50 MG: 25 TABLET, FILM COATED ORAL at 08:14

## 2021-03-31 RX ADMIN — AMANTADINE 100 MG: 100 CAPSULE ORAL at 16:17

## 2021-03-31 RX ADMIN — NYSTATIN: 100000 POWDER TOPICAL at 08:14

## 2021-03-31 NOTE — NURSING NOTE
Patient discharged via wheelchair with transport team, all belongings, and discharge instructions to [facility]

## 2021-03-31 NOTE — SOCIAL WORK
CM notified by [facility] that Garrel League for STR was obtained and patient can be admitted today  CM was able to get [facility] to provide BLS transport for patient with a  time of 1700  COVID test was requested and CM sent message to [facility] with [facility] requesting report information and which physicians will see patient at the facility  CM also called and left VM for patient's brother, Naren Alex, informing him of patient's discharge today  CM met and spoke with patient in her room  Patient is satisfied with discharge today  CM will continue to assist with discharge planning needs

## 2021-03-31 NOTE — PROGRESS NOTES
Patient compliant with medications and meals  Patient stated she did not have any changes from previous shift, depression and anxiety continued to be high  Patient denies any SI/HI  Affect is flat  No other concerns or problems reported this shift  Covid test obtain for possible discharge this shift  Will continue to monitor for behaviors and changes

## 2021-03-31 NOTE — BH TRANSITION RECORD
Contact Information: If you have any questions, concerns, pended studies, tests and/or procedures, or emergencies regarding your inpatient behavioral health visit  Please contact [facility]" [facility] older adult behavioral health unit [phone] and ask to speak to a , nurse or physician  A contact is available 24 hours/ 7 days a week at this number  Summary of Procedures Performed During your Stay:  Below is a list of major procedures performed during your hospital stay and a summary of results:  - Major Imaging Studies: brain MRI  Pending Studies (From admission, onward)     Start     Ordered    03/31/21 1012  COVID19, Influenza A/B, RSV PCR, [facility]  Once     Question Answer Comment   Is this test for diagnosis or screening? Screening    Symptomatic for COVID-19 as defined by CDC? No    Hospitalized for COVID-19? No    Admitted to ICU for COVID-19? No    Is this the first test you have had for covid-19? No    Does the patient currently work in a healthcare setting with direct patient contact? No    Is this a [employer] employee? No    Resident in a congregate care setting? No    Pregnant? No        03/31/21 1011              If studies are pending at discharge, follow up with your PCP and/or referring provider

## 2021-03-31 NOTE — NURSING NOTE
Patient was observed in the community this evening for group and snack time  She presents with a flat affect  She rates anxiety and depression both 8/10, denies SI, HI and hallucinations  She was medication compliant at HS  No complaints voiced  Denies any pain  Will CTM via q7 minute safety checks

## 2021-03-31 NOTE — TRANSPORTATION MEDICAL NECESSITY
Section I - General Information    Name of Patient: Srini Varner                 : 1960    Medicare #: [id number]  Transport Date: 21 (PCS is valid for round trips on this date and for all repetitive trips in the 60-day range as noted below )  Origin: [address]:  [place] [address]  12 [facility], [address] [place] [address] Road  Is the pt's stay covered under Medicare Part A (PPS/DRG)   []     Closest appropriate facility? If no, why is transport to more distant facility required? Yes  If hospice pt, is this transport related to pt's terminal illness? NA       Section II - Medical Necessity Questionnaire  Ambulance transportation is medically necessary only if other means of transport are contraindicated or would be potentially harmful to the patient  To meet this requirement, the patient must either be "bed confined" or suffer from a condition such that transport by means other than ambulance is contraindicated by the patient's condition  The following questions must be answered by the medical professional signing below for this form to be valid:    1)  Describe the MEDICAL CONDITION (physical and/or mental) of this patient AT [address] [place] [address] that requires the patient to be transported in an ambulance and why transport by other means is contraindicated by the patient's condition: Gait Disturbance and Generalized weakness  2) Is the patient "bed confined" as defined below? No  To be "be confined" the patient must satisfy all three of the following conditions: (1) unable to get up from bed without Assistance; AND (2) unable to ambulate; AND (3) unable to sit in a chair or wheelchair  3) Can this patient safely be transported by car or wheelchair van (i e , seated during transport without a medical attendant or monitoring)?    No    4) In addition to completing questions 1-3 above, please check any of the following conditions that apply*:   *Note: supporting documentation for any boxes checked must be maintained in the patient's medical records  If hosp-hosp transfer, describe services needed at 2nd facility not available at 1st facility? Unable to tolerate seated position for time needed to transport       Section III - Signature of Physician or Healthcare Professional  I certify that the above information is true and correct based on my evaluation of this patient, and represent that the patient requires transport by ambulance and that other forms of transport are contraindicated  I understand that this information will be used by the Centers for Medicare and Medicaid Services (CMS) to support the determination of medical necessity for ambulance services, and I represent that I have personal knowledge of the patient's condition at time of transport  []  If this box is checked, I also certify that the patient is physically or mentally incapable of signing the ambulance service's claim and that the institution with which I am affiliated has furnished care, services, or assistance to the patient  My signature below is made on behalf of the patient pursuant to 42 CFR §424 36(b)(4)  In accordance with 42 CFR §424 37, the specific reason(s) that the patient is physically or mentally incapable of signing the claim form is as follows: N/A  Signature of Physician* or Healthcare Professional______________________________________________________________  Signature Date 03/31/21 (For scheduled repetitive transports, this form is not valid for transports performed more than 60 days after this date)    Printed Name & Credentials of Physician or Healthcare Professional (MD, DO, RN, etc )___Krystina Burtis Closs, BSW______________  *Form must be signed by patient's attending physician for scheduled, repetitive transports   For non-repetitive, unscheduled ambulance transports, if unable to obtain the signature of the attending physician, any of the following may sign (choose appropriate option below)  [] Physician Assistant []  Clinical Nurse Specialist []  Registered Nurse  []  Nurse Practitioner  [x] Discharge Planner

## 2021-03-31 NOTE — NURSING NOTE
Patient is to be discharge today around 5pm  Report given to RN at receiving facility [facility] in [facility] wing  Belongings and papers will be given to patient at the time of discharge

## 2021-03-31 NOTE — DISCHARGE INSTR - APPOINTMENTS
A referral has been made on your behalf for medication management with Dr Nikki Rey, [facility] [address]  [address], [address]  Phone: [phone]  Your discharge will be faxed to this provider for continuity of care  A referral has been made on your behalf for psychiatric management with [facility], [address], [address]  Phone: [phone]  Your discharge will be faxed to this provider for continuity of care

## 2021-03-31 NOTE — PROGRESS NOTES
Progress Note - Fahad Mckinley 62 y o  female MRN: [id number]    Unit/Bed#: [facility]-01 Encounter: [id number]        Subjective:  Patient seen and examined at bedside after reviewing the chart and discussing the case with the caring staff  Patient examined at bedside  Continues to state she cannot perform her ADLs  Patient reports, I can barely shuffle, I'm going backwards    Patient has been seen ambulating on her own with walker on the unit and going to the bathroom on her own in her bedroom  States that she has no desire to eat although patient has been witnessed to eat 100% of her meals  Patient also reports decreased fluid intake and inability to void  PVR from 03/25/2021 was 154 mL  Patient has been witnessed drinking fluids and urinating  She denies nausea, vomiting, abdominal pain, diarrhea, constipation, dysuria, hematuria  Patient is being discharged today, 03/31/2021, to [facility]  Patient is requesting her prescriptions  I have reviewed and reconciled the patient's problem list and medications  Physical Exam   Vitals: Blood pressure 124/65, pulse 77, temperature 98 7 °F (37 1 °C), temperature source Temporal, resp  rate 18, height 5' 5" (1 651 m), weight 63 5 kg (139 lb 15 9 oz), SpO2 97 %, not currently breastfeeding  ,Body mass index is 23 3 kg/m²  Constitutional: He appears well-developed  HEENT: PERR, EOMI, MMM  Cardiovascular: Normal rate and regular rhythm  Pulmonary/Chest: Effort normal and breath sounds normal    Abdomen: Soft, + BS, NT    Assessment/Plan:     Fahad Mckinley is a(n) 62y o  year old female with psychotic disorder  Medical clearance:  Patient is medically cleared for discharge to [facility]  All scripts have been written      1  Cardiac with history of hypertension  Patient will be continued on losartan  2  DJD/osteoarthritis  Patient may get Tylenol on as needed basis  3  Gait abnormality  I will consult PT and OT for further evaluation and treatment    4  Insomnia  Patient is on melatonin  5  Overactive urinary bladder  Patient is on oxybutynin  6  Hyperglycemia/weight loss  We will closely monitor patient's blood sugars by doing Accu-Cheks 2 times daily  Continue Januvia 50 mg daily along with carb controlled diet  Patient's hemoglobin A1c was 5 3  Nutrition is on consult  Patient has been put on Glucerna supplements  7  Nasal congestion/dryness  Continue saline nasal spray every 2 hours as needed  8  Vitamin D deficiency  Continue daily vitamin-D supplements  9  Overgrown toenails  Podiatry is on consult  10  Loose stools  Patient may receive Imodium as needed  11  Anorexia/low appetite  Patient is on Remeron 7 5 mg daily along with Glucerna 3 times daily  Encouraged patient to continue eating and drinking as much as she can  12  Athlete's foot  Continue nystatin powder to the feet 3 times daily  13  Gait Dysfunction  MRI of brain from 03/16/2021 is within normal limits  Patient continues to states she cannot perform her ADLs  Physical and occupational therapy are on consult  The patient was discussed with Dr Sheri Salgado and he is in agreement with the above note

## 2021-03-31 NOTE — PROGRESS NOTES
Belongings sent home with patient:    4 pairs of pink underwear, navy blue long sleeve shirt, walker, navy blue socks, mask, black sneakers and laces, 1 pair of pink/white underwear, 1 pair each of gray and blue underwear, two pairs of white underwear, 1 pair of gray jeans, gray tank top, red sweatpants, pink tshirt, blue tshirt, blue thermal shirt, 1 pair of blue jeans, brown socks, green and red socks, vivian soni pants, [facility], and [employer]

## 2021-03-31 NOTE — PROGRESS NOTES
03/31/21 0966   Team Meeting   Meeting Type Daily Rounds   Team Members Present   Team Members Present Physician;Nurse;Occupational Therapist;   Physician Team Member Dr Meghna Garcia MD; KAM Kelly   Nursing Team Member Kate Paris RN   Social Work Team Member Bruce Mcclain [facility]   OT Team Member Nicholas Baumann, [place]   Patient/Family Present   Patient Present No   Patient's Family Present No     No DC Date - Bety [place] attempting to obtain auth; same - flat, depressed

## 2021-03-31 NOTE — PLAN OF CARE
Problem: Alteration in Thoughts and Perception  Goal: Treatment Goal: Gain control of psychotic behaviors/thinking, reduce/eliminate presenting symptoms and demonstrate improved reality functioning upon discharge  Outcome: Adequate for Discharge  Goal: Verbalize thoughts and feelings  Description: Interventions:  - Promote a nonjudgmental and trusting relationship with the patient through active listening and therapeutic communication  - Assess patient's level of functioning, behavior and potential for risk  - Engage patient in 1 on 1 interactions  - Encourage patient to express fears, feelings, frustrations, and discuss symptoms    - [place] patient to reality, help patient recognize reality-based thinking   - Administer medications as ordered and assess for potential side effects  - Provide the patient education related to the signs and symptoms of the illness and desired effects of prescribed medications  Outcome: Adequate for Discharge  Goal: Refrain from acting on delusional thinking/internal stimuli  Description: Interventions:  - Monitor patient closely, per order   - Utilize least restrictive measures   - Set reasonable limits, give positive feedback for acceptable   - Administer medications as ordered and monitor of potential side effects  Outcome: Adequate for Discharge  Goal: Agree to be compliant with medication regime, as prescribed and report medication side effects  Description: Interventions:  - Offer appropriate PRN medication and supervise ingestion; conduct AIMS, as needed   Outcome: Adequate for Discharge  Goal: Attend and participate in unit activities, including therapeutic, recreational, and educational groups  Description: Interventions:  -Encourage Visitation and family involvement in care  Outcome: Adequate for Discharge  Goal: Recognize dysfunctional thoughts, communicate reality-based thoughts at the time of discharge  Description: Interventions:  - Provide medication and psycho-education to assist patient in compliance and developing insight into his/her illness   Outcome: Adequate for Discharge  Goal: Complete daily ADLs, including personal hygiene independently, as able  Description: Interventions:  - Observe, teach, and assist patient with ADLS  - Monitor and promote a balance of rest/activity, with adequate nutrition and elimination   Outcome: Adequate for Discharge     Problem: Ineffective Coping  Goal: Cooperates with admission process  Description: Interventions:   - Complete admission process  Outcome: Adequate for Discharge  Goal: Identifies ineffective coping skills  Outcome: Adequate for Discharge  Goal: Identifies healthy coping skills  Outcome: Adequate for Discharge  Goal: Demonstrates healthy coping skills  Outcome: Adequate for Discharge  Goal: Participates in unit activities  Description: Interventions:  - Provide therapeutic environment   - Provide required programming   - Redirect inappropriate behaviors   Outcome: Adequate for Discharge  Goal: Patient/Family participate in treatment and DC plans  Description: Interventions:  - Provide therapeutic environment  Outcome: Adequate for Discharge  Goal: Patient/Family verbalizes awareness of resources  Outcome: Adequate for Discharge  Goal: Understands least restrictive measures  Description: Interventions:  - Utilize least restrictive behavior  Outcome: Adequate for Discharge  Goal: Free from restraint events  Description: - Utilize least restrictive measures   - Provide behavioral interventions   - Redirect inappropriate behaviors   Outcome: Adequate for Discharge     Problem: Depression  Goal: Treatment Goal: Demonstrate behavioral control of depressive symptoms, verbalize feelings of improved mood/affect, and adopt new coping skills prior to discharge  Outcome: Adequate for Discharge  Goal: Verbalize thoughts and feelings  Description: Interventions:  - Assess and re-assess patient's level of risk   - Engage patient in 1:1 interactions, daily, for a minimum of 15 minutes   - Encourage patient to express feelings, fears, frustrations, hopes   Outcome: Adequate for Discharge  Goal: Refrain from harming self  Description: Interventions:  - Monitor patient closely, per order   - Supervise medication ingestion, monitor effects and side effects   Outcome: Adequate for Discharge  Goal: Refrain from isolation  Description: Interventions:  - Develop a trusting relationship   - Encourage socialization   Outcome: Adequate for Discharge  Goal: Refrain from self-neglect  Outcome: Adequate for Discharge  Goal: Attend and participate in unit activities, including therapeutic, recreational, and educational groups  Description: Interventions:  - Provide therapeutic and educational activities daily, encourage attendance and participation, and document same in the medical record   Outcome: Adequate for Discharge  Goal: Complete daily ADLs, including personal hygiene independently, as able  Description: Interventions:  - Observe, teach, and assist patient with ADLS  -  Monitor and promote a balance of rest/activity, with adequate nutrition and elimination   Outcome: Adequate for Discharge     Problem: Anxiety  Goal: Anxiety is at manageable level  Description: Interventions:  - Assess and monitor patient's anxiety level  - Monitor for signs and symptoms (heart palpitations, chest pain, shortness of breath, headaches, nausea, feeling jumpy, restlessness, irritable, apprehensive)  - Collaborate with interdisciplinary team and initiate plan and interventions as ordered    - [place] patient to unit/surroundings  - Explain treatment plan  - Encourage participation in care  - Encourage verbalization of concerns/fears  - Identify coping mechanisms  - Assist in developing anxiety-reducing skills  - Administer/offer alternative therapies  - Limit or eliminate stimulants  Outcome: Adequate for Discharge     Problem: Nutrition/Hydration-ADULT  Goal: Nutrient/Hydration intake appropriate for improving, restoring or maintaining nutritional needs  Description: Monitor and assess patient's nutrition/hydration status for malnutrition  Collaborate with interdisciplinary team and initiate plan and interventions as ordered  Monitor patient's weight and dietary intake as ordered or per policy  Utilize nutrition screening tool and intervene as necessary  Determine patient's food preferences and provide high-protein, high-caloric foods as appropriate       INTERVENTIONS:  - Monitor oral intake, urinary output, labs, and treatment plans  - Assess nutrition and hydration status and recommend course of action  - Evaluate amount of meals eaten  - Assist patient with eating if necessary   - Allow adequate time for meals  - Recommend/ encourage appropriate diets, oral nutritional supplements, and vitamin/mineral supplements  - Order, calculate, and assess calorie counts as needed  - Recommend, monitor, and adjust tube feedings and TPN/PPN based on assessed needs  - Assess need for intravenous fluids  - Provide specific nutrition/hydration education as appropriate  - Include patient/family/caregiver in decisions related to nutrition  Outcome: Adequate for Discharge     Problem: DISCHARGE PLANNING  Goal: Discharge to home or other facility with appropriate resources  Description: INTERVENTIONS:  - Identify barriers to discharge w/patient and caregiver  - Arrange for needed discharge resources and transportation as appropriate  - Identify discharge learning needs (meds, wound care, etc )  - Arrange for interpretive services to assist at discharge as needed  - Refer to Case Management Department for coordinating discharge planning if the patient needs post-hospital services based on physician/advanced practitioner order or complex needs related to functional status, cognitive ability, or social support system  Outcome: Adequate for Discharge Problem: Prexisting or High Potential for Compromised Skin Integrity  Goal: Skin integrity is maintained or improved  Description: INTERVENTIONS:  - Identify patients at risk for skin breakdown  - Assess and monitor skin integrity  - Assess and monitor nutrition and hydration status  - Monitor labs   - Assess for incontinence   - Turn and reposition patient  - Assist with mobility/ambulation  - Relieve pressure over bony prominences  - Avoid friction and shearing  - Provide appropriate hygiene as needed including keeping skin clean and dry  - Evaluate need for skin moisturizer/barrier cream  - Collaborate with interdisciplinary team   - Patient/family teaching  - Consider wound care consult   Outcome: Adequate for Discharge

## 2021-03-31 NOTE — DISCHARGE SUMMARY
Discharge Summary - Nilton [age] y o  female MRN: [id number]  Unit/Bed#: [place] [facility] 399-31 Encounter: [id number]     Admission Date: 2/20/2021         Discharge Date: 3/31/2021    Attending Psychiatrist: Alvin Mi MD    Reason for Admission/HPI: Major depressive disorder, single episode, unspecified [F32 9]  Psychosis ([facility] [address] ) [F29]    According to Dr Kashif Aponte:    History of Present Illness    This is a 64-year-old white woman never  who resides with her mom of 29-year-old, with history of depression in the past, who developed delusional thinking and psychosis in the last couple of months, and she felt like that she have a horrible relationship with the Creator, and she cursed the got out, she feels she has cursed back, she feels that she should not eat, unable to explain why, she is eating but very little, lost significant amount of weight, she has stated that she is not [place] and she has a contract with the devil and had demon interaction, her family called the ambulance and took her to the hospital as she was refusing to eat and drink and has been laying in the bed for 1 month  Patient has not taken any medication    Patient report and October September of last year she was admitted to psychiatric unit [facility] in the [facility] and treated with the medication but she does not remember what is the name of medication and she ran out of these medication in November  She stated the reason of hospitalization was that she was depressed and became suicidal and was admitted but she did not do anything to hurt herself  Denied any other psychiatric hospitalizations or treatment  Hospital Course: The patient was admitted to the inpatient psychiatric unit and started on every 7 minutes precautions  During the hospitalization the patient was attending individual therapy, group therapy, milieu therapy and occupational therapy      Psychiatric medications were titrated over the hospital stay  To address depression, mood instability and insomnia the patient was started on antidepressant Zoloft and Remeron, antipsychotic medication Zyprexa and hypnotic medication Trazodone  Medication doses were titrated during the hospital course  Prior to beginning of treatment medications risks and benefits and possible side effects including risk of parkinsonian symptoms, Tardive Dyskinesia and metabolic syndrome related to treatment with antipsychotic medications, risk of cardiovascular events in elderly related to treatment with antipsychotic medications and risk of suicidality and serotonin syndrome related to treatment with antidepressants were reviewed with the patient  The patient verbalized understanding and agreement for treatment  Patient's symptoms improved gradually over the hospital course  At the end of treatment the patient was doing well  Mood was stable at the time of discharge  The patient denied suicidal ideation, intent or plan at the time of discharge and denied homicidal ideation, intent or plan at the time of discharge  There was no overt psychosis at the time of discharge  Sleep and appetite were improved  The patient was tolerating medications and was not reporting any significant side effects at the time of discharge  Since the patient was doing well at the end of the hospitalization, treatment team felt that the patient could be safely discharged to [facility]  The outpatient follow up was arranged by the unit  upon discharge      Mental Status at time of Discharge:     Appearance:  age appropriate   Behavior:  cooperative   Speech:  normal volume   Mood:  euthymic   Affect:  mood-congruent   Thought Process:  goal directed   Thought Content:  no overt delusions   Perceptual Disturbances: None   Risk Potential: Suicidal Ideations none, HI none   Sensorium:  person, place and time/date   Cognition:  recent and remote memory grossly intact Consciousness:  alert and awake    Attention: attention span and concentration were age appropriate   Insight:  improved   Judgment: fair   Gait/Station: slow and uses walker   Motor Activity: no abnormal movements     Admission Diagnosis:Major depressive disorder, single episode, unspecified [F32 9]  Psychosis ([place] [address] ) [F29]    Discharge Diagnosis:   Principal Problem (Resolved):    MDD (major depressive disorder), recurrent episode ([place] [address] )  Active Problems:    Essential hypertension    Depression    Other constipation  Resolved Problems:    Absence of bladder continence    History of Asperger's syndrome    Aftercare following right hip joint replacement surgery    Severe protein-calorie malnutrition ([place] [address] )      Lab results:  Admission on 02/20/2021   Component Date Value    RPR 02/21/2021 Non-Reactive     Ventricular Rate 02/21/2021 85     Atrial Rate 02/21/2021 85     AK Interval 02/21/2021 156     QRSD Interval 02/21/2021 86     QT Interval 02/21/2021 356     QTC Interval 02/21/2021 423     P Axis 02/21/2021 70     QRS Axis 02/21/2021 -23     T Wave Axis 02/21/2021 69     Hemoglobin A1C 02/22/2021 5 3     EAG 02/22/2021 105     Vitamin B-12 02/22/2021 626     Vit D, 25-Hydroxy 02/22/2021 38 1     Folate 02/22/2021 14 9     POC Glucose 02/23/2021 102     POC Glucose 02/24/2021 96     Ventricular Rate 02/20/2021 91     Atrial Rate 02/20/2021 91     AK Interval 02/20/2021 154     QRSD Interval 02/20/2021 88     QT Interval 02/20/2021 356     QTC Interval 02/20/2021 437     P Axis 02/20/2021 72     QRS Axis 02/20/2021 -34     T Wave Axis 02/20/2021 76     POC Glucose 02/24/2021 108     POC Glucose 02/25/2021 88     POC Glucose 02/25/2021 95     POC Glucose 02/26/2021 100     POC Glucose 02/26/2021 103     POC Glucose 02/27/2021 94     POC Glucose 02/27/2021 98     POC Glucose 02/28/2021 91     POC Glucose 02/28/2021 98     POC Glucose 03/01/2021 91     Sodium 03/01/2021 137     Potassium 03/01/2021 4 0     Chloride 03/01/2021 104     CO2 03/01/2021 26     ANION GAP 03/01/2021 7     BUN 03/01/2021 18     Creatinine 03/01/2021 0 73     Glucose 03/01/2021 102*    Calcium 03/01/2021 9 3     AST 03/01/2021 9*    ALT 03/01/2021 11     Alkaline Phosphatase 03/01/2021 61     Total Protein 03/01/2021 6 5     Albumin 03/01/2021 4 1     Total Bilirubin 03/01/2021 0 40     eGFR 03/01/2021 90     POC Glucose 03/01/2021 106     POC Glucose 03/02/2021 87     POC Glucose 03/02/2021 91     POC Glucose 03/03/2021 83     POC Glucose 03/03/2021 102     POC Glucose 03/03/2021 83     POC Glucose 03/03/2021 102     POC Glucose 03/04/2021 91     POC Glucose 03/04/2021 90     POC Glucose 03/05/2021 90     POC Glucose 03/05/2021 91     POC Glucose 03/05/2021 90     POC Glucose 03/04/2021 91     POC Glucose 03/06/2021 86     POC Glucose 03/06/2021 101     POC Glucose 03/07/2021 83     POC Glucose 03/07/2021 95     POC Glucose 03/08/2021 84     POC Glucose 03/08/2021 99     POC Glucose 03/09/2021 82     POC Glucose 03/09/2021 89     POC Glucose 03/10/2021 94     POC Glucose 03/10/2021 96     POC Glucose 03/11/2021 108     POC Glucose 03/11/2021 86     POC Glucose 03/11/2021 108     POC Glucose 03/12/2021 91     POC Glucose 03/12/2021 95     POC Glucose 03/13/2021 84     POC Glucose 03/13/2021 97     POC Glucose 03/14/2021 93     POC Glucose 03/14/2021 92     POC Glucose 03/15/2021 94     POC Glucose 03/15/2021 116     POC Glucose 03/16/2021 96     POC Glucose 03/16/2021 107     POC Glucose 03/17/2021 92     WBC 03/17/2021 4 90     RBC 03/17/2021 4 71     Hemoglobin 03/17/2021 14 8     Hematocrit 03/17/2021 45 0     MCV 03/17/2021 96     MCH 03/17/2021 31 4     MCHC 03/17/2021 32 9     RDW 03/17/2021 14 2     MPV 03/17/2021 8 2*    Platelets 37/08/1877 273     Neutrophils Relative 03/17/2021 71     Lymphocytes Relative 03/17/2021 18*    Monocytes Relative 03/17/2021 10     Eosinophils Relative 03/17/2021 1     Basophils Relative 03/17/2021 1     Neutrophils Absolute 03/17/2021 3 50     Lymphocytes Absolute 03/17/2021 0 90     Monocytes Absolute 03/17/2021 0 50     Eosinophils Absolute 03/17/2021 0 00     Basophils Absolute 03/17/2021 0 00     Total CK 03/17/2021 955*    Sodium 03/17/2021 141     Potassium 03/17/2021 3 9     Chloride 03/17/2021 103     CO2 03/17/2021 27     ANION GAP 03/17/2021 11     BUN 03/17/2021 10     Creatinine 03/17/2021 0 70     Glucose 03/17/2021 106*    Glucose, Fasting 03/17/2021 106*    Calcium 03/17/2021 9 5     AST 03/17/2021 28     ALT 03/17/2021 22     Alkaline Phosphatase 03/17/2021 50     Total Protein 03/17/2021 6 7     Albumin 03/17/2021 4 1     Total Bilirubin 03/17/2021 0 70     eGFR 03/17/2021 95     POC Glucose 03/17/2021 92     CK-MB Index 03/17/2021 0 6     CK-MB 03/17/2021 5 5     Color, UA 03/17/2021 Yellow     Clarity, UA 03/17/2021 Clear     Specific Gravity, UA 03/17/2021 1 015     pH, UA 03/17/2021 7 0     Leukocytes, UA 03/17/2021 1+*    Nitrite, UA 03/17/2021 Negative     Protein, UA 03/17/2021 Negative     Glucose, UA 03/17/2021 Negative     Ketones, UA 03/17/2021 Negative     Urobilinogen, UA 03/17/2021 0 2     Bilirubin, UA 03/17/2021 Negative     Blood, UA 03/17/2021 Negative     POC Glucose 03/17/2021 103     RBC, UA 03/17/2021 0-1     WBC, UA 03/17/2021 2-4     Epithelial Cells 03/17/2021 Occasional     Bacteria, UA 03/17/2021 Occasional     POC Glucose 03/18/2021 107     POC Glucose 03/18/2021 132     POC Glucose 03/19/2021 90     POC Glucose 03/19/2021 105     POC Glucose 03/20/2021 95     POC Glucose 03/20/2021 97     Total CK 03/21/2021 297*    CK-MB Index 03/21/2021 1 1     CK-MB 03/21/2021 3 4     POC Glucose 03/21/2021 94     POC Glucose 03/21/2021 106     POC Glucose 03/22/2021 91     POC Glucose 03/22/2021 96     POC Glucose 03/23/2021 89     POC Glucose 03/23/2021 104     POC Glucose 03/24/2021 86     POC Glucose 03/24/2021 101     POC Glucose 03/25/2021 87     POC Glucose 03/25/2021 106     POC Glucose 03/26/2021 72     POC Glucose 03/26/2021 104     POC Glucose 03/27/2021 88     POC Glucose 03/27/2021 102     POC Glucose 03/28/2021 86     POC Glucose 03/28/2021 103     POC Glucose 03/29/2021 83     POC Glucose 03/29/2021 87     POC Glucose 03/30/2021 85     POC Glucose 03/30/2021 102     POC Glucose 03/31/2021 89     SARS-CoV-2 03/31/2021 Negative     INFLUENZA A PCR 03/31/2021 Negative     INFLUENZA B PCR 03/31/2021 Negative     RSV PCR 03/31/2021 Negative        Discharge Medications:  Current Discharge Medication List      START taking these medications    Details   amantadine (SYMMETREL) 100 mg capsule Take 1 capsule (100 mg total) by mouth 2 (two) times a day  Qty:  , Refills: 0    Associated Diagnoses: Severe episode of recurrent major depressive disorder, with psychotic features (HCC)      cholecalciferol (VITAMIN D3) 1,000 units tablet Take 1 tablet (1,000 Units total) by mouth daily  Qty: 30 tablet, Refills: 0    Associated Diagnoses: Vitamin D deficiency      loperamide (IMODIUM) 2 mg capsule Take 1 capsule (2 mg total) by mouth 3 (three) times a day as needed for diarrhea  Qty: 30 capsule, Refills: 0    Associated Diagnoses: Diarrhea      mirtazapine (REMERON) 15 mg tablet Take 1 tablet (15 mg total) by mouth daily at bedtime  Qty:  , Refills: 0    Associated Diagnoses: Severe episode of recurrent major depressive disorder, with psychotic features ([facility] [address] );  Severe protein-calorie malnutrition (HCC)      nystatin (MYCOSTATIN) powder Apply topically 3 (three) times a day  Qty: 15 g, Refills: 0    Associated Diagnoses: Intertrigo      OLANZapine (ZyPREXA) 7 5 mg tablet Take 1 tablet (7 5 mg total) by mouth daily at bedtime  Qty:  , Refills: 0    Associated Diagnoses: Severe episode of recurrent major depressive disorder, with psychotic features (HCC)      sertraline (ZOLOFT) 50 mg tablet Take 1 tablet (50 mg total) by mouth daily  Qty:  , Refills: 0    Associated Diagnoses: Severe episode of recurrent major depressive disorder, with psychotic features (HCC)      sitaGLIPtin (JANUVIA) 50 mg tablet Take 1 tablet (50 mg total) by mouth daily  Qty:  , Refills: 0    Associated Diagnoses: Diabetes ([facility] [address] )      traZODone (DESYREL) 100 mg tablet Take 1 tablet (100 mg total) by mouth daily at bedtime  Qty:  , Refills: 0    Associated Diagnoses: Severe episode of recurrent major depressive disorder, with psychotic features ([facility] [address] )            Current Discharge Medication List      STOP taking these medications       ascorbic acid (VITAMIN C) 500 mg tablet Comments:   Reason for Stopping:         aspirin 81 MG tablet Comments:   Reason for Stopping:         B Complex Vitamins (VITAMIN B COMPLEX PO) Comments:   Reason for Stopping:         Cholecalciferol (VITAMIN D3) 5000 units CAPS Comments:   Reason for Stopping:         CHROMIUM POLYNICOTINATE PO Comments:   Reason for Stopping:         Lactobacillus (ACIDOPHILUS) 100 MG CAPS Comments:   Reason for Stopping:         LORazepam (ATIVAN) 1 mg tablet Comments:   Reason for Stopping:         Magnesium 250 MG TABS Comments:   Reason for Stopping:         Potassium 99 MG TABS Comments:   Reason for Stopping:         Resveratrol 100 MG CAPS Comments:   Reason for Stopping:         Selenium 200 MCG CAPS Comments:   Reason for Stopping:         temazepam (RESTORIL) 15 mg capsule Comments:   Reason for Stopping:         VANADYL SULFATE PO Comments:   Reason for Stopping:         Cyanocobalamin (VITAMIN B 12) 250 MCG LOZG Comments:   Reason for Stopping:         fluocinonide (LIDEX) 0 05 % cream Comments:   Reason for Stopping:         MAGNESIUM-POTASSIUM PO Comments:   Reason for Stopping:         ondansetron (ZOFRAN-ODT) 4 mg disintegrating tablet Comments:   Reason for Stopping:         patient supplied medication Comments:   Reason for Stopping:              Current Discharge Medication List      CONTINUE these medications which have CHANGED    Details   acetaminophen (TYLENOL) 325 mg tablet Take 2 tablets (650 mg total) by mouth every 6 (six) hours as needed for moderate pain or fever  Qty: 100 tablet, Refills: 0    Associated Diagnoses: Fracture, hip, right, closed, initial encounter ([facility])      losartan (COZAAR) 50 mg tablet Take 1 tablet (50 mg total) by mouth daily  Qty: 90 tablet, Refills: 0    Associated Diagnoses: Hypertension, essential      !! melatonin 3 mg Take 1 tablet (3 mg total) by mouth daily at bedtime  Qty: 30 tablet, Refills: 0    Associated Diagnoses: Other insomnia      !! melatonin 3 mg Take 2 tablets (6 mg total) by mouth daily at bedtime  Refills: 0    Associated Diagnoses: History of Asperger's syndrome; Primary insomnia      Menatetrenone (Vitamin K2) 100 MCG TABS Take 100 mg by mouth daily  Qty: 30 tablet, Refills: 0    Associated Diagnoses: Essential hypertension      Multiple Vitamins-Minerals (multivitamin with minerals) tablet Take 1 tablet by mouth daily  Qty: 30 tablet, Refills: 0    Associated Diagnoses: Vitamin D deficiency      oxybutynin (DITROPAN-XL) 5 mg 24 hr tablet Take 1 tablet (5 mg total) by mouth daily  Qty: 30 tablet, Refills: 0    Associated Diagnoses: Functional urinary incontinence       !! - Potential duplicate medications found  Please discuss with provider  Current Discharge Medication List           Discharge instructions/Information to patient and family:   See after visit summary for information provided to patient and family  Provisions for Follow-Up Care:  See after visit summary for information related to follow-up care and any pertinent home health orders  Discharge Statement   I spent 30 minutes discharging the patient  This time was spent on the day of discharge  I had direct contact with the patient on the day of discharge   Additional documentation is required if more than 30 minutes were spent on discharge

## 2022-11-29 NOTE — PROGRESS NOTES
11/30/2022    Tang Nova  1960  [id number]        Assessment  -Overactive bladder  -Mixed urinary incontinence    Discussion/Plan  Ladonna Arreguin is a 65 y o  female who presents in consultation    1  Overactive bladder, mixed urinary incontinence- patient unable to provide urine specimen in the office today  Given her acute onset of urinary frequency and incontinence, I recommend obtaining a renal ultrasound with PVR assessment  Patient noted to be on oral anti hyper glycemic, but she has not had a recent hemoglobin A1c  Lab work ordered  We reviewed Kegel exercises as well as dietary and behavioral modifications  Plan to call facility with results  If findings are unremarkable, we discussed increasing Myrbetriq to 50 milligrams daily and referral to pelvic floor physical therapy  Call facility with results of renal ultrasound and lab work  Instructions were provided  They were advised to call with any questions or issues     -All questions answered, patient and caretaker agree with plan     History of Present Illness  65 y o  female who presents in consultation today for evaluation of urinary symptoms  She was referred by her PCP  Patient resides at [facility] and is accompanied today by a caretaker  She has a history of autism and cerebral palsy  Patient is an excellent historian  Patient reports acute onset increased urinary frequency and incontinence over the last several months  She is now wearing sanitary briefs and reports changing 3-4 times daily  Patient reports limited control of her urine  She is primarily sedentary and wheelchair-bound  She enjoys drinking coffee and tea throughout the day and admits to not hydrating enough with water  Records from facility note that patient is on add Myrbetriq 25 mg daily  She states that facility recently obtained a urine specimen 3 weeks ago via straight catheterization which was unremarkable    Patient denies any additional urologic history, surgical intervention, or instrumentation  Review of Systems  Review of Systems   Constitutional: Negative  HENT: Negative  Respiratory: Negative  Cardiovascular: Negative  Gastrointestinal: Negative  Genitourinary: Positive for frequency  Negative for decreased urine volume, difficulty urinating, dysuria, flank pain, hematuria and urgency  Musculoskeletal: Negative  Skin: Negative  Neurological: Negative  Psychiatric/Behavioral: Negative  Past Medical History  Past Medical History:   Diagnosis Date   • Anxiety    • Cerebral palsy ([facility] )    • Depression    • Hypertension    • Pre-diabetes    • Psychiatric disorder    • [facility]    • Scoliosis        Past Social History  Past Surgical History:   Procedure Laterality Date   • APPENDECTOMY     • BUNIONECTOMY Bilateral    • LA PARTIAL HIP REPLACEMENT Right 9/23/2019    Procedure: HEMIARTHROPLASTY HIP (BIPOLAR); Surgeon: Dale St MD;  Location: [facility];  Service: Orthopedics       Past Family History  Family History   Problem Relation Age of Onset   • Hypertension Mother    • Heart disease Mother    • Prostate cancer Father    • Heart disease Father    • Substance Abuse Neg Hx    • Mental illness Neg Hx        Past Social history  Social History     Socioeconomic History   • Marital status: Single     Spouse name: Not on file   • Number of children: Not on file   • Years of education: Not on file   • Highest education level: Not on file   Occupational History   • Occupation: unemployed   Tobacco Use   • Smoking status: Never   • Smokeless tobacco: Never   Vaping Use   • Vaping Use: Never used   Substance and Sexual Activity   • Alcohol use: Never   • Drug use: Never   • Sexual activity: Never   Other Topics Concern   • Not on file   Social History Narrative    Always uses seat belt    Caffeine use    Lives with mother  Feels safe  No living will  Sees dentist reg       Social Determinants of Health     Financial Resource Strain: Not on file   Food Insecurity: Not on file   Transportation Needs: Not on file   Physical Activity: Not on file   Stress: Not on file   Social Connections: Not on file   Intimate Partner Violence: Not on file   Housing Stability: Not on file       Current Medications  Current Outpatient Medications   Medication Sig Dispense Refill   • acetaminophen (TYLENOL) 325 mg tablet Take 2 tablets (650 mg total) by mouth every 6 (six) hours as needed for moderate pain or fever 100 tablet 0   • amantadine (SYMMETREL) 100 mg capsule Take 1 capsule (100 mg total) by mouth 2 (two) times a day  0   • cholecalciferol (VITAMIN D3) 1,000 units tablet Take 1 tablet (1,000 Units total) by mouth daily 30 tablet 0   • loperamide (IMODIUM) 2 mg capsule Take 1 capsule (2 mg total) by mouth 3 (three) times a day as needed for diarrhea 30 capsule 0   • losartan (COZAAR) 50 mg tablet Take 1 tablet (50 mg total) by mouth daily 90 tablet 0   • melatonin 3 mg Take 1 tablet (3 mg total) by mouth daily at bedtime 30 tablet 0   • melatonin 3 mg Take 2 tablets (6 mg total) by mouth daily at bedtime  0   • Menatetrenone (Vitamin K2) 100 MCG TABS Take 100 mg by mouth daily 30 tablet 0   • mirtazapine (REMERON) 15 mg tablet Take 1 tablet (15 mg total) by mouth daily at bedtime  0   • Multiple Vitamins-Minerals (multivitamin with minerals) tablet Take 1 tablet by mouth daily 30 tablet 0   • nystatin (MYCOSTATIN) powder Apply topically 3 (three) times a day 15 g 0   • OLANZapine (ZyPREXA) 7 5 mg tablet Take 1 tablet (7 5 mg total) by mouth daily at bedtime  0   • oxybutynin (DITROPAN-XL) 5 mg 24 hr tablet Take 1 tablet (5 mg total) by mouth daily 30 tablet 0   • sertraline (ZOLOFT) 50 mg tablet Take 1 tablet (50 mg total) by mouth daily  0   • sitaGLIPtin (JANUVIA) 50 mg tablet Take 1 tablet (50 mg total) by mouth daily  0   • traZODone (DESYREL) 100 mg tablet Take 1 tablet (100 mg total) by mouth daily at bedtime 0     No current facility-administered medications for this visit  Allergies  Allergies   Allergen Reactions   • Bactrim [Sulfamethoxazole-Trimethoprim]    • Sulfa Antibiotics Rash and Itching       Past medical history, social history, family history, medications and allergies were reviewed  Vitals  There were no vitals filed for this visit  Physical Exam  Physical Exam  Constitutional:       Appearance: Normal appearance  She is well-developed  HENT:      Head: Normocephalic  Eyes:      Pupils: Pupils are equal, round, and reactive to light  Pulmonary:      Effort: Pulmonary effort is normal    Abdominal:      Palpations: Abdomen is soft  Musculoskeletal:         General: Normal range of motion  Cervical back: Normal range of motion  Comments: Wheelchair bound   Skin:     General: Skin is warm and dry  Neurological:      General: No focal deficit present  Mental Status: She is alert and oriented to person, place, and time  Psychiatric:         Mood and Affect: Mood normal          Behavior: Behavior normal          Thought Content: Thought content normal          Judgment: Judgment normal          Results    I have personally reviewed all pertinent lab results and reviewed with patient  Lab Results   Component Value Date    GLUCOSE 107 12/22/2014    CALCIUM 9 5 03/17/2021     12/22/2014    K 3 9 03/17/2021    CO2 27 03/17/2021     03/17/2021    BUN 10 03/17/2021    CREATININE 0 70 03/17/2021     Lab Results   Component Value Date    WBC 4 90 03/17/2021    HGB 14 8 03/17/2021    HCT 45 0 03/17/2021    MCV 96 03/17/2021     03/17/2021     No results found for this or any previous visit (from the past 1 hour(s))

## 2022-11-30 ENCOUNTER — OFFICE VISIT (OUTPATIENT)
Dept: UROLOGY | Facility: HOSPITAL | Age: 62
End: 2022-11-30

## 2022-11-30 VITALS — HEART RATE: 103 BPM | OXYGEN SATURATION: 98 % | SYSTOLIC BLOOD PRESSURE: 128 MMHG | DIASTOLIC BLOOD PRESSURE: 78 MMHG

## 2022-11-30 DIAGNOSIS — E13.65 OTHER SPECIFIED DIABETES MELLITUS WITH HYPERGLYCEMIA, WITHOUT LONG-TERM CURRENT USE OF INSULIN (HCC): ICD-10-CM

## 2022-11-30 DIAGNOSIS — N39.46 MIXED STRESS AND URGE URINARY INCONTINENCE: ICD-10-CM

## 2022-11-30 DIAGNOSIS — N32.81 OAB (OVERACTIVE BLADDER): Primary | ICD-10-CM

## 2022-11-30 RX ORDER — ATORVASTATIN CALCIUM 10 MG/1
10 TABLET, FILM COATED ORAL DAILY
COMMUNITY

## 2022-11-30 RX ORDER — OMEPRAZOLE 10 MG/1
10 CAPSULE, DELAYED RELEASE ORAL DAILY
COMMUNITY

## 2022-11-30 RX ORDER — ARIPIPRAZOLE 15 MG/1
15 TABLET ORAL DAILY
COMMUNITY

## 2022-11-30 RX ORDER — POLYVINYL ALCOHOL, POVIDONE 14; 6 MG/ML; MG/ML
SOLUTION/ DROPS OPHTHALMIC
COMMUNITY

## 2022-12-12 ENCOUNTER — TELEPHONE (OUTPATIENT)
Dept: NEPHROLOGY | Facility: CLINIC | Age: 62
End: 2022-12-12

## 2022-12-12 NOTE — TELEPHONE ENCOUNTER
cNew Patient Intake Form   Patient Details   Terri Valerio     1960     [id number]     Insurance Information   Name of [employer]    Does the patient need an insurance referral? no   If patient has Pitnaomi Jenkins, please ask if they will be using their Pitnaomi Gregory  Appointment Information   Who is calling to schedule? If not patient, what is callers name? Saumya with [facility]    Referring Provider -   Referring Provider Number -   Reason for Appt (Diagnosis) US done - showed kidney stone    Is patient aware of why they are being referred? yes   Does Patient have labs done at [place] [address]? If not, where do they go? At [facility]    Has patient had labs / urine work done? List date of most recent lab / urine work yes   Has patient had a BMP & CBC done in the past 2 years? If so, list the date yes   Has patient been hospitalized recently? If yes, list name and location of hospital they were In no   Has patient been seen by a Nephrologist before? If yes, list name, location and phone number no   Has patient been see by another Speciality before (ex  Neurology, urology, cardiology)? If yes, please list name, and speciality  Urology    Has the patient had imaging done? If so, list the most recent date and type of imagineg yes   Does the patient has a stone analysis report if history of kidney stones?  -   Appointment Details   Is there a referral on file? no   Appointment Date  03/08/2023   Location [place]    Miscellaneous

## 2023-02-13 ENCOUNTER — HOSPITAL ENCOUNTER (OUTPATIENT)
Dept: MRI IMAGING | Facility: HOSPITAL | Age: 63
Discharge: HOME | End: 2023-02-13

## 2023-02-13 DIAGNOSIS — M54.16 RADICULOPATHY, LUMBAR REGION: ICD-10-CM

## 2023-03-03 LAB — HBA1C MFR BLD HPLC: 5.5 %

## 2023-03-03 NOTE — PROGRESS NOTES
03/18/21 0943   Team Meeting   Meeting Type Daily Rounds   Team Members Present   Team Members Present Physician;Nurse;; Occupational Therapist   Physician Team Member Dr Danni Bustamante MD   Nursing Team Member Sabino Dallas RN   Social Work Team Member Mica Wright [facility]   OT Team Member Joanne Tony [place]   Patient/Family Present   Patient Present No   Patient's Family Present No     No DC date; dc to [facility] upon stabilization; labs yesterday; temps normalized; c/o weakness and inability to move; dep 6/10; flat and depressed affect; slept; med compliant Solaraze Counseling:  I discussed with the patient the risks of Solaraze including but not limited to erythema, scaling, itching, weeping, crusting, and pain.

## 2023-03-08 ENCOUNTER — OFFICE VISIT (OUTPATIENT)
Dept: NEPHROLOGY | Facility: HOSPITAL | Age: 63
End: 2023-03-08

## 2023-03-08 VITALS — HEART RATE: 90 BPM | SYSTOLIC BLOOD PRESSURE: 120 MMHG | OXYGEN SATURATION: 96 % | DIASTOLIC BLOOD PRESSURE: 78 MMHG

## 2023-03-08 DIAGNOSIS — N18.2 CKD STAGE G2/A1, GFR 60-89 AND ALBUMIN CREATININE RATIO <30 MG/G: ICD-10-CM

## 2023-03-08 DIAGNOSIS — I10 ESSENTIAL HYPERTENSION: Primary | ICD-10-CM

## 2023-03-08 PROBLEM — N39.41 URGE INCONTINENCE OF URINE: Status: ACTIVE | Noted: 2023-03-08

## 2023-03-08 RX ORDER — FLUTICASONE PROPIONATE 50 MCG
1 SPRAY, SUSPENSION (ML) NASAL DAILY
COMMUNITY

## 2023-03-08 RX ORDER — DOCUSATE SODIUM 100 MG/1
100 CAPSULE, LIQUID FILLED ORAL 2 TIMES DAILY
COMMUNITY

## 2023-03-08 RX ORDER — SENNA PLUS 8.6 MG/1
1 TABLET ORAL 2 TIMES DAILY
COMMUNITY

## 2023-03-08 NOTE — PATIENT INSTRUCTIONS
Dehydration   WHAT YOU NEED TO KNOW:   Dehydration is a condition that develops when your body does not have enough fluid  You may become dehydrated if you do not drink enough water or lose too much fluid  Fluid loss may also cause loss of electrolytes (minerals), such as sodium  DISCHARGE INSTRUCTIONS:   Return to the emergency department if:   You have a seizure  You are confused or cannot think clearly  You are extremely sleepy, or another person cannot wake you  You become dizzy or faint when you stand  You are not able to urinate  You have trouble breathing  You have a fast or irregular heartbeat  Your hands or feet are cold, or your face is pale  Contact your healthcare provider if:   You have trouble drinking liquids because you are vomiting  Your symptoms get worse  You have a fever  You feel very weak or tired  You have questions or concerns about your condition or care  Follow up with your healthcare provider as directed:  Write down your questions so you remember to ask them during your visits  Prevent or manage dehydration:   Drink liquids as directed  Liquids that contain water, sugar, and minerals can help your body hold in fluid and help prevent dehydration  Drink liquids throughout the day, not just when you feel thirsty  Men should drink about 3 liters (13 eight-ounce cups) of liquid each day  Women should drink about 2 liters (9 eight-ounce cups) of liquid each day  Drink even more liquid if you will be outdoors, in the sun for a long time, or exercising  Stay cool  Limit the time you spend outdoors during the hottest part of the day  Dress in lightweight clothes  Keep track of how often you urinate  If you urinate less than usual or your urine is darker, drink more liquids  © Copyright Ramiro Dewey 2022 Information is for End User's use only and may not be sold, redistributed or otherwise used for commercial purposes    The above information is an  only  It is not intended as medical advice for individual conditions or treatments  Talk to your doctor, nurse or pharmacist before following any medical regimen to see if it is safe and effective for you

## 2023-03-08 NOTE — PROGRESS NOTES
OFFICE CONSULT - Nephrology   Ara Smalls 65 y o  female MRN: [id number]    Encounter: [id number]        ASSESSMENT & PLAN:    1  CKD G2A1  o Etiology: Age related nephron loss  o Urine: unable to give specimen today  o Imaging: A renal US was ordered by urology, images not uploaded in our system, patient is unaware of any kidney stones  o Plan:, Urine output acceptable, function and electrolytes acceptable  Avoiding nephrotoxic agents  Medications are appropriately dosed  2  Electrolytes:  o Current electrolytes and acid-base status are acceptable  3  Blood Pressure:  o Was on losartan, this is now on hold, blood pressures are at goal  4  Hemoglobin:  o Hemoglobins are acceptable, no signs of anemia  5  Overactive bladder  o The patient was seen by urology recommended  o Kegel exercises and a renal ultrasound was ordered  6  Nephrolithiasis?  o  imaging could not be seen on epic  o Patient unaware of any kidney stones  o Continue to monitor symptoms increase fluid intake    DISCUSSION:    It was nice meeting Kaushik Martino today  Overall stable from a renal standpoint  Her creatinine is around 0 8  Her electrolytes are stable  Her blood pressures are well controlled  She has no active issues that need to be managed by nephrology  She can follow-up with urology  Please call with any questions or concerns    HPI:  Ara Smalls is a 65 y  o female who was referred by Leeanna Zabala MD for evaluation of No chief complaint on file  71-year-old female with a history of Asperger's, cerebral palsy, patient was having urinary frequency and incontinence she saw urology for this  Continues to have symptoms but is being treated conservatively  Overall feels well  Her blood pressures are well controlled  She denies any acute chest pain or shortness of breath no fevers or chills      I personally spent over half of a total 60 minutes face to face with the patient in counseling and discussion and/or coordination of care as described above  ROS:    Review of Systems   Constitutional: Negative  HENT: Negative  Eyes: Negative  Respiratory: Negative  Cardiovascular: Negative  Gastrointestinal: Negative  Endocrine: Negative  Genitourinary: Positive for difficulty urinating  Musculoskeletal: Negative  Skin: Negative  Allergic/Immunologic: Negative  Neurological: Negative  Hematological: Negative  Psychiatric/Behavioral: Negative  All other systems reviewed and are negative        Allergies: Bactrim [sulfamethoxazole-trimethoprim] and Sulfa antibiotics    Medications:   Current Outpatient Medications:   •  acetaminophen (TYLENOL) 325 mg tablet, Take 2 tablets (650 mg total) by mouth every 6 (six) hours as needed for moderate pain or fever, Disp: 100 tablet, Rfl: 0  •  atorvastatin (LIPITOR) 10 mg tablet, Take 10 mg by mouth daily, Disp: , Rfl:   •  cholecalciferol (VITAMIN D3) 1,000 units tablet, Take 1 tablet (1,000 Units total) by mouth daily, Disp: 30 tablet, Rfl: 0  •  docusate sodium (COLACE) 100 mg capsule, Take 100 mg by mouth 2 (two) times a day, Disp: , Rfl:   •  fluticasone (FLONASE) 50 mcg/act nasal spray, 1 spray into each nostril daily, Disp: , Rfl:   •  loperamide (IMODIUM) 2 mg capsule, Take 1 capsule (2 mg total) by mouth 3 (three) times a day as needed for diarrhea, Disp: 30 capsule, Rfl: 0  •  melatonin 3 mg, Take 1 tablet (3 mg total) by mouth daily at bedtime, Disp: 30 tablet, Rfl: 0  •  melatonin 3 mg, Take 2 tablets (6 mg total) by mouth daily at bedtime, Disp: , Rfl: 0  •  Menatetrenone (Vitamin K2) 100 MCG TABS, Take 100 mg by mouth daily, Disp: 30 tablet, Rfl: 0  •  Multiple Vitamins-Minerals (multivitamin with minerals) tablet, Take 1 tablet by mouth daily, Disp: 30 tablet, Rfl: 0  •  nystatin (MYCOSTATIN) powder, Apply topically 3 (three) times a day, Disp: 15 g, Rfl: 0  •  omeprazole (PriLOSEC) 10 mg delayed release capsule, Take 20 mg by mouth daily, Disp: , Rfl:   • Polyvinyl Alcohol-Povidone PF (Refresh) 1 4-0 6 % SOLN, Apply to eye, Disp: , Rfl:   •  senna (SENOKOT) 8 6 MG tablet, Take 1 tablet by mouth 2 (two) times a day, Disp: , Rfl:   •  traZODone (DESYREL) 100 mg tablet, Take 1 tablet (100 mg total) by mouth daily at bedtime, Disp:  , Rfl: 0  •  vortioxetine (TRINTELLIX) 10 MG tablet, Take 10 mg by mouth daily, Disp: , Rfl:   •  amantadine (SYMMETREL) 100 mg capsule, Take 1 capsule (100 mg total) by mouth 2 (two) times a day (Patient not taking: Reported on 3/8/2023), Disp:  , Rfl: 0  •  ARIPiprazole (ABILIFY) 15 mg tablet, Take 15 mg by mouth daily, Disp: , Rfl:   •  losartan (COZAAR) 50 mg tablet, Take 1 tablet (50 mg total) by mouth daily (Patient not taking: Reported on 3/8/2023), Disp: 90 tablet, Rfl: 0  •  mirtazapine (REMERON) 15 mg tablet, Take 1 tablet (15 mg total) by mouth daily at bedtime (Patient not taking: Reported on 3/8/2023), Disp:  , Rfl: 0  •  OLANZapine (ZyPREXA) 7 5 mg tablet, Take 1 tablet (7 5 mg total) by mouth daily at bedtime (Patient not taking: Reported on 3/8/2023), Disp:  , Rfl: 0  •  sertraline (ZOLOFT) 50 mg tablet, Take 1 tablet (50 mg total) by mouth daily (Patient not taking: Reported on 3/8/2023), Disp:  , Rfl: 0  •  sitaGLIPtin (JANUVIA) 50 mg tablet, Take 1 tablet (50 mg total) by mouth daily, Disp:  , Rfl: 0    Past Medical History:   Diagnosis Date   • Anxiety    • Cerebral palsy ([facility] [address] )    • Depression    • Hypertension    • Pre-diabetes    • Psychiatric disorder    • Schizoaffective disorder ([address] )    • Scoliosis      Past Surgical History:   Procedure Laterality Date   • APPENDECTOMY     • BUNIONECTOMY Bilateral    • CA HEMIARTHROPLASTY HIP PARTIAL Right 9/23/2019    Procedure: HEMIARTHROPLASTY HIP (BIPOLAR);   Surgeon: Tootie Monte MD;  Location:  MAIN OR;  Service: Orthopedics     Family History   Problem Relation Age of Onset   • Hypertension Mother    • Heart disease Mother    • Prostate cancer Father    • Heart disease Father    • Substance Abuse Neg Hx    • Mental illness Neg Hx       reports that she has never smoked  She has never used smokeless tobacco  She reports that she does not drink alcohol and does not use drugs  OBJECTIVE:    Vitals:    03/08/23 0836   BP: 120/78   BP Location: Left arm   Patient Position: Sitting   Cuff Size: Large   Pulse: 90   SpO2: 96%        There is no height or weight on file to calculate BMI  [unfilled]     Weight (last 2 days)     None           Physical exam:  Physical Exam  Vitals reviewed  Constitutional:       Appearance: Normal appearance  She is obese  HENT:      Head: Normocephalic and atraumatic  Right Ear: External ear normal       Left Ear: External ear normal       Nose: Nose normal       Mouth/Throat:      Mouth: Mucous membranes are dry  Pharynx: Oropharynx is clear  Eyes:      Extraocular Movements: Extraocular movements intact  Conjunctiva/sclera: Conjunctivae normal    Cardiovascular:      Rate and Rhythm: Normal rate  Rhythm irregular  Pulses: Normal pulses  Heart sounds: Normal heart sounds  Pulmonary:      Effort: Pulmonary effort is normal       Breath sounds: Normal breath sounds  Abdominal:      General: Abdomen is flat  Bowel sounds are normal       Palpations: Abdomen is soft  Musculoskeletal:         General: Normal range of motion  Cervical back: Normal range of motion and neck supple  Skin:     General: Skin is warm and dry  Neurological:      General: No focal deficit present  Mental Status: She is alert and oriented to person, place, and time  Mental status is at baseline  Psychiatric:         Mood and Affect: Mood normal          Behavior: Behavior normal          Thought Content:  Thought content normal            Lab Results:     Results for orders placed or performed during the hospital encounter of 02/20/21   COVID19, Influenza A/B, RSV PCR, [facility]    Specimen: Nose; Nares   Result Value Ref Range SARS-CoV-2 Negative Negative    INFLUENZA A PCR Negative Negative    INFLUENZA B PCR Negative Negative    RSV PCR Negative Negative   RPR    Result Value Ref Range    RPR Non-Reactive Non-Reactive   Hemoglobin A1C   Result Value Ref Range    Hemoglobin A1C 5 3 Normal 3 8-5 6%; PreDiabetic 5 7-6 4%;  Diabetic >=6 5%; Glycemic control for adults with diabetes <7 0% %     mg/dl   Vitamin B12   Result Value Ref Range    Vitamin B-12 626 100 - 900 pg/mL   Vitamin D 25 hydroxy   Result Value Ref Range    Vit D, 25-Hydroxy 38 1 30 0 - 100 0 ng/mL   Folate   Result Value Ref Range    Folate 14 9 3 1 - 17 5 ng/mL   Comprehensive metabolic panel   Result Value Ref Range    Sodium 137 134 - 143 mmol/L    Potassium 4 0 3 5 - 5 5 mmol/L    Chloride 104 98 - 107 mmol/L    CO2 26 21 - 31 mmol/L    ANION GAP 7 4 - 13 mmol/L    BUN 18 7 - 25 mg/dL    Creatinine 0 73 0 60 - 1 20 mg/dL    Glucose 102 (H) 65 - 99 mg/dL    Calcium 9 3 8 6 - 10 5 mg/dL    AST 9 (L) 13 - 39 U/L    ALT 11 7 - 52 U/L    Alkaline Phosphatase 61 40 - 150 U/L    Total Protein 6 5 6 4 - 8 9 g/dL    Albumin 4 1 3 5 - 5 7 g/dL    Total Bilirubin 0 40 0 20 - 1 00 mg/dL    eGFR 90 ml/min/1 73sq m   CBC and differential   Result Value Ref Range    WBC 4 90 4 80 - 10 80 Thousand/uL    RBC 4 71 3 90 - 5 20 Million/uL    Hemoglobin 14 8 12 0 - 16 0 g/dL    Hematocrit 45 0 42 0 - 47 0 %    MCV 96 81 - 99 fL    MCH 31 4 26 0 - 34 0 pg    MCHC 32 9 31 0 - 37 0 g/dL    RDW 14 2 11 5 - 14 5 %    MPV 8 2 (L) 8 6 - 11 7 fL    Platelets 596 035 - 918 Thousands/uL    Neutrophils Relative 71 42 - 75 %    Lymphocytes Relative 18 (L) 21 - 51 %    Monocytes Relative 10 2 - 12 %    Eosinophils Relative 1 0 - 5 %    Basophils Relative 1 0 - 2 %    Neutrophils Absolute 3 50 1 40 - 6 50 Thousands/µL    Lymphocytes Absolute 0 90 0 60 - 4 47 Thousands/µL    Monocytes Absolute 0 50 0 17 - 1 22 Thousand/µL    Eosinophils Absolute 0 00 0 00 - 0 61 Thousand/µL    Basophils Absolute 0  00 0 00 - 0 10 Thousands/µL   CK (with reflex to MB)   Result Value Ref Range    Total  (H) 30 - 192 U/L   Comprehensive metabolic panel   Result Value Ref Range    Sodium 141 134 - 143 mmol/L    Potassium 3 9 3 5 - 5 5 mmol/L    Chloride 103 98 - 107 mmol/L    CO2 27 21 - 31 mmol/L    ANION GAP 11 4 - 13 mmol/L    BUN 10 7 - 25 mg/dL    Creatinine 0 70 0 60 - 1 20 mg/dL    Glucose 106 (H) 65 - 99 mg/dL    Glucose, Fasting 106 (H) 65 - 99 mg/dL    Calcium 9 5 8 6 - 10 5 mg/dL    AST 28 13 - 39 U/L    ALT 22 7 - 52 U/L    Alkaline Phosphatase 50 40 - 150 U/L    Total Protein 6 7 6 4 - 8 9 g/dL    Albumin 4 1 3 5 - 5 7 g/dL    Total Bilirubin 0 70 0 20 - 1 00 mg/dL    eGFR 95 ml/min/1 73sq m   CKMB   Result Value Ref Range    CK-MB Index 0 6 0 6 - 6 3 %    CK-MB 5 5 0 6 - 6 3 ng/mL   UA w Reflex to Microscopic w Reflex to Culture    Specimen: Urine, Clean Catch   Result Value Ref Range    Color, UA Yellow Yellow, Straw    Clarity, UA Clear Hazy, Clear    Specific Gravity, UA 1 015 >1 005-<1 030    pH, UA 7 0 5 0, 5 5, 6 0, 6 5, 7 0, 7 5    Leukocytes, UA 1+ (A) Negative    Nitrite, UA Negative Negative    Protein, UA Negative Negative, Interference- unable to analyze mg/dl    Glucose, UA Negative Negative mg/dl    Ketones, UA Negative Negative mg/dl    Urobilinogen, UA 0 2 0 2, 1 0 E U /dl E U /dl    Bilirubin, UA Negative Negative    Occult Blood, UA Negative Negative   Urine Microscopic   Result Value Ref Range    RBC, UA 0-1 None Seen, 0-1, 1-2, 2-4, 0-5 /hpf    WBC, UA 2-4 None Seen, 0-1, 1-2, 0-5, 2-4 /hpf    Epithelial Cells Occasional None Seen, Occasional /hpf    Bacteria, UA Occasional None Seen, Occasional /hpf   CK (with reflex to MB)   Result Value Ref Range    Total  (H) 30 - 192 U/L   CKMB   Result Value Ref Range    CK-MB Index 1 1 0 6 - 6 3 %    CK-MB 3 4 0 6 - 6 3 ng/mL   Fingerstick Glucose (POCT)   Result Value Ref Range    POC Glucose 83 65 - 140 mg/dl   Fingerstick Glucose (POCT) Result Value Ref Range    POC Glucose 102 65 - 140 mg/dl   Fingerstick Glucose (POCT)   Result Value Ref Range    POC Glucose 91 65 - 140 mg/dl   Fingerstick Glucose (POCT)   Result Value Ref Range    POC Glucose 90 65 - 140 mg/dl   Fingerstick Glucose (POCT)   Result Value Ref Range    POC Glucose 108 65 - 140 mg/dl   Fingerstick Glucose (POCT)   Result Value Ref Range    POC Glucose 92 65 - 140 mg/dl   ECG 12 lead   Result Value Ref Range    Ventricular Rate 85 BPM    Atrial Rate 85 BPM    IL Interval 156 ms    QRSD Interval 86 ms    QT Interval 356 ms    QTC Interval 423 ms    P Axis 70 degrees    QRS Axis -23 degrees    T Wave Axis 69 degrees   ECG 12 lead   Result Value Ref Range    Ventricular Rate 91 BPM    Atrial Rate 91 BPM    IL Interval 154 ms    QRSD Interval 88 ms    QT Interval 356 ms    QTC Interval 437 ms    P Axis 72 degrees    QRS Axis -34 degrees    T Wave Axis 76 degrees   Fingerstick Glucose (POCT)   Result Value Ref Range    POC Glucose 102 65 - 140 mg/dl   Fingerstick Glucose (POCT)   Result Value Ref Range    POC Glucose 96 65 - 140 mg/dl   Fingerstick Glucose (POCT)   Result Value Ref Range    POC Glucose 108 65 - 140 mg/dl   Fingerstick Glucose (POCT)   Result Value Ref Range    POC Glucose 88 65 - 140 mg/dl   Fingerstick Glucose (POCT)   Result Value Ref Range    POC Glucose 95 65 - 140 mg/dl   Fingerstick Glucose (POCT)   Result Value Ref Range    POC Glucose 100 65 - 140 mg/dl   Fingerstick Glucose (POCT)   Result Value Ref Range    POC Glucose 103 65 - 140 mg/dl   Fingerstick Glucose (POCT)   Result Value Ref Range    POC Glucose 94 65 - 140 mg/dl   Fingerstick Glucose (POCT)   Result Value Ref Range    POC Glucose 98 65 - 140 mg/dl   Fingerstick Glucose (POCT)   Result Value Ref Range    POC Glucose 91 65 - 140 mg/dl   Fingerstick Glucose (POCT)   Result Value Ref Range    POC Glucose 98 65 - 140 mg/dl   Fingerstick Glucose (POCT)   Result Value Ref Range    POC Glucose 91 65 - 140 mg/dl   Fingerstick Glucose (POCT)   Result Value Ref Range    POC Glucose 106 65 - 140 mg/dl   Fingerstick Glucose (POCT)   Result Value Ref Range    POC Glucose 87 65 - 140 mg/dl   Fingerstick Glucose (POCT)   Result Value Ref Range    POC Glucose 91 65 - 140 mg/dl   Fingerstick Glucose (POCT)   Result Value Ref Range    POC Glucose 83 65 - 140 mg/dl   Fingerstick Glucose (POCT)   Result Value Ref Range    POC Glucose 102 65 - 140 mg/dl   Fingerstick Glucose (POCT)   Result Value Ref Range    POC Glucose 90 65 - 140 mg/dl   Fingerstick Glucose (POCT)   Result Value Ref Range    POC Glucose 91 65 - 140 mg/dl   Fingerstick Glucose (POCT)   Result Value Ref Range    POC Glucose 90 65 - 140 mg/dl   Fingerstick Glucose (POCT)   Result Value Ref Range    POC Glucose 91 65 - 140 mg/dl   Fingerstick Glucose (POCT)   Result Value Ref Range    POC Glucose 86 65 - 140 mg/dl   Fingerstick Glucose (POCT)   Result Value Ref Range    POC Glucose 101 65 - 140 mg/dl   Fingerstick Glucose (POCT)   Result Value Ref Range    POC Glucose 83 65 - 140 mg/dl   Fingerstick Glucose (POCT)   Result Value Ref Range    POC Glucose 95 65 - 140 mg/dl   Fingerstick Glucose (POCT)   Result Value Ref Range    POC Glucose 84 65 - 140 mg/dl   Fingerstick Glucose (POCT)   Result Value Ref Range    POC Glucose 99 65 - 140 mg/dl   Fingerstick Glucose (POCT)   Result Value Ref Range    POC Glucose 82 65 - 140 mg/dl   Fingerstick Glucose (POCT)   Result Value Ref Range    POC Glucose 89 65 - 140 mg/dl   Fingerstick Glucose (POCT)   Result Value Ref Range    POC Glucose 94 65 - 140 mg/dl   Fingerstick Glucose (POCT)   Result Value Ref Range    POC Glucose 96 65 - 140 mg/dl   Fingerstick Glucose (POCT)   Result Value Ref Range    POC Glucose 86 65 - 140 mg/dl   Fingerstick Glucose (POCT)   Result Value Ref Range    POC Glucose 108 65 - 140 mg/dl   Fingerstick Glucose (POCT)   Result Value Ref Range    POC Glucose 91 65 - 140 mg/dl   Fingerstick Glucose (POCT)   Result Value Ref Range    POC Glucose 95 65 - 140 mg/dl   Fingerstick Glucose (POCT)   Result Value Ref Range    POC Glucose 84 65 - 140 mg/dl   Fingerstick Glucose (POCT)   Result Value Ref Range    POC Glucose 97 65 - 140 mg/dl   Fingerstick Glucose (POCT)   Result Value Ref Range    POC Glucose 93 65 - 140 mg/dl   Fingerstick Glucose (POCT)   Result Value Ref Range    POC Glucose 92 65 - 140 mg/dl   Fingerstick Glucose (POCT)   Result Value Ref Range    POC Glucose 94 65 - 140 mg/dl   Fingerstick Glucose (POCT)   Result Value Ref Range    POC Glucose 116 65 - 140 mg/dl   Fingerstick Glucose (POCT)   Result Value Ref Range    POC Glucose 96 65 - 140 mg/dl   Fingerstick Glucose (POCT)   Result Value Ref Range    POC Glucose 107 65 - 140 mg/dl   Fingerstick Glucose (POCT)   Result Value Ref Range    POC Glucose 92 65 - 140 mg/dl   Fingerstick Glucose (POCT)   Result Value Ref Range    POC Glucose 103 65 - 140 mg/dl   Fingerstick Glucose (POCT)   Result Value Ref Range    POC Glucose 107 65 - 140 mg/dl   Fingerstick Glucose (POCT)   Result Value Ref Range    POC Glucose 132 65 - 140 mg/dl   Fingerstick Glucose (POCT)   Result Value Ref Range    POC Glucose 90 65 - 140 mg/dl   Fingerstick Glucose (POCT)   Result Value Ref Range    POC Glucose 105 65 - 140 mg/dl   Fingerstick Glucose (POCT)   Result Value Ref Range    POC Glucose 95 65 - 140 mg/dl   Fingerstick Glucose (POCT)   Result Value Ref Range    POC Glucose 97 65 - 140 mg/dl   Fingerstick Glucose (POCT)   Result Value Ref Range    POC Glucose 94 65 - 140 mg/dl   Fingerstick Glucose (POCT)   Result Value Ref Range    POC Glucose 106 65 - 140 mg/dl   Fingerstick Glucose (POCT)   Result Value Ref Range    POC Glucose 91 65 - 140 mg/dl   Fingerstick Glucose (POCT)   Result Value Ref Range    POC Glucose 96 65 - 140 mg/dl   Fingerstick Glucose (POCT)   Result Value Ref Range    POC Glucose 89 65 - 140 mg/dl   Fingerstick Glucose (POCT)   Result Value Ref Range    POC Glucose 104 65 - 140 mg/dl   Fingerstick Glucose (POCT)   Result Value Ref Range    POC Glucose 86 65 - 140 mg/dl   Fingerstick Glucose (POCT)   Result Value Ref Range    POC Glucose 101 65 - 140 mg/dl   Fingerstick Glucose (POCT)   Result Value Ref Range    POC Glucose 87 65 - 140 mg/dl   Fingerstick Glucose (POCT)   Result Value Ref Range    POC Glucose 106 65 - 140 mg/dl   Fingerstick Glucose (POCT)   Result Value Ref Range    POC Glucose 72 65 - 140 mg/dl   Fingerstick Glucose (POCT)   Result Value Ref Range    POC Glucose 104 65 - 140 mg/dl   Fingerstick Glucose (POCT)   Result Value Ref Range    POC Glucose 88 65 - 140 mg/dl   Fingerstick Glucose (POCT)   Result Value Ref Range    POC Glucose 102 65 - 140 mg/dl   Fingerstick Glucose (POCT)   Result Value Ref Range    POC Glucose 86 65 - 140 mg/dl   Fingerstick Glucose (POCT)   Result Value Ref Range    POC Glucose 103 65 - 140 mg/dl   Fingerstick Glucose (POCT)   Result Value Ref Range    POC Glucose 83 65 - 140 mg/dl   Fingerstick Glucose (POCT)   Result Value Ref Range    POC Glucose 87 65 - 140 mg/dl   Fingerstick Glucose (POCT)   Result Value Ref Range    POC Glucose 85 65 - 140 mg/dl   Fingerstick Glucose (POCT)   Result Value Ref Range    POC Glucose 102 65 - 140 mg/dl   Fingerstick Glucose (POCT)   Result Value Ref Range    POC Glucose 89 65 - 140 mg/dl   Fingerstick Glucose (POCT)   Result Value Ref Range    POC Glucose 97 65 - 140 mg/dl       Portions of the record may have been created with voice recognition software  Occasional wrong word or "sound a like" substitutions may have occurred due to the inherent limitations of voice recognition software  Read the chart carefully and recognize, using context, where substitutions have occurred  If you have any questions, please contact the dictating provider

## 2023-05-16 NOTE — PROGRESS NOTES
5/18/2023    Kennebec Darnell  1960  [id number]        Assessment  -Overactive bladder  -Mixed urinary incontinence    Discussion/Plan  Shivam Mosher is a 65 y o  female being managed by our office    1  Overactive bladder, mixed urinary incontinence- urine dip in the office today appears negative for infection or blood  PVR assessment 15 mL  Patient has noticed mild improvement in urinary symptoms since starting Myrbetriq  She will continue to take Myrbetriq 50 mg daily  Reviewed dietary and behavioral modifications  Prior renal ultrasound order remains outstanding  Advised facility to fax results to our office once complete  She will otherwise return in 1 year for reevaluation and PVR assessment  Instructions provided to group home     -All questions answered, patient agree with plan     History of Present Illness  65 y o  female with a history of OAB and mixed urinary incontinence presents today for follow up  Patient last seen in the office in consultation in November 2022  She resides at [facility] and is accompanied today by caretaker  Patient has history of developmental delay, but is an excellent historian  She remains on Myrbetriq 50 mg daily  She has noticed improvement in decreased episodes of urinary urgency  Patient continues to wear sanitary pad and briefs daily for protection for occasional episodes of urinary urge incontinence  She denies any episodes of gross hematuria or dysuria  Renal ultrasound ordered to rule out any anatomical cause of her symptoms  Order remains outstanding  Review of Systems  Review of Systems   Constitutional: Negative  HENT: Negative  Respiratory: Negative  Cardiovascular: Negative  Gastrointestinal: Negative  Genitourinary: Positive for urgency  Negative for decreased urine volume, difficulty urinating, dysuria, flank pain, frequency and hematuria  Musculoskeletal: Negative  Skin: Negative  Neurological: Negative  Psychiatric/Behavioral: Negative  Past Medical History  Past Medical History:   Diagnosis Date   • Anxiety    • Cerebral palsy ([facility] [address] )    • Depression    • Hypertension    • Pre-diabetes    • Psychiatric disorder    • Schizoaffective disorder ([facility] [address] )    • Scoliosis        Past Social History  Past Surgical History:   Procedure Laterality Date   • APPENDECTOMY     • BUNIONECTOMY Bilateral    • MA HEMIARTHROPLASTY HIP PARTIAL Right 9/23/2019    Procedure: HEMIARTHROPLASTY HIP (BIPOLAR); Surgeon: Richy Salinas MD;  Location: [facility] OR;  Service: Orthopedics       Past Family History  Family History   Problem Relation Age of Onset   • Hypertension Mother    • Heart disease Mother    • Prostate cancer Father    • Heart disease Father    • Substance Abuse Neg Hx    • Mental illness Neg Hx        Past Social history  Social History     Socioeconomic History   • Marital status: Single     Spouse name: Not on file   • Number of children: Not on file   • Years of education: Not on file   • Highest education level: Not on file   Occupational History   • Occupation: unemployed   Tobacco Use   • Smoking status: Never   • Smokeless tobacco: Never   Vaping Use   • Vaping Use: Never used   Substance and Sexual Activity   • Alcohol use: Never   • Drug use: Never   • Sexual activity: Never   Other Topics Concern   • Not on file   Social History Narrative    Always uses seat belt    Caffeine use    Lives with mother  Feels safe  No living will  Sees dentist reg       Social Determinants of Health     Financial Resource Strain: Not on file   Food Insecurity: Not on file   Transportation Needs: Not on file   Physical Activity: Not on file   Stress: Not on file   Social Connections: Not on file   Intimate Partner Violence: Not on file   Housing Stability: Not on file       Current Medications  Current Outpatient Medications   Medication Sig Dispense Refill   • acetaminophen (TYLENOL) 325 mg tablet Take 2 tablets (650 mg total) by mouth every 6 (six) hours as needed for moderate pain or fever 100 tablet 0   • amantadine (SYMMETREL) 100 mg capsule Take 1 capsule (100 mg total) by mouth 2 (two) times a day (Patient not taking: Reported on 3/8/2023)  0   • ARIPiprazole (ABILIFY) 15 mg tablet Take 15 mg by mouth daily     • atorvastatin (LIPITOR) 10 mg tablet Take 10 mg by mouth daily     • cholecalciferol (VITAMIN D3) 1,000 units tablet Take 1 tablet (1,000 Units total) by mouth daily 30 tablet 0   • docusate sodium (COLACE) 100 mg capsule Take 100 mg by mouth 2 (two) times a day     • fluticasone (FLONASE) 50 mcg/act nasal spray 1 spray into each nostril daily     • loperamide (IMODIUM) 2 mg capsule Take 1 capsule (2 mg total) by mouth 3 (three) times a day as needed for diarrhea 30 capsule 0   • losartan (COZAAR) 50 mg tablet Take 1 tablet (50 mg total) by mouth daily (Patient not taking: Reported on 3/8/2023) 90 tablet 0   • melatonin 3 mg Take 1 tablet (3 mg total) by mouth daily at bedtime 30 tablet 0   • melatonin 3 mg Take 2 tablets (6 mg total) by mouth daily at bedtime  0   • Menatetrenone (Vitamin K2) 100 MCG TABS Take 100 mg by mouth daily 30 tablet 0   • mirtazapine (REMERON) 15 mg tablet Take 1 tablet (15 mg total) by mouth daily at bedtime (Patient not taking: Reported on 3/8/2023)  0   • Multiple Vitamins-Minerals (multivitamin with minerals) tablet Take 1 tablet by mouth daily 30 tablet 0   • nystatin (MYCOSTATIN) powder Apply topically 3 (three) times a day 15 g 0   • OLANZapine (ZyPREXA) 7 5 mg tablet Take 1 tablet (7 5 mg total) by mouth daily at bedtime (Patient not taking: Reported on 3/8/2023)  0   • omeprazole (PriLOSEC) 10 mg delayed release capsule Take 20 mg by mouth daily     • Polyvinyl Alcohol-Povidone PF (Refresh) 1 4-0 6 % SOLN Apply to eye     • senna (SENOKOT) 8 6 MG tablet Take 1 tablet by mouth 2 (two) times a day     • sertraline (ZOLOFT) 50 mg tablet Take 1 tablet (50 mg total) by mouth daily (Patient not taking: Reported on 3/8/2023)  0   • sitaGLIPtin (JANUVIA) 50 mg tablet Take 1 tablet (50 mg total) by mouth daily  0   • traZODone (DESYREL) 100 mg tablet Take 1 tablet (100 mg total) by mouth daily at bedtime  0   • vortioxetine (TRINTELLIX) 10 MG tablet Take 10 mg by mouth daily       No current facility-administered medications for this visit  Allergies  Allergies   Allergen Reactions   • Bactrim [Sulfamethoxazole-Trimethoprim]    • Sulfa Antibiotics Rash and Itching       Past medical history, social history, family history, medications and allergies were reviewed  Vitals  There were no vitals filed for this visit  Physical Exam  Physical Exam  Constitutional:       Appearance: Normal appearance  She is well-developed  HENT:      Head: Normocephalic  Eyes:      Pupils: Pupils are equal, round, and reactive to light  Pulmonary:      Effort: Pulmonary effort is normal    Abdominal:      Palpations: Abdomen is soft  Musculoskeletal:         General: Normal range of motion  Cervical back: Normal range of motion  Comments: Wheelchair bound, assist x1     Skin:     General: Skin is warm and dry  Neurological:      General: No focal deficit present  Mental Status: She is alert and oriented to person, place, and time  Psychiatric:         Mood and Affect: Mood normal          Behavior: Behavior normal          Thought Content:  Thought content normal          Judgment: Judgment normal          Results    I have personally reviewed all pertinent lab results and reviewed with patient  Lab Results   Component Value Date    GLUCOSE 107 12/22/2014    CALCIUM 9 5 03/17/2021     12/22/2014    K 3 9 03/17/2021    CO2 27 03/17/2021     03/17/2021    BUN 10 03/17/2021    CREATININE 0 70 03/17/2021     Lab Results   Component Value Date    WBC 4 90 03/17/2021    HGB 14 8 03/17/2021    HCT 45 0 03/17/2021    MCV 96 03/17/2021     03/17/2021     No results found for this or any previous visit (from the past 1 hour(s))

## 2023-05-18 ENCOUNTER — OFFICE VISIT (OUTPATIENT)
Dept: UROLOGY | Facility: HOSPITAL | Age: 63
End: 2023-05-18

## 2023-05-18 VITALS — HEART RATE: 79 BPM | DIASTOLIC BLOOD PRESSURE: 80 MMHG | SYSTOLIC BLOOD PRESSURE: 106 MMHG | OXYGEN SATURATION: 97 %

## 2023-05-18 DIAGNOSIS — N32.81 OAB (OVERACTIVE BLADDER): Primary | ICD-10-CM

## 2023-05-18 DIAGNOSIS — N39.41 URGE INCONTINENCE OF URINE: ICD-10-CM

## 2023-05-18 LAB
POST-VOID RESIDUAL VOLUME, ML POC: 15 ML
SL AMB  POCT GLUCOSE, UA: NORMAL
SL AMB LEUKOCYTE ESTERASE,UA: NORMAL
SL AMB POCT BILIRUBIN,UA: NORMAL
SL AMB POCT BLOOD,UA: NORMAL
SL AMB POCT CLARITY,UA: CLEAR
SL AMB POCT COLOR,UA: NORMAL
SL AMB POCT KETONES,UA: NORMAL
SL AMB POCT NITRITE,UA: NORMAL
SL AMB POCT PH,UA: 7.5
SL AMB POCT SPECIFIC GRAVITY,UA: 1.01
SL AMB POCT URINE PROTEIN: NORMAL
SL AMB POCT UROBILINOGEN: 0.2

## 2023-05-18 NOTE — PATIENT INSTRUCTIONS
BLADDER HEALTH    WHAT IS CONSIDERED NORMAL? The average bladder can hold about 2 cups of urine before it needs to be emptied  The normal range of voiding urine is 6 to 8 times during a 24 hour period  As we get older, our bladder capacity can get smaller and we may need to pass urine more frequently but usually not more than every 2 hours  Urine should flow easily without discomfort in a good, steady stream until the bladder is empty  No pushing or straining is necessary to empty the bladder  An urge is a signal that you feel as the bladder stretches to fill with urine  Urges can be felt even if the bladder is not full  Urges are not commands to go to the toilet, merely a signal and can be controlled  WHAT ARE GOOD BLADDER HABITS? Take your time when emptying your bladder  Don’t strain or push to empty your bladder  Make sure you empty your bladder completely each time you pass urine  Do not rush the process  Consistently ignoring the urge to go (waiting more than 4 hours between toileting) or urinating too infrequently may be convenient but not healthy for your bladder  Avoid going to the toilet “just in case” or more often than every 2 hours  It is usually not necessary to go when you feel the first urge  Try to go only when your bladder is full  Urgency and frequency of urination can be improved by retraining the bladder and spacing your fluid intake throughout the day  Practice good toilet habits  Don’t let your bladder control your life  TIPS TO MAINTAIN GOOD BLADDER HABITS  Maintain a good fluid intake  Depending on your body size and environment, drink 6 -8 cups (8 oz each) of fluid per day unless otherwise advised by your doctor  Not enough fluid creates a foul odor and dark color of the urine  Limit the amount of caffeine (coffee, cola, chocolate or tea) and citrus foods that you consume as these foods can be associated with increased sensation of urinary urgency and frequency  "    Limit the amount of alcohol you drink  Alcohol increases urine production and makes it difficult for the brain to coordinate bladder control  Avoid constipation by maintaining a balanced diet of dietary fiber  Cigarette smoking is also irritating to the bladder surface and is associated with bladder cancer  In addition, the coughing associated with smoking may lead to increased incontinent episodes because of the increased pressure  HOW DIET CAN AFFECT YOUR BLADDER  Although there is no particular \"diet\" that can cure bladder control, there are certain dietary suggestions you can use to help control the problem  There are 2 points to consider when evaluating how your habits and diet may affect your bladder:    Foods and fluids can irritate the bladder  Some foods and beverages are thought to contribute to bladder leakage and irritability  However their effect on the bladder is not completely understood and you may want to see if eliminating one or all of these items improves your bladder control  If you are unable to give them up completely, it is recommended that you use the following items in moderation:  Acidic beverages and foods (orange juice, grapefruit juice, lemonade etc)  Alcoholic beverages  Vinegar  Coffee (regular and decaf)  Tea (regular and decaf)  Caffeinated beverages  Carbonated beverages          Drinking enough and the right kinds of fluids  Many people with bladder control issues decrease their intake of liquids in hope that they will need to urinate less frequently or have less urinary leakage  You should not restrict fluids to control your bladder  While a decrease in liquid intake does result in a decrease in the volume of urine, the smaller amount of urine may be more highly concentrated  Highly concentrated, dark yellow urine is irritating to the bladder surface and may actually cause you to go to the bathroom more frequently        It also encourages the growth " of bacteria, which may lead to infections resulting in incontinence  Substitutions for Bladder Irritants: water is always the best beverage choice  Grape and apple juice are thirst quenchers are good selections and are not as irritating to the bladder    Low acid fruits:  Pears, apricots, papaya, watermelon  For coffee drinkers: KAVA®, Postum®, Tavo®, Kaffree Eula®  For tea drinkers:  non-citrus or herbal and sun brewed tea

## 2024-12-11 ENCOUNTER — OFFICE VISIT (OUTPATIENT)
Dept: GYNECOLOGY | Facility: CLINIC | Age: 64
End: 2024-12-11
Payer: MEDICARE

## 2024-12-11 VITALS — DIASTOLIC BLOOD PRESSURE: 84 MMHG | WEIGHT: 174 LBS | SYSTOLIC BLOOD PRESSURE: 134 MMHG | BODY MASS INDEX: 28.96 KG/M2

## 2024-12-11 DIAGNOSIS — E13.65 OTHER SPECIFIED DIABETES MELLITUS WITH HYPERGLYCEMIA, WITHOUT LONG-TERM CURRENT USE OF INSULIN (HCC): ICD-10-CM

## 2024-12-11 DIAGNOSIS — Z01.419 WOMEN'S ANNUAL ROUTINE GYNECOLOGICAL EXAMINATION: Primary | ICD-10-CM

## 2024-12-11 DIAGNOSIS — N89.8 VAGINAL DRYNESS: ICD-10-CM

## 2024-12-11 DIAGNOSIS — Z12.31 VISIT FOR SCREENING MAMMOGRAM: ICD-10-CM

## 2024-12-11 PROCEDURE — G0101 CA SCREEN;PELVIC/BREAST EXAM: HCPCS | Performed by: OBSTETRICS & GYNECOLOGY

## 2024-12-11 PROCEDURE — G0145 SCR C/V CYTO,THINLAYER,RESCR: HCPCS | Performed by: OBSTETRICS & GYNECOLOGY

## 2024-12-11 RX ORDER — SUVOREXANT 15 MG/1
TABLET, FILM COATED ORAL
COMMUNITY
Start: 2024-12-09

## 2024-12-11 RX ORDER — FLUOCINONIDE 1 MG/G
CREAM TOPICAL
COMMUNITY
Start: 2024-09-10

## 2024-12-11 RX ORDER — CARBOXYMETHYLCELLULOSE SODIUM 5 MG/ML
SOLUTION/ DROPS OPHTHALMIC
COMMUNITY
Start: 2024-12-01

## 2024-12-11 NOTE — PROGRESS NOTES
Assessment & Plan   Diagnoses and all orders for this visit:    Women's annual routine gynecological examination    Visit for screening mammogram  -     Mammo screening bilateral w 3d and cad; Future    Other specified diabetes mellitus with hyperglycemia, without long-term current use of insulin (HCC)    Vaginal dryness    Other orders  -     Refresh Tears 0.5 % SOLN  -     Fluocinonide 0.1 % CREA  -     Belsomra 15 MG TABS    1. yearly exam-Pap smear done with HPV testing, self breast awareness reviewed, calcium/vitamin D recommendations discussed, mammogram request given, colonoscopy up-to-date follow-up as per specialist.  2. vaginal atrophy-moderate to severe changes noted on exam.  Exam done with pediatric speculum.  Patient tolerated the procedure well.  Pap done with slight bleeding appreciated.  Check results and proceed accordingly.  3.  Previous history yeast-was noted with yeast infection 6 weeks ago.  She is status post fluconazole and Monistat 3 with resolution of symptoms.  She declines symptoms of yeast infection at this time.  She does have use of antifungal powder and she will continue with that as needed at the facility where she lives.  4.  History of dense breast-most recent mammogram 5/9/2018.  Breast exam today was normal.  3D mammogram request was given.  5.  History of OAB-was on Myrbetriq previously but has been off of it for some time since it did not work.  6. history of mild CP/hypertension/anxiety-follow-up as per treating doctor.  Follow-up 2 years for yearly exam or as needed    Subjective   Patient ID: Arpita De Santiago is a 64 y.o. female.    Vitals:    12/11/24 1128   BP: 134/84     Patient was seen today for new patient evaluation.  Please see assessment plan for details.        The following portions of the patient's history were reviewed and updated as appropriate: allergies, current medications, past family history, past medical history, past social history, past surgical history,  and problem list.  Past Medical History:   Diagnosis Date    Anxiety     Cerebral palsy (HCC)     Depression     Hypertension     Pre-diabetes     Psychiatric disorder     Schizoaffective disorder (HCC)     Scoliosis      Past Surgical History:   Procedure Laterality Date    APPENDECTOMY      BUNIONECTOMY Bilateral     NM HEMIARTHROPLASTY HIP PARTIAL Right 2019    Procedure: HEMIARTHROPLASTY HIP (BIPOLAR);  Surgeon: Denis Etienne MD;  Location:  MAIN OR;  Service: Orthopedics     OB History    Para Term  AB Living   0 0 0 0 0 0   SAB IAB Ectopic Multiple Live Births   0 0 0 0 0       Current Outpatient Medications:     acetaminophen (TYLENOL) 325 mg tablet, Take 2 tablets (650 mg total) by mouth every 6 (six) hours as needed for moderate pain or fever, Disp: 100 tablet, Rfl: 0    atorvastatin (LIPITOR) 10 mg tablet, Take 10 mg by mouth daily, Disp: , Rfl:     Belsomra 15 MG TABS, , Disp: , Rfl:     cholecalciferol (VITAMIN D3) 1,000 units tablet, Take 1 tablet (1,000 Units total) by mouth daily, Disp: 30 tablet, Rfl: 0    docusate sodium (COLACE) 100 mg capsule, Take 100 mg by mouth 2 (two) times a day, Disp: , Rfl:     Fluocinonide 0.1 % CREA, , Disp: , Rfl:     fluticasone (FLONASE) 50 mcg/act nasal spray, 1 spray into each nostril daily, Disp: , Rfl:     Refresh Tears 0.5 % SOLN, , Disp: , Rfl:     amantadine (SYMMETREL) 100 mg capsule, Take 1 capsule (100 mg total) by mouth 2 (two) times a day (Patient not taking: Reported on 3/8/2023), Disp:  , Rfl: 0    ARIPiprazole (ABILIFY) 15 mg tablet, Take 15 mg by mouth daily, Disp: , Rfl:     loperamide (IMODIUM) 2 mg capsule, Take 1 capsule (2 mg total) by mouth 3 (three) times a day as needed for diarrhea, Disp: 30 capsule, Rfl: 0    losartan (COZAAR) 50 mg tablet, Take 1 tablet (50 mg total) by mouth daily (Patient not taking: Reported on 3/8/2023), Disp: 90 tablet, Rfl: 0    melatonin 3 mg, Take 1 tablet (3 mg total) by mouth daily at bedtime,  Disp: 30 tablet, Rfl: 0    melatonin 3 mg, Take 2 tablets (6 mg total) by mouth daily at bedtime, Disp: , Rfl: 0    Menatetrenone (Vitamin K2) 100 MCG TABS, Take 100 mg by mouth daily, Disp: 30 tablet, Rfl: 0    Mirabegron ER 50 MG TB24, Take 1 tablet (50 mg total) by mouth in the morning, Disp: 30 tablet, Rfl: 11    mirtazapine (REMERON) 15 mg tablet, Take 1 tablet (15 mg total) by mouth daily at bedtime (Patient not taking: Reported on 3/8/2023), Disp:  , Rfl: 0    Multiple Vitamins-Minerals (multivitamin with minerals) tablet, Take 1 tablet by mouth daily, Disp: 30 tablet, Rfl: 0    nystatin (MYCOSTATIN) powder, Apply topically 3 (three) times a day, Disp: 15 g, Rfl: 0    OLANZapine (ZyPREXA) 7.5 mg tablet, Take 1 tablet (7.5 mg total) by mouth daily at bedtime (Patient not taking: Reported on 3/8/2023), Disp:  , Rfl: 0    omeprazole (PriLOSEC) 10 mg delayed release capsule, Take 20 mg by mouth daily, Disp: , Rfl:     Polyvinyl Alcohol-Povidone PF (Refresh) 1.4-0.6 % SOLN, Apply to eye, Disp: , Rfl:     senna (SENOKOT) 8.6 MG tablet, Take 1 tablet by mouth 2 (two) times a day, Disp: , Rfl:     sertraline (ZOLOFT) 50 mg tablet, Take 1 tablet (50 mg total) by mouth daily (Patient not taking: Reported on 3/8/2023), Disp:  , Rfl: 0    sitaGLIPtin (JANUVIA) 50 mg tablet, Take 1 tablet (50 mg total) by mouth daily, Disp:  , Rfl: 0    traZODone (DESYREL) 100 mg tablet, Take 1 tablet (100 mg total) by mouth daily at bedtime, Disp:  , Rfl: 0    vortioxetine (TRINTELLIX) 10 MG tablet, Take 10 mg by mouth daily, Disp: , Rfl:   Allergies   Allergen Reactions    Bactrim [Sulfamethoxazole-Trimethoprim]     Sulfa Antibiotics Rash and Itching     Social History     Socioeconomic History    Marital status: Single     Spouse name: None    Number of children: None    Years of education: None    Highest education level: None   Occupational History    Occupation: unemployed   Tobacco Use    Smoking status: Never    Smokeless tobacco:  Never   Vaping Use    Vaping status: Never Used   Substance and Sexual Activity    Alcohol use: Never    Drug use: Never    Sexual activity: Never   Other Topics Concern    None   Social History Narrative    Always uses seat belt    Caffeine use    Lives with mother.    Feels safe.    No living will.    Sees dentist reg.     Social Drivers of Health     Financial Resource Strain: Not on file   Food Insecurity: No Food Insecurity (2/22/2021)    Hunger Vital Sign     Worried About Running Out of Food in the Last Year: Never true     Ran Out of Food in the Last Year: Never true   Transportation Needs: No Transportation Needs (2/22/2021)    PRAPARE - Transportation     Lack of Transportation (Medical): No     Lack of Transportation (Non-Medical): No   Physical Activity: Not on file   Stress: Not on file   Social Connections: Unknown (2/22/2021)    Social Connection and Isolation Panel [NHANES]     Frequency of Communication with Friends and Family: Twice a week     Frequency of Social Gatherings with Friends and Family: More than three times a week     Attends Jew Services: Not on file     Active Member of Clubs or Organizations: No     Attends Club or Organization Meetings: Never     Marital Status: Not on file   Intimate Partner Violence: Not on file   Housing Stability: Not on file     Family History   Problem Relation Age of Onset    Hypertension Mother     Heart disease Mother     Prostate cancer Father     Heart disease Father     Substance Abuse Neg Hx     Mental illness Neg Hx        Review of Systems   Constitutional:  Negative for chills, diaphoresis, fatigue and fever.   Respiratory:  Negative for apnea, cough, chest tightness, shortness of breath and wheezing.    Cardiovascular:  Negative for chest pain, palpitations and leg swelling.   Gastrointestinal:  Negative for abdominal distention, abdominal pain, anal bleeding, constipation, diarrhea, nausea, rectal pain and vomiting.   Genitourinary:   Negative for difficulty urinating, dyspareunia, dysuria, frequency, hematuria, menstrual problem, pelvic pain, urgency, vaginal bleeding, vaginal discharge and vaginal pain.   Musculoskeletal:  Negative for arthralgias, back pain and myalgias.   Skin:  Negative for color change and rash.   Neurological:  Negative for dizziness, syncope, light-headedness, numbness and headaches.   Hematological:  Negative for adenopathy. Does not bruise/bleed easily.   Psychiatric/Behavioral:  Negative for dysphoric mood and sleep disturbance. The patient is not nervous/anxious.        Objective   Physical Exam  OBGyn Exam     Objective      /84 (BP Location: Left arm, Patient Position: Sitting)   Wt 78.9 kg (174 lb)   LMP  (LMP Unknown) Comment: 2000s  BMI 28.96 kg/m²     General:   alert and oriented, in no acute distress   Neck: normal to inspection and palpation   Breast: normal appearance, no masses or tenderness   Heart:    Lungs:    Abdomen: soft, non-tender, without masses or organomegaly   Vulva: normal   Vagina: Moderate to severe vaginal atrophy, without erythema or lesions or discharge appreciated   Cervix: Moderately atrophic, without lesions or discharge or cervicitis.  No CMT.  Limited visualization given atrophy.   Uterus: normal size, mid-position, non-tender   Adnexa: no mass, fullness, tenderness   Rectum: Deferred, patient declined    Psych:  Normal mood and affect   Skin:  Without obvious lesions   Eyes: symmetric, with normal movements and reactivity   Musculoskeletal:  Normal muscle tone and movements appreciated        Oxybutynin Counseling:  I discussed with the patient the risks of oxybutynin including but not limited to skin rash, drowsiness, dry mouth, difficulty urinating, and blurred vision.

## 2024-12-18 ENCOUNTER — RESULTS FOLLOW-UP (OUTPATIENT)
Dept: GYNECOLOGY | Facility: CLINIC | Age: 64
End: 2024-12-18

## 2024-12-18 LAB
LAB AP GYN PRIMARY INTERPRETATION: NORMAL
Lab: NORMAL

## 2024-12-30 ENCOUNTER — APPOINTMENT (EMERGENCY)
Dept: RADIOLOGY | Facility: HOSPITAL | Age: 64
End: 2024-12-30
Payer: MEDICARE

## 2024-12-30 ENCOUNTER — TELEPHONE (OUTPATIENT)
Dept: OBGYN CLINIC | Facility: HOSPITAL | Age: 64
End: 2024-12-30

## 2024-12-30 ENCOUNTER — HOSPITAL ENCOUNTER (EMERGENCY)
Facility: HOSPITAL | Age: 64
Discharge: HOME | End: 2024-12-30
Attending: EMERGENCY MEDICINE | Admitting: EMERGENCY MEDICINE
Payer: MEDICARE

## 2024-12-30 VITALS
HEART RATE: 82 BPM | DIASTOLIC BLOOD PRESSURE: 81 MMHG | SYSTOLIC BLOOD PRESSURE: 134 MMHG | WEIGHT: 174 LBS | BODY MASS INDEX: 28.99 KG/M2 | TEMPERATURE: 98.4 F | HEIGHT: 65 IN | RESPIRATION RATE: 18 BRPM | OXYGEN SATURATION: 98 %

## 2024-12-30 DIAGNOSIS — M54.41 ACUTE RIGHT-SIDED LOW BACK PAIN WITH RIGHT-SIDED SCIATICA: Primary | ICD-10-CM

## 2024-12-30 DIAGNOSIS — M25.551 RIGHT HIP PAIN: ICD-10-CM

## 2024-12-30 PROCEDURE — 99283 EMERGENCY DEPT VISIT LOW MDM: CPT

## 2024-12-30 PROCEDURE — 99284 EMERGENCY DEPT VISIT MOD MDM: CPT

## 2024-12-30 PROCEDURE — 72100 X-RAY EXAM L-S SPINE 2/3 VWS: CPT

## 2024-12-30 PROCEDURE — 96372 THER/PROPH/DIAG INJ SC/IM: CPT

## 2024-12-30 PROCEDURE — 73502 X-RAY EXAM HIP UNI 2-3 VIEWS: CPT

## 2024-12-30 RX ORDER — KETOROLAC TROMETHAMINE 30 MG/ML
30 INJECTION, SOLUTION INTRAMUSCULAR; INTRAVENOUS ONCE
Status: COMPLETED | OUTPATIENT
Start: 2024-12-30 | End: 2024-12-30

## 2024-12-30 RX ORDER — ACETAMINOPHEN 500 MG
1000 TABLET ORAL 3 TIMES DAILY PRN
Qty: 30 TABLET | Refills: 0 | Status: SHIPPED | OUTPATIENT
Start: 2024-12-30 | End: 2025-01-04

## 2024-12-30 RX ORDER — METHOCARBAMOL 500 MG/1
500 TABLET, FILM COATED ORAL ONCE
Status: COMPLETED | OUTPATIENT
Start: 2024-12-30 | End: 2024-12-30

## 2024-12-30 RX ORDER — IBUPROFEN 600 MG/1
TABLET, FILM COATED ORAL
Qty: 18 TABLET | Refills: 0 | Status: SHIPPED | OUTPATIENT
Start: 2024-12-30 | End: 2025-01-05

## 2024-12-30 RX ORDER — TIZANIDINE HYDROCHLORIDE 4 MG/1
4 CAPSULE, GELATIN COATED ORAL 3 TIMES DAILY PRN
Qty: 15 CAPSULE | Refills: 0 | Status: SHIPPED | OUTPATIENT
Start: 2024-12-30 | End: 2025-01-04

## 2024-12-30 RX ORDER — ACETAMINOPHEN 325 MG/1
975 TABLET ORAL ONCE
Status: COMPLETED | OUTPATIENT
Start: 2024-12-30 | End: 2024-12-30

## 2024-12-30 RX ORDER — LIDOCAINE 50 MG/G
1 PATCH TOPICAL ONCE
Status: DISCONTINUED | OUTPATIENT
Start: 2024-12-30 | End: 2024-12-30 | Stop reason: HOSPADM

## 2024-12-30 RX ADMIN — ACETAMINOPHEN 975 MG: 325 TABLET, FILM COATED ORAL at 15:11

## 2024-12-30 RX ADMIN — METHOCARBAMOL TABLETS 500 MG: 500 TABLET, COATED ORAL at 15:11

## 2024-12-30 RX ADMIN — LIDOCAINE 5% 1 PATCH: 700 PATCH TOPICAL at 15:12

## 2024-12-30 RX ADMIN — KETOROLAC TROMETHAMINE 30 MG: 30 INJECTION, SOLUTION INTRAMUSCULAR; INTRAVENOUS at 15:11

## 2024-12-30 NOTE — ED NOTES
This RN attempted to ambulate patient. Per patient, it is too painful to stand. Provider aware. Patient able to move from laying to side of bed independently.      Deedee Meade RN  12/30/24 [id number]

## 2024-12-30 NOTE — ED PROVIDER NOTES
Time reflects when diagnosis was documented in both MDM as applicable and the Disposition within this note       Time User Action Codes Description Comment    12/30/2024  4:35 PM Marizol Reardon Add [M54.41] Acute right-sided low back pain with right-sided sciatica     12/30/2024  4:35 PM Marizol Reardon Add [M25.551] Right hip pain           ED Disposition       ED Disposition   Discharge    Condition   Stable    Date/Time   Mon Dec 30, 2024  4:35 PM    Comment   Arpita De Santiago discharge to home/self care.                   Assessment & Plan       Medical Decision Making  DDx including but not limited to: Strain, sprain, arthritis, bursitis, tendinitis, considerable less likely fracture or hardware complication    X-rays reviewed without hardware fracture or bony abnormality.  Patient does have full strength and range of motion of the right hip but does intermittently have spasms.  On reevaluation after Tylenol, Toradol, and Robaxin, she is with improved pain control and less stiffness.  She is able to stand with some discomfort to the right hip and take few steps.  She wishes to get back in bed due to the ongoing discomfort and radiating pain from the right buttock to the right leg.  I do suspect a lumbar radiculopathy as well as possible right hip tendinitis given locations of pain and tenderness.  No suspicion for fracture or complication of her hip arthroplasty.  Given overall stability of exam and improvement in pain, will send patient home on Tylenol, NSAIDs, as needed muscle relaxants.  Will have her closely follow-up with her primary care provider.    Problems Addressed:  Acute right-sided low back pain with right-sided sciatica: acute illness or injury  Right hip pain: acute illness or injury    Amount and/or Complexity of Data Reviewed  Radiology: ordered and independent interpretation performed.    Risk  OTC drugs.  Prescription drug management.             Medications   lidocaine (LIDODERM) 5 % patch 1 patch  (1 patch Topical Medication Applied 12/30/24 1512)   acetaminophen (TYLENOL) tablet 975 mg (975 mg Oral Given 12/30/24 1511)   ketorolac (TORADOL) injection 30 mg (30 mg Intramuscular Given 12/30/24 1511)   methocarbamol (ROBAXIN) tablet 500 mg (500 mg Oral Given 12/30/24 1511)       ED Risk Strat Scores                          SBIRT 22yo+      Flowsheet Row Most Recent Value   Initial Alcohol Screen: US AUDIT-C     1. How often do you have a drink containing alcohol? 0 Filed at: 12/30/2024 1421   2. How many drinks containing alcohol do you have on a typical day you are drinking?  0 Filed at: 12/30/2024 1421   3a. Male UNDER 65: How often do you have five or more drinks on one occasion? 0 Filed at: 12/30/2024 1421   Audit-C Score 0 Filed at: 12/30/2024 1421   AJIT: How many times in the past year have you...    Used an illegal drug or used a prescription medication for non-medical reasons? Never Filed at: 12/30/2024 1421                            History of Present Illness       Chief Complaint   Patient presents with    Hip Pain     Patient presents to the ER via EMS from [facility].       Past Medical History:   Diagnosis Date    Anxiety     Cerebral palsy (HCC)     Depression     Hypertension     Pre-diabetes     Psychiatric disorder     Schizoaffective disorder (HCC)     Scoliosis       Past Surgical History:   Procedure Laterality Date    APPENDECTOMY      BUNIONECTOMY Bilateral     RI HEMIARTHROPLASTY HIP PARTIAL Right 9/23/2019    Procedure: HEMIARTHROPLASTY HIP (BIPOLAR);  Surgeon: Denis Etienne MD;  Location: [facility];  Service: Orthopedics      Family History   Problem Relation Age of Onset    Hypertension Mother     Heart disease Mother     Prostate cancer Father     Heart disease Father     Substance Abuse Neg Hx     Mental illness Neg Hx       Social History     Tobacco Use    Smoking status: Never    Smokeless tobacco: Never   Vaping Use    Vaping status: Never Used   Substance Use Topics     Alcohol use: Never    Drug use: Never      E-Cigarette/Vaping    E-Cigarette Use Never User       E-Cigarette/Vaping Substances    Nicotine No     THC No     CBD No     Flavoring No     Other No     Unknown No       I have reviewed and agree with the history as documented.     The patient is a 64-year-old female with PMH of anxiety, Asperger syndrome, schizoaffective disorder, depression, HTN, CP, and T2DM brought in by EMS from [facility] for 4 days of right hip pain.  The patient notes slipping and falling on Thursday (5 days PTA).  She reports banging her right shin on the ground and falling onto her right hip/buttock.  She reports since that time having right hip since that time with difficulty ambulating and bearing weight.  She denies bruising or swelling to the right hip.  She does report prior right hip total arthroplasty.  She denies head strike, loss of consciousness, and does not take aspirin or blood thinners.      History provided by:  Patient   used: No    Hip Pain  Associated symptoms: no rash        Review of Systems   Musculoskeletal:  Positive for arthralgias (Right hip), back pain (Right buttock and right hip) and gait problem. Negative for joint swelling.   Skin:  Negative for rash and wound.   All other systems reviewed and are negative.          Objective       ED Triage Vitals [12/30/24 1418]   Temperature Pulse Blood Pressure Respirations SpO2 Patient Position - Orthostatic VS   98.4 °F (36.9 °C) 82 134/81 18 98 % Lying      Temp Source Heart Rate Source BP Location FiO2 (%) Pain Score    Oral Monitor Left arm -- 2      Vitals      Date and Time Temp Pulse SpO2 Resp BP Pain Score FACES Pain Rating User   12/30/24 1511 -- -- -- -- -- 2 -- RD   12/30/24 1418 98.4 °F (36.9 °C) 82 98 % 18 134/81 2 -- CAC            Physical Exam  Vitals and nursing note reviewed.   Constitutional:       General: She is not in acute distress.     Appearance: Normal appearance. She is  normal weight. She is not ill-appearing, toxic-appearing or diaphoretic.   HENT:      Head: Normocephalic and atraumatic. No contusion or laceration.      Jaw: There is normal jaw occlusion. No tenderness, swelling, pain on movement or malocclusion.      Right Ear: No hemotympanum.      Left Ear: No hemotympanum.      Nose: Nose normal. No nasal deformity or signs of injury.      Mouth/Throat:      Lips: Pink.      Mouth: Mucous membranes are moist.      Pharynx: Oropharynx is clear. Uvula midline.   Eyes:      General: Lids are normal. Vision grossly intact. Gaze aligned appropriately.      Extraocular Movements: Extraocular movements intact.      Right eye: No nystagmus.      Left eye: No nystagmus.      Conjunctiva/sclera: Conjunctivae normal.      Pupils: Pupils are equal, round, and reactive to light.   Neck:      Trachea: Phonation normal. No abnormal tracheal secretions.   Cardiovascular:      Rate and Rhythm: Normal rate and regular rhythm.      Pulses:           Radial pulses are 2+ on the right side and 2+ on the left side.        Posterior tibial pulses are 2+ on the right side and 2+ on the left side.      Heart sounds: Normal heart sounds, S1 normal and S2 normal.   Pulmonary:      Effort: Pulmonary effort is normal. No tachypnea or respiratory distress.      Breath sounds: Normal breath sounds and air entry.   Abdominal:      Palpations: Abdomen is soft.      Tenderness: There is no abdominal tenderness.   Musculoskeletal:         General: Normal range of motion.      Cervical back: Normal, full passive range of motion without pain, normal range of motion and neck supple. No bony tenderness. No spinous process tenderness.      Thoracic back: Normal. No bony tenderness.      Lumbar back: Tenderness (Right lumbar paraspinal muscle tenderness, right buttock tenderness, no overlying skin changes including erythema or ecchymosis or evidence of trauma) present. No swelling, deformity or bony tenderness.  Normal range of motion.      Right hip: Tenderness (Right anterolateral thigh) present. No deformity or bony tenderness. Normal range of motion.      Left hip: Normal.      Right knee: Normal.      Left knee: Normal.      Right ankle: Normal.      Left ankle: Normal.      Right foot: Normal.      Left foot: Normal.   Skin:     General: Skin is warm and dry.      Capillary Refill: Capillary refill takes less than 2 seconds.      Findings: No rash or wound.   Neurological:      General: No focal deficit present.      Mental Status: She is alert and oriented to person, place, and time. Mental status is at baseline.      Cranial Nerves: Cranial nerves 2-12 are intact.      Sensory: Sensation is intact.      Motor: Motor function is intact.      Gait: Gait is intact.   Psychiatric:         Behavior: Behavior is cooperative.         Results Reviewed       None            XR spine lumbar 2 or 3 views injury   ED Interpretation by KAM Strauss (12/30 1534)   No acute fracture or bony abnormality identified by me.      XR hip/pelv 2-3 vws right   ED Interpretation by KAM Strauss (12/30 1534)   No acute fracture or bony abnormality identified by me.          Procedures    ED Medication and Procedure Management   Prior to Admission Medications   Prescriptions Last Dose Informant Patient Reported? Taking?   ARIPiprazole (ABILIFY) 15 mg tablet  Outside Facility (Specify) Yes No   Sig: Take 15 mg by mouth daily   Belsomra 15 MG TABS   Yes No   Fluocinonide 0.1 % CREA   Yes No   Menatetrenone (Vitamin K2) 100 MCG TABS  Outside Facility (Specify) No No   Sig: Take 100 mg by mouth daily   Mirabegron ER 50 MG TB24   No No   Sig: Take 1 tablet (50 mg total) by mouth in the morning   Multiple Vitamins-Minerals (multivitamin with minerals) tablet  Outside Facility (Specify) No No   Sig: Take 1 tablet by mouth daily   OLANZapine (ZyPREXA) 7.5 mg tablet  Outside Facility (Specify) No No   Sig: Take 1 tablet (7.5 mg total) by  mouth daily at bedtime   Patient not taking: Reported on 3/8/2023   Polyvinyl Alcohol-Povidone PF (Refresh) 1.4-0.6 % SOLN  Outside Facility (Specify) Yes No   Sig: Apply to eye   Refresh Tears 0.5 % SOLN   Yes No   acetaminophen (TYLENOL) 325 mg tablet  Outside Facility (Specify) No No   Sig: Take 2 tablets (650 mg total) by mouth every 6 (six) hours as needed for moderate pain or fever   amantadine (SYMMETREL) 100 mg capsule  Outside Facility (Specify) No No   Sig: Take 1 capsule (100 mg total) by mouth 2 (two) times a day   Patient not taking: Reported on 3/8/2023   atorvastatin (LIPITOR) 10 mg tablet  Outside Facility (Specify) Yes No   Sig: Take 10 mg by mouth daily   cholecalciferol (VITAMIN D3) 1,000 units tablet  Outside Facility (Specify) No No   Sig: Take 1 tablet (1,000 Units total) by mouth daily   docusate sodium (COLACE) 100 mg capsule  Outside Facility (Specify) Yes No   Sig: Take 100 mg by mouth 2 (two) times a day   fluticasone (FLONASE) 50 mcg/act nasal spray  Outside Facility (Specify) Yes No   Si spray into each nostril daily   loperamide (IMODIUM) 2 mg capsule  Outside Facility (Specify) No No   Sig: Take 1 capsule (2 mg total) by mouth 3 (three) times a day as needed for diarrhea   losartan (COZAAR) 50 mg tablet  Outside Facility (Specify) No No   Sig: Take 1 tablet (50 mg total) by mouth daily   Patient not taking: Reported on 3/8/2023   melatonin 3 mg  Outside Facility (Specify) No No   Sig: Take 1 tablet (3 mg total) by mouth daily at bedtime   melatonin 3 mg  Outside Facility (Specify) No No   Sig: Take 2 tablets (6 mg total) by mouth daily at bedtime   mirtazapine (REMERON) 15 mg tablet  Outside Facility (Specify) No No   Sig: Take 1 tablet (15 mg total) by mouth daily at bedtime   Patient not taking: Reported on 3/8/2023   nystatin (MYCOSTATIN) powder  Outside Facility (Specify) No No   Sig: Apply topically 3 (three) times a day   omeprazole (PriLOSEC) 10 mg delayed release capsule   Outside Facility (Specify) Yes No   Sig: Take 20 mg by mouth daily   senna (SENOKOT) 8.6 MG tablet  Outside Facility (Specify) Yes No   Sig: Take 1 tablet by mouth 2 (two) times a day   sertraline (ZOLOFT) 50 mg tablet  Outside Facility (Specify) No No   Sig: Take 1 tablet (50 mg total) by mouth daily   Patient not taking: Reported on 3/8/2023   sitaGLIPtin (JANUVIA) 50 mg tablet  Outside Facility (Specify) No No   Sig: Take 1 tablet (50 mg total) by mouth daily   traZODone (DESYREL) 100 mg tablet  Outside Facility (Specify) No No   Sig: Take 1 tablet (100 mg total) by mouth daily at bedtime   vortioxetine (TRINTELLIX) 10 MG tablet  Outside Facility (Specify) Yes No   Sig: Take 10 mg by mouth daily      Facility-Administered Medications: None     Patient's Medications   Discharge Prescriptions    ACETAMINOPHEN (TYLENOL) 500 MG TABLET    Take 2 tablets (1,000 mg total) by mouth 3 (three) times a day as needed for mild pain or moderate pain for up to 5 days       Start Date: 12/30/2024End Date: 1/4/2025       Order Dose: 1,000 mg       Quantity: 30 tablet    Refills: 0    IBUPROFEN (MOTRIN) 600 MG TABLET    Take 1 tablet (600 mg total) by mouth 3 (three) times a day with meals for 3 days, THEN 1 tablet (600 mg total) 3 (three) times a day as needed for mild pain for up to 3 days.       Start Date: 12/30/2024End Date: 1/5/2025       Order Dose: --       Quantity: 18 tablet    Refills: 0    TIZANIDINE (ZANAFLEX) 4 MG CAPSULE    Take 1 capsule (4 mg total) by mouth 3 (three) times a day as needed for muscle spasms for up to 5 days       Start Date: 12/30/2024End Date: 1/4/2025       Order Dose: 4 mg       Quantity: 15 capsule    Refills: 0     No discharge procedures on file.  ED SEPSIS DOCUMENTATION   Time reflects when diagnosis was documented in both MDM as applicable and the Disposition within this note       Time User Action Codes Description Comment    12/30/2024  4:35 PM Marizol Reardon [M54.41] Acute  right-sided low back pain with right-sided sciatica     12/30/2024  4:35 PM Marizol Reardon Add [M25.551] Right hip pain                  KAM Strauss  12/30/24 173

## 2024-12-30 NOTE — DISCHARGE INSTRUCTIONS
Use prescribed medications to treat your acute pain and inflammation.  Your x-rays are without evidence of fracture or hardware malfunction.  Your range of motion and strength is normal.  We do suspect lumbar strain, bone contusion, and possibly lumbar radiculopathy.  Please follow-up with your primary care doctor for reevaluation in 3 to 5 days.    Return to the ER if develop fever, new redness or warmth or swelling of the right hip, or with any subsequent fall or trauma.

## 2025-02-10 NOTE — SOCIAL WORK
Apt switched to  04/03/25 @11:30.    GAURAV placed follow up call to PT brother Sulaiman Mcfadden    PT brother indicated that he spoke with his mother and that they are in agreement to spending the amount indicated by the [address]  He will reach out to [facility] with [facility] to review  CM provided him Reyna's cell phone number  Call ended mutually  CM placed call to [facility]  [phone] to let her know CM spoke with PT brother and that he is in agreement, left message requesting return call

## 2025-03-06 ENCOUNTER — HOSPITAL ENCOUNTER (OUTPATIENT)
Dept: MAMMOGRAPHY | Facility: IMAGING CENTER | Age: 65
Discharge: HOME | End: 2025-03-06
Payer: MEDICARE

## 2025-03-06 VITALS — BODY MASS INDEX: 28.82 KG/M2 | WEIGHT: 173 LBS | HEIGHT: 65 IN

## 2025-03-06 DIAGNOSIS — Z12.31 VISIT FOR SCREENING MAMMOGRAM: ICD-10-CM

## 2025-03-06 PROCEDURE — 77063 BREAST TOMOSYNTHESIS BI: CPT

## 2025-03-06 PROCEDURE — 77067 SCR MAMMO BI INCL CAD: CPT

## (undated) DEVICE — NEEDLE HYPO 22G X 1-1/2 IN

## (undated) DEVICE — ANTIBACTERIAL VIOLET BRAIDED (POLYGLACTIN 910), SYNTHETIC ABSORBABLE SURGICAL SUTURE: Brand: COATED VICRYL

## (undated) DEVICE — SYRINGE 20ML LL

## (undated) DEVICE — SUT VICRYL 0 CT-1 27 IN J260H

## (undated) DEVICE — 450 ML BOTTLE OF 0.05% CHLORHEXIDINE GLUCONATE IN 99.95% STERILE WATER FOR IRRIGATION, USP AND APPLICATOR.: Brand: IRRISEPT ANTIMICROBIAL WOUND LAVAGE

## (undated) DEVICE — ABDUCTION PILLOW FOAM POSITIONER: Brand: CARDINAL HEALTH

## (undated) DEVICE — 2000CC GUARDIAN II: Brand: GUARDIAN

## (undated) DEVICE — CAPIT HIP BIPOLAR HEAD POR PRIMARY

## (undated) DEVICE — 3M™ IOBAN™ 2 ANTIMICROBIAL INCISE DRAPE 6650EZ: Brand: IOBAN™ 2

## (undated) DEVICE — PACK MAJOR ORTHO W/SPLITS PBDS

## (undated) DEVICE — SUT STRATAFIX SPIRAL 3-0 PGA/PCL 30 X 30 CM SXMD2B408

## (undated) DEVICE — INTENDED FOR TISSUE SEPARATION, AND OTHER PROCEDURES THAT REQUIRE A SHARP SURGICAL BLADE TO PUNCTURE OR CUT.: Brand: BARD-PARKER SAFETY BLADES SIZE 10, STERILE

## (undated) DEVICE — GLOVE SRG BIOGEL ORTHOPEDIC 7

## (undated) DEVICE — 3M™ STERI-STRIP™ REINFORCED ADHESIVE SKIN CLOSURES, R1547, 1/2 IN X 4 IN (12 MM X 100 MM), 6 STRIPS/ENVELOPE: Brand: 3M™ STERI-STRIP™

## (undated) DEVICE — GLOVE SRG BIOGEL 7

## (undated) DEVICE — HEAVY DUTY TABLE COVER: Brand: CONVERTORS

## (undated) DEVICE — THE SIMPULSE SOLO SYSTEM WITH ULTREX RETRACTABLE SPLASH SHIELD TIP: Brand: SIMPULSE SOLO

## (undated) DEVICE — BIPOLAR SEALER 23-301-1 AQM MBS: Brand: AQUAMANTYS™

## (undated) DEVICE — HOOD: Brand: FLYTE, SURGICOOL

## (undated) DEVICE — CAPIT HIP STEM POR PRIMARY

## (undated) DEVICE — 5065 IMPAD REGULAR PAIR: Brand: A-V IMPULSE

## (undated) DEVICE — CHLORAPREP HI-LITE 26ML ORANGE

## (undated) DEVICE — TUBING SUCTION 5MM X 12 FT

## (undated) DEVICE — GLOVE INDICATOR PI UNDERGLOVE SZ 7 BLUE

## (undated) DEVICE — YELLOW BOAT

## (undated) DEVICE — SUT VICRYL 2-0 CT-1 27 IN J259H

## (undated) DEVICE — GLOVE INDICATOR PI UNDERGLOVE SZ 8 BLUE

## (undated) DEVICE — IMPERVIOUS STOCKINETTE: Brand: DEROYAL

## (undated) DEVICE — 3M™ STERI-DRAPE™ U-DRAPE 1015: Brand: STERI-DRAPE™

## (undated) DEVICE — GLOVE SRG BIOGEL 7.5

## (undated) DEVICE — GLOVE INDICATOR PI UNDERGLOVE SZ 7.5 BLUE

## (undated) DEVICE — 3M™ STERI-STRIP™ COMPOUND BENZOIN TINCTURE 40 BAGS/CARTON 4 CARTONS/CASE C1544: Brand: 3M™ STERI-STRIP™

## (undated) DEVICE — SAW BLADE RECIPROCATING SINGLE SIDED 258 ORTHO